# Patient Record
Sex: MALE | Race: WHITE | NOT HISPANIC OR LATINO
[De-identification: names, ages, dates, MRNs, and addresses within clinical notes are randomized per-mention and may not be internally consistent; named-entity substitution may affect disease eponyms.]

---

## 2014-11-28 RX ORDER — DOCUSATE SODIUM 100 MG
1 CAPSULE ORAL
Qty: 0 | Refills: 0 | DISCHARGE
Start: 2014-11-28

## 2016-12-01 RX ORDER — ACETAMINOPHEN 500 MG
2 TABLET ORAL
Qty: 0 | Refills: 0 | DISCHARGE
Start: 2016-12-01

## 2016-12-01 RX ORDER — ASPIRIN/CALCIUM CARB/MAGNESIUM 324 MG
1 TABLET ORAL
Qty: 0 | Refills: 0 | DISCHARGE
Start: 2016-12-01

## 2017-01-03 ENCOUNTER — APPOINTMENT (OUTPATIENT)
Dept: ORTHOPEDIC SURGERY | Facility: CLINIC | Age: 72
End: 2017-01-03

## 2017-01-03 RX ORDER — DULOXETINE HYDROCHLORIDE 60 MG/1
60 CAPSULE, DELAYED RELEASE PELLETS ORAL
Qty: 90 | Refills: 0 | Status: ACTIVE | COMMUNITY
Start: 2016-08-26

## 2017-01-03 RX ORDER — NEPAFENAC 3 MG/ML
0.3 SUSPENSION/ DROPS OPHTHALMIC
Qty: 2 | Refills: 0 | Status: ACTIVE | COMMUNITY
Start: 2016-08-05

## 2017-01-11 ENCOUNTER — APPOINTMENT (OUTPATIENT)
Dept: ORTHOPEDIC SURGERY | Facility: CLINIC | Age: 72
End: 2017-01-11

## 2017-01-25 ENCOUNTER — APPOINTMENT (OUTPATIENT)
Dept: ORTHOPEDIC SURGERY | Facility: CLINIC | Age: 72
End: 2017-01-25

## 2017-02-08 ENCOUNTER — APPOINTMENT (OUTPATIENT)
Dept: ORTHOPEDIC SURGERY | Facility: CLINIC | Age: 72
End: 2017-02-08

## 2017-04-18 ENCOUNTER — MEDICATION RENEWAL (OUTPATIENT)
Age: 72
End: 2017-04-18

## 2017-05-03 ENCOUNTER — APPOINTMENT (OUTPATIENT)
Dept: ORTHOPEDIC SURGERY | Facility: CLINIC | Age: 72
End: 2017-05-03

## 2017-05-03 VITALS — WEIGHT: 188 LBS | BODY MASS INDEX: 28.49 KG/M2 | HEIGHT: 68 IN

## 2017-05-03 DIAGNOSIS — M25.559 PAIN IN UNSPECIFIED HIP: ICD-10-CM

## 2017-06-13 ENCOUNTER — APPOINTMENT (OUTPATIENT)
Dept: PULMONOLOGY | Facility: CLINIC | Age: 72
End: 2017-06-13

## 2017-06-13 VITALS
RESPIRATION RATE: 17 BRPM | WEIGHT: 188 LBS | BODY MASS INDEX: 28.49 KG/M2 | HEART RATE: 85 BPM | DIASTOLIC BLOOD PRESSURE: 70 MMHG | SYSTOLIC BLOOD PRESSURE: 130 MMHG | OXYGEN SATURATION: 100 % | HEIGHT: 68 IN

## 2017-06-13 DIAGNOSIS — Z78.9 OTHER SPECIFIED HEALTH STATUS: ICD-10-CM

## 2017-06-13 DIAGNOSIS — Z56.0 UNEMPLOYMENT, UNSPECIFIED: ICD-10-CM

## 2017-06-13 DIAGNOSIS — Z82.69 FAMILY HISTORY OF OTHER DISEASES OF THE MUSCULOSKELETAL SYSTEM AND CONNECTIVE TISSUE: ICD-10-CM

## 2017-06-13 DIAGNOSIS — F19.90 OTHER PSYCHOACTIVE SUBSTANCE USE, UNSPECIFIED, UNCOMPLICATED: ICD-10-CM

## 2017-06-13 DIAGNOSIS — Z77.22 CONTACT WITH AND (SUSPECTED) EXPOSURE TO ENVIRONMENTAL TOBACCO SMOKE (ACUTE) (CHRONIC): ICD-10-CM

## 2017-06-13 DIAGNOSIS — Z60.2 PROBLEMS RELATED TO LIVING ALONE: ICD-10-CM

## 2017-06-13 DIAGNOSIS — Z80.9 FAMILY HISTORY OF MALIGNANT NEOPLASM, UNSPECIFIED: ICD-10-CM

## 2017-06-13 RX ORDER — PREDNISONE 5 MG/1
5 TABLET ORAL
Qty: 50 | Refills: 0 | Status: DISCONTINUED | COMMUNITY
Start: 2016-09-19 | End: 2017-06-13

## 2017-06-13 RX ORDER — MONTELUKAST SODIUM 10 MG/1
10 TABLET, FILM COATED ORAL
Qty: 1 | Refills: 1 | Status: ACTIVE | COMMUNITY
Start: 2017-06-13 | End: 1900-01-01

## 2017-06-13 RX ORDER — BESIFLOXACIN 6 MG/ML
0.6 SUSPENSION OPHTHALMIC
Qty: 5 | Refills: 0 | Status: DISCONTINUED | COMMUNITY
Start: 2016-07-01 | End: 2017-06-13

## 2017-06-13 RX ORDER — DULOXETINE HYDROCHLORIDE 60 MG/1
60 CAPSULE, DELAYED RELEASE PELLETS ORAL
Qty: 90 | Refills: 0 | Status: DISCONTINUED | COMMUNITY
Start: 2016-08-26 | End: 2017-06-13

## 2017-06-13 RX ORDER — CLINDAMYCIN HYDROCHLORIDE 300 MG/1
300 CAPSULE ORAL 3 TIMES DAILY
Qty: 30 | Refills: 0 | Status: DISCONTINUED | COMMUNITY
Start: 2017-01-03 | End: 2017-06-13

## 2017-06-13 SDOH — ECONOMIC STABILITY - INCOME SECURITY: UNEMPLOYMENT, UNSPECIFIED: Z56.0

## 2017-06-13 SDOH — SOCIAL STABILITY - SOCIAL INSECURITY: PROBLEMS RELATED TO LIVING ALONE: Z60.2

## 2017-07-11 ENCOUNTER — MEDICATION RENEWAL (OUTPATIENT)
Age: 72
End: 2017-07-11

## 2017-07-27 ENCOUNTER — RX RENEWAL (OUTPATIENT)
Age: 72
End: 2017-07-27

## 2017-07-31 ENCOUNTER — APPOINTMENT (OUTPATIENT)
Dept: PULMONOLOGY | Facility: CLINIC | Age: 72
End: 2017-07-31
Payer: MEDICARE

## 2017-07-31 VITALS
OXYGEN SATURATION: 100 % | HEART RATE: 68 BPM | BODY MASS INDEX: 21.22 KG/M2 | WEIGHT: 140 LBS | HEIGHT: 68 IN | RESPIRATION RATE: 16 BRPM

## 2017-07-31 VITALS — WEIGHT: 188 LBS | SYSTOLIC BLOOD PRESSURE: 140 MMHG | DIASTOLIC BLOOD PRESSURE: 80 MMHG | BODY MASS INDEX: 28.59 KG/M2

## 2017-07-31 PROCEDURE — 94010 BREATHING CAPACITY TEST: CPT

## 2017-07-31 PROCEDURE — 99214 OFFICE O/P EST MOD 30 MIN: CPT | Mod: 25

## 2017-08-02 ENCOUNTER — RESULT REVIEW (OUTPATIENT)
Age: 72
End: 2017-08-02

## 2017-08-10 ENCOUNTER — MEDICATION RENEWAL (OUTPATIENT)
Age: 72
End: 2017-08-10

## 2017-08-10 RX ORDER — TIOTROPIUM BROMIDE 18 UG/1
18 CAPSULE ORAL; RESPIRATORY (INHALATION)
Qty: 3 | Refills: 1 | Status: DISCONTINUED | COMMUNITY
End: 2017-08-10

## 2017-08-15 RX ORDER — FLUTICASONE FUROATE 200 UG/1
200 POWDER RESPIRATORY (INHALATION)
Qty: 3 | Refills: 1 | Status: ACTIVE | COMMUNITY
Start: 2017-08-15 | End: 1900-01-01

## 2017-08-15 RX ORDER — BECLOMETHASONE DIPROPIONATE 80 UG/1
80 AEROSOL, METERED RESPIRATORY (INHALATION) TWICE DAILY
Qty: 3 | Refills: 1 | Status: DISCONTINUED | COMMUNITY
Start: 2017-08-10 | End: 2017-08-15

## 2017-09-05 ENCOUNTER — EMERGENCY (EMERGENCY)
Facility: HOSPITAL | Age: 72
LOS: 1 days | Discharge: HOME | End: 2017-09-05
Attending: EMERGENCY MEDICINE | Admitting: EMERGENCY MEDICINE
Payer: MEDICARE

## 2017-09-05 VITALS — TEMPERATURE: 99 F | OXYGEN SATURATION: 96 % | RESPIRATION RATE: 18 BRPM | HEART RATE: 65 BPM

## 2017-09-05 DIAGNOSIS — M25.561 PAIN IN RIGHT KNEE: ICD-10-CM

## 2017-09-05 DIAGNOSIS — Z96.60 PRESENCE OF UNSPECIFIED ORTHOPEDIC JOINT IMPLANT: Chronic | ICD-10-CM

## 2017-09-05 DIAGNOSIS — G56.01 CARPAL TUNNEL SYNDROME, RIGHT UPPER LIMB: Chronic | ICD-10-CM

## 2017-09-05 DIAGNOSIS — Z98.89 OTHER SPECIFIED POSTPROCEDURAL STATES: Chronic | ICD-10-CM

## 2017-09-05 PROCEDURE — 99284 EMERGENCY DEPT VISIT MOD MDM: CPT | Mod: 25

## 2017-09-05 PROCEDURE — 99284 EMERGENCY DEPT VISIT MOD MDM: CPT

## 2017-09-05 PROCEDURE — 73564 X-RAY EXAM KNEE 4 OR MORE: CPT

## 2017-09-05 PROCEDURE — 73130 X-RAY EXAM OF HAND: CPT | Mod: 26,RT

## 2017-09-05 PROCEDURE — 73110 X-RAY EXAM OF WRIST: CPT

## 2017-09-05 PROCEDURE — 73130 X-RAY EXAM OF HAND: CPT

## 2017-09-05 PROCEDURE — 73110 X-RAY EXAM OF WRIST: CPT | Mod: 26,RT

## 2017-09-05 PROCEDURE — 73564 X-RAY EXAM KNEE 4 OR MORE: CPT | Mod: 26,RT
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
## 2017-09-05 NOTE — ED PROVIDER NOTE - OBJECTIVE STATEMENT
71 [facility] male [facility] who presents to the ED following fall last night in driveway. patient reports was trying to move a baby gate that got caught in a tree right sided fall onto the knee and right hand. no headstrike or LOC. ambulatory all day but has persistent hand and wrist pain as well as mild knee pain. ambulatory with pain. no numbness/tingling. tdap  UTD

## 2017-09-05 NOTE — ED PROVIDER NOTE - CARE PLAN
Principal Discharge DX:	Knee pain Principal Discharge DX:	Knee pain  Instructions for follow-up, activity and diet:	Follow up with your Primary Care Physician within the next 2-3 days  Bring a copy of your test results with you to your appointment  Continue your current medication regimen  Return to the Emergency Room if you experience new or worsening symptoms   Take Motrin 400-600mg every 6 hrs as needed for pain. Take with food   Take Tylenol 650mg every 6 hrs as needed for pain.  Rest. Apply cold pack for 20 minutes multiple times daily. Elevate.

## 2017-09-05 NOTE — ED ADULT NURSE NOTE - PSH
Carpal tunnel syndrome on both sides  2014  History of lithotripsy  2012  Previous back surgery  L1-L5   June 2014 first surgery got infected; + MRSA  antibiotic given  July 2014 incision and drainage of infected wound  S/P left knee arthroscopy  2012  S/P right knee arthroscopy  1997  S/P tonsillectomy and adenoidectomy  childhood  Status post right hip replacement  1995

## 2017-09-05 NOTE — ED PROVIDER NOTE - PLAN OF CARE
Follow up with your Primary Care Physician within the next 2-3 days  Bring a copy of your test results with you to your appointment  Continue your current medication regimen  Return to the Emergency Room if you experience new or worsening symptoms   Take Motrin 400-600mg every 6 hrs as needed for pain. Take with food   Take Tylenol 650mg every 6 hrs as needed for pain.  Rest. Apply cold pack for 20 minutes multiple times daily. Elevate.

## 2017-09-05 NOTE — ED PROVIDER NOTE - ATTENDING CONTRIBUTION TO CARE
Attending MD Gomez:   I personally have seen and examined this patient.  Physician assistant note reviewed and agree on plan of care and except where noted.  See below for details.    72M with extensive PMH including COPD, GERD presents to the ED s/p fall yesterday.  Reports that he was trying to get a babygate but it was caught on a bush so when he went to move it, he was thrown off balance and fell onto his R side.  Reports landed on his R wrist/hand/knee. Denies preceding dizziness, weakness, sensory changes, change in vision.  Denies LOC. Denies hitting head.  Reports decided to come in because of persistent pain.  Reports ambulated immediately after incident.  Denies numbness/weakness.  Reports Tetanus UTD.  On exam, NAD, head NCAT, PERRL, Cn2-12 grossly intact, FROM at neck, no tenderness to palpation or stepoffs along length of spine, lungs CTAB with good inspiratory effort, +S1S2, no m/r/g, abdomen soft with +BS, NT, ND, no CVAT, moving all extremities with 5/5 strength bilateral upper and lower extremities, good and equal  strength bilaterally, +2 radials, +tenderness to palpation along R dorsal hand at proximal 3rd metacarpal, scattered ecchymosis over hand and forearm,  well healed surgical scars on bilateral knees, reports pain with ROM of wrist (in any direction) but no tenderness to palpation, no anatomic snuffbox tenderness, no tenderness with axial loading of R thumb, abrasion to R knee and R wrist, no surrounding erythema, no purulence, no active bleeding; A/P: 72M s/p fall now with MSK pain, will obtain XRs of R hand and wrist to r/o fracture and R knee

## 2017-09-05 NOTE — ED ADULT NURSE NOTE - OBJECTIVE STATEMENT
Trip and fall in driveway and fell in his right wrist and hand pain.  Pt also has right knee pain that radiates up to the thigh [place]

## 2017-09-11 ENCOUNTER — APPOINTMENT (OUTPATIENT)
Dept: UROLOGY | Facility: CLINIC | Age: 72
End: 2017-09-11
Payer: MEDICARE

## 2017-09-11 ENCOUNTER — FORM ENCOUNTER (OUTPATIENT)
Age: 72
End: 2017-09-11

## 2017-09-11 VITALS
HEART RATE: 70 BPM | DIASTOLIC BLOOD PRESSURE: 79 MMHG | TEMPERATURE: 98.9 F | SYSTOLIC BLOOD PRESSURE: 136 MMHG | RESPIRATION RATE: 16 BRPM

## 2017-09-11 PROCEDURE — 99213 OFFICE O/P EST LOW 20 MIN: CPT

## 2017-09-12 ENCOUNTER — OUTPATIENT (OUTPATIENT)
Dept: OUTPATIENT SERVICES | Facility: HOSPITAL | Age: 72
LOS: 1 days | End: 2017-09-12
Payer: MEDICARE

## 2017-09-12 ENCOUNTER — APPOINTMENT (OUTPATIENT)
Dept: CT IMAGING | Facility: CLINIC | Age: 72
End: 2017-09-12
Payer: MEDICARE

## 2017-09-12 DIAGNOSIS — Z98.89 OTHER SPECIFIED POSTPROCEDURAL STATES: Chronic | ICD-10-CM

## 2017-09-12 DIAGNOSIS — Z00.8 ENCOUNTER FOR OTHER GENERAL EXAMINATION: ICD-10-CM

## 2017-09-12 DIAGNOSIS — G56.01 CARPAL TUNNEL SYNDROME, RIGHT UPPER LIMB: Chronic | ICD-10-CM

## 2017-09-12 DIAGNOSIS — Z96.60 PRESENCE OF UNSPECIFIED ORTHOPEDIC JOINT IMPLANT: Chronic | ICD-10-CM

## 2017-09-12 PROCEDURE — 71250 CT THORAX DX C-: CPT | Mod: 26

## 2017-09-12 PROCEDURE — 71250 CT THORAX DX C-: CPT

## 2017-09-14 ENCOUNTER — MEDICATION RENEWAL (OUTPATIENT)
Age: 72
End: 2017-09-14

## 2017-09-14 ENCOUNTER — RX RENEWAL (OUTPATIENT)
Age: 72
End: 2017-09-14

## 2017-09-25 ENCOUNTER — RX RENEWAL (OUTPATIENT)
Age: 72
End: 2017-09-25

## 2017-09-27 ENCOUNTER — NON-APPOINTMENT (OUTPATIENT)
Age: 72
End: 2017-09-27

## 2017-09-27 ENCOUNTER — APPOINTMENT (OUTPATIENT)
Dept: CARDIOLOGY | Facility: CLINIC | Age: 72
End: 2017-09-27
Payer: MEDICARE

## 2017-09-27 VITALS — SYSTOLIC BLOOD PRESSURE: 124 MMHG | DIASTOLIC BLOOD PRESSURE: 80 MMHG

## 2017-09-27 VITALS
WEIGHT: 193.8 LBS | HEIGHT: 68 IN | HEART RATE: 76 BPM | OXYGEN SATURATION: 99 % | DIASTOLIC BLOOD PRESSURE: 87 MMHG | SYSTOLIC BLOOD PRESSURE: 158 MMHG | BODY MASS INDEX: 29.37 KG/M2

## 2017-09-27 PROCEDURE — 93000 ELECTROCARDIOGRAM COMPLETE: CPT

## 2017-09-27 PROCEDURE — 99215 OFFICE O/P EST HI 40 MIN: CPT

## 2017-09-29 ENCOUNTER — MEDICATION RENEWAL (OUTPATIENT)
Age: 72
End: 2017-09-29

## 2017-10-04 ENCOUNTER — APPOINTMENT (OUTPATIENT)
Dept: ORTHOPEDIC SURGERY | Facility: CLINIC | Age: 72
End: 2017-10-04
Payer: MEDICARE

## 2017-10-04 PROCEDURE — 99213 OFFICE O/P EST LOW 20 MIN: CPT

## 2017-10-04 PROCEDURE — 72170 X-RAY EXAM OF PELVIS: CPT

## 2017-10-13 ENCOUNTER — APPOINTMENT (OUTPATIENT)
Dept: THORACIC SURGERY | Facility: CLINIC | Age: 72
End: 2017-10-13
Payer: MEDICARE

## 2017-10-13 ENCOUNTER — OUTPATIENT (OUTPATIENT)
Dept: OUTPATIENT SERVICES | Facility: HOSPITAL | Age: 72
LOS: 1 days | End: 2017-10-13
Payer: MEDICARE

## 2017-10-13 VITALS
SYSTOLIC BLOOD PRESSURE: 148 MMHG | HEIGHT: 68 IN | HEART RATE: 86 BPM | BODY MASS INDEX: 29.55 KG/M2 | WEIGHT: 195 LBS | RESPIRATION RATE: 18 BRPM | TEMPERATURE: 97.3 F | OXYGEN SATURATION: 96 % | DIASTOLIC BLOOD PRESSURE: 68 MMHG

## 2017-10-13 DIAGNOSIS — Z98.89 OTHER SPECIFIED POSTPROCEDURAL STATES: Chronic | ICD-10-CM

## 2017-10-13 DIAGNOSIS — Z96.60 PRESENCE OF UNSPECIFIED ORTHOPEDIC JOINT IMPLANT: Chronic | ICD-10-CM

## 2017-10-13 DIAGNOSIS — G56.01 CARPAL TUNNEL SYNDROME, RIGHT UPPER LIMB: Chronic | ICD-10-CM

## 2017-10-13 DIAGNOSIS — Z01.818 ENCOUNTER FOR OTHER PREPROCEDURAL EXAMINATION: ICD-10-CM

## 2017-10-13 LAB
ALBUMIN SERPL ELPH-MCNC: 4.4 G/DL — SIGNIFICANT CHANGE UP (ref 3.3–5)
ALP SERPL-CCNC: 107 U/L — SIGNIFICANT CHANGE UP (ref 40–120)
ALT FLD-CCNC: 12 U/L — SIGNIFICANT CHANGE UP (ref 10–45)
ANION GAP SERPL CALC-SCNC: 12 MMOL/L — SIGNIFICANT CHANGE UP (ref 5–17)
APPEARANCE UR: CLEAR — SIGNIFICANT CHANGE UP
APTT BLD: 32.4 SEC — SIGNIFICANT CHANGE UP (ref 27.5–37.4)
AST SERPL-CCNC: 13 U/L — SIGNIFICANT CHANGE UP (ref 10–40)
BASOPHILS NFR BLD AUTO: 0.6 % — SIGNIFICANT CHANGE UP (ref 0–2)
BILIRUB SERPL-MCNC: 0.3 MG/DL — SIGNIFICANT CHANGE UP (ref 0.2–1.2)
BILIRUB UR-MCNC: NEGATIVE — SIGNIFICANT CHANGE UP
BLD GP AB SCN SERPL QL: NEGATIVE — SIGNIFICANT CHANGE UP
BUN SERPL-MCNC: 16 MG/DL — SIGNIFICANT CHANGE UP (ref 7–23)
CALCIUM SERPL-MCNC: 9.2 MG/DL — SIGNIFICANT CHANGE UP (ref 8.4–10.5)
CHLORIDE SERPL-SCNC: 100 MMOL/L — SIGNIFICANT CHANGE UP (ref 96–108)
CHOLEST SERPL-MCNC: 229 MG/DL — HIGH (ref 10–199)
CO2 SERPL-SCNC: 27 MMOL/L — SIGNIFICANT CHANGE UP (ref 22–31)
COLOR SPEC: YELLOW — SIGNIFICANT CHANGE UP
CREAT SERPL-MCNC: 0.87 MG/DL — SIGNIFICANT CHANGE UP (ref 0.5–1.3)
DIFF PNL FLD: NEGATIVE — SIGNIFICANT CHANGE UP
EOSINOPHIL NFR BLD AUTO: 2.5 % — SIGNIFICANT CHANGE UP (ref 0–6)
GLUCOSE SERPL-MCNC: 88 MG/DL — SIGNIFICANT CHANGE UP (ref 70–99)
GLUCOSE UR QL: NEGATIVE — SIGNIFICANT CHANGE UP
HBA1C BLD-MCNC: 5.1 % — SIGNIFICANT CHANGE UP (ref 4–5.6)
HBV SURFACE AG SER-ACNC: SIGNIFICANT CHANGE UP
HCT VFR BLD CALC: 39.7 % — SIGNIFICANT CHANGE UP (ref 39–50)
HDLC SERPL-MCNC: 74 MG/DL — SIGNIFICANT CHANGE UP (ref 40–125)
HGB BLD-MCNC: 13.1 G/DL — SIGNIFICANT CHANGE UP (ref 13–17)
INR BLD: 0.92 — SIGNIFICANT CHANGE UP (ref 0.88–1.16)
KETONES UR-MCNC: NEGATIVE — SIGNIFICANT CHANGE UP
LEUKOCYTE ESTERASE UR-ACNC: NEGATIVE — SIGNIFICANT CHANGE UP
LIPID PNL WITH DIRECT LDL SERPL: 117 MG/DL — SIGNIFICANT CHANGE UP
LYMPHOCYTES # BLD AUTO: 23.7 % — SIGNIFICANT CHANGE UP (ref 13–44)
MCHC RBC-ENTMCNC: 30.8 PG — SIGNIFICANT CHANGE UP (ref 27–34)
MCHC RBC-ENTMCNC: 33 G/DL — SIGNIFICANT CHANGE UP (ref 32–36)
MCV RBC AUTO: 93.2 FL — SIGNIFICANT CHANGE UP (ref 80–100)
MONOCYTES NFR BLD AUTO: 7.4 % — SIGNIFICANT CHANGE UP (ref 2–14)
NEUTROPHILS NFR BLD AUTO: 65.8 % — SIGNIFICANT CHANGE UP (ref 43–77)
NITRITE UR-MCNC: NEGATIVE — SIGNIFICANT CHANGE UP
PH UR: 5.5 — SIGNIFICANT CHANGE UP (ref 5–8)
PLAT MORPH BLD: NORMAL — SIGNIFICANT CHANGE UP
PLATELET # BLD AUTO: 282 K/UL — SIGNIFICANT CHANGE UP (ref 150–400)
POTASSIUM SERPL-MCNC: 4 MMOL/L — SIGNIFICANT CHANGE UP (ref 3.5–5.3)
POTASSIUM SERPL-SCNC: 4 MMOL/L — SIGNIFICANT CHANGE UP (ref 3.5–5.3)
PROT SERPL-MCNC: 7.5 G/DL — SIGNIFICANT CHANGE UP (ref 6–8.3)
PROT UR-MCNC: NEGATIVE MG/DL — SIGNIFICANT CHANGE UP
PROTHROM AB SERPL-ACNC: 10.2 SEC — SIGNIFICANT CHANGE UP (ref 9.8–12.7)
RBC # BLD: 4.26 M/UL — SIGNIFICANT CHANGE UP (ref 4.2–5.8)
RBC # FLD: 13.1 % — SIGNIFICANT CHANGE UP (ref 10.3–16.9)
RBC BLD AUTO: NORMAL — SIGNIFICANT CHANGE UP
RH IG SCN BLD-IMP: POSITIVE — SIGNIFICANT CHANGE UP
SODIUM SERPL-SCNC: 139 MMOL/L — SIGNIFICANT CHANGE UP (ref 135–145)
SP GR SPEC: 1.01 — SIGNIFICANT CHANGE UP (ref 1–1.03)
TOTAL CHOLESTEROL/HDL RATIO MEASUREMENT: 3.1 RATIO — LOW (ref 3.4–9.6)
TRIGL SERPL-MCNC: 190 MG/DL — HIGH (ref 10–149)
TSH SERPL-MCNC: 1.53 UIU/ML — SIGNIFICANT CHANGE UP (ref 0.35–4.94)
UROBILINOGEN FLD QL: 0.2 E.U./DL — SIGNIFICANT CHANGE UP
WBC # BLD: 6.7 K/UL — SIGNIFICANT CHANGE UP (ref 3.8–10.5)
WBC # FLD AUTO: 6.7 K/UL — SIGNIFICANT CHANGE UP (ref 3.8–10.5)

## 2017-10-13 PROCEDURE — 93010 ELECTROCARDIOGRAM REPORT: CPT

## 2017-10-13 PROCEDURE — 87340 HEPATITIS B SURFACE AG IA: CPT

## 2017-10-13 PROCEDURE — 85730 THROMBOPLASTIN TIME PARTIAL: CPT

## 2017-10-13 PROCEDURE — 93005 ELECTROCARDIOGRAM TRACING: CPT

## 2017-10-13 PROCEDURE — 81003 URINALYSIS AUTO W/O SCOPE: CPT

## 2017-10-13 PROCEDURE — 85610 PROTHROMBIN TIME: CPT

## 2017-10-13 PROCEDURE — 86901 BLOOD TYPING SEROLOGIC RH(D): CPT

## 2017-10-13 PROCEDURE — 99205 OFFICE O/P NEW HI 60 MIN: CPT

## 2017-10-13 PROCEDURE — 80053 COMPREHEN METABOLIC PANEL: CPT

## 2017-10-13 PROCEDURE — 80061 LIPID PANEL: CPT

## 2017-10-13 PROCEDURE — 86850 RBC ANTIBODY SCREEN: CPT

## 2017-10-13 PROCEDURE — 86900 BLOOD TYPING SEROLOGIC ABO: CPT

## 2017-10-13 PROCEDURE — 84443 ASSAY THYROID STIM HORMONE: CPT

## 2017-10-13 PROCEDURE — 83036 HEMOGLOBIN GLYCOSYLATED A1C: CPT

## 2017-10-13 PROCEDURE — 85025 COMPLETE CBC W/AUTO DIFF WBC: CPT

## 2017-10-15 ENCOUNTER — RX RENEWAL (OUTPATIENT)
Age: 72
End: 2017-10-15

## 2017-10-15 ENCOUNTER — CLINICAL ADVICE (OUTPATIENT)
Age: 72
End: 2017-10-15

## 2017-10-19 ENCOUNTER — MEDICATION RENEWAL (OUTPATIENT)
Age: 72
End: 2017-10-19

## 2017-10-23 ENCOUNTER — OUTPATIENT (OUTPATIENT)
Dept: OUTPATIENT SERVICES | Facility: HOSPITAL | Age: 72
LOS: 1 days | Discharge: HOME | End: 2017-10-23
Payer: MEDICARE

## 2017-10-23 ENCOUNTER — APPOINTMENT (OUTPATIENT)
Dept: THORACIC SURGERY | Facility: HOSPITAL | Age: 72
End: 2017-10-23

## 2017-10-23 DIAGNOSIS — Z98.89 OTHER SPECIFIED POSTPROCEDURAL STATES: Chronic | ICD-10-CM

## 2017-10-23 DIAGNOSIS — G56.01 CARPAL TUNNEL SYNDROME, RIGHT UPPER LIMB: Chronic | ICD-10-CM

## 2017-10-23 DIAGNOSIS — Z96.60 PRESENCE OF UNSPECIFIED ORTHOPEDIC JOINT IMPLANT: Chronic | ICD-10-CM

## 2017-10-23 PROCEDURE — 31645 BRNCHSC W/THER ASPIR 1ST: CPT

## 2017-10-23 PROCEDURE — 31622 DX BRONCHOSCOPE/WASH: CPT

## 2017-10-26 DIAGNOSIS — Z88.1 ALLERGY STATUS TO OTHER ANTIBIOTIC AGENTS STATUS: ICD-10-CM

## 2017-10-26 DIAGNOSIS — J98.09 OTHER DISEASES OF BRONCHUS, NOT ELSEWHERE CLASSIFIED: ICD-10-CM

## 2017-10-26 DIAGNOSIS — J44.9 CHRONIC OBSTRUCTIVE PULMONARY DISEASE, UNSPECIFIED: ICD-10-CM

## 2017-10-26 DIAGNOSIS — Z96.652 PRESENCE OF LEFT ARTIFICIAL KNEE JOINT: ICD-10-CM

## 2017-10-26 DIAGNOSIS — M10.9 GOUT, UNSPECIFIED: ICD-10-CM

## 2017-10-26 DIAGNOSIS — Z96.643 PRESENCE OF ARTIFICIAL HIP JOINT, BILATERAL: ICD-10-CM

## 2017-10-26 DIAGNOSIS — N40.0 BENIGN PROSTATIC HYPERPLASIA WITHOUT LOWER URINARY TRACT SYMPTOMS: ICD-10-CM

## 2017-10-26 DIAGNOSIS — K21.9 GASTRO-ESOPHAGEAL REFLUX DISEASE WITHOUT ESOPHAGITIS: ICD-10-CM

## 2017-10-26 DIAGNOSIS — Z88.0 ALLERGY STATUS TO PENICILLIN: ICD-10-CM

## 2017-10-26 DIAGNOSIS — J45.909 UNSPECIFIED ASTHMA, UNCOMPLICATED: ICD-10-CM

## 2017-10-26 DIAGNOSIS — J39.8 OTHER SPECIFIED DISEASES OF UPPER RESPIRATORY TRACT: ICD-10-CM

## 2017-10-26 DIAGNOSIS — Z87.891 PERSONAL HISTORY OF NICOTINE DEPENDENCE: ICD-10-CM

## 2017-10-27 ENCOUNTER — RX RENEWAL (OUTPATIENT)
Age: 72
End: 2017-10-27

## 2017-10-27 RX ORDER — MOMETASONE 50 UG/1
50 SPRAY, METERED NASAL
Qty: 51 | Refills: 3 | Status: ACTIVE | COMMUNITY
Start: 2016-08-19 | End: 1900-01-01

## 2017-10-31 ENCOUNTER — APPOINTMENT (OUTPATIENT)
Dept: PULMONOLOGY | Facility: CLINIC | Age: 72
End: 2017-10-31
Payer: MEDICARE

## 2017-10-31 PROCEDURE — 94010 BREATHING CAPACITY TEST: CPT

## 2017-10-31 PROCEDURE — 99214 OFFICE O/P EST MOD 30 MIN: CPT | Mod: 25

## 2017-10-31 RX ORDER — TIZANIDINE 4 MG/1
4 TABLET ORAL
Qty: 90 | Refills: 0 | Status: ACTIVE | COMMUNITY
Start: 2017-04-25

## 2017-11-02 ENCOUNTER — MESSAGE (OUTPATIENT)
Age: 72
End: 2017-11-02

## 2017-11-03 ENCOUNTER — APPOINTMENT (OUTPATIENT)
Dept: THORACIC SURGERY | Facility: CLINIC | Age: 72
End: 2017-11-03
Payer: MEDICARE

## 2017-11-03 ENCOUNTER — RX RENEWAL (OUTPATIENT)
Age: 72
End: 2017-11-03

## 2017-11-03 VITALS
RESPIRATION RATE: 18 BRPM | BODY MASS INDEX: 30.71 KG/M2 | SYSTOLIC BLOOD PRESSURE: 154 MMHG | WEIGHT: 202 LBS | HEART RATE: 75 BPM | TEMPERATURE: 97.7 F | DIASTOLIC BLOOD PRESSURE: 72 MMHG | OXYGEN SATURATION: 97 %

## 2017-11-03 PROCEDURE — 99214 OFFICE O/P EST MOD 30 MIN: CPT

## 2017-11-06 ENCOUNTER — RX RENEWAL (OUTPATIENT)
Age: 72
End: 2017-11-06

## 2017-11-15 ENCOUNTER — APPOINTMENT (OUTPATIENT)
Dept: SPINE | Facility: CLINIC | Age: 72
End: 2017-11-15
Payer: MEDICARE

## 2017-11-15 VITALS
SYSTOLIC BLOOD PRESSURE: 144 MMHG | BODY MASS INDEX: 30.62 KG/M2 | DIASTOLIC BLOOD PRESSURE: 80 MMHG | WEIGHT: 202 LBS | HEIGHT: 68 IN

## 2017-11-15 PROCEDURE — 99214 OFFICE O/P EST MOD 30 MIN: CPT

## 2017-12-07 ENCOUNTER — RX RENEWAL (OUTPATIENT)
Age: 72
End: 2017-12-07

## 2017-12-12 ENCOUNTER — APPOINTMENT (OUTPATIENT)
Dept: ORTHOPEDIC SURGERY | Facility: CLINIC | Age: 72
End: 2017-12-12
Payer: MEDICARE

## 2017-12-12 VITALS
BODY MASS INDEX: 29.86 KG/M2 | DIASTOLIC BLOOD PRESSURE: 81 MMHG | HEIGHT: 68 IN | WEIGHT: 197 LBS | HEART RATE: 78 BPM | SYSTOLIC BLOOD PRESSURE: 157 MMHG

## 2017-12-12 DIAGNOSIS — M25.561 PAIN IN RIGHT KNEE: ICD-10-CM

## 2017-12-12 DIAGNOSIS — G89.29 PAIN IN RIGHT KNEE: ICD-10-CM

## 2017-12-12 PROCEDURE — 99214 OFFICE O/P EST MOD 30 MIN: CPT

## 2017-12-12 PROCEDURE — 73564 X-RAY EXAM KNEE 4 OR MORE: CPT | Mod: RT

## 2017-12-18 ENCOUNTER — MEDICATION RENEWAL (OUTPATIENT)
Age: 72
End: 2017-12-18

## 2017-12-19 ENCOUNTER — MEDICATION RENEWAL (OUTPATIENT)
Age: 72
End: 2017-12-19

## 2017-12-19 RX ORDER — ALBUTEROL SULFATE 4 MG/1
4 TABLET ORAL
Qty: 120 | Refills: 1 | Status: DISCONTINUED | COMMUNITY
Start: 2017-12-18 | End: 2017-12-19

## 2017-12-20 ENCOUNTER — APPOINTMENT (OUTPATIENT)
Dept: ORTHOPEDIC SURGERY | Facility: CLINIC | Age: 72
End: 2017-12-20
Payer: MEDICARE

## 2017-12-20 ENCOUNTER — APPOINTMENT (OUTPATIENT)
Dept: CARDIOLOGY | Facility: CLINIC | Age: 72
End: 2017-12-20
Payer: MEDICARE

## 2017-12-20 ENCOUNTER — NON-APPOINTMENT (OUTPATIENT)
Age: 72
End: 2017-12-20

## 2017-12-20 VITALS
BODY MASS INDEX: 29.7 KG/M2 | HEIGHT: 68 IN | WEIGHT: 196 LBS | HEART RATE: 75 BPM | DIASTOLIC BLOOD PRESSURE: 74 MMHG | SYSTOLIC BLOOD PRESSURE: 127 MMHG

## 2017-12-20 VITALS
BODY MASS INDEX: 29.7 KG/M2 | HEART RATE: 82 BPM | HEIGHT: 68 IN | SYSTOLIC BLOOD PRESSURE: 158 MMHG | DIASTOLIC BLOOD PRESSURE: 78 MMHG | WEIGHT: 196 LBS

## 2017-12-20 VITALS — DIASTOLIC BLOOD PRESSURE: 74 MMHG | SYSTOLIC BLOOD PRESSURE: 136 MMHG

## 2017-12-20 VITALS — DIASTOLIC BLOOD PRESSURE: 74 MMHG | SYSTOLIC BLOOD PRESSURE: 132 MMHG

## 2017-12-20 PROCEDURE — 99214 OFFICE O/P EST MOD 30 MIN: CPT | Mod: 25

## 2017-12-20 PROCEDURE — 20610 DRAIN/INJ JOINT/BURSA W/O US: CPT | Mod: RT

## 2017-12-20 PROCEDURE — 99214 OFFICE O/P EST MOD 30 MIN: CPT

## 2017-12-20 PROCEDURE — 73502 X-RAY EXAM HIP UNI 2-3 VIEWS: CPT | Mod: LT

## 2017-12-20 PROCEDURE — 93000 ELECTROCARDIOGRAM COMPLETE: CPT

## 2017-12-29 ENCOUNTER — LABORATORY RESULT (OUTPATIENT)
Age: 72
End: 2017-12-29

## 2017-12-29 ENCOUNTER — APPOINTMENT (OUTPATIENT)
Dept: PULMONOLOGY | Facility: CLINIC | Age: 72
End: 2017-12-29
Payer: MEDICARE

## 2017-12-29 VITALS
WEIGHT: 195 LBS | SYSTOLIC BLOOD PRESSURE: 124 MMHG | OXYGEN SATURATION: 98 % | HEART RATE: 79 BPM | HEIGHT: 68 IN | DIASTOLIC BLOOD PRESSURE: 82 MMHG | BODY MASS INDEX: 29.55 KG/M2

## 2017-12-29 PROCEDURE — 95012 NITRIC OXIDE EXP GAS DETER: CPT

## 2017-12-29 PROCEDURE — 99214 OFFICE O/P EST MOD 30 MIN: CPT | Mod: 25

## 2017-12-29 PROCEDURE — 94010 BREATHING CAPACITY TEST: CPT

## 2017-12-29 PROCEDURE — 94729 DIFFUSING CAPACITY: CPT

## 2017-12-30 ENCOUNTER — RX RENEWAL (OUTPATIENT)
Age: 72
End: 2017-12-30

## 2017-12-30 LAB
24R-OH-CALCIDIOL SERPL-MCNC: 56.5 PG/ML
25(OH)D3 SERPL-MCNC: 47.1 NG/ML
BASOPHILS # BLD AUTO: 0.02 K/UL
BASOPHILS NFR BLD AUTO: 0.2 %
EOSINOPHIL # BLD AUTO: 0.15 K/UL
EOSINOPHIL NFR BLD AUTO: 1.8 %
HCT VFR BLD CALC: 39.4 %
HGB BLD-MCNC: 12.7 G/DL
IGE SER-MCNC: 127 IU/ML
IMM GRANULOCYTES NFR BLD AUTO: 0.9 %
LYMPHOCYTES # BLD AUTO: 1.47 K/UL
LYMPHOCYTES NFR BLD AUTO: 17.3 %
MAN DIFF?: NORMAL
MCHC RBC-ENTMCNC: 30.8 PG
MCHC RBC-ENTMCNC: 32.2 GM/DL
MCV RBC AUTO: 95.6 FL
MONOCYTES # BLD AUTO: 0.53 K/UL
MONOCYTES NFR BLD AUTO: 6.2 %
NEUTROPHILS # BLD AUTO: 6.27 K/UL
NEUTROPHILS NFR BLD AUTO: 73.6 %
PLATELET # BLD AUTO: 302 K/UL
RBC # BLD: 4.12 M/UL
RBC # FLD: 13.6 %
WBC # FLD AUTO: 8.52 K/UL

## 2018-01-05 LAB
A ALTERNATA IGE QN: <0.1 KUA/L
A FUMIGATUS IGE QN: <0.1 KUA/L
C ALBICANS IGE QN: <0.1 KUA/L
C HERBARUM IGE QN: <0.1 KUA/L
CAT DANDER IGE QN: <0.1 KUA/L
CLAM IGE QN: <0.1 KUA/L
CODFISH IGE QN: <0.1 KUA/L
COMMON RAGWEED IGE QN: <0.1 KUA/L
CORN IGE QN: <0.1 KUA/L
COW MILK IGE QN: <0.1 KUA/L
D FARINAE IGE QN: <0.1 KUA/L
D PTERONYSS IGE QN: <0.1 KUA/L
DEPRECATED A ALTERNATA IGE RAST QL: 0
DEPRECATED A FUMIGATUS IGE RAST QL: 0
DEPRECATED C ALBICANS IGE RAST QL: 0
DEPRECATED C HERBARUM IGE RAST QL: 0
DEPRECATED CAT DANDER IGE RAST QL: 0
DEPRECATED CLAM IGE RAST QL: 0
DEPRECATED CODFISH IGE RAST QL: 0
DEPRECATED COMMON RAGWEED IGE RAST QL: 0
DEPRECATED CORN IGE RAST QL: 0
DEPRECATED COW MILK IGE RAST QL: 0
DEPRECATED D FARINAE IGE RAST QL: 0
DEPRECATED D PTERONYSS IGE RAST QL: 0
DEPRECATED DOG DANDER IGE RAST QL: 0
DEPRECATED EGG WHITE IGE RAST QL: 0
DEPRECATED M RACEMOSUS IGE RAST QL: 0
DEPRECATED PEANUT IGE RAST QL: 0
DEPRECATED ROACH IGE RAST QL: 0
DEPRECATED SCALLOP IGE RAST QL: <0.1 KUA/L
DEPRECATED SESAME SEED IGE RAST QL: 0
DEPRECATED SHRIMP IGE RAST QL: 0
DEPRECATED SOYBEAN IGE RAST QL: 0
DEPRECATED TIMOTHY IGE RAST QL: 0
DEPRECATED WALNUT IGE RAST QL: 0
DEPRECATED WHEAT IGE RAST QL: 0
DEPRECATED WHITE OAK IGE RAST QL: 0
DOG DANDER IGE QN: <0.1 KUA/L
EGG WHITE IGE QN: <0.1 KUA/L
M RACEMOSUS IGE QN: <0.1 KUA/L
PEANUT IGE QN: <0.1 KUA/L
ROACH IGE QN: <0.1 KUA/L
SCALLOP IGE QN: 0
SCALLOP IGE QN: <0.1 KUA/L
SESAME SEED IGE QN: <0.1 KUA/L
SOYBEAN IGE QN: <0.1 KUA/L
TIMOTHY IGE QN: <0.1 KUA/L
WALNUT IGE QN: <0.1 KUA/L
WHEAT IGE QN: <0.1 KUA/L
WHITE OAK IGE QN: <0.1 KUA/L

## 2018-01-15 ENCOUNTER — RX RENEWAL (OUTPATIENT)
Age: 73
End: 2018-01-15

## 2018-01-17 ENCOUNTER — MEDICATION RENEWAL (OUTPATIENT)
Age: 73
End: 2018-01-17

## 2018-02-16 ENCOUNTER — MEDICATION RENEWAL (OUTPATIENT)
Age: 73
End: 2018-02-16

## 2018-02-21 ENCOUNTER — APPOINTMENT (OUTPATIENT)
Dept: SPINE | Facility: CLINIC | Age: 73
End: 2018-02-21
Payer: MEDICARE

## 2018-02-21 VITALS
DIASTOLIC BLOOD PRESSURE: 70 MMHG | BODY MASS INDEX: 29.7 KG/M2 | SYSTOLIC BLOOD PRESSURE: 130 MMHG | HEIGHT: 68 IN | WEIGHT: 196 LBS

## 2018-02-21 PROCEDURE — 99213 OFFICE O/P EST LOW 20 MIN: CPT

## 2018-03-12 ENCOUNTER — MEDICATION RENEWAL (OUTPATIENT)
Age: 73
End: 2018-03-12

## 2018-03-12 ENCOUNTER — RX RENEWAL (OUTPATIENT)
Age: 73
End: 2018-03-12

## 2018-03-13 ENCOUNTER — MEDICATION RENEWAL (OUTPATIENT)
Age: 73
End: 2018-03-13

## 2018-03-19 ENCOUNTER — MEDICATION RENEWAL (OUTPATIENT)
Age: 73
End: 2018-03-19

## 2018-03-30 ENCOUNTER — MEDICATION RENEWAL (OUTPATIENT)
Age: 73
End: 2018-03-30

## 2018-03-30 RX ORDER — BECLOMETHASONE DIPROPIONATE 80 UG/1
80 AEROSOL, METERED RESPIRATORY (INHALATION) TWICE DAILY
Qty: 26.1 | Refills: 0 | Status: DISCONTINUED | COMMUNITY
Start: 2017-09-25 | End: 2018-03-30

## 2018-03-31 ENCOUNTER — MOBILE ON CALL (OUTPATIENT)
Age: 73
End: 2018-03-31

## 2018-04-01 ENCOUNTER — INPATIENT (INPATIENT)
Facility: HOSPITAL | Age: 73
LOS: 1 days | Discharge: HOME | DRG: 192 | End: 2018-04-03
Attending: INTERNAL MEDICINE | Admitting: INTERNAL MEDICINE
Payer: MEDICARE

## 2018-04-01 VITALS
TEMPERATURE: 100 F | WEIGHT: 199.08 LBS | OXYGEN SATURATION: 96 % | SYSTOLIC BLOOD PRESSURE: 137 MMHG | HEIGHT: 68 IN | HEART RATE: 83 BPM | DIASTOLIC BLOOD PRESSURE: 79 MMHG | RESPIRATION RATE: 22 BRPM

## 2018-04-01 DIAGNOSIS — G56.01 CARPAL TUNNEL SYNDROME, RIGHT UPPER LIMB: Chronic | ICD-10-CM

## 2018-04-01 DIAGNOSIS — J44.1 CHRONIC OBSTRUCTIVE PULMONARY DISEASE WITH (ACUTE) EXACERBATION: ICD-10-CM

## 2018-04-01 DIAGNOSIS — Z98.89 OTHER SPECIFIED POSTPROCEDURAL STATES: Chronic | ICD-10-CM

## 2018-04-01 DIAGNOSIS — Z96.60 PRESENCE OF UNSPECIFIED ORTHOPEDIC JOINT IMPLANT: Chronic | ICD-10-CM

## 2018-04-01 DIAGNOSIS — M25.561 PAIN IN RIGHT KNEE: ICD-10-CM

## 2018-04-01 LAB
ALBUMIN SERPL ELPH-MCNC: 4.3 G/DL — SIGNIFICANT CHANGE UP (ref 3.3–5)
ALP SERPL-CCNC: 105 U/L — SIGNIFICANT CHANGE UP (ref 40–120)
ALT FLD-CCNC: 21 U/L RC — SIGNIFICANT CHANGE UP (ref 10–45)
ANION GAP SERPL CALC-SCNC: 14 MMOL/L — SIGNIFICANT CHANGE UP (ref 5–17)
APTT BLD: 33.2 SEC — SIGNIFICANT CHANGE UP (ref 27.5–37.4)
AST SERPL-CCNC: 28 U/L — SIGNIFICANT CHANGE UP (ref 10–40)
BASE EXCESS BLDV CALC-SCNC: 3.3 MMOL/L — HIGH (ref -2–2)
BASOPHILS # BLD AUTO: 0.1 K/UL — SIGNIFICANT CHANGE UP (ref 0–0.2)
BASOPHILS NFR BLD AUTO: 2 % — SIGNIFICANT CHANGE UP (ref 0–2)
BILIRUB SERPL-MCNC: 0.4 MG/DL — SIGNIFICANT CHANGE UP (ref 0.2–1.2)
BUN SERPL-MCNC: 14 MG/DL — SIGNIFICANT CHANGE UP (ref 7–23)
CA-I SERPL-SCNC: 1.09 MMOL/L — LOW (ref 1.12–1.3)
CALCIUM SERPL-MCNC: 8.7 MG/DL — SIGNIFICANT CHANGE UP (ref 8.4–10.5)
CHLORIDE BLDV-SCNC: 105 MMOL/L — SIGNIFICANT CHANGE UP (ref 96–108)
CHLORIDE SERPL-SCNC: 102 MMOL/L — SIGNIFICANT CHANGE UP (ref 96–108)
CO2 BLDV-SCNC: 30 MMOL/L — SIGNIFICANT CHANGE UP (ref 22–30)
CO2 SERPL-SCNC: 24 MMOL/L — SIGNIFICANT CHANGE UP (ref 22–31)
CREAT SERPL-MCNC: 0.93 MG/DL — SIGNIFICANT CHANGE UP (ref 0.5–1.3)
EOSINOPHIL # BLD AUTO: 0 K/UL — SIGNIFICANT CHANGE UP (ref 0–0.5)
EOSINOPHIL NFR BLD AUTO: 0.5 % — SIGNIFICANT CHANGE UP (ref 0–6)
FLUBV RNA SPEC QL NAA+PROBE: DETECTED
GAS PNL BLDV: 136 MMOL/L — SIGNIFICANT CHANGE UP (ref 136–145)
GAS PNL BLDV: SIGNIFICANT CHANGE UP
GLUCOSE BLDV-MCNC: 116 MG/DL — HIGH (ref 70–99)
GLUCOSE SERPL-MCNC: 115 MG/DL — HIGH (ref 70–99)
HCO3 BLDV-SCNC: 29 MMOL/L — SIGNIFICANT CHANGE UP (ref 21–29)
HCT VFR BLD CALC: 37 % — LOW (ref 39–50)
HCT VFR BLDA CALC: 43 % — SIGNIFICANT CHANGE UP (ref 39–50)
HGB BLD CALC-MCNC: 14 G/DL — SIGNIFICANT CHANGE UP (ref 13–17)
HGB BLD-MCNC: 12.7 G/DL — LOW (ref 13–17)
INR BLD: 1.05 RATIO — SIGNIFICANT CHANGE UP (ref 0.88–1.16)
LACTATE BLDV-MCNC: 1.3 MMOL/L — SIGNIFICANT CHANGE UP (ref 0.7–2)
LYMPHOCYTES # BLD AUTO: 0.2 K/UL — LOW (ref 1–3.3)
LYMPHOCYTES # BLD AUTO: 3.9 % — LOW (ref 13–44)
MAGNESIUM SERPL-MCNC: 1.9 MG/DL — SIGNIFICANT CHANGE UP (ref 1.6–2.6)
MCHC RBC-ENTMCNC: 33.1 PG — SIGNIFICANT CHANGE UP (ref 27–34)
MCHC RBC-ENTMCNC: 34.3 GM/DL — SIGNIFICANT CHANGE UP (ref 32–36)
MCV RBC AUTO: 96.5 FL — SIGNIFICANT CHANGE UP (ref 80–100)
MONOCYTES # BLD AUTO: 0.3 K/UL — SIGNIFICANT CHANGE UP (ref 0–0.9)
MONOCYTES NFR BLD AUTO: 5.9 % — SIGNIFICANT CHANGE UP (ref 2–14)
NEUTROPHILS # BLD AUTO: 4 K/UL — SIGNIFICANT CHANGE UP (ref 1.8–7.4)
NEUTROPHILS NFR BLD AUTO: 87.7 % — HIGH (ref 43–77)
OTHER CELLS CSF MANUAL: 4 ML/DL — LOW (ref 18–22)
PCO2 BLDV: 50 MMHG — SIGNIFICANT CHANGE UP (ref 35–50)
PH BLDV: 7.38 — SIGNIFICANT CHANGE UP (ref 7.35–7.45)
PHOSPHATE SERPL-MCNC: 1.9 MG/DL — LOW (ref 2.5–4.5)
PLATELET # BLD AUTO: 155 K/UL — SIGNIFICANT CHANGE UP (ref 150–400)
PO2 BLDV: 18 MMHG — LOW (ref 25–45)
POTASSIUM BLDV-SCNC: 3.5 MMOL/L — SIGNIFICANT CHANGE UP (ref 3.5–5)
POTASSIUM SERPL-MCNC: 3.9 MMOL/L — SIGNIFICANT CHANGE UP (ref 3.5–5.3)
POTASSIUM SERPL-SCNC: 3.9 MMOL/L — SIGNIFICANT CHANGE UP (ref 3.5–5.3)
PROT SERPL-MCNC: 7.7 G/DL — SIGNIFICANT CHANGE UP (ref 6–8.3)
PROTHROM AB SERPL-ACNC: 11.3 SEC — SIGNIFICANT CHANGE UP (ref 9.8–12.7)
RAPID RVP RESULT: DETECTED
RBC # BLD: 3.84 M/UL — LOW (ref 4.2–5.8)
RBC # FLD: 12.2 % — SIGNIFICANT CHANGE UP (ref 10.3–14.5)
SAO2 % BLDV: 21 % — LOW (ref 67–88)
SODIUM SERPL-SCNC: 140 MMOL/L — SIGNIFICANT CHANGE UP (ref 135–145)
TROPONIN T SERPL-MCNC: <0.01 NG/ML — SIGNIFICANT CHANGE UP (ref 0–0.06)
WBC # BLD: 4.6 K/UL — SIGNIFICANT CHANGE UP (ref 3.8–10.5)
WBC # FLD AUTO: 4.6 K/UL — SIGNIFICANT CHANGE UP (ref 3.8–10.5)

## 2018-04-01 PROCEDURE — 99285 EMERGENCY DEPT VISIT HI MDM: CPT | Mod: 25,GC

## 2018-04-01 PROCEDURE — 93010 ELECTROCARDIOGRAM REPORT: CPT

## 2018-04-01 RX ORDER — MONTELUKAST 4 MG/1
10 TABLET, CHEWABLE ORAL DAILY
Qty: 0 | Refills: 0 | Status: DISCONTINUED | OUTPATIENT
Start: 2018-04-01 | End: 2018-04-03

## 2018-04-01 RX ORDER — ATORVASTATIN CALCIUM 80 MG/1
10 TABLET, FILM COATED ORAL AT BEDTIME
Qty: 0 | Refills: 0 | Status: DISCONTINUED | OUTPATIENT
Start: 2018-04-01 | End: 2018-04-03

## 2018-04-01 RX ORDER — AZITHROMYCIN 500 MG/1
500 TABLET, FILM COATED ORAL ONCE
Qty: 0 | Refills: 0 | Status: DISCONTINUED | OUTPATIENT
Start: 2018-04-01 | End: 2018-04-01

## 2018-04-01 RX ORDER — IPRATROPIUM/ALBUTEROL SULFATE 18-103MCG
3 AEROSOL WITH ADAPTER (GRAM) INHALATION EVERY 6 HOURS
Qty: 0 | Refills: 0 | Status: DISCONTINUED | OUTPATIENT
Start: 2018-04-01 | End: 2018-04-03

## 2018-04-01 RX ORDER — INFLUENZA VIRUS VACCINE 15; 15; 15; 15 UG/.5ML; UG/.5ML; UG/.5ML; UG/.5ML
0.5 SUSPENSION INTRAMUSCULAR ONCE
Qty: 0 | Refills: 0 | Status: DISCONTINUED | OUTPATIENT
Start: 2018-04-01 | End: 2018-04-03

## 2018-04-01 RX ORDER — FAMOTIDINE 10 MG/ML
20 INJECTION INTRAVENOUS DAILY
Qty: 0 | Refills: 0 | Status: DISCONTINUED | OUTPATIENT
Start: 2018-04-01 | End: 2018-04-03

## 2018-04-01 RX ORDER — TAMSULOSIN HYDROCHLORIDE 0.4 MG/1
0.8 CAPSULE ORAL AT BEDTIME
Qty: 0 | Refills: 0 | Status: DISCONTINUED | OUTPATIENT
Start: 2018-04-01 | End: 2018-04-03

## 2018-04-01 RX ORDER — ACETAMINOPHEN 500 MG
650 TABLET ORAL EVERY 6 HOURS
Qty: 0 | Refills: 0 | Status: DISCONTINUED | OUTPATIENT
Start: 2018-04-01 | End: 2018-04-03

## 2018-04-01 RX ORDER — SODIUM,POTASSIUM PHOSPHATES 278-250MG
1 POWDER IN PACKET (EA) ORAL
Qty: 0 | Refills: 0 | Status: DISCONTINUED | OUTPATIENT
Start: 2018-04-01 | End: 2018-04-01

## 2018-04-01 RX ORDER — FLUTICASONE PROPIONATE 50 MCG
2 SPRAY, SUSPENSION NASAL DAILY
Qty: 0 | Refills: 0 | Status: DISCONTINUED | OUTPATIENT
Start: 2018-04-01 | End: 2018-04-03

## 2018-04-01 RX ORDER — IPRATROPIUM/ALBUTEROL SULFATE 18-103MCG
3 AEROSOL WITH ADAPTER (GRAM) INHALATION ONCE
Qty: 0 | Refills: 0 | Status: COMPLETED | OUTPATIENT
Start: 2018-04-01 | End: 2018-04-01

## 2018-04-01 RX ORDER — ASPIRIN/CALCIUM CARB/MAGNESIUM 324 MG
81 TABLET ORAL DAILY
Qty: 0 | Refills: 0 | Status: DISCONTINUED | OUTPATIENT
Start: 2018-04-01 | End: 2018-04-03

## 2018-04-01 RX ORDER — LORATADINE 10 MG/1
10 TABLET ORAL DAILY
Qty: 0 | Refills: 0 | Status: DISCONTINUED | OUTPATIENT
Start: 2018-04-01 | End: 2018-04-03

## 2018-04-01 RX ORDER — TIOTROPIUM BROMIDE 18 UG/1
18 CAPSULE ORAL; RESPIRATORY (INHALATION) DAILY
Qty: 0 | Refills: 0 | Status: DISCONTINUED | OUTPATIENT
Start: 2018-04-01 | End: 2018-04-03

## 2018-04-01 RX ORDER — METHOCARBAMOL 500 MG/1
0 TABLET, FILM COATED ORAL
Qty: 0 | Refills: 0 | COMMUNITY

## 2018-04-01 RX ORDER — HEPARIN SODIUM 5000 [USP'U]/ML
5000 INJECTION INTRAVENOUS; SUBCUTANEOUS EVERY 12 HOURS
Qty: 0 | Refills: 0 | Status: DISCONTINUED | OUTPATIENT
Start: 2018-04-01 | End: 2018-04-03

## 2018-04-01 RX ORDER — DULOXETINE HYDROCHLORIDE 30 MG/1
60 CAPSULE, DELAYED RELEASE ORAL DAILY
Qty: 0 | Refills: 0 | Status: DISCONTINUED | OUTPATIENT
Start: 2018-04-01 | End: 2018-04-03

## 2018-04-01 RX ORDER — AZITHROMYCIN 500 MG/1
0 TABLET, FILM COATED ORAL
Qty: 0 | Refills: 0 | COMMUNITY
Start: 2018-04-01

## 2018-04-01 RX ORDER — ALLOPURINOL 300 MG
150 TABLET ORAL DAILY
Qty: 0 | Refills: 0 | Status: DISCONTINUED | OUTPATIENT
Start: 2018-04-01 | End: 2018-04-03

## 2018-04-01 RX ADMIN — Medication 3 MILLILITER(S): at 17:20

## 2018-04-01 RX ADMIN — Medication 3 MILLILITER(S): at 23:54

## 2018-04-01 RX ADMIN — Medication 20 MILLIGRAM(S): at 14:32

## 2018-04-01 RX ADMIN — Medication 1 TABLET(S): at 18:01

## 2018-04-01 RX ADMIN — Medication 3 MILLILITER(S): at 14:31

## 2018-04-01 RX ADMIN — Medication 3 MILLILITER(S): at 14:30

## 2018-04-01 RX ADMIN — ATORVASTATIN CALCIUM 10 MILLIGRAM(S): 80 TABLET, FILM COATED ORAL at 22:36

## 2018-04-01 RX ADMIN — Medication 40 MILLIGRAM(S): at 22:36

## 2018-04-01 RX ADMIN — TAMSULOSIN HYDROCHLORIDE 0.8 MILLIGRAM(S): 0.4 CAPSULE ORAL at 22:35

## 2018-04-01 NOTE — ED ADULT NURSE REASSESSMENT NOTE - NS ED NURSE REASSESS COMMENT FT1
Received report this PM from Cole Tran RN. PT observed resting comfortably in bed. Pt denies any needs at this time. Awaiting bed assignment . Plan of care reviewed with pt. Pt educated on call bell system and provided call bell. Safety and comfort maintained.

## 2018-04-01 NOTE — ED PROVIDER NOTE - MEDICAL DECISION MAKING DETAILS
Shravan Albright MD (resident): cough, sob, F/C. wheezing on exam. concerning for COPD exacerbation, likely triggered by infectious etiology w/ bacterial PNA or viral bronchitis. will get EKG, CXR and trop. Will treat w/ nebs, and prednisone to finish course of 40 mg daily.

## 2018-04-01 NOTE — H&P ADULT - HISTORY OF PRESENT ILLNESS
The patient is a 72y Male complaining of difficulty breathing.	  72 M w/ Hx of COPD not on home O2, HTN, DVT, who p/w SOB, cough, and fever. Pt started to have cough and SOB 4 days ago, but has been worsening, and now has been needing to use inhaler every 2-3 hours. Pt w/ nonproductive cough, chest congestion, and fever up to 101.2'F yesterday, took tylenol 1.5 hrs ago. Reports chest pressure midsternally, not worse w/ deep breathing or exertion. Pt has been taking azithromycin for 1 day, and prednisone (took one 20 mg dose of 40 mg daily Rx).

## 2018-04-01 NOTE — H&P ADULT - ASSESSMENT
72 m with  COPD exacerbation- steroids, nebs, Pulmonary evaluation called Dr. Lance  Influenza B- unclear start of symptoms. Will give Tamiflu.  Tracheal stenosis- CT chest  HTN control  Gout- continue Rx  CAD- stable  BPH- continue Rx  Depression stable  Further action as per clinical course   Dagoberto Nelson MD pager [phone]

## 2018-04-01 NOTE — ED PROVIDER NOTE - NS ED ROS FT
+ fever, no chills, no change in vision, no change in hearing, + chest pain, + cough, + shortness of breath, no abdominal pain, no vomiting, no dysuria, no muscle pain, no rashes, no loss of consciousness. ~ Shravan Albright MD

## 2018-04-01 NOTE — ED PROVIDER NOTE - ATTENDING CONTRIBUTION TO CARE
72 yom pmhx copd no home 02, prior dvt, htn, presents with few days of cough, sob and fever at home. states has been using his home inhalers without relief. states cough nonproductive, no cp. states sob worse w exertion. fever improves w tylenol. no sick contacts or recent travel. no leg swelling o calf pain. discussed his sxs with his pulm dr roche and was started on azithro and pred 40 qd this AM. pt's wife is immunocompromised s/p transplant.     ros as above     Physical Exam:   constitutional - well appearing, awake and alert, oriented x3  head - no external evidence of trauma  cvs - rrr, no murmurs, no peripheral edema  resp - bilat wheezing, good air entry, satting 94-5 on ra, no significanly increased wob  gi - abdomen soft and nontender, no rigidity, guarding or rebound, bowel sounds present  msk - moving all extremities spontaneously  neuro - alert and oriented x3, no focal deficits, CNs 2-12 grossly intact  skin- no jaundice, warm and dry  psych - mood and affect wnl, no apparent risk to self or others     pt w likely copd exac in setting of likely viral syndrome, however will do xray to eval for poss pna.   xray clear. endorsed to Dr Jenkins pending reassessment w plan for likely dc w continuation of his zpak and steroids. JAI Vanessa MD

## 2018-04-01 NOTE — ED PROVIDER NOTE - PROGRESS NOTE DETAILS
Shravan Albright MD (resident): discussion with patient and significant other regarding results. pt reassessed and still w/ wheezing on exam despite nebs, ambulated w/ pulse ox and desat to 92%, with dyspnea, pt w/ tachypnea so will admit to hosp. endorsed to Dr. Nelson who admits for Dr. Green

## 2018-04-01 NOTE — H&P ADULT - NSHPSOCIALHISTORY_GEN_ALL_CORE
Social History:    Marital Status:  (   )    (   ) Single    (   )    ( x )   Occupation:   Lives with: (  ) alone  (  ) children   (  ) spouse   (  ) parents  ( x ) other    Substance Use (street drugs): ( x ) never used  (  ) other:  Tobacco Usage:  (  x ) never smoked   (   ) former smoker   (   ) current smoker  (     ) pack years  (        ) last cigarette date  Alcohol Usage: denies    (     ) Advanced Directives: (     ) None    (      ) DNR    (     ) DNI    (     ) Health Care Proxy:

## 2018-04-01 NOTE — H&P ADULT - NSHPPHYSICALEXAM_GEN_ALL_CORE
PHYSICAL EXAMINATION:  Vital Signs Last 24 Hrs  T(C): 37.3 (01 Apr 2018 19:19), Max: 38 (01 Apr 2018 13:20)  T(F): 99.2 (01 Apr 2018 19:19), Max: 100.4 (01 Apr 2018 13:20)  HR: 81 (01 Apr 2018 19:19) (77 - 84)  BP: 133/69 (01 Apr 2018 19:19) (132/75 - 144/73)  BP(mean): --  RR: 20 (01 Apr 2018 19:19) (18 - 24)  SpO2: 100% (01 Apr 2018 19:19) (95% - 100%)  CAPILLARY BLOOD GLUCOSE          GENERAL: NAD, well-groomed, well-developed  HEAD:  atraumatic, normocephalic  EYES: sclera anicteric  ENMT: mucous membranes moist  NECK: supple, No JVD  CHEST/LUNG: few rhonchi and wheezes to auscultation bilaterally  HEART: normal S1, S2  ABDOMEN: BS+, soft, ND, NT   EXTREMITIES:  pulses palpable; no clubbing, cyanosis, 1+ edema b/l LEs  NEURO: awake, alert, interactive; moves all extremities  SKIN: no rashes or lesions

## 2018-04-01 NOTE — ED PROVIDER NOTE - PHYSICAL EXAMINATION
Physical Exam: elderly M w/ NAD, AAOx3, NCAT, MMM, neck is supple, no stridor, no drooling or difficulty handling secretions, PERRL, + wheezing, + prolonged expiratory phase, normal rate and regular rhythm, abdomen is soft and NTND, No edema, No deformity of extremities, No rashes, CN grossly intact, No focal motor or sensory deficits. ~ Shravan Albright MD

## 2018-04-01 NOTE — ED ADULT NURSE NOTE - OBJECTIVE STATEMENT
1340 72 yr old WM brought to ER by wife for further eval and tx of Difficulty breathing, SOB and fever x a few days . Was started on Zithromax and prednisone this morning. Wheezing. Tachypneic. Has inhaler and using frequently at home today. color pink. skin W&D. +cough. A&OX3. denies chest pain, palp,or dizziness. Did not get a flu shot this year. PMH COPD and lung nodules per wife

## 2018-04-01 NOTE — H&P ADULT - NSHPLABSRESULTS_GEN_ALL_CORE
12.7   4.6   )-----------( 155      ( 01 Apr 2018 14:48 )             37.0       04-01    140  |  102  |  14  ----------------------------<  115<H>  3.9   |  24  |  0.93    Ca    8.7      01 Apr 2018 14:48  Phos  1.9     04-01  Mg     1.9     04-01    TPro  7.7  /  Alb  4.3  /  TBili  0.4  /  DBili  x   /  AST  28  /  ALT  21  /  AlkPhos  105  04-01                  PT/INR - ( 01 Apr 2018 14:48 )   PT: 11.3 sec;   INR: 1.05 ratio         PTT - ( 01 Apr 2018 14:48 )  PTT:33.2 sec    Lactate Trend      CARDIAC MARKERS ( 01 Apr 2018 14:48 )  x     / <0.01 ng/mL / x     / x     / x            CAPILLARY BLOOD GLUCOSE    < from: Xray Chest 1 View- PORTABLE-Urgent (04.01.18 @ 14:38) >    IMPRESSION:   Clear lungs.  Stable tracheal narrowing as above.          < end of copied text >    EKG SR NSST/T

## 2018-04-01 NOTE — H&P ADULT - NSHPREVIEWOFSYSTEMS_GEN_ALL_CORE
REVIEW OF SYSTEMS:    CONSTITUTIONAL: + weakness,+ fevers + chills  EYES/ENT: No visual changes;  No vertigo or throat pain   NECK: No pain or stiffness  RESPIRATORY: + cough,+ wheezing, no hemoptysis; + shortness of breath  CARDIOVASCULAR: No chest pain or palpitations  GASTROINTESTINAL: No abdominal or epigastric pain. No nausea, vomiting, or hematemesis; No diarrhea or constipation. No melena or hematochezia.  GENITOURINARY: No dysuria, frequency or hematuria  NEUROLOGICAL: No numbness or weakness  SKIN: No itching, burning, rashes, or lesions   All other review of systems is negative unless indicated above.

## 2018-04-01 NOTE — ED ADULT NURSE REASSESSMENT NOTE - NS ED NURSE REASSESS COMMENT FT1
Patient resting comfortably. Droplet precaution maintained (patient is FluB+). Patient alert and oriented x3, ambulatory. RTM timer clicked and patient awaiting bed assignment.

## 2018-04-02 DIAGNOSIS — Z29.9 ENCOUNTER FOR PROPHYLACTIC MEASURES, UNSPECIFIED: ICD-10-CM

## 2018-04-02 DIAGNOSIS — J44.1 CHRONIC OBSTRUCTIVE PULMONARY DISEASE WITH (ACUTE) EXACERBATION: ICD-10-CM

## 2018-04-02 DIAGNOSIS — J10.1 INFLUENZA DUE TO OTHER IDENTIFIED INFLUENZA VIRUS WITH OTHER RESPIRATORY MANIFESTATIONS: ICD-10-CM

## 2018-04-02 DIAGNOSIS — E66.9 OBESITY, UNSPECIFIED: ICD-10-CM

## 2018-04-02 DIAGNOSIS — J39.8 OTHER SPECIFIED DISEASES OF UPPER RESPIRATORY TRACT: ICD-10-CM

## 2018-04-02 DIAGNOSIS — K21.9 GASTRO-ESOPHAGEAL REFLUX DISEASE WITHOUT ESOPHAGITIS: ICD-10-CM

## 2018-04-02 DIAGNOSIS — R06.02 SHORTNESS OF BREATH: ICD-10-CM

## 2018-04-02 LAB
ANION GAP SERPL CALC-SCNC: 14 MMOL/L — SIGNIFICANT CHANGE UP (ref 5–17)
BUN SERPL-MCNC: 17 MG/DL — SIGNIFICANT CHANGE UP (ref 7–23)
CALCIUM SERPL-MCNC: 8.9 MG/DL — SIGNIFICANT CHANGE UP (ref 8.4–10.5)
CHLORIDE SERPL-SCNC: 103 MMOL/L — SIGNIFICANT CHANGE UP (ref 96–108)
CO2 SERPL-SCNC: 22 MMOL/L — SIGNIFICANT CHANGE UP (ref 22–31)
CREAT SERPL-MCNC: 0.68 MG/DL — SIGNIFICANT CHANGE UP (ref 0.5–1.3)
GLUCOSE SERPL-MCNC: 151 MG/DL — HIGH (ref 70–99)
HCT VFR BLD CALC: 37.1 % — LOW (ref 39–50)
HGB BLD-MCNC: 12.2 G/DL — LOW (ref 13–17)
MCHC RBC-ENTMCNC: 31.6 PG — SIGNIFICANT CHANGE UP (ref 27–34)
MCHC RBC-ENTMCNC: 32.9 GM/DL — SIGNIFICANT CHANGE UP (ref 32–36)
MCV RBC AUTO: 96.1 FL — SIGNIFICANT CHANGE UP (ref 80–100)
NRBC # BLD: 0 /100 WBCS — SIGNIFICANT CHANGE UP (ref 0–0)
PLATELET # BLD AUTO: 168 K/UL — SIGNIFICANT CHANGE UP (ref 150–400)
POTASSIUM SERPL-MCNC: 3.7 MMOL/L — SIGNIFICANT CHANGE UP (ref 3.5–5.3)
POTASSIUM SERPL-SCNC: 3.7 MMOL/L — SIGNIFICANT CHANGE UP (ref 3.5–5.3)
RBC # BLD: 3.86 M/UL — LOW (ref 4.2–5.8)
RBC # FLD: 13.3 % — SIGNIFICANT CHANGE UP (ref 10.3–14.5)
SODIUM SERPL-SCNC: 139 MMOL/L — SIGNIFICANT CHANGE UP (ref 135–145)
WBC # BLD: 4.27 K/UL — SIGNIFICANT CHANGE UP (ref 3.8–10.5)
WBC # FLD AUTO: 4.27 K/UL — SIGNIFICANT CHANGE UP (ref 3.8–10.5)

## 2018-04-02 PROCEDURE — 99221 1ST HOSP IP/OBS SF/LOW 40: CPT

## 2018-04-02 RX ORDER — LANOLIN ALCOHOL/MO/W.PET/CERES
3 CREAM (GRAM) TOPICAL ONCE
Qty: 0 | Refills: 0 | Status: COMPLETED | OUTPATIENT
Start: 2018-04-02 | End: 2018-04-03

## 2018-04-02 RX ORDER — BUDESONIDE AND FORMOTEROL FUMARATE DIHYDRATE 160; 4.5 UG/1; UG/1
2 AEROSOL RESPIRATORY (INHALATION)
Qty: 0 | Refills: 0 | Status: DISCONTINUED | OUTPATIENT
Start: 2018-04-02 | End: 2018-04-03

## 2018-04-02 RX ADMIN — TAMSULOSIN HYDROCHLORIDE 0.8 MILLIGRAM(S): 0.4 CAPSULE ORAL at 21:05

## 2018-04-02 RX ADMIN — Medication 75 MILLIGRAM(S): at 06:18

## 2018-04-02 RX ADMIN — Medication 150 MILLIGRAM(S): at 12:27

## 2018-04-02 RX ADMIN — MONTELUKAST 10 MILLIGRAM(S): 4 TABLET, CHEWABLE ORAL at 12:31

## 2018-04-02 RX ADMIN — ATORVASTATIN CALCIUM 10 MILLIGRAM(S): 80 TABLET, FILM COATED ORAL at 21:05

## 2018-04-02 RX ADMIN — Medication 81 MILLIGRAM(S): at 12:29

## 2018-04-02 RX ADMIN — Medication 3 MILLILITER(S): at 12:26

## 2018-04-02 RX ADMIN — TIOTROPIUM BROMIDE 18 MICROGRAM(S): 18 CAPSULE ORAL; RESPIRATORY (INHALATION) at 12:32

## 2018-04-02 RX ADMIN — HEPARIN SODIUM 5000 UNIT(S): 5000 INJECTION INTRAVENOUS; SUBCUTANEOUS at 06:18

## 2018-04-02 RX ADMIN — Medication 40 MILLIGRAM(S): at 06:18

## 2018-04-02 RX ADMIN — Medication 20 MILLIGRAM(S): at 17:59

## 2018-04-02 RX ADMIN — HEPARIN SODIUM 5000 UNIT(S): 5000 INJECTION INTRAVENOUS; SUBCUTANEOUS at 17:53

## 2018-04-02 RX ADMIN — LORATADINE 10 MILLIGRAM(S): 10 TABLET ORAL at 12:31

## 2018-04-02 RX ADMIN — FAMOTIDINE 20 MILLIGRAM(S): 10 INJECTION INTRAVENOUS at 12:30

## 2018-04-02 RX ADMIN — DULOXETINE HYDROCHLORIDE 60 MILLIGRAM(S): 30 CAPSULE, DELAYED RELEASE ORAL at 12:30

## 2018-04-02 RX ADMIN — BUDESONIDE AND FORMOTEROL FUMARATE DIHYDRATE 2 PUFF(S): 160; 4.5 AEROSOL RESPIRATORY (INHALATION) at 21:05

## 2018-04-02 RX ADMIN — Medication 3 MILLILITER(S): at 06:19

## 2018-04-02 RX ADMIN — Medication 2 SPRAY(S): at 12:34

## 2018-04-02 RX ADMIN — Medication 3 MILLILITER(S): at 17:52

## 2018-04-02 RX ADMIN — Medication 3 MILLILITER(S): at 23:10

## 2018-04-02 RX ADMIN — Medication 75 MILLIGRAM(S): at 17:53

## 2018-04-02 NOTE — CONSULT NOTE ADULT - ASSESSMENT
71 yo male c/o decreased hearing in his left ear. No signs of infection. Left ear impacted with dense cerumen against the TM. Cerumen removal unsuccessful due to location of wax.
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
## 2018-04-02 NOTE — CONSULT NOTE ADULT - ASSESSMENT
72 yo mild COPD/asthma, allergies, OA, GERD, CAD-and likely Tracheomalacia-admit w/influenza induced bronchospasm.

## 2018-04-02 NOTE — PROGRESS NOTE ADULT - SUBJECTIVE AND OBJECTIVE BOX
Patient is a 72y old  Male who presents with a chief complaint of sob (01 Apr 2018 20:30)      SUBJECTIVE / OVERNIGHT EVENTS: feels better.  Review of Systems  chest pain no  palpitations no  sob improving   nausea no  headache no    MEDICATIONS  (STANDING):  ALBUTerol/ipratropium for Nebulization 3 milliLiter(s) Nebulizer every 6 hours  allopurinol 150 milliGRAM(s) Oral daily  aspirin enteric coated 81 milliGRAM(s) Oral daily  atorvastatin 10 milliGRAM(s) Oral at bedtime  buDESOnide 160 MICROgram(s)/formoterol 4.5 MICROgram(s) Inhaler 2 Puff(s) Inhalation two times a day  DULoxetine 60 milliGRAM(s) Oral daily  famotidine    Tablet 20 milliGRAM(s) Oral daily  fluticasone propionate 50 MICROgram(s)/spray Nasal Spray 2 Spray(s) Both Nostrils daily  heparin  Injectable 5000 Unit(s) SubCutaneous every 12 hours  influenza   Vaccine 0.5 milliLiter(s) IntraMuscular once  loratadine 10 milliGRAM(s) Oral daily  montelukast 10 milliGRAM(s) Oral daily  oseltamivir 75 milliGRAM(s) Oral two times a day  predniSONE   Tablet 20 milliGRAM(s) Oral two times a day  tamsulosin 0.8 milliGRAM(s) Oral at bedtime  tiotropium 18 MICROgram(s) Capsule 18 MICROGram(s) Inhalation daily    MEDICATIONS  (PRN):  acetaminophen   Tablet 650 milliGRAM(s) Oral every 6 hours PRN For Temp greater than 38.5 C (101.3 F)  acetaminophen   Tablet. 650 milliGRAM(s) Oral every 6 hours PRN Mild Pain (1 - 3)          PHYSICAL EXAM:  GENERAL: NAD, well-developed  HEAD:  Atraumatic, Normocephalic  EYES: EOMI, PERRLA, conjunctiva and sclera clear  NECK: Supple, No JVD  CHEST/LUNG: few wheezes to auscultation bilaterally  HEART: Regular rate and rhythm; No murmurs, rubs, or gallops  ABDOMEN: Soft, Nontender, Nondistended; Bowel sounds present  EXTREMITIES:  2+ Peripheral Pulses, No clubbing, cyanosis, or edema  PSYCH: AAOx3  NEUROLOGY: non-focal  SKIN: No rashes or lesions    LABS:                        12.2   4.27  )-----------( 168      ( 02 Apr 2018 09:27 )             37.1     04-02    139  |  103  |  17  ----------------------------<  151<H>  3.7   |  22  |  0.68    Ca    8.9      02 Apr 2018 07:10  Phos  1.9     04-01  Mg     1.9     04-01    TPro  7.7  /  Alb  4.3  /  TBili  0.4  /  DBili  x   /  AST  28  /  ALT  21  /  AlkPhos  105  04-01    PT/INR - ( 01 Apr 2018 14:48 )   PT: 11.3 sec;   INR: 1.05 ratio         PTT - ( 01 Apr 2018 14:48 )  PTT:33.2 sec  CARDIAC MARKERS ( 01 Apr 2018 14:48 )  x     / <0.01 ng/mL / x     / x     / x              RADIOLOGY & ADDITIONAL TESTS:    Imaging Personally Reviewed:    Consultant(s) Notes Reviewed:      Care Discussed with Consultants/Other Providers: Patient is a 72y old  Male who presents with a chief complaint of sob (01 Apr 2018 20:30)      SUBJECTIVE / OVERNIGHT EVENTS: feels better. c/o L ear decreased hearing.   Review of Systems  chest pain no  palpitations no  sob improving   nausea no  headache no    MEDICATIONS  (STANDING):  ALBUTerol/ipratropium for Nebulization 3 milliLiter(s) Nebulizer every 6 hours  allopurinol 150 milliGRAM(s) Oral daily  aspirin enteric coated 81 milliGRAM(s) Oral daily  atorvastatin 10 milliGRAM(s) Oral at bedtime  buDESOnide 160 MICROgram(s)/formoterol 4.5 MICROgram(s) Inhaler 2 Puff(s) Inhalation two times a day  DULoxetine 60 milliGRAM(s) Oral daily  famotidine    Tablet 20 milliGRAM(s) Oral daily  fluticasone propionate 50 MICROgram(s)/spray Nasal Spray 2 Spray(s) Both Nostrils daily  heparin  Injectable 5000 Unit(s) SubCutaneous every 12 hours  influenza   Vaccine 0.5 milliLiter(s) IntraMuscular once  loratadine 10 milliGRAM(s) Oral daily  montelukast 10 milliGRAM(s) Oral daily  oseltamivir 75 milliGRAM(s) Oral two times a day  predniSONE   Tablet 20 milliGRAM(s) Oral two times a day  tamsulosin 0.8 milliGRAM(s) Oral at bedtime  tiotropium 18 MICROgram(s) Capsule 18 MICROGram(s) Inhalation daily    MEDICATIONS  (PRN):  acetaminophen   Tablet 650 milliGRAM(s) Oral every 6 hours PRN For Temp greater than 38.5 C (101.3 F)  acetaminophen   Tablet. 650 milliGRAM(s) Oral every 6 hours PRN Mild Pain (1 - 3)          PHYSICAL EXAM:  GENERAL: NAD, well-developed  HEAD:  Atraumatic, Normocephalic  EYES: EOMI, PERRLA, conjunctiva and sclera clear  NECK: Supple, No JVD  CHEST/LUNG: few wheezes to auscultation bilaterally  HEART: Regular rate and rhythm; No murmurs, rubs, or gallops  ABDOMEN: Soft, Nontender, Nondistended; Bowel sounds present  EXTREMITIES:  2+ Peripheral Pulses, No clubbing, cyanosis, or edema  PSYCH: AAOx3  NEUROLOGY: non-focal  SKIN: No rashes or lesions    LABS:                        12.2   4.27  )-----------( 168      ( 02 Apr 2018 09:27 )             37.1     04-02    139  |  103  |  17  ----------------------------<  151<H>  3.7   |  22  |  0.68    Ca    8.9      02 Apr 2018 07:10  Phos  1.9     04-01  Mg     1.9     04-01    TPro  7.7  /  Alb  4.3  /  TBili  0.4  /  DBili  x   /  AST  28  /  ALT  21  /  AlkPhos  105  04-01    PT/INR - ( 01 Apr 2018 14:48 )   PT: 11.3 sec;   INR: 1.05 ratio         PTT - ( 01 Apr 2018 14:48 )  PTT:33.2 sec  CARDIAC MARKERS ( 01 Apr 2018 14:48 )  x     / <0.01 ng/mL / x     / x     / x              RADIOLOGY & ADDITIONAL TESTS:    Imaging Personally Reviewed:    Consultant(s) Notes Reviewed:      Care Discussed with Consultants/Other Providers:

## 2018-04-02 NOTE — CONSULT NOTE ADULT - ATTENDING COMMENTS
as above  Chronic resp dz-asthma/mild copd and likely Tracheomalacia (awaiting out pt dynamic CT)- influenza induced additional bspasm  DC solumedrol and initiate prednisone 40mg per day for 5 days then taper by 10mg every 3 days  symbicort 160 2 bid, spiriva 1 q day, nebulized rx, acapella, and incentive spirometry  tamiflu to complete 5 days  DC hopeful tomorrrow
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
## 2018-04-02 NOTE — CONSULT NOTE ADULT - PROBLEM SELECTOR RECOMMENDATION 3
change solumedrol to PO prednisone to 40mg per day (pt always wheezes due to likely TBM)  albuterol via HHN q 6h  spiriva 1 puff q day  symbicort 160 2 bid  pulmonary toilet-incentive spirometry, Chest Pt-acapella/chest vest-up to 5 times per day.    maintain oxygen levels above 90%-supplemental oxygen via nasal canula-humidified    All nebulized therapy is to be administered via hand held nebulizer-(patient must gargle and spit with water after use)

## 2018-04-02 NOTE — PROGRESS NOTE ADULT - ASSESSMENT
72 m with  COPD exacerbation- steroids, nebs, Pulmonary evaluation noted  Influenza B- unclear start of symptoms. Will give Tamiflu.  Tracheomalacia- CT chest as OTP  HTN control  Gout- continue Rx  CAD- stable  BPH- continue Rx  Depression stable  DCP home.   Dagoberto Nelson MD pager [phone] 72 m with  COPD exacerbation- steroids, nebs, Pulmonary evaluation noted  Influenza B- unclear start of symptoms. Will give Tamiflu.  Tracheomalacia- CT chest as OTP  HTN control  Gout- continue Rx  CAD- stable  BPH- continue Rx  Depression stable  ENT evaluation re L ear decreased hearing.  DCP home.   Dagoberto Nelson MD pager [phone]

## 2018-04-03 ENCOUNTER — TRANSCRIPTION ENCOUNTER (OUTPATIENT)
Age: 73
End: 2018-04-03

## 2018-04-03 VITALS
SYSTOLIC BLOOD PRESSURE: 133 MMHG | OXYGEN SATURATION: 97 % | RESPIRATION RATE: 18 BRPM | TEMPERATURE: 98 F | HEART RATE: 83 BPM | DIASTOLIC BLOOD PRESSURE: 72 MMHG

## 2018-04-03 DIAGNOSIS — H61.22 IMPACTED CERUMEN, LEFT EAR: ICD-10-CM

## 2018-04-03 PROCEDURE — 84484 ASSAY OF TROPONIN QUANT: CPT

## 2018-04-03 PROCEDURE — 85610 PROTHROMBIN TIME: CPT

## 2018-04-03 PROCEDURE — 87581 M.PNEUMON DNA AMP PROBE: CPT

## 2018-04-03 PROCEDURE — 84295 ASSAY OF SERUM SODIUM: CPT

## 2018-04-03 PROCEDURE — 87633 RESP VIRUS 12-25 TARGETS: CPT

## 2018-04-03 PROCEDURE — 99285 EMERGENCY DEPT VISIT HI MDM: CPT | Mod: 25

## 2018-04-03 PROCEDURE — 85027 COMPLETE CBC AUTOMATED: CPT

## 2018-04-03 PROCEDURE — 80053 COMPREHEN METABOLIC PANEL: CPT

## 2018-04-03 PROCEDURE — 82947 ASSAY GLUCOSE BLOOD QUANT: CPT

## 2018-04-03 PROCEDURE — 84100 ASSAY OF PHOSPHORUS: CPT

## 2018-04-03 PROCEDURE — 87798 DETECT AGENT NOS DNA AMP: CPT

## 2018-04-03 PROCEDURE — 82330 ASSAY OF CALCIUM: CPT

## 2018-04-03 PROCEDURE — 80048 BASIC METABOLIC PNL TOTAL CA: CPT

## 2018-04-03 PROCEDURE — 85014 HEMATOCRIT: CPT

## 2018-04-03 PROCEDURE — 82435 ASSAY OF BLOOD CHLORIDE: CPT

## 2018-04-03 PROCEDURE — 82803 BLOOD GASES ANY COMBINATION: CPT

## 2018-04-03 PROCEDURE — 83605 ASSAY OF LACTIC ACID: CPT

## 2018-04-03 PROCEDURE — 87486 CHLMYD PNEUM DNA AMP PROBE: CPT

## 2018-04-03 PROCEDURE — 87040 BLOOD CULTURE FOR BACTERIA: CPT

## 2018-04-03 PROCEDURE — 84132 ASSAY OF SERUM POTASSIUM: CPT

## 2018-04-03 PROCEDURE — 85730 THROMBOPLASTIN TIME PARTIAL: CPT

## 2018-04-03 PROCEDURE — 93005 ELECTROCARDIOGRAM TRACING: CPT

## 2018-04-03 PROCEDURE — 71045 X-RAY EXAM CHEST 1 VIEW: CPT

## 2018-04-03 PROCEDURE — 83735 ASSAY OF MAGNESIUM: CPT

## 2018-04-03 PROCEDURE — 99232 SBSQ HOSP IP/OBS MODERATE 35: CPT

## 2018-04-03 PROCEDURE — 94640 AIRWAY INHALATION TREATMENT: CPT

## 2018-04-03 RX ORDER — CARBAMIDE PEROXIDE 81.86 MG/ML
7 SOLUTION/ DROPS AURICULAR (OTIC)
Qty: 0 | Refills: 0 | Status: DISCONTINUED | OUTPATIENT
Start: 2018-04-03 | End: 2018-04-03

## 2018-04-03 RX ORDER — CARBAMIDE PEROXIDE 81.86 MG/ML
7 SOLUTION/ DROPS AURICULAR (OTIC)
Qty: 0 | Refills: 0 | DISCHARGE
Start: 2018-04-03

## 2018-04-03 RX ORDER — CHOLECALCIFEROL (VITAMIN D3) 125 MCG
0 CAPSULE ORAL
Qty: 0 | Refills: 0 | COMMUNITY

## 2018-04-03 RX ORDER — BECLOMETHASONE DIPROPIONATE 40 UG/1
2 AEROSOL, METERED RESPIRATORY (INHALATION)
Qty: 0 | Refills: 0 | COMMUNITY

## 2018-04-03 RX ADMIN — Medication 3 MILLILITER(S): at 11:22

## 2018-04-03 RX ADMIN — DULOXETINE HYDROCHLORIDE 60 MILLIGRAM(S): 30 CAPSULE, DELAYED RELEASE ORAL at 11:22

## 2018-04-03 RX ADMIN — Medication 2 SPRAY(S): at 11:23

## 2018-04-03 RX ADMIN — CARBAMIDE PEROXIDE 7 DROP(S): 81.86 SOLUTION/ DROPS AURICULAR (OTIC) at 06:22

## 2018-04-03 RX ADMIN — Medication 150 MILLIGRAM(S): at 11:21

## 2018-04-03 RX ADMIN — LORATADINE 10 MILLIGRAM(S): 10 TABLET ORAL at 11:22

## 2018-04-03 RX ADMIN — Medication 81 MILLIGRAM(S): at 11:22

## 2018-04-03 RX ADMIN — Medication 20 MILLIGRAM(S): at 08:58

## 2018-04-03 RX ADMIN — BUDESONIDE AND FORMOTEROL FUMARATE DIHYDRATE 2 PUFF(S): 160; 4.5 AEROSOL RESPIRATORY (INHALATION) at 06:21

## 2018-04-03 RX ADMIN — FAMOTIDINE 20 MILLIGRAM(S): 10 INJECTION INTRAVENOUS at 11:22

## 2018-04-03 RX ADMIN — HEPARIN SODIUM 5000 UNIT(S): 5000 INJECTION INTRAVENOUS; SUBCUTANEOUS at 06:21

## 2018-04-03 RX ADMIN — Medication 3 MILLILITER(S): at 06:21

## 2018-04-03 RX ADMIN — MONTELUKAST 10 MILLIGRAM(S): 4 TABLET, CHEWABLE ORAL at 11:21

## 2018-04-03 RX ADMIN — Medication 75 MILLIGRAM(S): at 06:21

## 2018-04-03 RX ADMIN — Medication 3 MILLIGRAM(S): at 00:25

## 2018-04-03 RX ADMIN — TIOTROPIUM BROMIDE 18 MICROGRAM(S): 18 CAPSULE ORAL; RESPIRATORY (INHALATION) at 11:23

## 2018-04-03 NOTE — DISCHARGE NOTE ADULT - HOSPITAL COURSE
72 yo mild COPD/asthma, allergies, OA, GERD, CAD-and likely Tracheomalacia-admit w/influenza induced bronchospasm.    Improved over all-near baseline;  (awaiting out pt dynamic CT)-   on steroid tapering per pulmonary; complete course of tamiflu for influenza;   follow up with your pulmonary doctor in 1 week;

## 2018-04-03 NOTE — DISCHARGE NOTE ADULT - CARE PROVIDERS DIRECT ADDRESSES
[email or web address],DirectAddress_Unknown

## 2018-04-03 NOTE — PROGRESS NOTE ADULT - SUBJECTIVE AND OBJECTIVE BOX
Patient is a 72y old  Male who presents with a chief complaint of sob (03 Apr 2018 10:15)      SUBJECTIVE / OVERNIGHT EVENTS: Comfortable without new complaints.   Review of Systems  chest pain no  palpitations no  sob no  nausea no  headache no    MEDICATIONS  (STANDING):  ALBUTerol/ipratropium for Nebulization 3 milliLiter(s) Nebulizer every 6 hours  allopurinol 150 milliGRAM(s) Oral daily  aspirin enteric coated 81 milliGRAM(s) Oral daily  atorvastatin 10 milliGRAM(s) Oral at bedtime  buDESOnide 160 MICROgram(s)/formoterol 4.5 MICROgram(s) Inhaler 2 Puff(s) Inhalation two times a day  carbamide peroxide Otic Solution 7 Drop(s) Left Ear two times a day  DULoxetine 60 milliGRAM(s) Oral daily  famotidine    Tablet 20 milliGRAM(s) Oral daily  fluticasone propionate 50 MICROgram(s)/spray Nasal Spray 2 Spray(s) Both Nostrils daily  heparin  Injectable 5000 Unit(s) SubCutaneous every 12 hours  influenza   Vaccine 0.5 milliLiter(s) IntraMuscular once  loratadine 10 milliGRAM(s) Oral daily  montelukast 10 milliGRAM(s) Oral daily  oseltamivir 75 milliGRAM(s) Oral two times a day  predniSONE   Tablet 20 milliGRAM(s) Oral two times a day  tamsulosin 0.8 milliGRAM(s) Oral at bedtime  tiotropium 18 MICROgram(s) Capsule 18 MICROGram(s) Inhalation daily    MEDICATIONS  (PRN):  acetaminophen   Tablet 650 milliGRAM(s) Oral every 6 hours PRN For Temp greater than 38.5 C (101.3 F)  acetaminophen   Tablet. 650 milliGRAM(s) Oral every 6 hours PRN Mild Pain (1 - 3)          PHYSICAL EXAM:  GENERAL: NAD, well-developed  HEAD:  Atraumatic, Normocephalic  EYES: EOMI, PERRLA, conjunctiva and sclera clear  NECK: Supple, No JVD  CHEST/LUNG: Clear to auscultation bilaterally; No wheeze  HEART: Regular rate and rhythm; No murmurs, rubs, or gallops  ABDOMEN: Soft, Nontender, Nondistended; Bowel sounds present  EXTREMITIES:  2+ Peripheral Pulses, No clubbing, cyanosis, or edema  PSYCH: AAOx3  NEUROLOGY: non-focal  SKIN: No rashes or lesions    LABS:                        12.2   4.27  )-----------( 168      ( 02 Apr 2018 09:27 )             37.1     04-02    139  |  103  |  17  ----------------------------<  151<H>  3.7   |  22  |  0.68    Ca    8.9      02 Apr 2018 07:10  Phos  1.9     04-01  Mg     1.9     04-01    TPro  7.7  /  Alb  4.3  /  TBili  0.4  /  DBili  x   /  AST  28  /  ALT  21  /  AlkPhos  105  04-01    PT/INR - ( 01 Apr 2018 14:48 )   PT: 11.3 sec;   INR: 1.05 ratio         PTT - ( 01 Apr 2018 14:48 )  PTT:33.2 sec  CARDIAC MARKERS ( 01 Apr 2018 14:48 )  x     / <0.01 ng/mL / x     / x     / x              RADIOLOGY & ADDITIONAL TESTS:    Imaging Personally Reviewed:    Consultant(s) Notes Reviewed:      Care Discussed with Consultants/Other Providers:

## 2018-04-03 NOTE — PROGRESS NOTE ADULT - ASSESSMENT
72 m with  COPD exacerbation- steroids taper, nebs, Pulmonary evaluation noted  Influenza B- unclear start of symptoms. Will continue Tamiflu for 5 days.  Tracheomalacia- CT chest as OTP  HTN control  Gout- continue Rx  CAD- stable  BPH- continue Rx  Depression stable  ENT evaluation re L ear decreased hearing noted. Cerumen impaction.  DC home.   Dagoberto Nelson MD pager [phone]

## 2018-04-03 NOTE — PROGRESS NOTE ADULT - ASSESSMENT
72 yo mild COPD/asthma, allergies, OA, GERD, CAD-and likely Tracheomalacia-admit w/influenza induced bronchospasm. 72 yo mild COPD/asthma, allergies, OA, GERD, CAD-and likely Tracheomalacia-admit w/influenza induced bronchospasm.    Improved over all-near baseline

## 2018-04-03 NOTE — DISCHARGE NOTE ADULT - PATIENT PORTAL LINK FT
You can access the [facility] [facility] Patient Portal, offered by [facility], by registering with the following website: [email or web address]

## 2018-04-03 NOTE — DISCHARGE NOTE ADULT - CARE PLAN
Principal Discharge DX:	COPD exacerbation  Goal:	improve symptoms  Assessment and plan of treatment:	complete steroid as prescribed  continue your medication  follow up with your lung doctor in 1 week  Secondary Diagnosis:	Influenza B  Assessment and plan of treatment:	complete Tamiflu as prescribed  Secondary Diagnosis:	Tracheomalacia, acquired  Assessment and plan of treatment:	follow  up-dynamic ct pending 4/11.   follow up with pulmonary doctor  Secondary Diagnosis:	Impacted cerumen of left ear  Assessment and plan of treatment:	complete ear drops as prescribed  follow up with ENT doctor as needed

## 2018-04-03 NOTE — PROGRESS NOTE ADULT - SUBJECTIVE AND OBJECTIVE BOX
CHIEF COMPLAINT:    Interval Events:    REVIEW OF SYSTEMS:  Constitutional: No fevers or chills. No weight loss. No fatigue or generalized malaise.  Eyes: No itching or discharge from the eyes  ENT: No ear pain. No ear discharge. No nasal congestion. No post nasal drip. No epistaxis. No throat pain. No sore throat. No difficulty swallowing.   CV: No chest pain. No palpitations. No lightheadedness or dizziness.   Resp: No dyspnea at rest. No dyspnea on exertion. No orthopnea. No wheezing. No cough. No stridor. No sputum production. No chest pain with respiration.  GI: No nausea. No vomiting. No diarrhea.  MSK: No joint pain or pain in any extremities  Integumentary: No skin lesions. No pedal edema.  Neurological: No gross motor weakness. No sensory changes.  [ ] All other systems negative  [ ] Unable to assess ROS because ________    OBJECTIVE:  ICU Vital Signs Last 24 Hrs  T(C): 36.6 (03 Apr 2018 00:43), Max: 37.5 (02 Apr 2018 17:24)  T(F): 97.9 (03 Apr 2018 00:43), Max: 99.5 (02 Apr 2018 17:24)  HR: 88 (03 Apr 2018 00:43) (65 - 88)  BP: 152/91 (03 Apr 2018 00:43) (143/70 - 163/88)  BP(mean): --  ABP: --  ABP(mean): --  RR: 18 (03 Apr 2018 00:43) (18 - 18)  SpO2: 98% (03 Apr 2018 00:43) (96% - 98%)        04-01 @ 07:01 - 04-02 @ 07:00  --------------------------------------------------------  IN: 480 mL / OUT: 0 mL / NET: 480 mL    04-02 @ 07:01  -  04-03 @ 05:18  --------------------------------------------------------  IN: 240 mL / OUT: 0 mL / NET: 240 mL      CAPILLARY BLOOD GLUCOSE          PHYSICAL EXAM:  General: Awake, alert, oriented X 3.   HEENT: Atraumatic, normocephalic.                 Mallampatti Grade                 No nasal congestion.                No tonsillar or pharyngeal exudates.  Lymph Nodes: No palpable lymphadenopathy  Neck: No JVD. No carotid bruit.   Respiratory: Normal chest expansion                         Normal percussion                         Normal and equal air entry                         No wheeze, rhonchi or rales.  Cardiovascular: S1 S2 normal. No murmurs, rubs or gallops.   Abdomen: Soft, non-tender, non-distended. No organomegaly.  Extremities: Warm to touch. Peripheral pulse palpable. No pedal edema.   Skin: No rashes or skin lesions  Neurological: Motor and sensory examination equal and normal in all four extremities.  Psychiatry: Appropriate mood and affect.    HOSPITAL MEDICATIONS:  MEDICATIONS  (STANDING):  ALBUTerol/ipratropium for Nebulization 3 milliLiter(s) Nebulizer every 6 hours  allopurinol 150 milliGRAM(s) Oral daily  aspirin enteric coated 81 milliGRAM(s) Oral daily  atorvastatin 10 milliGRAM(s) Oral at bedtime  buDESOnide 160 MICROgram(s)/formoterol 4.5 MICROgram(s) Inhaler 2 Puff(s) Inhalation two times a day  carbamide peroxide Otic Solution 7 Drop(s) Left Ear two times a day  DULoxetine 60 milliGRAM(s) Oral daily  famotidine    Tablet 20 milliGRAM(s) Oral daily  fluticasone propionate 50 MICROgram(s)/spray Nasal Spray 2 Spray(s) Both Nostrils daily  heparin  Injectable 5000 Unit(s) SubCutaneous every 12 hours  influenza   Vaccine 0.5 milliLiter(s) IntraMuscular once  loratadine 10 milliGRAM(s) Oral daily  montelukast 10 milliGRAM(s) Oral daily  oseltamivir 75 milliGRAM(s) Oral two times a day  predniSONE   Tablet 20 milliGRAM(s) Oral two times a day  tamsulosin 0.8 milliGRAM(s) Oral at bedtime  tiotropium 18 MICROgram(s) Capsule 18 MICROGram(s) Inhalation daily    MEDICATIONS  (PRN):  acetaminophen   Tablet 650 milliGRAM(s) Oral every 6 hours PRN For Temp greater than 38.5 C (101.3 F)  acetaminophen   Tablet. 650 milliGRAM(s) Oral every 6 hours PRN Mild Pain (1 - 3)      LABS:                        12.2   4.27  )-----------( 168      ( 02 Apr 2018 09:27 )             37.1     04-02    139  |  103  |  17  ----------------------------<  151<H>  3.7   |  22  |  0.68    Ca    8.9      02 Apr 2018 07:10  Phos  1.9     04-01  Mg     1.9     04-01    TPro  7.7  /  Alb  4.3  /  TBili  0.4  /  DBili  x   /  AST  28  /  ALT  21  /  AlkPhos  105  04-01    PT/INR - ( 01 Apr 2018 14:48 )   PT: 11.3 sec;   INR: 1.05 ratio         PTT - ( 01 Apr 2018 14:48 )  PTT:33.2 sec      Venous Blood Gas:  04-01 @ 14:48  7.38/50/18/29/21  VBG Lactate: 1.3      MICROBIOLOGY:     RADIOLOGY:  [ ] Reviewed and interpreted by me    Point of Care Ultrasound Findings:    PFT:    EKG: CHIEF COMPLAINT: better over all-walking/showered; less cough and intermittent    Interval Events: ambulated    REVIEW OF SYSTEMS:  Constitutional: No fevers or chills. No weight loss. No fatigue or generalized malaise.  Eyes: No itching or discharge from the eyes  ENT: No ear pain. No ear discharge. No nasal congestion. No post nasal drip. No epistaxis. No throat pain. No sore throat. No difficulty swallowing.   CV: No chest pain. No palpitations. No lightheadedness or dizziness.   Resp: No dyspnea at rest. mild dyspnea on exertion. No orthopnea. + wheezing. + cough. No stridor. No sputum production. No chest pain with respiration.  GI: No nausea. No vomiting. No diarrhea.  MSK: No joint pain or pain in any extremities  Integumentary: No skin lesions. No pedal edema.  Neurological: No gross motor weakness. No sensory changes.  [+ ] All other systems negative  [ ] Unable to assess ROS because ________    OBJECTIVE:  ICU Vital Signs Last 24 Hrs  T(C): 36.6 (03 Apr 2018 00:43), Max: 37.5 (02 Apr 2018 17:24)  T(F): 97.9 (03 Apr 2018 00:43), Max: 99.5 (02 Apr 2018 17:24)  HR: 88 (03 Apr 2018 00:43) (65 - 88)  BP: 152/91 (03 Apr 2018 00:43) (143/70 - 163/88)  BP(mean): --  ABP: --  ABP(mean): --  RR: 18 (03 Apr 2018 00:43) (18 - 18)  SpO2: 98% (03 Apr 2018 00:43) (96% - 98%)        04-01 @ 07:01 - 04-02 @ 07:00  --------------------------------------------------------  IN: 480 mL / OUT: 0 mL / NET: 480 mL    04-02 @ 07:01  -  04-03 @ 05:18  --------------------------------------------------------  IN: 240 mL / OUT: 0 mL / NET: 240 mL      CAPILLARY BLOOD GLUCOSE          PHYSICAL EXAM: NAD in bed  General: Awake, alert, oriented X 3.   HEENT: Atraumatic, normocephalic.                 Mallampatti Grade 3                No nasal congestion.                No tonsillar or pharyngeal exudates.  Lymph Nodes: No palpable lymphadenopathy  Neck: No JVD. No carotid bruit.   Respiratory: Normal chest expansion                         Normal percussion                         Normal and equal air entry                         + exp wheeze, no rhonchi or rales.  Cardiovascular: S1 S2 normal. No murmurs, rubs or gallops.   Abdomen: Soft, non-tender, non-distended. No organomegaly.  Extremities: Warm to touch. Peripheral pulse palpable. No pedal edema.   Skin: No rashes or skin lesions  Neurological: Motor and sensory examination equal and normal in all four extremities.  Psychiatry: Appropriate mood and affect.    HOSPITAL MEDICATIONS:  MEDICATIONS  (STANDING):  ALBUTerol/ipratropium for Nebulization 3 milliLiter(s) Nebulizer every 6 hours  allopurinol 150 milliGRAM(s) Oral daily  aspirin enteric coated 81 milliGRAM(s) Oral daily  atorvastatin 10 milliGRAM(s) Oral at bedtime  buDESOnide 160 MICROgram(s)/formoterol 4.5 MICROgram(s) Inhaler 2 Puff(s) Inhalation two times a day  carbamide peroxide Otic Solution 7 Drop(s) Left Ear two times a day  DULoxetine 60 milliGRAM(s) Oral daily  famotidine    Tablet 20 milliGRAM(s) Oral daily  fluticasone propionate 50 MICROgram(s)/spray Nasal Spray 2 Spray(s) Both Nostrils daily  heparin  Injectable 5000 Unit(s) SubCutaneous every 12 hours  influenza   Vaccine 0.5 milliLiter(s) IntraMuscular once  loratadine 10 milliGRAM(s) Oral daily  montelukast 10 milliGRAM(s) Oral daily  oseltamivir 75 milliGRAM(s) Oral two times a day  predniSONE   Tablet 20 milliGRAM(s) Oral two times a day  tamsulosin 0.8 milliGRAM(s) Oral at bedtime  tiotropium 18 MICROgram(s) Capsule 18 MICROGram(s) Inhalation daily    MEDICATIONS  (PRN):  acetaminophen   Tablet 650 milliGRAM(s) Oral every 6 hours PRN For Temp greater than 38.5 C (101.3 F)  acetaminophen   Tablet. 650 milliGRAM(s) Oral every 6 hours PRN Mild Pain (1 - 3)      LABS:                        12.2   4.27  )-----------( 168      ( 02 Apr 2018 09:27 )             37.1     04-02    139  |  103  |  17  ----------------------------<  151<H>  3.7   |  22  |  0.68    Ca    8.9      02 Apr 2018 07:10  Phos  1.9     04-01  Mg     1.9     04-01    TPro  7.7  /  Alb  4.3  /  TBili  0.4  /  DBili  x   /  AST  28  /  ALT  21  /  AlkPhos  105  04-01    PT/INR - ( 01 Apr 2018 14:48 )   PT: 11.3 sec;   INR: 1.05 ratio         PTT - ( 01 Apr 2018 14:48 )  PTT:33.2 sec      Venous Blood Gas:  04-01 @ 14:48  7.38/50/18/29/21  VBG Lactate: 1.3      MICROBIOLOGY:     RADIOLOGY:  [ ] Reviewed and interpreted by me    Point of Care Ultrasound Findings:    PFT:    EKG:

## 2018-04-03 NOTE — DISCHARGE NOTE ADULT - CARE PROVIDER_API CALL
Clay Lance), Internal Medicine; Pulmonary Disease  [address] [facility] [address]  [address]  [place] [address]  Phone: [phone]  Fax: [phone]    Leroy Lozano), Otolaryngology  [address] [address]  Phone: [phone]  Fax: [phone]    Dagoberto Nelson), Internal Medicine  [address] [place] [address]  Phone: [phone]  Fax: [phone]

## 2018-04-03 NOTE — DISCHARGE NOTE ADULT - MEDICATION SUMMARY - MEDICATIONS TO CHANGE
I will SWITCH the dose or number of times a day I take the medications listed below when I get home from the hospital:    predniSONE 20 mg oral tablet  -- 2 tab(s) by mouth once a day x 5 days

## 2018-04-03 NOTE — DISCHARGE NOTE ADULT - MEDICATION SUMMARY - MEDICATIONS TO TAKE
I will START or STAY ON the medications listed below when I get home from the hospital:    predniSONE 10 mg oral tablet  -- 4 tab(s) by mouth once a day for 1 day, 3 tabs daily for 3 days, 2 tabs dailt for 3 days and 1 tab daily for 3 days  -- It is very important that you take or use this exactly as directed.  Do not skip doses or discontinue unless directed by your doctor.  Obtain medical advice before taking any non-prescription drugs as some may affect the action of this medication.  Take with food or milk.    -- Indication: For Steroid tapering    acetaminophen 500 mg oral tablet  -- 2 tab(s) by mouth once a day in the morning   -- Indication: For Pain    aspirin 81 mg oral tablet  -- 1 tab(s) by mouth once a day  -- Indication: For antiplatelet    CeleBREX 200 mg oral capsule  -- 1 cap(s) by mouth once a day  -- Indication: For Pain    alfuzosin 10 mg oral tablet, extended release  -- 1 tab(s) by mouth once a day  -- Indication: For Prostate    duloxetine 60 mg oral delayed release capsule  -- 1 cap(s) by mouth once a day  -- Indication: For neuropathic pain    amitriptyline 10 mg oral tablet  -- 1 tab(s) by mouth once a day (at bedtime), or 2 times daily, As Needed  -- Indication: For neuropathic pain    allopurinol 300 mg oral tablet  -- 0.5 tab(s) by mouth once a day  -- Indication: For antigout    Xyzal 5 mg oral tablet  -- 1 tab(s) by mouth once a day (in the evening)  -- Indication: For antihistamine    Lipitor 10 mg oral tablet  -- 1 tab(s) by mouth once a day  -- Indication: For lipids    oseltamivir 75 mg oral capsule  -- 1 cap(s) by mouth 2 times a day  -- Indication: For Influenza B    ProAir HFA 90 mcg/inh inhalation aerosol  -- 2 puff(s) inhaled 4 times a day, As Needed  -- Indication: For COPD    Spiriva Respimat 2.5 mcg/inh inhalation aerosol  -- 2 puff(s) inhaled once a day  -- Indication: For COPD     Breo Ellipta 200 mcg-25 mcg/inh inhalation powder  -- 1 puff(s) inhaled once a day  -- Indication: For COPD     albuterol-ipratropium 2.5 mg-0.5 mg/3 mL inhalation solution  -- 3 milliliter(s) inhaled every 4-6 hours, As Needed  -- Indication: For Chronic obstructive pulmonary disease with acute exacerbation    raNITIdine 150 mg oral tablet  -- 1 tab(s) by mouth once a day  -- Indication: For antacid    docusate sodium 100 mg oral capsule  -- 1-2 cap(s) by mouth once a day, As Needed  -- Indication: For Stool softner    montelukast 10 mg oral tablet  -- 1 tab(s) by mouth once a day  -- Indication: For respiratory/    tiZANidine 4 mg oral tablet  -- 1 tab(s) by mouth once a day, As Needed  -- Indication: For Spasm    olopatadine 665 mcg/inh nasal spray  -- 2 spray(s) into nose 2 times a day  -- Indication: For nasal spray    Nasonex 50 mcg/inh nasal spray  -- 2 spray(s) into nose 2 times a day  -- Indication: For nasal spray    ipratropium 42 mcg/inh (0.06%) nasal spray  -- 2 spray(s) into nose 2-3 times a day, As Needed  -- Indication: For nasal spray    carbamide peroxide 6.5% otic solution  -- 7 drop(s) to each affected ear 2 times a day for 5 more days  -- Indication: For left ear wax    NexIUM 40 mg oral delayed release capsule  -- 1 cap(s) by mouth once a day  -- Indication: For Stomach acid reflux    Vitamin D3  -- 3000 international unit(s) by mouth once a day  -- Indication: For vitamin D

## 2018-04-03 NOTE — DISCHARGE NOTE ADULT - PLAN OF CARE
improve symptoms complete steroid as prescribed  continue your medication  follow up with your lung doctor in 1 week complete Tamiflu as prescribed follow  up-dynamic ct pending 4/11.   follow up with pulmonary doctor complete ear drops as prescribed  follow up with ENT doctor as needed

## 2018-04-03 NOTE — DISCHARGE NOTE ADULT - MEDICATION SUMMARY - MEDICATIONS TO STOP TAKING
I will STOP taking the medications listed below when I get home from the hospital:    Azithromycin 5 Day Dose Pack 250 mg oral tablet    Qvar 80 mcg/inh inhalation aerosol  -- 2 puff(s) inhaled 2 times a day

## 2018-04-03 NOTE — PROGRESS NOTE ADULT - ATTENDING COMMENTS
as above  Chronic resp dz-asthma/mild copd and likely Tracheomalacia (awaiting out pt dynamic CT)- influenza induced additional bspasm  DC solumedrol and initiate prednisone 40mg per day for 5 days then taper by 10mg every 3 days  symbicort 160 2 bid, spiriva 1 q day, nebulized rx, acapella, and incentive spirometry  tamiflu to complete 5 days  DC hopeful tomorrrow as above  Chronic resp dz-asthma/mild copd and likely Tracheomalacia (awaiting out pt dynamic CT)- influenza induced additional bspasm  prednisone 40mg per day for 3 days then taper by 10mg every 3 days  symbicort 160 2 bid, spiriva 1 q day, nebulized rx, acapella, and incentive spirometry  tamiflu to complete 5 days  DC hopeful today    Clay Lance MD-Pulmonary   [phone]

## 2018-04-03 NOTE — PROGRESS NOTE ADULT - PROBLEM SELECTOR PLAN 3
prednisone taper-40 for 3 then 30 for 3 then 20 for 3 then 10 then 3 days  acapella  symbicort 160 2 bid  spiriva 1 per day  pulmonary toilet-incentive spirometry, Chest Pt-acapella/chest vest-up to 5 times per day.    maintain oxygen levels above 90%-supplemental oxygen via nasal canula-humidified    All nebulized therapy is to be administered via hand held nebulizer-(patient must gargle and spit with water after use)

## 2018-04-06 LAB
CULTURE RESULTS: SIGNIFICANT CHANGE UP
CULTURE RESULTS: SIGNIFICANT CHANGE UP
SPECIMEN SOURCE: SIGNIFICANT CHANGE UP
SPECIMEN SOURCE: SIGNIFICANT CHANGE UP

## 2018-04-09 ENCOUNTER — MEDICATION RENEWAL (OUTPATIENT)
Age: 73
End: 2018-04-09

## 2018-04-10 ENCOUNTER — FORM ENCOUNTER (OUTPATIENT)
Age: 73
End: 2018-04-10

## 2018-04-11 ENCOUNTER — OUTPATIENT (OUTPATIENT)
Dept: OUTPATIENT SERVICES | Facility: HOSPITAL | Age: 73
LOS: 1 days | End: 2018-04-11
Payer: MEDICARE

## 2018-04-11 ENCOUNTER — APPOINTMENT (OUTPATIENT)
Dept: CT IMAGING | Facility: CLINIC | Age: 73
End: 2018-04-11
Payer: MEDICARE

## 2018-04-11 DIAGNOSIS — Z98.89 OTHER SPECIFIED POSTPROCEDURAL STATES: Chronic | ICD-10-CM

## 2018-04-11 DIAGNOSIS — R93.8 ABNORMAL FINDINGS ON DIAGNOSTIC IMAGING OF OTHER SPECIFIED BODY STRUCTURES: ICD-10-CM

## 2018-04-11 DIAGNOSIS — Z96.60 PRESENCE OF UNSPECIFIED ORTHOPEDIC JOINT IMPLANT: Chronic | ICD-10-CM

## 2018-04-11 DIAGNOSIS — G56.01 CARPAL TUNNEL SYNDROME, RIGHT UPPER LIMB: Chronic | ICD-10-CM

## 2018-04-11 PROCEDURE — 71250 CT THORAX DX C-: CPT

## 2018-04-11 PROCEDURE — 71250 CT THORAX DX C-: CPT | Mod: 26

## 2018-04-13 ENCOUNTER — APPOINTMENT (OUTPATIENT)
Dept: PULMONOLOGY | Facility: CLINIC | Age: 73
End: 2018-04-13
Payer: MEDICARE

## 2018-04-13 VITALS
WEIGHT: 196 LBS | DIASTOLIC BLOOD PRESSURE: 90 MMHG | SYSTOLIC BLOOD PRESSURE: 140 MMHG | HEART RATE: 93 BPM | RESPIRATION RATE: 17 BRPM | BODY MASS INDEX: 29.7 KG/M2 | OXYGEN SATURATION: 98 % | HEIGHT: 68 IN

## 2018-04-13 PROCEDURE — 99214 OFFICE O/P EST MOD 30 MIN: CPT | Mod: 25

## 2018-04-13 PROCEDURE — 94010 BREATHING CAPACITY TEST: CPT

## 2018-04-13 RX ORDER — PREDNISONE 10 MG/1
10 TABLET ORAL
Qty: 22 | Refills: 0 | Status: DISCONTINUED | COMMUNITY
Start: 2018-04-03

## 2018-04-13 RX ORDER — OSELTAMIVIR PHOSPHATE 75 MG/1
75 CAPSULE ORAL
Qty: 6 | Refills: 0 | Status: DISCONTINUED | COMMUNITY
Start: 2018-04-03

## 2018-04-16 ENCOUNTER — APPOINTMENT (OUTPATIENT)
Dept: UROLOGY | Facility: CLINIC | Age: 73
End: 2018-04-16
Payer: MEDICARE

## 2018-04-16 PROCEDURE — 99213 OFFICE O/P EST LOW 20 MIN: CPT

## 2018-04-19 LAB
PSA FREE FLD-MCNC: 14.4
PSA FREE SERPL-MCNC: 0.31 NG/ML
PSA SERPL-MCNC: 2.15 NG/ML

## 2018-04-24 PROBLEM — Z96.642 HISTORY OF LEFT HIP REPLACEMENT: Status: RESOLVED | Noted: 2017-10-04 | Resolved: 2018-04-24

## 2018-04-24 PROBLEM — Z92.29 HISTORY OF LONG-TERM TREATMENT WITH HIGH-RISK MEDICATION: Status: RESOLVED | Noted: 2017-12-20 | Resolved: 2018-04-24

## 2018-04-24 PROBLEM — Z87.39 HISTORY OF LOW BACK PAIN: Status: RESOLVED | Noted: 2017-05-03 | Resolved: 2018-04-24

## 2018-04-24 PROBLEM — Z87.898 HISTORY OF SNORING: Status: RESOLVED | Noted: 2017-06-13 | Resolved: 2018-04-24

## 2018-04-24 PROBLEM — J31.0 NON-ALLERGIC RHINITIS: Status: RESOLVED | Noted: 2017-06-13 | Resolved: 2018-04-24

## 2018-04-24 PROBLEM — S70.02XA HEMATOMA OF LEFT HIP, INITIAL ENCOUNTER: Status: RESOLVED | Noted: 2017-01-03 | Resolved: 2018-04-24

## 2018-04-24 PROBLEM — T84.84XA PAIN DUE TO TOTAL HIP REPLACEMENT, INITIAL ENCOUNTER: Status: RESOLVED | Noted: 2017-12-20 | Resolved: 2018-04-24

## 2018-04-24 PROBLEM — Z86.2 HISTORY OF IMMUNODEFICIENCY: Status: RESOLVED | Noted: 2018-04-13 | Resolved: 2018-04-24

## 2018-04-26 ENCOUNTER — APPOINTMENT (OUTPATIENT)
Dept: THORACIC SURGERY | Facility: CLINIC | Age: 73
End: 2018-04-26

## 2018-04-26 ENCOUNTER — APPOINTMENT (OUTPATIENT)
Dept: THORACIC SURGERY | Facility: CLINIC | Age: 73
End: 2018-04-26
Payer: MEDICARE

## 2018-04-26 ENCOUNTER — OUTPATIENT (OUTPATIENT)
Dept: OUTPATIENT SERVICES | Facility: HOSPITAL | Age: 73
LOS: 1 days | End: 2018-04-26
Payer: MEDICARE

## 2018-04-26 VITALS
DIASTOLIC BLOOD PRESSURE: 86 MMHG | HEART RATE: 72 BPM | SYSTOLIC BLOOD PRESSURE: 187 MMHG | TEMPERATURE: 97.3 F | RESPIRATION RATE: 18 BRPM | WEIGHT: 203 LBS | BODY MASS INDEX: 30.87 KG/M2 | OXYGEN SATURATION: 97 %

## 2018-04-26 DIAGNOSIS — Z96.641 PRESENCE OF RIGHT ARTIFICIAL HIP JOINT: ICD-10-CM

## 2018-04-26 DIAGNOSIS — Z96.60 PRESENCE OF UNSPECIFIED ORTHOPEDIC JOINT IMPLANT: Chronic | ICD-10-CM

## 2018-04-26 DIAGNOSIS — J39.8 OTHER SPECIFIED DISEASES OF UPPER RESPIRATORY TRACT: ICD-10-CM

## 2018-04-26 DIAGNOSIS — Z96.649 PAIN DUE TO INTERNAL ORTHOPEDIC PROSTHETIC DEVICES, IMPLANTS AND GRAFTS, INITIAL ENCOUNTER: ICD-10-CM

## 2018-04-26 DIAGNOSIS — Z87.898 PERSONAL HISTORY OF OTHER SPECIFIED CONDITIONS: ICD-10-CM

## 2018-04-26 DIAGNOSIS — R42 DIZZINESS AND GIDDINESS: ICD-10-CM

## 2018-04-26 DIAGNOSIS — Z87.39 PERSONAL HISTORY OF OTHER DISEASES OF THE MUSCULOSKELETAL SYSTEM AND CONNECTIVE TISSUE: ICD-10-CM

## 2018-04-26 DIAGNOSIS — Z96.642 PRESENCE OF LEFT ARTIFICIAL HIP JOINT: ICD-10-CM

## 2018-04-26 DIAGNOSIS — Z98.89 OTHER SPECIFIED POSTPROCEDURAL STATES: Chronic | ICD-10-CM

## 2018-04-26 DIAGNOSIS — T14.8XXA OTHER INJURY OF UNSPECIFIED BODY REGION, INITIAL ENCOUNTER: ICD-10-CM

## 2018-04-26 DIAGNOSIS — Z96.659 PRESENCE OF UNSPECIFIED ARTIFICIAL KNEE JOINT: ICD-10-CM

## 2018-04-26 DIAGNOSIS — Z96.652 PRESENCE OF LEFT ARTIFICIAL KNEE JOINT: ICD-10-CM

## 2018-04-26 DIAGNOSIS — Z92.29 PERSONAL HISTORY OF OTHER DRUG THERAPY: ICD-10-CM

## 2018-04-26 DIAGNOSIS — R00.1 BRADYCARDIA, UNSPECIFIED: ICD-10-CM

## 2018-04-26 DIAGNOSIS — M25.562 PAIN IN LEFT KNEE: ICD-10-CM

## 2018-04-26 DIAGNOSIS — N40.1 BENIGN PROSTATIC HYPERPLASIA WITH LOWER URINARY TRACT SYMPMS: ICD-10-CM

## 2018-04-26 DIAGNOSIS — Z86.2 PERSONAL HISTORY OF DISEASES OF THE BLOOD AND BLOOD-FORMING ORGANS AND CERTAIN DISORDERS INVOLVING THE IMMUNE MECHANISM: ICD-10-CM

## 2018-04-26 DIAGNOSIS — Z86.14 PERSONAL HISTORY OF METHICILLIN RESISTANT STAPHYLOCOCCUS AUREUS INFECTION: ICD-10-CM

## 2018-04-26 DIAGNOSIS — I80.00 PHLEBITIS AND THROMBOPHLEBITIS OF SUPERFICIAL VESSELS OF UNSPECIFIED LOWER EXTREMITY: ICD-10-CM

## 2018-04-26 DIAGNOSIS — Z12.11 ENCOUNTER FOR SCREENING FOR MALIGNANT NEOPLASM OF COLON: ICD-10-CM

## 2018-04-26 DIAGNOSIS — J31.0 CHRONIC RHINITIS: ICD-10-CM

## 2018-04-26 DIAGNOSIS — N13.8 BENIGN PROSTATIC HYPERPLASIA WITH LOWER URINARY TRACT SYMPMS: ICD-10-CM

## 2018-04-26 DIAGNOSIS — T84.84XA PAIN DUE TO INTERNAL ORTHOPEDIC PROSTHETIC DEVICES, IMPLANTS AND GRAFTS, INITIAL ENCOUNTER: ICD-10-CM

## 2018-04-26 DIAGNOSIS — S70.02XA CONTUSION OF LEFT HIP, INITIAL ENCOUNTER: ICD-10-CM

## 2018-04-26 DIAGNOSIS — G56.01 CARPAL TUNNEL SYNDROME, RIGHT UPPER LIMB: Chronic | ICD-10-CM

## 2018-04-26 DIAGNOSIS — Z87.19 PERSONAL HISTORY OF OTHER DISEASES OF THE DIGESTIVE SYSTEM: ICD-10-CM

## 2018-04-26 LAB
ALBUMIN SERPL ELPH-MCNC: 4.5 G/DL — SIGNIFICANT CHANGE UP (ref 3.3–5)
ALP SERPL-CCNC: 80 U/L — SIGNIFICANT CHANGE UP (ref 40–120)
ALT FLD-CCNC: 11 U/L — SIGNIFICANT CHANGE UP (ref 10–45)
ANION GAP SERPL CALC-SCNC: 10 MMOL/L — SIGNIFICANT CHANGE UP (ref 5–17)
APPEARANCE UR: CLEAR — SIGNIFICANT CHANGE UP
APTT BLD: 35.4 SEC — SIGNIFICANT CHANGE UP (ref 27.5–37.4)
AST SERPL-CCNC: 13 U/L — SIGNIFICANT CHANGE UP (ref 10–40)
BASOPHILS NFR BLD AUTO: 0.5 % — SIGNIFICANT CHANGE UP (ref 0–2)
BILIRUB SERPL-MCNC: 0.5 MG/DL — SIGNIFICANT CHANGE UP (ref 0.2–1.2)
BILIRUB UR-MCNC: NEGATIVE — SIGNIFICANT CHANGE UP
BLD GP AB SCN SERPL QL: NEGATIVE — SIGNIFICANT CHANGE UP
BUN SERPL-MCNC: 12 MG/DL — SIGNIFICANT CHANGE UP (ref 7–23)
CALCIUM SERPL-MCNC: 9.6 MG/DL — SIGNIFICANT CHANGE UP (ref 8.4–10.5)
CHLORIDE SERPL-SCNC: 102 MMOL/L — SIGNIFICANT CHANGE UP (ref 96–108)
CHOLEST SERPL-MCNC: 202 MG/DL — HIGH (ref 10–199)
CO2 SERPL-SCNC: 29 MMOL/L — SIGNIFICANT CHANGE UP (ref 22–31)
COLOR SPEC: YELLOW — SIGNIFICANT CHANGE UP
CREAT SERPL-MCNC: 0.75 MG/DL — SIGNIFICANT CHANGE UP (ref 0.5–1.3)
DIFF PNL FLD: NEGATIVE — SIGNIFICANT CHANGE UP
EOSINOPHIL NFR BLD AUTO: 3.8 % — SIGNIFICANT CHANGE UP (ref 0–6)
GLUCOSE SERPL-MCNC: 100 MG/DL — HIGH (ref 70–99)
GLUCOSE UR QL: NEGATIVE — SIGNIFICANT CHANGE UP
HBA1C BLD-MCNC: 5.2 % — SIGNIFICANT CHANGE UP (ref 4–5.6)
HBV SURFACE AG SER-ACNC: SIGNIFICANT CHANGE UP
HCT VFR BLD CALC: 37.9 % — LOW (ref 39–50)
HDLC SERPL-MCNC: 77 MG/DL — SIGNIFICANT CHANGE UP (ref 40–125)
HGB BLD-MCNC: 12.1 G/DL — LOW (ref 13–17)
INR BLD: 0.95 — SIGNIFICANT CHANGE UP (ref 0.88–1.16)
KETONES UR-MCNC: NEGATIVE — SIGNIFICANT CHANGE UP
LEUKOCYTE ESTERASE UR-ACNC: NEGATIVE — SIGNIFICANT CHANGE UP
LIPID PNL WITH DIRECT LDL SERPL: 93 MG/DL — SIGNIFICANT CHANGE UP
LYMPHOCYTES # BLD AUTO: 32.9 % — SIGNIFICANT CHANGE UP (ref 13–44)
MCHC RBC-ENTMCNC: 30.2 PG — SIGNIFICANT CHANGE UP (ref 27–34)
MCHC RBC-ENTMCNC: 31.9 G/DL — LOW (ref 32–36)
MCV RBC AUTO: 94.5 FL — SIGNIFICANT CHANGE UP (ref 80–100)
MONOCYTES NFR BLD AUTO: 8.1 % — SIGNIFICANT CHANGE UP (ref 2–14)
NEUTROPHILS NFR BLD AUTO: 54.7 % — SIGNIFICANT CHANGE UP (ref 43–77)
NITRITE UR-MCNC: NEGATIVE — SIGNIFICANT CHANGE UP
PH UR: 6 — SIGNIFICANT CHANGE UP (ref 5–8)
PLATELET # BLD AUTO: 286 K/UL — SIGNIFICANT CHANGE UP (ref 150–400)
POTASSIUM SERPL-MCNC: 4.9 MMOL/L — SIGNIFICANT CHANGE UP (ref 3.5–5.3)
POTASSIUM SERPL-SCNC: 4.9 MMOL/L — SIGNIFICANT CHANGE UP (ref 3.5–5.3)
PROT SERPL-MCNC: 7.7 G/DL — SIGNIFICANT CHANGE UP (ref 6–8.3)
PROT UR-MCNC: NEGATIVE MG/DL — SIGNIFICANT CHANGE UP
PROTHROM AB SERPL-ACNC: 10.5 SEC — SIGNIFICANT CHANGE UP (ref 9.8–12.7)
RBC # BLD: 4.01 M/UL — LOW (ref 4.2–5.8)
RBC # FLD: 13.4 % — SIGNIFICANT CHANGE UP (ref 10.3–16.9)
RH IG SCN BLD-IMP: POSITIVE — SIGNIFICANT CHANGE UP
SODIUM SERPL-SCNC: 141 MMOL/L — SIGNIFICANT CHANGE UP (ref 135–145)
SP GR SPEC: 1.01 — SIGNIFICANT CHANGE UP (ref 1–1.03)
TOTAL CHOLESTEROL/HDL RATIO MEASUREMENT: 2.6 RATIO — LOW (ref 3.4–9.6)
TRIGL SERPL-MCNC: 159 MG/DL — HIGH (ref 10–149)
TSH SERPL-MCNC: 1.76 UIU/ML — SIGNIFICANT CHANGE UP (ref 0.35–4.94)
UROBILINOGEN FLD QL: 0.2 E.U./DL — SIGNIFICANT CHANGE UP
WBC # BLD: 5.5 K/UL — SIGNIFICANT CHANGE UP (ref 3.8–10.5)
WBC # FLD AUTO: 5.5 K/UL — SIGNIFICANT CHANGE UP (ref 3.8–10.5)

## 2018-04-26 PROCEDURE — 81003 URINALYSIS AUTO W/O SCOPE: CPT

## 2018-04-26 PROCEDURE — 86900 BLOOD TYPING SEROLOGIC ABO: CPT

## 2018-04-26 PROCEDURE — 86850 RBC ANTIBODY SCREEN: CPT

## 2018-04-26 PROCEDURE — 86901 BLOOD TYPING SEROLOGIC RH(D): CPT

## 2018-04-26 PROCEDURE — 85610 PROTHROMBIN TIME: CPT

## 2018-04-26 PROCEDURE — 85025 COMPLETE CBC W/AUTO DIFF WBC: CPT

## 2018-04-26 PROCEDURE — 87340 HEPATITIS B SURFACE AG IA: CPT

## 2018-04-26 PROCEDURE — 84443 ASSAY THYROID STIM HORMONE: CPT

## 2018-04-26 PROCEDURE — 83036 HEMOGLOBIN GLYCOSYLATED A1C: CPT

## 2018-04-26 PROCEDURE — 80053 COMPREHEN METABOLIC PANEL: CPT

## 2018-04-26 PROCEDURE — 99214 OFFICE O/P EST MOD 30 MIN: CPT

## 2018-04-26 PROCEDURE — 85730 THROMBOPLASTIN TIME PARTIAL: CPT

## 2018-04-26 PROCEDURE — 80061 LIPID PANEL: CPT

## 2018-04-27 ENCOUNTER — MEDICATION RENEWAL (OUTPATIENT)
Age: 73
End: 2018-04-27

## 2018-04-27 RX ORDER — CLARITHROMYCIN 500 MG/1
500 TABLET, FILM COATED ORAL
Qty: 20 | Refills: 0 | Status: COMPLETED | COMMUNITY
Start: 2018-04-13 | End: 2018-04-27

## 2018-04-27 RX ORDER — OXYCODONE 5 MG/1
5 TABLET ORAL
Qty: 30 | Refills: 0 | Status: COMPLETED | COMMUNITY
Start: 2016-12-12 | End: 2018-04-27

## 2018-04-27 RX ORDER — LIDOCAINE 5% 700 MG/1
5 PATCH TOPICAL
Qty: 10 | Refills: 0 | Status: COMPLETED | COMMUNITY
Start: 2017-12-20 | End: 2018-04-27

## 2018-04-27 RX ORDER — AMITRIPTYLINE HCL 10 MG
10 TABLET ORAL
Refills: 0 | Status: COMPLETED | COMMUNITY
End: 2018-04-27

## 2018-04-27 RX ORDER — AZITHROMYCIN DIHYDRATE 250 MG/1
250 TABLET, FILM COATED ORAL
Qty: 1 | Refills: 0 | Status: COMPLETED | COMMUNITY
Start: 2018-03-31 | End: 2018-04-27

## 2018-04-27 RX ORDER — NYSTATIN 100000 [USP'U]/ML
100000 SUSPENSION ORAL
Qty: 1 | Refills: 2 | Status: COMPLETED | COMMUNITY
Start: 2017-12-29 | End: 2018-04-27

## 2018-05-15 ENCOUNTER — APPOINTMENT (OUTPATIENT)
Dept: PULMONOLOGY | Facility: CLINIC | Age: 73
End: 2018-05-15
Payer: MEDICARE

## 2018-05-15 PROCEDURE — 99203 OFFICE O/P NEW LOW 30 MIN: CPT | Mod: 25

## 2018-05-17 ENCOUNTER — APPOINTMENT (OUTPATIENT)
Dept: CARDIOLOGY | Facility: CLINIC | Age: 73
End: 2018-05-17

## 2018-05-21 VITALS
TEMPERATURE: 96 F | RESPIRATION RATE: 16 BRPM | DIASTOLIC BLOOD PRESSURE: 61 MMHG | SYSTOLIC BLOOD PRESSURE: 122 MMHG | HEART RATE: 69 BPM | HEIGHT: 68 IN | OXYGEN SATURATION: 95 % | WEIGHT: 205.25 LBS

## 2018-05-22 ENCOUNTER — OUTPATIENT (OUTPATIENT)
Dept: OUTPATIENT SERVICES | Facility: HOSPITAL | Age: 73
LOS: 1 days | Discharge: HOME | End: 2018-05-22
Payer: MEDICARE

## 2018-05-22 ENCOUNTER — APPOINTMENT (OUTPATIENT)
Dept: THORACIC SURGERY | Facility: HOSPITAL | Age: 73
End: 2018-05-22

## 2018-05-22 VITALS
SYSTOLIC BLOOD PRESSURE: 153 MMHG | RESPIRATION RATE: 17 BRPM | HEART RATE: 64 BPM | DIASTOLIC BLOOD PRESSURE: 66 MMHG | OXYGEN SATURATION: 96 %

## 2018-05-22 DIAGNOSIS — Z98.89 OTHER SPECIFIED POSTPROCEDURAL STATES: Chronic | ICD-10-CM

## 2018-05-22 DIAGNOSIS — G56.01 CARPAL TUNNEL SYNDROME, RIGHT UPPER LIMB: Chronic | ICD-10-CM

## 2018-05-22 DIAGNOSIS — Z96.60 PRESENCE OF UNSPECIFIED ORTHOPEDIC JOINT IMPLANT: Chronic | ICD-10-CM

## 2018-05-22 PROCEDURE — 31645 BRNCHSC W/THER ASPIR 1ST: CPT

## 2018-05-22 PROCEDURE — 31622 DX BRONCHOSCOPE/WASH: CPT

## 2018-05-22 RX ORDER — METOCLOPRAMIDE HCL 10 MG
10 TABLET ORAL ONCE
Qty: 0 | Refills: 0 | Status: DISCONTINUED | OUTPATIENT
Start: 2018-05-22 | End: 2018-05-22

## 2018-05-22 RX ORDER — ONDANSETRON 8 MG/1
4 TABLET, FILM COATED ORAL ONCE
Qty: 0 | Refills: 0 | Status: COMPLETED | OUTPATIENT
Start: 2018-05-22 | End: 2018-05-22

## 2018-05-22 RX ADMIN — ONDANSETRON 4 MILLIGRAM(S): 8 TABLET, FILM COATED ORAL at 13:50

## 2018-05-22 NOTE — PACU DISCHARGE NOTE - COMMENTS
pt aao x3.  VSS.  ambulating with steady gait.  tolerating PO.  reviewed discharge instructions with pt; pt verbalizes understanding of instructions; copy given to pt.  IV discontinued.  discharged to home ambulatory with escort

## 2018-05-23 ENCOUNTER — APPOINTMENT (OUTPATIENT)
Dept: CARDIOLOGY | Facility: CLINIC | Age: 73
End: 2018-05-23
Payer: MEDICARE

## 2018-05-23 PROCEDURE — 93306 TTE W/DOPPLER COMPLETE: CPT

## 2018-05-25 ENCOUNTER — MEDICATION RENEWAL (OUTPATIENT)
Age: 73
End: 2018-05-25

## 2018-06-06 ENCOUNTER — APPOINTMENT (OUTPATIENT)
Dept: CARDIOLOGY | Facility: CLINIC | Age: 73
End: 2018-06-06
Payer: MEDICARE

## 2018-06-06 ENCOUNTER — NON-APPOINTMENT (OUTPATIENT)
Age: 73
End: 2018-06-06

## 2018-06-06 VITALS
HEIGHT: 68 IN | HEART RATE: 78 BPM | WEIGHT: 210 LBS | BODY MASS INDEX: 31.83 KG/M2 | DIASTOLIC BLOOD PRESSURE: 82 MMHG | SYSTOLIC BLOOD PRESSURE: 158 MMHG

## 2018-06-06 VITALS — DIASTOLIC BLOOD PRESSURE: 82 MMHG | SYSTOLIC BLOOD PRESSURE: 128 MMHG

## 2018-06-06 VITALS — SYSTOLIC BLOOD PRESSURE: 144 MMHG | DIASTOLIC BLOOD PRESSURE: 84 MMHG

## 2018-06-06 PROCEDURE — 99215 OFFICE O/P EST HI 40 MIN: CPT

## 2018-06-06 PROCEDURE — 93000 ELECTROCARDIOGRAM COMPLETE: CPT

## 2018-06-12 ENCOUNTER — MEDICATION RENEWAL (OUTPATIENT)
Age: 73
End: 2018-06-12

## 2018-06-15 ENCOUNTER — APPOINTMENT (OUTPATIENT)
Dept: PULMONOLOGY | Facility: CLINIC | Age: 73
End: 2018-06-15
Payer: MEDICARE

## 2018-06-15 VITALS
HEART RATE: 69 BPM | DIASTOLIC BLOOD PRESSURE: 80 MMHG | OXYGEN SATURATION: 98 % | SYSTOLIC BLOOD PRESSURE: 126 MMHG | RESPIRATION RATE: 17 BRPM | HEIGHT: 68 IN | BODY MASS INDEX: 31.52 KG/M2 | WEIGHT: 208 LBS

## 2018-06-15 DIAGNOSIS — Z87.01 PERSONAL HISTORY OF PNEUMONIA (RECURRENT): ICD-10-CM

## 2018-06-15 PROCEDURE — 94010 BREATHING CAPACITY TEST: CPT

## 2018-06-15 PROCEDURE — 94727 GAS DIL/WSHOT DETER LNG VOL: CPT

## 2018-06-15 PROCEDURE — 94729 DIFFUSING CAPACITY: CPT

## 2018-06-15 PROCEDURE — 99214 OFFICE O/P EST MOD 30 MIN: CPT | Mod: 25

## 2018-06-30 ENCOUNTER — RX RENEWAL (OUTPATIENT)
Age: 73
End: 2018-06-30

## 2018-07-05 ENCOUNTER — MEDICATION RENEWAL (OUTPATIENT)
Age: 73
End: 2018-07-05

## 2018-07-05 DIAGNOSIS — Z29.9 ENCOUNTER FOR PROPHYLACTIC MEASURES, UNSPECIFIED: ICD-10-CM

## 2018-07-11 ENCOUNTER — MEDICATION RENEWAL (OUTPATIENT)
Age: 73
End: 2018-07-11

## 2018-07-18 ENCOUNTER — APPOINTMENT (OUTPATIENT)
Dept: PULMONOLOGY | Facility: CLINIC | Age: 73
End: 2018-07-18
Payer: MEDICARE

## 2018-07-18 VITALS
SYSTOLIC BLOOD PRESSURE: 122 MMHG | HEIGHT: 68 IN | DIASTOLIC BLOOD PRESSURE: 80 MMHG | WEIGHT: 208 LBS | HEART RATE: 67 BPM | OXYGEN SATURATION: 97 % | BODY MASS INDEX: 31.52 KG/M2 | RESPIRATION RATE: 17 BRPM

## 2018-07-18 VITALS
WEIGHT: 208 LBS | SYSTOLIC BLOOD PRESSURE: 127 MMHG | DIASTOLIC BLOOD PRESSURE: 82 MMHG | OXYGEN SATURATION: 98 % | RESPIRATION RATE: 17 BRPM | HEART RATE: 67 BPM | HEIGHT: 68 IN | BODY MASS INDEX: 31.52 KG/M2

## 2018-07-18 VITALS
HEIGHT: 68 IN | OXYGEN SATURATION: 98 % | WEIGHT: 208 LBS | BODY MASS INDEX: 31.52 KG/M2 | SYSTOLIC BLOOD PRESSURE: 130 MMHG | DIASTOLIC BLOOD PRESSURE: 76 MMHG | HEART RATE: 71 BPM | RESPIRATION RATE: 17 BRPM

## 2018-07-18 PROCEDURE — 96372 THER/PROPH/DIAG INJ SC/IM: CPT

## 2018-07-18 RX ORDER — OMALIZUMAB 202.5 MG/1.4ML
150 INJECTION, SOLUTION SUBCUTANEOUS
Qty: 45 | Refills: 0 | Status: COMPLETED | OUTPATIENT
Start: 2018-07-18

## 2018-07-18 RX ADMIN — OMALIZUMAB 0 MG: 202.5 INJECTION, SOLUTION SUBCUTANEOUS at 00:00

## 2018-07-31 ENCOUNTER — APPOINTMENT (OUTPATIENT)
Dept: PULMONOLOGY | Facility: CLINIC | Age: 73
End: 2018-07-31
Payer: MEDICARE

## 2018-07-31 VITALS
DIASTOLIC BLOOD PRESSURE: 80 MMHG | OXYGEN SATURATION: 100 % | WEIGHT: 214 LBS | HEART RATE: 73 BPM | SYSTOLIC BLOOD PRESSURE: 140 MMHG | HEIGHT: 68 IN | BODY MASS INDEX: 32.43 KG/M2

## 2018-07-31 DIAGNOSIS — R05 COUGH: ICD-10-CM

## 2018-07-31 PROCEDURE — 99214 OFFICE O/P EST MOD 30 MIN: CPT | Mod: 25

## 2018-07-31 PROCEDURE — 96372 THER/PROPH/DIAG INJ SC/IM: CPT

## 2018-07-31 RX ORDER — OMALIZUMAB 202.5 MG/1.4ML
150 INJECTION, SOLUTION SUBCUTANEOUS
Qty: 30 | Refills: 0 | Status: COMPLETED | OUTPATIENT
Start: 2018-07-31

## 2018-07-31 RX ADMIN — OMALIZUMAB 150 MG: 202.5 INJECTION, SOLUTION SUBCUTANEOUS at 00:00

## 2018-08-06 ENCOUNTER — RX RENEWAL (OUTPATIENT)
Age: 73
End: 2018-08-06

## 2018-08-07 ENCOUNTER — NON-APPOINTMENT (OUTPATIENT)
Age: 73
End: 2018-08-07

## 2018-08-07 ENCOUNTER — APPOINTMENT (OUTPATIENT)
Dept: CARDIOLOGY | Facility: CLINIC | Age: 73
End: 2018-08-07
Payer: MEDICARE

## 2018-08-07 VITALS — DIASTOLIC BLOOD PRESSURE: 78 MMHG | SYSTOLIC BLOOD PRESSURE: 140 MMHG

## 2018-08-07 VITALS
HEIGHT: 66 IN | BODY MASS INDEX: 33.11 KG/M2 | OXYGEN SATURATION: 97 % | HEART RATE: 73 BPM | SYSTOLIC BLOOD PRESSURE: 138 MMHG | DIASTOLIC BLOOD PRESSURE: 82 MMHG | WEIGHT: 206 LBS

## 2018-08-07 VITALS — SYSTOLIC BLOOD PRESSURE: 138 MMHG | DIASTOLIC BLOOD PRESSURE: 88 MMHG

## 2018-08-07 VITALS — SYSTOLIC BLOOD PRESSURE: 132 MMHG | DIASTOLIC BLOOD PRESSURE: 80 MMHG

## 2018-08-07 PROCEDURE — 93000 ELECTROCARDIOGRAM COMPLETE: CPT

## 2018-08-07 PROCEDURE — 99215 OFFICE O/P EST HI 40 MIN: CPT

## 2018-08-13 ENCOUNTER — MEDICATION RENEWAL (OUTPATIENT)
Age: 73
End: 2018-08-13

## 2018-08-14 ENCOUNTER — APPOINTMENT (OUTPATIENT)
Dept: PULMONOLOGY | Facility: CLINIC | Age: 73
End: 2018-08-14
Payer: MEDICARE

## 2018-08-14 VITALS
OXYGEN SATURATION: 98 % | HEART RATE: 78 BPM | SYSTOLIC BLOOD PRESSURE: 132 MMHG | WEIGHT: 211 LBS | HEIGHT: 68 IN | DIASTOLIC BLOOD PRESSURE: 80 MMHG | BODY MASS INDEX: 31.98 KG/M2

## 2018-08-14 PROCEDURE — 96372 THER/PROPH/DIAG INJ SC/IM: CPT

## 2018-08-14 RX ORDER — OMALIZUMAB 202.5 MG/1.4ML
150 INJECTION, SOLUTION SUBCUTANEOUS
Qty: 45 | Refills: 0 | Status: COMPLETED | OUTPATIENT
Start: 2018-08-14

## 2018-08-14 RX ADMIN — OMALIZUMAB 0 MG: 202.5 INJECTION, SOLUTION SUBCUTANEOUS at 00:00

## 2018-08-23 ENCOUNTER — APPOINTMENT (OUTPATIENT)
Dept: PULMONOLOGY | Facility: CLINIC | Age: 73
End: 2018-08-23
Payer: MEDICARE

## 2018-08-23 VITALS
HEIGHT: 68 IN | HEART RATE: 96 BPM | DIASTOLIC BLOOD PRESSURE: 80 MMHG | WEIGHT: 203 LBS | OXYGEN SATURATION: 98 % | BODY MASS INDEX: 30.77 KG/M2 | SYSTOLIC BLOOD PRESSURE: 118 MMHG

## 2018-08-23 PROCEDURE — 99214 OFFICE O/P EST MOD 30 MIN: CPT | Mod: 25

## 2018-08-23 PROCEDURE — 71046 X-RAY EXAM CHEST 2 VIEWS: CPT

## 2018-08-24 ENCOUNTER — MEDICATION RENEWAL (OUTPATIENT)
Age: 73
End: 2018-08-24

## 2018-08-25 ENCOUNTER — RX RENEWAL (OUTPATIENT)
Age: 73
End: 2018-08-25

## 2018-08-27 ENCOUNTER — MEDICATION RENEWAL (OUTPATIENT)
Age: 73
End: 2018-08-27

## 2018-08-27 RX ORDER — ALBUTEROL SULFATE 90 UG/1
108 (90 BASE) AEROSOL, METERED RESPIRATORY (INHALATION) EVERY 6 HOURS
Qty: 3 | Refills: 1 | Status: ACTIVE | COMMUNITY
Start: 2017-11-03 | End: 1900-01-01

## 2018-08-28 ENCOUNTER — APPOINTMENT (OUTPATIENT)
Dept: PULMONOLOGY | Facility: CLINIC | Age: 73
End: 2018-08-28
Payer: MEDICARE

## 2018-08-28 VITALS
SYSTOLIC BLOOD PRESSURE: 132 MMHG | BODY MASS INDEX: 31.52 KG/M2 | HEART RATE: 68 BPM | WEIGHT: 208 LBS | DIASTOLIC BLOOD PRESSURE: 79 MMHG | OXYGEN SATURATION: 96 % | HEIGHT: 68 IN

## 2018-08-28 PROCEDURE — 99214 OFFICE O/P EST MOD 30 MIN: CPT | Mod: 25

## 2018-08-28 PROCEDURE — 96372 THER/PROPH/DIAG INJ SC/IM: CPT

## 2018-08-28 RX ORDER — OMALIZUMAB 202.5 MG/1.4ML
150 INJECTION, SOLUTION SUBCUTANEOUS
Qty: 60 | Refills: 0 | Status: COMPLETED | OUTPATIENT
Start: 2018-08-28

## 2018-08-28 RX ADMIN — OMALIZUMAB 0 MG: 202.5 INJECTION, SOLUTION SUBCUTANEOUS at 00:00

## 2018-09-17 ENCOUNTER — APPOINTMENT (OUTPATIENT)
Dept: PULMONOLOGY | Facility: CLINIC | Age: 73
End: 2018-09-17
Payer: MEDICARE

## 2018-09-17 ENCOUNTER — NON-APPOINTMENT (OUTPATIENT)
Age: 73
End: 2018-09-17

## 2018-09-17 VITALS
RESPIRATION RATE: 17 BRPM | BODY MASS INDEX: 31.07 KG/M2 | HEART RATE: 98 BPM | HEIGHT: 68 IN | SYSTOLIC BLOOD PRESSURE: 132 MMHG | DIASTOLIC BLOOD PRESSURE: 84 MMHG | WEIGHT: 205 LBS | OXYGEN SATURATION: 78 %

## 2018-09-17 PROCEDURE — 96372 THER/PROPH/DIAG INJ SC/IM: CPT

## 2018-09-17 PROCEDURE — 94010 BREATHING CAPACITY TEST: CPT

## 2018-09-17 PROCEDURE — 99214 OFFICE O/P EST MOD 30 MIN: CPT | Mod: 25

## 2018-09-17 RX ORDER — OMALIZUMAB 202.5 MG/1.4ML
150 INJECTION, SOLUTION SUBCUTANEOUS
Qty: 60 | Refills: 0 | Status: COMPLETED | OUTPATIENT
Start: 2018-09-17

## 2018-09-17 RX ADMIN — OMALIZUMAB 0 MG: 202.5 INJECTION, SOLUTION SUBCUTANEOUS at 00:00

## 2018-09-17 NOTE — ASSESSMENT
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 He is now here for a xolair injection and is stable from a pulmonary perspective. \par \par His shortness of breath is felt to be multifactorial due to:\par -asthma\par -poor breathing mechanics \par -cardiac disease\par -anemia\par -out of shape\par -overweight\par -TBM\par \par \par problem 1: tracheomalacia (s/p PNA)\par -CTSX f/u with Dr. Bustos and Dr. Verde, f/u in 5 months\par -continue amitriptyline 10mg Q8H PRN\par Tracheomalacia is usually acquired in adults and common causes include damage by tracheostomy or endotracheal intubation damaging the tracheal cartilage with increase risk with multiple intubations, prolonged intubation, and concurrent high dose steroid therapy; external chest wall trauma and surgery; chronic compression of the trachea by benign etiologies (eg, benign mediastinal goiter) or malignancy; relapsing polychondritis; or recurrent infection. Tracheomalacia can be asymptomatic, however signs or symptoms can develop as the severity of the airway narrowing progresses with major symptoms include dyspnea, cough, and sputum retention. Other symptoms include severe paroxysms of coughing, wheezing or stridor, barking cough and may be exacerbated by forced expiration, cough, and valsalva maneuver. Tracheomalacia is diagnosed by a bronchoscopic visualization of dynamic airway collapse on dynamic chest CT. Therapy is warranted in symptomatic patients with severe tracheomalacia and includes surgical repair as tracheobronchoplasty. The patient was referred to Dr. Everardo Jones or Dr. Johan Verde, at [facility] for a surgical consult. \par \par problem 2: asthma \par -can continue to use albuterol by the nebulizer up to QID though should use it first thing in the morning and PRN \par -continue to use Breo Ellipta 200 at 1 inhalation QD\par -continue to use Spiriva Handihaler 1 inhalation QD \par -continue to use QVar 80 2 puffs BID\par -continue Singulair 10 mg QHS \par -continue to use rescue inhaler, Ventolin, PRN and before exercise\par \par -Asthma is  believed to be caused by inherited (genetic) and environmental factor, but its exact cause is unknown. Asthma may be triggered by allergens, lung infections, or irritants in the air. Asthma triggers are different for each person\par -Inhaler technique reviewed as well as oral hygiene techniques reviewed with patient. Avoidance of cold air, extremes of temperature, rescue inhaler should be used before exercise. Order of medication reviewed with patient. Recommended use of a cool mist humidifier in the bedroom. \par \par problem 3: allergic rhinitis\par -Olopatadine .6% 1 sniff/nostril BID\par -Clarinex 5 mg QD at breakfast \par -To r/o an allergic profile: blood work to include IgE level(+), eosinophil level(-), vitamin D level(-), asthma profile(-), and food IgE level (-)\par \par -Environmental measures for allergies were encouraged including mattress and pillow cover, air purifier, and environmental controls. \par \par problem 4: GERD\par -continue to use Nexium 40 mg before breakfast\par -continue to use Zantac 300 mg QHS \par \par -Rule of 2s: avoid eating too much, eating too late, eating too spicy, eating two hours before bed\par -Things to avoid including overeating, spicy foods, tight clothing, eating within three hours of bed, this list is not all inclusive. \par -For treatment of reflux, possible options discussed including diet control, H2 blockers, PPIs, as well as coating motility agents discussed as treatment options. Timing of meals and proximity of last meal to sleep were discussed. If symptoms persist, a formal gastrointestinal evaluation is needed. \par \par problem 5: Elevated IgE level (127) (severe allergic asthma)\par - he is receiving his 5th shot of xolair today in the [facility]\par -now injections to Q 3 weeks\par -Xolair is a recombinant DNA- derived humanized IgG1K monoclonal antibody that selectively binds ot human immunoglobulin E (IgE). Xolair is produced by a Chinese hamster ovary cell suspension culture in nutrient medium containing the antibiotic gentamicin. Gentamicin is not detectable in the final product. Xolair is a sterile, white, preservative free, lyophilized powder contained in a single use vial that is reconstituted with sterile water for suspension. Side effects include: wheezing, tightness of the chest, trouble breathing, hives, skin rash, feeling anxious or light-headed, fainting, warmth or tingling under skin, or swelling of face, lips, or tongue \par \par  \par problem 6: obesity\par -Weight loss, exercise, and diet control were discussed and are highly encouraged. Treatment options were given such as, aqua therapy, and contacting a nutritionist. Recommended to use the elliptical, stationary bike, less use of treadmill.  Obesity is associated with worsening asthma, shortness of breath, and potential for cardiac disease, diabetes, and other underlying medical conditions. \par \par problem 7: cardiac component\par -recommended to continue to f/u with cardiologist Dr. Jed Wilks\par \par problem 8: poor breathing mechanics\par -Proper breathing techniques were reviewed with an emphasis of exhalation. Patient instructed to breath in for 1 second and out for four seconds. Patient was encouraged to not talk while walking. \par \par problem 9: no OSAS\par -based on his fatigue, EDS, snoring, elevated mallampati class, increased leg size, and changes in memory\par -he is s/p home sleep study\par -recommended Provent in the meantime \par -Sleep apnea is associated with adverse clinical consequences which an affect most organ systems.  Cardiovascular disease risk includes arrhythmias, atrial fibrillation, hypertension, coronary artery disease, and stroke. Metabolic disorders include diabetes type 2, non-alcoholic fatty liver disease. Mood disorder especially depression; and cognitive decline especially in the elderly. Associations with  chronic reflux/Rodríguez’s esophagus some but not all inclusive. \par -Reasons to assess this include arousal consistent with hypopnea; respiratory events most prominent in REM sleep or supine position; therefore sleep staging and body position are important for accurate diagnosis and estimation of AHI.\par \par problem 10: abnormal chest CT\par -c/w TBM and PNA\par -repeat chest CT in 10/5/18\par \par problem 11: r/o immunodeficiency\par -s/p blood work: quantitative immunoglobulins, IgG subsets, strep pneumonia titers (all normal)\par \par -Due to the fact that this pt has had more infections than would be expected and immunological blood work is indicated this would include: IgG subclasses, quantitative immunoglobulins, Strep pneumoniae titers as well as Vitamin D levels. Based on this blood work we will be able to decide where the pt needs additional pneumococcal vaccine either Prevnar 13 or pneumovax. Immunology evaluation will also be potentially indicated.\par  \par problem 12: health maintenance\par - Recommended him to have a neurological evaluation with Dr. Jerrell Moore regarding his frequent falling. \par -recommended a yearly flu shot after October 15\par -recommended strep pneumonia vaccines: Prevnar-13 vaccine, followed by Pneumo vaccine 23 on year following\par -recommended early intervention for URIs\par -recommended osteoporosis evaluations\par -recommended early dermatological evaluations\par -recommended after the age of 50 to the age of 70, colonoscopy every 5 years\par \par F/U in 4 months\par He is encouraged to call with any changes, concerns, or questions.

## 2018-09-17 NOTE — HISTORY OF PRESENT ILLNESS
[FreeTextEntry1] : Mr. Rhoades is a 73 year old male with a history of abnormal chest CT, acute URI, severe persistent asthma, chronic fatigue, elevated IgE, GERD, overweight, tracheal stenosis, tracheomalacia and SOB presenting to the office today for a follow up visit. His chief complaint is sweats \par - he is leaving for [place] on the 6th. \par - He states that he feels generally well.\par - The last couple of weeks he has been having sweats. \par - He has itchy ears attributable to his hearing aids\par - he is sleeping well. \par - His wife reports that he has been wheezing slightly. \par - his wife states that he had a slight cough a few days ago. \par - His exercise consists of PT. \par - He denies any headaches, nausea, vomiting, fever, chills, chest pain, chest pressure, palpitations, SOB, fatigue, diarrhea, constipation, dysphagia, myalgias, dizziness, leg swelling, leg pain, itchy eyes, itchy ears, heartburn, reflux, or sour taste in the mouth.

## 2018-09-17 NOTE — PROCEDURE
[FreeTextEntry1] : PFT- spi reveals normal flows; FEV1 was 2.67 L which is 92% of predicted; flattened inspiratory limb

## 2018-09-17 NOTE — PHYSICAL EXAM

## 2018-09-17 NOTE — ADDENDUM
[FreeTextEntry1] : Documented by Manny Gupta acting as a scribe for Dr. Clay Lance on 9/17/18\par \par All medical record entries made by the Scribe were at my, Dr. Clay Lance's, direction and personally dictated by me on 9/17/18. I have reviewed the chart and agree that the record accurately reflects my personal performance of the history, physical exam, assessment and plan. I have also personally directed, reviewed, and agree with the discharge instructions. \par \par \par \par \par

## 2018-09-17 NOTE — REASON FOR VISIT
[Follow-Up] : a follow-up visit [FreeTextEntry1] : abnormal chest CT, acute URI, severe persistent asthma, chronic fatigue, elevated IgE, GERD, overweight, tracheal stenosis, tracheomalacia and SOB

## 2018-09-21 ENCOUNTER — RX RENEWAL (OUTPATIENT)
Age: 73
End: 2018-09-21

## 2018-10-02 ENCOUNTER — TRANSCRIPTION ENCOUNTER (OUTPATIENT)
Age: 73
End: 2018-10-02

## 2018-10-02 ENCOUNTER — FORM ENCOUNTER (OUTPATIENT)
Age: 73
End: 2018-10-02

## 2018-10-03 ENCOUNTER — APPOINTMENT (OUTPATIENT)
Dept: CT IMAGING | Facility: CLINIC | Age: 73
End: 2018-10-03
Payer: MEDICARE

## 2018-10-03 ENCOUNTER — OUTPATIENT (OUTPATIENT)
Dept: OUTPATIENT SERVICES | Facility: HOSPITAL | Age: 73
LOS: 1 days | End: 2018-10-03
Payer: MEDICARE

## 2018-10-03 DIAGNOSIS — Z98.89 OTHER SPECIFIED POSTPROCEDURAL STATES: Chronic | ICD-10-CM

## 2018-10-03 DIAGNOSIS — G56.01 CARPAL TUNNEL SYNDROME, RIGHT UPPER LIMB: Chronic | ICD-10-CM

## 2018-10-03 DIAGNOSIS — R93.8 ABNORMAL FINDINGS ON DIAGNOSTIC IMAGING OF OTHER SPECIFIED BODY STRUCTURES: ICD-10-CM

## 2018-10-03 DIAGNOSIS — Z96.60 PRESENCE OF UNSPECIFIED ORTHOPEDIC JOINT IMPLANT: Chronic | ICD-10-CM

## 2018-10-03 DIAGNOSIS — Z00.8 ENCOUNTER FOR OTHER GENERAL EXAMINATION: ICD-10-CM

## 2018-10-03 PROCEDURE — 71250 CT THORAX DX C-: CPT | Mod: 26

## 2018-10-03 PROCEDURE — 71250 CT THORAX DX C-: CPT

## 2018-10-05 ENCOUNTER — MEDICATION RENEWAL (OUTPATIENT)
Age: 73
End: 2018-10-05

## 2018-10-18 ENCOUNTER — APPOINTMENT (OUTPATIENT)
Dept: PULMONOLOGY | Facility: CLINIC | Age: 73
End: 2018-10-18

## 2018-10-18 ENCOUNTER — APPOINTMENT (OUTPATIENT)
Dept: PULMONOLOGY | Facility: CLINIC | Age: 73
End: 2018-10-18
Payer: MEDICARE

## 2018-10-18 VITALS
SYSTOLIC BLOOD PRESSURE: 126 MMHG | DIASTOLIC BLOOD PRESSURE: 80 MMHG | HEIGHT: 68 IN | OXYGEN SATURATION: 96 % | WEIGHT: 210 LBS | RESPIRATION RATE: 17 BRPM | BODY MASS INDEX: 31.83 KG/M2 | HEART RATE: 83 BPM

## 2018-10-18 PROCEDURE — 99214 OFFICE O/P EST MOD 30 MIN: CPT | Mod: 25

## 2018-10-18 PROCEDURE — 96372 THER/PROPH/DIAG INJ SC/IM: CPT

## 2018-10-18 RX ORDER — OMALIZUMAB 202.5 MG/1.4ML
150 INJECTION, SOLUTION SUBCUTANEOUS
Qty: 1 | Refills: 0 | Status: COMPLETED | OUTPATIENT
Start: 2018-10-18

## 2018-10-18 RX ADMIN — OMALIZUMAB 1.5 MG: 202.5 INJECTION, SOLUTION SUBCUTANEOUS at 00:00

## 2018-10-18 NOTE — PHYSICAL EXAM

## 2018-10-18 NOTE — REASON FOR VISIT
[Follow-Up] : a follow-up visit [FreeTextEntry1] : abnormal chest CT, allergic rhinitis, severe persistent asthma, chronic fatigue, cough, dyspnea, elevated IgE, GERD, overweight, prophylactic measure, tracheal stenosis and tracheomalacia Asthma  hospitalized in the past, never intubated. Last inhaler use 3weeks ago (had a cold)

## 2018-10-18 NOTE — ASSESSMENT
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 He is now here for a xolair injection and is in the midst of URI / Sinusitis. \par \par His shortness of breath is felt to be multifactorial due to:\par -in midst of URI / sinusitis\par -asthma\par -poor breathing mechanics \par -cardiac disease\par -anemia\par -out of shape\par -overweight\par -TBM\par \par problem 1: asthmatic bronchitis \par -add Prednisone 20 mg x 7 days, 10 mg x 7 days\par Information sheet given to the patient to be reviewed, this medication is never to be used without consulting the prescribing physician. Proper dietary restraint is necessary specifically salt containing foods, if any reaction may occur should be reported. \par \par -can continue to use albuterol by the nebulizer up to QID though should use it first thing in the morning and PRN \par -continue to use Breo Ellipta 200 at 1 inhalation QD\par -continue to use Spiriva Handihaler 1 inhalation QD \par -continue to use QVar 80 2 puffs BID\par -continue Singulair 10 mg QHS \par -continue to use rescue inhaler, Ventolin, PRN and before exercise\par \par Asthma is believed to be caused by inherited (genetic) and environmental factor, but its exact cause is unknown. Asthma may be triggered by allergens, lung infections, or irritants in the air. Asthma triggers are different for each person \par -Inhaler technique reviewed as well as oral hygiene techniques reviewed with patient. Avoidance of cold air, extremes of temperature, rescue inhaler should be used before exercise. Order of medication reviewed with patient. Recommended use of a cool mist humidifier in the bedroom \par You have a clinical scenario most c/w acute bronchitis the etiology of which is unknown but empiric antibiotics are indicated. Hydration, mucolytics including Mucinex, Robitussin and the like are indicated. Cough controlling agents will be needed. \par \par problem 2: Sinusitis\par -add Bactrim 5 mg BID \par \par problem 3: tracheomalacia (s/p PNA)\par -CTSX f/u with Dr. Bustos and Dr. Verde, f/u in 5 months\par -continue amitriptyline 10mg Q8H PRN\par Tracheomalacia is usually acquired in adults and common causes include damage by tracheostomy or endotracheal intubation damaging the tracheal cartilage with increase risk with multiple intubations, prolonged intubation, and concurrent high dose steroid therapy; external chest wall trauma and surgery; chronic compression of the trachea by benign etiologies (eg, benign mediastinal goiter) or malignancy; relapsing polychondritis; or recurrent infection. Tracheomalacia can be asymptomatic, however signs or symptoms can develop as the severity of the airway narrowing progresses with major symptoms include dyspnea, cough, and sputum retention. Other symptoms include severe paroxysms of coughing, wheezing or stridor, barking cough and may be exacerbated by forced expiration, cough, and Valsalva maneuver. Tracheomalacia is diagnosed by a bronchoscopic visualization of dynamic airway collapse on dynamic chest CT. Therapy is warranted in symptomatic patients with severe tracheomalacia and includes surgical repair as tracheobronchoplasty. The patient was referred to Dr. Everardo Jones or Dr. Johan Verde, at [facility] for a surgical consult.\par \par problem 4: allergic rhinitis\par -Olopatadine .6% 1 sniff/nostril BID\par -Clarinex 5 mg QD at breakfast \par -s/p allergic profile: blood work to include IgE level(+), eosinophil level(-), vitamin D level(-), asthma profile(-), and food IgE level (-)\par \par -Environmental measures for allergies were encouraged including mattress and pillow cover, air purifier, and environmental controls. \par \par problem 5: GERD\par -continue to use Nexium 40 mg before breakfast\par -continue to use Zantac 300 mg QHS \par \par -Rule of 2s: avoid eating too much, eating too late, eating too spicy, eating two hours before bed\par -Things to avoid including overeating, spicy foods, tight clothing, eating within three hours of bed, this list is not all inclusive. \par -For treatment of reflux, possible options discussed including diet control, H2 blockers, PPIs, as well as coating motility agents discussed as treatment options. Timing of meals and proximity of last meal to sleep were discussed. If symptoms persist, a formal gastrointestinal evaluation is needed. \par \par problem 6: Elevated IgE level (127) (severe allergic asthma)\par - he is receiving his 5th shot of xolair today \par -now injections to Q 3 weeks\par -Xolair is a recombinant DNA- derived humanized IgG1K monoclonal antibody that selectively binds ot human immunoglobulin E (IgE). Xolair is produced by a Chinese hamster ovary cell suspension culture in nutrient medium containing the antibiotic gentamicin. Gentamicin is not detectable in the final product. Xolair is a sterile, white, preservative free, lyophilized powder contained in a single use vial that is reconstituted with sterile water for suspension. Side effects include: wheezing, tightness of the chest, trouble breathing, hives, skin rash, feeling anxious or light-headed, fainting, warmth or tingling under skin, or swelling of face, lips, or tongue \par \par problem 7: obesity\par -Weight loss, exercise, and diet control were discussed and are highly encouraged. Treatment options were given such as, aqua therapy, and contacting a nutritionist. Recommended to use the elliptical, stationary bike, less use of treadmill.  Obesity is associated with worsening asthma, shortness of breath, and potential for cardiac disease, diabetes, and other underlying medical conditions. \par \par problem 8: cardiac component\par -recommended to continue to f/u with cardiologist Dr. Jed Wilks\par \par problem 9: poor breathing mechanics\par -Proper breathing techniques were reviewed with an emphasis of exhalation. Patient instructed to breath in for 1 second and out for four seconds. Patient was encouraged to not talk while walking. \par \par problem 10: no OSAS\par -based on his fatigue, EDS, snoring, elevated mallampati class, increased leg size, and changes in memory\par -he is s/p home sleep study\par -recommended Provent in the meantime \par -Sleep apnea is associated with adverse clinical consequences which an affect most organ systems.  Cardiovascular disease risk includes arrhythmias, atrial fibrillation, hypertension, coronary artery disease, and stroke. Metabolic disorders include diabetes type 2, non-alcoholic fatty liver disease. Mood disorder especially depression; and cognitive decline especially in the elderly. Associations with  chronic reflux/Rodríguez’s esophagus some but not all inclusive. \par -Reasons to assess this include arousal consistent with hypopnea; respiratory events most prominent in REM sleep or supine position; therefore sleep staging and body position are important for accurate diagnosis and estimation of AHI.\par \par problem 11: abnormal chest CT\par -c/w TBM and PNA\par -repeat chest CT in 10/5/18\par \par problem 12: r/o immunodeficiency\par -s/p blood work: quantitative immunoglobulins, IgG subsets, strep pneumonia titers (all normal)\par \par -Due to the fact that this pt has had more infections than would be expected and immunological blood work is indicated this would include: IgG subclasses, quantitative immunoglobulins, Strep pneumoniae titers as well as Vitamin D levels. Based on this blood work we will be able to decide where the pt needs additional pneumococcal vaccine either Prevnar 13 or pneumovax. Immunology evaluation will also be potentially indicated.\par  \par problem 13: health maintenance\par - Recommended him to have a neurological evaluation with Dr. Jerrell Moore regarding his frequent falling. \par -recommended a yearly flu shot after October 15\par -recommended strep pneumonia vaccines: Prevnar-13 vaccine, followed by Pneumo vaccine 23 on year following\par -recommended early intervention for URIs\par -recommended osteoporosis evaluations\par -recommended early dermatological evaluations\par -recommended after the age of 50 to the age of 70, colonoscopy every 5 years\par \par F/U in 4 months\par He is encouraged to call with any changes, concerns, or questions.

## 2018-10-18 NOTE — REVIEW OF SYSTEMS
[Negative] : Sleep Disorder [Fatigue] : fatigue [Chest Tightness] : chest tightness [As Noted in HPI] : as noted in HPI [Myalgias] : myalgias [Arthralgias] : arthralgias

## 2018-10-18 NOTE — ADDENDUM
[FreeTextEntry1] : All medical record entries made by alfonzo Escobar were at Dr. Clay Lance's, direction and personally dictated by me on (10/18/2018). I have reviewed the chart and agree that the record accurately reflects my personal performance of the history, physical exam, assessment and plan. I have also personally directed, reviewed, and agree with the discharge instructions.

## 2018-10-18 NOTE — HISTORY OF PRESENT ILLNESS
[FreeTextEntry1] : Mr. Rhoades is a 73 year old male with a history of abnormal chest CT, allergic rhinitis, severe persistent asthma, chronic fatigue, cough, dyspnea, elevated IgE, GERD, overweight, prophylactic measure, tracheal stenosis and tracheomalacia presenting to the office for a follow up visit. His chief complaint is \par -he states that he has been feeling sick since last night\par -he notes that he has not been bringing up any mucous, but he has been feeling chest pressure, fatigue, body aches, dry mouth and joint aches and pains. \par -he reports that he has constant sinus issues, with a constant runny nose and post nasal drip\par -he denies any headaches, nausea, vomiting, fever, chills, sweats, chest pain, diarrhea, constipation, dysphagia, dizziness, leg swelling, leg pain, itchy eyes, itchy ears, heartburn, reflux, or sour taste in the mouth.

## 2018-10-19 ENCOUNTER — MEDICATION RENEWAL (OUTPATIENT)
Age: 73
End: 2018-10-19

## 2018-11-08 ENCOUNTER — MEDICATION RENEWAL (OUTPATIENT)
Age: 73
End: 2018-11-08

## 2018-11-08 ENCOUNTER — NON-APPOINTMENT (OUTPATIENT)
Age: 73
End: 2018-11-08

## 2018-11-08 ENCOUNTER — APPOINTMENT (OUTPATIENT)
Dept: PULMONOLOGY | Facility: CLINIC | Age: 73
End: 2018-11-08
Payer: MEDICARE

## 2018-11-08 VITALS
RESPIRATION RATE: 17 BRPM | OXYGEN SATURATION: 98 % | DIASTOLIC BLOOD PRESSURE: 70 MMHG | WEIGHT: 209 LBS | HEART RATE: 83 BPM | SYSTOLIC BLOOD PRESSURE: 144 MMHG | HEIGHT: 67 IN | BODY MASS INDEX: 32.8 KG/M2

## 2018-11-08 DIAGNOSIS — Z87.09 PERSONAL HISTORY OF OTHER DISEASES OF THE RESPIRATORY SYSTEM: ICD-10-CM

## 2018-11-08 PROCEDURE — 99214 OFFICE O/P EST MOD 30 MIN: CPT | Mod: 25

## 2018-11-08 PROCEDURE — 94010 BREATHING CAPACITY TEST: CPT

## 2018-11-08 PROCEDURE — 96372 THER/PROPH/DIAG INJ SC/IM: CPT

## 2018-11-08 PROCEDURE — 95012 NITRIC OXIDE EXP GAS DETER: CPT

## 2018-11-08 RX ORDER — OMALIZUMAB 202.5 MG/1.4ML
150 INJECTION, SOLUTION SUBCUTANEOUS
Qty: 1 | Refills: 0 | Status: COMPLETED | OUTPATIENT
Start: 2018-11-08

## 2018-11-08 RX ADMIN — OMALIZUMAB 0.9 MG: 202.5 INJECTION, SOLUTION SUBCUTANEOUS at 00:00

## 2018-11-08 NOTE — REASON FOR VISIT
[Follow-Up] : a follow-up visit [FreeTextEntry1] : acute sinusitis, allergic rhinitis, chronic fatigue, cough, dyspnea, GERD, overweight, SOB, prophylactic measure, tracheal stenosis and tracheomalacia

## 2018-11-08 NOTE — PROCEDURE
[FreeTextEntry1] : PFT - spi reveals normal flows; FEV1 is 3.36 which is 121% of predicted, normal flow volume loop \par \par Chest CT (October 3, 2018) reveals the b/l tree in bud nodules and opacities have nearly completely resolved. the remaining residual nodules and opacities are better shown within the b/l lower lobes. the largest remaining noncalcified nodule measures 5 mm in the left lower lobe. \par \par FENO was 11; a normal value being less than 25\par \par Fractional exhaled nitric oxide (FENO) is regarded as a simple, noninvasive method for assessing eosinophilic airway inflammation. Produced by a variety of cells within the lung, nitric oxide (NO) concentrations are generally low in healthy individuals. However, high concentrations of NO appear to be involved in nonspecific host defense mechanisms and chronic inflammatory diseases such as asthma. The American Thor[employer] Society (ATS) therefore has recommended using FENO to aid in the diagnosis and monitoring of eosinophilic airway inflammation and asthma, and for identifying steroid responsive individuals whose chronic respiratory symptoms may be caused by airway inflammation.

## 2018-11-08 NOTE — PHYSICAL EXAM

## 2018-11-08 NOTE — ASSESSMENT
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 He is now here for a xolair injection and is s/p URI / Sinusitis. - improved. \par \par His shortness of breath is felt to be multifactorial due to:\par -in midst of URI / sinusitis\par -asthma\par -poor breathing mechanics \par -cardiac disease\par -anemia\par -out of shape\par -overweight\par -TBM\par \par problem 1: asthma\par -can continue to use albuterol by the nebulizer up to QID though should use it first thing in the morning and PRN \par -continue to use Breo Ellipta 200 at 1 inhalation QD\par -continue to use Spiriva Handihaler 1 inhalation QD \par -continue to use QVar 80 2 puffs BID\par -continue Singulair 10 mg QHS \par -continue to use rescue inhaler, Ventolin, PRN and before exercise\par \par Asthma is believed to be caused by inherited (genetic) and environmental factor, but its exact cause is unknown. Asthma may be triggered by allergens, lung infections, or irritants in the air. Asthma triggers are different for each person \par -Inhaler technique reviewed as well as oral hygiene techniques reviewed with patient. Avoidance of cold air, extremes of temperature, rescue inhaler should be used before exercise. Order of medication reviewed with patient. Recommended use of a cool mist humidifier in the bedroom \par You have a clinical scenario most c/w acute bronchitis the etiology of which is unknown but empiric antibiotics are indicated. Hydration, mucolytics including Mucinex, Robitussin and the like are indicated. Cough controlling agents will be needed. \par \par problem 2: tracheomalacia (s/p PNA)\par -CTSX f/u with Dr. Bustos and Dr. Verde, f/u in 5 months\par -continue amitriptyline 10mg Q8H PRN\par Tracheomalacia is usually acquired in adults and common causes include damage by tracheostomy or endotracheal intubation damaging the tracheal cartilage with increase risk with multiple intubations, prolonged intubation, and concurrent high dose steroid therapy; external chest wall trauma and surgery; chronic compression of the trachea by benign etiologies (eg, benign mediastinal goiter) or malignancy; relapsing polychondritis; or recurrent infection. Tracheomalacia can be asymptomatic, however signs or symptoms can develop as the severity of the airway narrowing progresses with major symptoms include dyspnea, cough, and sputum retention. Other symptoms include severe paroxysms of coughing, wheezing or stridor, barking cough and may be exacerbated by forced expiration, cough, and Valsalva maneuver. Tracheomalacia is diagnosed by a bronchoscopic visualization of dynamic airway collapse on dynamic chest CT. Therapy is warranted in symptomatic patients with severe tracheomalacia and includes surgical repair as tracheobronchoplasty. The patient was referred to Dr. Everardo Jones or Dr. Johan Verde, at [facility] for a surgical consult.\par \par problem 3: allergic rhinitis\par -recommended to use "Navage" nasal rinse device \par -Olopatadine .6% 1 sniff/nostril BID\par -Clarinex 5 mg QD at breakfast \par -s/p allergic profile: blood work to include IgE level(+), eosinophil level(-), vitamin D level(-), asthma profile(-), and food IgE level (-)\par \par -Environmental measures for allergies were encouraged including mattress and pillow cover, air purifier, and environmental controls. \par \par problem 4:  GERD\par -continue to use Nexium 40 mg before breakfast\par -continue to use Zantac 300 mg QHS \par \par -Rule of 2s: avoid eating too much, eating too late, eating too spicy, eating two hours before bed\par -Things to avoid including overeating, spicy foods, tight clothing, eating within three hours of bed, this list is not all inclusive. \par -For treatment of reflux, possible options discussed including diet control, H2 blockers, PPIs, as well as coating motility agents discussed as treatment options. Timing of meals and proximity of last meal to sleep were discussed. If symptoms persist, a formal gastrointestinal evaluation is needed. \par \par problem 5: Elevated IgE level (127) (severe allergic asthma)\par - s/p 6th Xolair shot\par -now injections to Q 3 weeks\par -Xolair is a recombinant DNA- derived humanized IgG1K monoclonal antibody that selectively binds ot human immunoglobulin E (IgE). Xolair is produced by a Chinese hamster ovary cell suspension culture in nutrient medium containing the antibiotic gentamicin. Gentamicin is not detectable in the final product. Xolair is a sterile, white, preservative free, lyophilized powder contained in a single use vial that is reconstituted with sterile water for suspension. Side effects include: wheezing, tightness of the chest, trouble breathing, hives, skin rash, feeling anxious or light-headed, fainting, warmth or tingling under skin, or swelling of face, lips, or tongue \par \par problem 6: obesity\par -Weight loss, exercise, and diet control were discussed and are highly encouraged. Treatment options were given such as, aqua therapy, and contacting a nutritionist. Recommended to use the elliptical, stationary bike, less use of treadmill.  Obesity is associated with worsening asthma, shortness of breath, and potential for cardiac disease, diabetes, and other underlying medical conditions. \par \par problem 7: cardiac component\par -recommended to continue to f/u with cardiologist Dr. Jed Wilks\par \par problem 8: poor breathing mechanics\par -Proper breathing techniques were reviewed with an emphasis of exhalation. Patient instructed to breath in for 1 second and out for four seconds. Patient was encouraged to not talk while walking. \par \par problem 9: no OSAS\par -based on his fatigue, EDS, snoring, elevated mallampati class, increased leg size, and changes in memory\par -he is s/p home sleep study\par -recommended Provent in the meantime \par -Sleep apnea is associated with adverse clinical consequences which an affect most organ systems.  Cardiovascular disease risk includes arrhythmias, atrial fibrillation, hypertension, coronary artery disease, and stroke. Metabolic disorders include diabetes type 2, non-alcoholic fatty liver disease. Mood disorder especially depression; and cognitive decline especially in the elderly. Associations with  chronic reflux/Rodríguez’s esophagus some but not all inclusive. \par -Reasons to assess this include arousal consistent with hypopnea; respiratory events most prominent in REM sleep or supine position; therefore sleep staging and body position are important for accurate diagnosis and estimation of AHI.\par \par problem 10: abnormal chest CT\par -c/w TBM and PNA\par -repeat chest CT in 10/19\par \par problem 11: r/o immunodeficiency\par -s/p blood work: quantitative immunoglobulins, IgG subsets, strep pneumonia titers (all normal)\par \par -Due to the fact that this pt has had more infections than would be expected and immunological blood work is indicated this would include: IgG subclasses, quantitative immunoglobulins, Strep pneumoniae titers as well as Vitamin D levels. Based on this blood work we will be able to decide where the pt needs additional pneumococcal vaccine either Prevnar 13 or pneumovax. Immunology evaluation will also be potentially indicated.\par  \par problem 12: health maintenance\par - Recommended him to have a neurological evaluation with Dr. Jerrell Moore regarding his frequent falling. \par -recommended a yearly flu shot after October 15 -refused\par -recommended strep pneumonia vaccines: Prevnar-13 vaccine, followed by Pneumo vaccine 23 on year following\par -recommended early intervention for URIs\par -recommended osteoporosis evaluations\par -recommended early dermatological evaluations\par -recommended after the age of 50 to the age of 70, colonoscopy every 5 years\par \par F/U in 4 months\par He is encouraged to call with any changes, concerns, or questions.

## 2018-11-08 NOTE — ADDENDUM
[FreeTextEntry1] : All medical record entries made by alfonzo Escobar were at Dr. Clay Lance's, direction and personally dictated by me on (11/08/2018). I have reviewed the chart and agree that the record accurately reflects my personal performance of the history, physical exam, assessment and plan. I have also personally directed, reviewed, and agree with the discharge instructions.

## 2018-11-08 NOTE — HISTORY OF PRESENT ILLNESS
[FreeTextEntry1] : Mr. Rhoades is a 73 year old male with a history of acute sinusitis, allergic rhinitis, chronic fatigue, cough, dyspnea, GERD, overweight, SOB, prophylactic measure, tracheal stenosis and tracheomalacia presenting to the office today for a follow up visit. His chief complaint is \par -he states that he has officially stopped quitting\par -he reports that he has some dry mouth, which causes him to have trouble swallowing\par -he states that his bowels have been regular\par -he reports that his sense of taste and smell have been good \par -he notes that his weight has been high, but stable\par -he reports that he has been sleeping well, and his wife notes that he has not been snoring too much\par -he reports that he has been having a frequent runny nose\par -he reports that he had used the navage a few times and thought it cleared out his sinuses very well\par -he denies any headaches, nausea, vomiting, fever, chills, sweats, chest pain, chest pressure, diarrhea, constipation, dysphagia, dizziness, leg swelling, leg pain, itchy eyes, itchy ears, heartburn, reflux, or sour taste in the mouth.

## 2018-11-29 ENCOUNTER — MED ADMIN CHARGE (OUTPATIENT)
Age: 73
End: 2018-11-29

## 2018-11-29 ENCOUNTER — APPOINTMENT (OUTPATIENT)
Dept: PULMONOLOGY | Facility: CLINIC | Age: 73
End: 2018-11-29
Payer: MEDICARE

## 2018-11-29 PROCEDURE — 96372 THER/PROPH/DIAG INJ SC/IM: CPT

## 2018-11-29 RX ORDER — OMALIZUMAB 202.5 MG/1.4ML
150 INJECTION, SOLUTION SUBCUTANEOUS
Qty: 1 | Refills: 0 | Status: COMPLETED | OUTPATIENT
Start: 2018-11-29

## 2018-11-29 RX ADMIN — OMALIZUMAB 1 MG: 202.5 INJECTION, SOLUTION SUBCUTANEOUS at 00:00

## 2018-12-03 ENCOUNTER — RX RENEWAL (OUTPATIENT)
Age: 73
End: 2018-12-03

## 2018-12-04 ENCOUNTER — RX RENEWAL (OUTPATIENT)
Age: 73
End: 2018-12-04

## 2018-12-16 ENCOUNTER — RX RENEWAL (OUTPATIENT)
Age: 73
End: 2018-12-16

## 2018-12-20 ENCOUNTER — APPOINTMENT (OUTPATIENT)
Dept: PULMONOLOGY | Facility: CLINIC | Age: 73
End: 2018-12-20
Payer: MEDICARE

## 2018-12-20 VITALS
BODY MASS INDEX: 31.39 KG/M2 | WEIGHT: 200 LBS | HEIGHT: 67 IN | HEART RATE: 79 BPM | OXYGEN SATURATION: 98 % | SYSTOLIC BLOOD PRESSURE: 138 MMHG | DIASTOLIC BLOOD PRESSURE: 76 MMHG | RESPIRATION RATE: 17 BRPM

## 2018-12-20 PROCEDURE — 96372 THER/PROPH/DIAG INJ SC/IM: CPT

## 2018-12-20 RX ORDER — OMALIZUMAB 202.5 MG/1.4ML
150 INJECTION, SOLUTION SUBCUTANEOUS
Qty: 1 | Refills: 0 | Status: COMPLETED | OUTPATIENT
Start: 2018-12-20

## 2018-12-20 RX ADMIN — OMALIZUMAB 1.5 MG: 202.5 INJECTION, SOLUTION SUBCUTANEOUS at 00:00

## 2018-12-24 ENCOUNTER — APPOINTMENT (OUTPATIENT)
Dept: CARDIOLOGY | Facility: CLINIC | Age: 73
End: 2018-12-24
Payer: MEDICARE

## 2018-12-24 ENCOUNTER — NON-APPOINTMENT (OUTPATIENT)
Age: 73
End: 2018-12-24

## 2018-12-24 VITALS
HEIGHT: 67 IN | OXYGEN SATURATION: 98 % | SYSTOLIC BLOOD PRESSURE: 126 MMHG | HEART RATE: 77 BPM | WEIGHT: 207 LBS | BODY MASS INDEX: 32.49 KG/M2 | DIASTOLIC BLOOD PRESSURE: 75 MMHG

## 2018-12-24 DIAGNOSIS — R55 SYNCOPE AND COLLAPSE: ICD-10-CM

## 2018-12-24 PROCEDURE — 99214 OFFICE O/P EST MOD 30 MIN: CPT

## 2018-12-24 PROCEDURE — 93000 ELECTROCARDIOGRAM COMPLETE: CPT

## 2018-12-24 NOTE — HISTORY OF PRESENT ILLNESS
[FreeTextEntry1] : He presents in scheduled followup, accompanied by his girlfriend. The past 4 months. He has suffered 4 or 5 full. On June 18 episode occurred as he exited a train onto a platform. He has no recollection of falling and next recalls awakening in the hospital. He suffered an occipital laceration. Apparently no abnormalities noted on his vital signs were EKG and he was discharged home. Thereafter, he fell in the men's room of the doctor's office. Believes he may twisted his ankle and stumbled. He suffered a back abrasion. The other episodes occurred without any particular setting prodrome. He fell in the bathroom at home without having strained. He suffered a scalp laceration. He was evaluated in the emergency room and again treated and released.\par \par He saw Dr. Jerrell Moore in consultation. MRI and EEG are scheduled for later this week.\par \par Unchanged dyspnea with moderate effort w/o associated symptoms\par Mild dependent edema for a few years w/o change.   No orthopnea. Nocturia x1-2. No palpitations.  Rare postural lightheadedness.  No syncope. No claudication.

## 2018-12-24 NOTE — REASON FOR VISIT
[Initial Evaluation] : an initial evaluation of [Chest Pain] : chest pain [Dyspnea] : dyspnea [FreeTextEntry1] : Abnormal CAT scan

## 2019-01-04 ENCOUNTER — RX RENEWAL (OUTPATIENT)
Age: 74
End: 2019-01-04

## 2019-01-07 ENCOUNTER — RX RENEWAL (OUTPATIENT)
Age: 74
End: 2019-01-07

## 2019-01-10 ENCOUNTER — APPOINTMENT (OUTPATIENT)
Dept: PULMONOLOGY | Facility: CLINIC | Age: 74
End: 2019-01-10
Payer: MEDICARE

## 2019-01-10 VITALS
SYSTOLIC BLOOD PRESSURE: 120 MMHG | OXYGEN SATURATION: 98 % | HEART RATE: 76 BPM | DIASTOLIC BLOOD PRESSURE: 70 MMHG | HEIGHT: 67 IN | RESPIRATION RATE: 17 BRPM | BODY MASS INDEX: 32.33 KG/M2 | WEIGHT: 206 LBS

## 2019-01-10 PROCEDURE — 96372 THER/PROPH/DIAG INJ SC/IM: CPT

## 2019-01-10 RX ORDER — OMALIZUMAB 202.5 MG/1.4ML
150 INJECTION, SOLUTION SUBCUTANEOUS
Qty: 45 | Refills: 0 | Status: COMPLETED | OUTPATIENT
Start: 2019-01-10

## 2019-01-10 RX ADMIN — OMALIZUMAB 0 MG: 202.5 INJECTION, SOLUTION SUBCUTANEOUS at 00:00

## 2019-01-11 ENCOUNTER — RX CHANGE (OUTPATIENT)
Age: 74
End: 2019-01-11

## 2019-01-11 RX ORDER — DESLORATADINE 5 MG/1
5 TABLET ORAL DAILY
Qty: 90 | Refills: 1 | Status: DISCONTINUED | COMMUNITY
Start: 2017-07-31 | End: 2019-01-11

## 2019-01-14 ENCOUNTER — RX RENEWAL (OUTPATIENT)
Age: 74
End: 2019-01-14

## 2019-01-30 ENCOUNTER — APPOINTMENT (OUTPATIENT)
Dept: PULMONOLOGY | Facility: CLINIC | Age: 74
End: 2019-01-30
Payer: MEDICARE

## 2019-01-30 ENCOUNTER — NON-APPOINTMENT (OUTPATIENT)
Age: 74
End: 2019-01-30

## 2019-01-30 VITALS
DIASTOLIC BLOOD PRESSURE: 78 MMHG | HEART RATE: 71 BPM | WEIGHT: 208 LBS | SYSTOLIC BLOOD PRESSURE: 145 MMHG | BODY MASS INDEX: 31.52 KG/M2 | RESPIRATION RATE: 17 BRPM | HEIGHT: 68 IN | OXYGEN SATURATION: 98 %

## 2019-01-30 PROCEDURE — 99214 OFFICE O/P EST MOD 30 MIN: CPT | Mod: 25

## 2019-01-30 PROCEDURE — 94010 BREATHING CAPACITY TEST: CPT

## 2019-01-30 PROCEDURE — 95012 NITRIC OXIDE EXP GAS DETER: CPT

## 2019-01-30 PROCEDURE — 96372 THER/PROPH/DIAG INJ SC/IM: CPT

## 2019-01-30 RX ORDER — PREDNISONE 10 MG/1
10 TABLET ORAL
Qty: 50 | Refills: 0 | Status: DISCONTINUED | COMMUNITY
Start: 2018-12-27 | End: 2019-01-30

## 2019-01-30 RX ORDER — SULFAMETHOXAZOLE AND TRIMETHOPRIM 800; 160 MG/1; MG/1
800-160 TABLET ORAL TWICE DAILY
Qty: 20 | Refills: 0 | Status: DISCONTINUED | COMMUNITY
Start: 2019-01-10 | End: 2019-01-30

## 2019-01-30 RX ORDER — CLARITHROMYCIN 500 MG/1
500 TABLET, FILM COATED ORAL
Qty: 20 | Refills: 0 | Status: DISCONTINUED | COMMUNITY
Start: 2017-09-25 | End: 2019-01-30

## 2019-01-30 RX ORDER — OMALIZUMAB 202.5 MG/1.4ML
150 INJECTION, SOLUTION SUBCUTANEOUS
Qty: 60 | Refills: 0 | Status: COMPLETED | OUTPATIENT
Start: 2019-01-30

## 2019-01-30 RX ORDER — SULFAMETHOXAZOLE AND TRIMETHOPRIM 800; 160 MG/1; MG/1
800-160 TABLET ORAL TWICE DAILY
Qty: 20 | Refills: 0 | Status: DISCONTINUED | COMMUNITY
Start: 2018-10-18 | End: 2019-01-30

## 2019-01-30 RX ORDER — PREDNISONE 10 MG/1
10 TABLET ORAL
Qty: 100 | Refills: 0 | Status: DISCONTINUED | COMMUNITY
Start: 2018-10-18 | End: 2019-01-30

## 2019-01-30 RX ORDER — PREDNISONE 20 MG/1
20 TABLET ORAL DAILY
Qty: 10 | Refills: 0 | Status: DISCONTINUED | COMMUNITY
Start: 2018-03-31 | End: 2019-01-30

## 2019-01-30 RX ORDER — AZITHROMYCIN 500 MG/1
500 TABLET, FILM COATED ORAL DAILY
Qty: 1 | Refills: 0 | Status: DISCONTINUED | COMMUNITY
Start: 2018-08-23 | End: 2019-01-30

## 2019-01-30 RX ORDER — AZITHROMYCIN 500 MG/1
500 TABLET, FILM COATED ORAL
Qty: 5 | Refills: 0 | Status: DISCONTINUED | COMMUNITY
Start: 2018-12-12 | End: 2019-01-30

## 2019-01-30 RX ADMIN — OMALIZUMAB 1.5 MG: 202.5 INJECTION, SOLUTION SUBCUTANEOUS at 00:00

## 2019-01-30 RX ADMIN — OMALIZUMAB 0 MG: 202.5 INJECTION, SOLUTION SUBCUTANEOUS at 00:00

## 2019-01-30 NOTE — REASON FOR VISIT
[Follow-Up] : a follow-up visit [FreeTextEntry1] : chronic sinusitis, allergic rhinitis, chronic fatigue, cough, GERD, overweight, SOB, tracheal stenosis and tracheomalacia

## 2019-01-30 NOTE — HISTORY OF PRESENT ILLNESS
[FreeTextEntry1] : Mr. Rhoades is a 73 year old male with a history of chronic sinusitis, allergic rhinitis, chronic fatigue, cough, GERD, overweight, SOB, tracheal stenosis and tracheomalacia presenting to the office today for a follow up visit. His chief complaint is sinus issues. \par - He comes in stating that he feels remarkably improved. \par - He has recently begun taking hemp oil, which he feels has improved his symptoms\par - He notes a chest discomfort when he swallows\par - He has itchy ears associated with his hearing aids\par - He is sleeping well\par - He gained around 10-12 pounds. \par - He reports SOB while climbing stairs, but his breathing is otherwise fine\par - He has chronic PND\par - He describes his sinuses as horrible\par - He does not cough unless he wants.

## 2019-01-30 NOTE — PHYSICAL EXAM

## 2019-01-30 NOTE — ADDENDUM
[FreeTextEntry1] : Documented by Manny Gupta acting as a scribe for Dr. Clay Lance on 1/30/2019\par \par All medical record entries made by the Scribe were at my, Dr. Clay Lance's, direction and personally dictated by me on 1/30/2019. I have reviewed the chart and agree that the record accurately reflects my personal performance of the history, physical exam, assessment and plan. I have also personally directed, reviewed, and agree with the discharge instructions. \par \par \par \par \par

## 2019-01-30 NOTE — PROCEDURE
[FreeTextEntry1] : PFT- spi reveals normal flows; FEV1 was 2.53L which is 87% of predicted; normal flow volume loop \par \par FENO was 32; a normal value being less than 25\par Fractional exhaled nitric oxide (FENO) is regarded as a simple, noninvasive method for assessing eosinophilic airway inflammation. Produced by a variety of cells within the lung, nitric oxide (NO) concentrations are generally low in healthy individuals. However, high concentrations of NO appear to be involved in nonspecific host defense mechanisms and chronic inflammatory diseases such as asthma. The American Thoracic Society (ATS) therefore has recommended using FENO to aid in the diagnosis and monitoring of eosinophilic airway inflammation and asthma, and for identifying steroid responsive individuals whose chronic respiratory symptoms may be caused by airway inflammation. \par

## 2019-01-31 ENCOUNTER — APPOINTMENT (OUTPATIENT)
Dept: PULMONOLOGY | Facility: CLINIC | Age: 74
End: 2019-01-31

## 2019-01-31 RX ORDER — OMALIZUMAB 202.5 MG/1.4ML
150 INJECTION, SOLUTION SUBCUTANEOUS
Qty: 60 | Refills: 0 | Status: COMPLETED | OUTPATIENT
Start: 2019-01-30

## 2019-02-02 ENCOUNTER — RX RENEWAL (OUTPATIENT)
Age: 74
End: 2019-02-02

## 2019-02-07 ENCOUNTER — APPOINTMENT (OUTPATIENT)
Dept: THORACIC SURGERY | Facility: CLINIC | Age: 74
End: 2019-02-07
Payer: MEDICARE

## 2019-02-07 VITALS
RESPIRATION RATE: 19 BRPM | DIASTOLIC BLOOD PRESSURE: 69 MMHG | OXYGEN SATURATION: 98 % | SYSTOLIC BLOOD PRESSURE: 148 MMHG | BODY MASS INDEX: 31.98 KG/M2 | HEIGHT: 68 IN | WEIGHT: 211 LBS | HEART RATE: 84 BPM | TEMPERATURE: 98.4 F

## 2019-02-07 PROCEDURE — 99213 OFFICE O/P EST LOW 20 MIN: CPT

## 2019-02-07 NOTE — PHYSICAL EXAM
[Neck Appearance] : the appearance of the neck was normal [] : no respiratory distress [Respiration, Rhythm And Depth] : normal respiratory rhythm and effort [Exaggerated Use Of Accessory Muscles For Inspiration] : no accessory muscle use [Apical Impulse] : the apical impulse was normal [Heart Rate And Rhythm] : heart rate was normal and rhythm regular [Heart Sounds] : normal S1 and S2 [2+] : left 2+ [Skin Color & Pigmentation] : normal skin color and pigmentation [Oriented To Time, Place, And Person] : oriented to person, place, and time

## 2019-02-10 ENCOUNTER — RX RENEWAL (OUTPATIENT)
Age: 74
End: 2019-02-10

## 2019-02-13 NOTE — HISTORY OF PRESENT ILLNESS
[FreeTextEntry1] : 72 year old Male, Former smoker (1/2 PPD x 2 years) with PMHx of COPD/asthma, GERD, tracheobronchomalacia, chronic cough, and SOB/FRIED. He was originally referred to us by Dr. Clay Lance. He underwent an away dynamic bronchoscopy in May 2018 which revealed moderate tracheal stenosis.  He presents today for a follow up visit and for symptom reassessment. \par \par  Chest CT on 9/12/17 revealing:\par -Severe collapse of the trachea is noted on the dynamic expiratory images.\par -Few less than 0.3 cm solid nodules in both lungs unchanged from previous exam. \par \par \par At that time we decided to do an awake bronchoscopy to further evaluate degree of collapse. Awake Bronchoscopy on 10/23/17 revealed: \par -Short segment of approximately 1 cm of narrowing of mid trachea, with a saber sheath structure. \par -Approximately 50% collapse of the posterior membrane at the adrianne. \par \par At that time, it was decided that we continue to monitor the patient due to a combination of degree of tracheal collapse, focal stenosis, and patient apprehension towards surgery. \par \par CT chest on 4/11/18:\par - marked collapse of the trachea, bronchus intermedius and the left mainstem bronchi, consistent with tracheobronchomalacia. Several ill-defined opacities throughout both lungs, primary consideration is multifocal pneumonia. Patient was also recently hospitalized with the flu and is currently finishing an antibiotic regimen. \par \par Away Dynamic Bronchoscopy completed on 5/22/18:\par - at the level of the thoracic inlet, there was a mod- severe stenosis with a length of  approximately 2.5 cm or six tracheal rings. The stenosis was no circumferential but rather lateral with severe saber sheath appearance. More distally the stenosis appeared less significant with a moderate amount of tracheomalacia of approximately 75% collapse. The left and right mainstem bronchi also collapsed about 75%.\par

## 2019-02-13 NOTE — ASSESSMENT
[FreeTextEntry1] : 72 y/o M with mild tracheobronchomalacia. Patient was evaluated with awake dynamic bronchoscopy and found to have 50% collapse in 10/2017 and 75% collapse in 5/2018) Today, patient is doing well. he does report that he recently was seen by Dr. Clay Lance for an episode of bronchitis, started on prednisone and abx, with resolution of his symptoms. Overall, patient reports that his TBM symptoms have improved with Elavil and he is feeling better. Recommend we repeat the awake dynamic bronchoscopy when patient returns from his vacation in March.\par \par I have reviewed the patient's medical records and diagnostic images at the time of this office visit and have made the following recommendations\par Plan:\par 1. Repeat awake dynamic bronchoscopy when patient return from vacation in March\par 2. Follow-up here after the above to discuss results and plan of care

## 2019-02-14 ENCOUNTER — RX RENEWAL (OUTPATIENT)
Age: 74
End: 2019-02-14

## 2019-02-20 ENCOUNTER — APPOINTMENT (OUTPATIENT)
Dept: PULMONOLOGY | Facility: CLINIC | Age: 74
End: 2019-02-20
Payer: MEDICARE

## 2019-02-20 VITALS
WEIGHT: 206 LBS | BODY MASS INDEX: 31.22 KG/M2 | SYSTOLIC BLOOD PRESSURE: 140 MMHG | RESPIRATION RATE: 16 BRPM | DIASTOLIC BLOOD PRESSURE: 72 MMHG | OXYGEN SATURATION: 98 % | HEART RATE: 74 BPM | HEIGHT: 68 IN

## 2019-02-20 PROCEDURE — 96372 THER/PROPH/DIAG INJ SC/IM: CPT

## 2019-02-20 RX ORDER — OMALIZUMAB 202.5 MG/1.4ML
150 INJECTION, SOLUTION SUBCUTANEOUS
Qty: 45 | Refills: 0 | Status: COMPLETED | OUTPATIENT
Start: 2019-02-20

## 2019-02-20 RX ADMIN — OMALIZUMAB 0 MG: 202.5 INJECTION, SOLUTION SUBCUTANEOUS at 00:00

## 2019-02-26 ENCOUNTER — RX RENEWAL (OUTPATIENT)
Age: 74
End: 2019-02-26

## 2019-02-26 RX ORDER — ALBUTEROL SULFATE 90 UG/1
108 (90 BASE) AEROSOL, METERED RESPIRATORY (INHALATION)
Qty: 3 | Refills: 1 | Status: ACTIVE | COMMUNITY
Start: 2016-02-22 | End: 1900-01-01

## 2019-03-12 ENCOUNTER — RX RENEWAL (OUTPATIENT)
Age: 74
End: 2019-03-12

## 2019-03-15 ENCOUNTER — RX RENEWAL (OUTPATIENT)
Age: 74
End: 2019-03-15

## 2019-03-22 ENCOUNTER — NON-APPOINTMENT (OUTPATIENT)
Age: 74
End: 2019-03-22

## 2019-03-22 ENCOUNTER — APPOINTMENT (OUTPATIENT)
Dept: PULMONOLOGY | Facility: CLINIC | Age: 74
End: 2019-03-22
Payer: MEDICARE

## 2019-03-22 VITALS
BODY MASS INDEX: 32.13 KG/M2 | WEIGHT: 212 LBS | HEIGHT: 68 IN | OXYGEN SATURATION: 98 % | SYSTOLIC BLOOD PRESSURE: 139 MMHG | RESPIRATION RATE: 17 BRPM | HEART RATE: 80 BPM | DIASTOLIC BLOOD PRESSURE: 79 MMHG

## 2019-03-22 PROCEDURE — 96372 THER/PROPH/DIAG INJ SC/IM: CPT

## 2019-03-22 PROCEDURE — 99214 OFFICE O/P EST MOD 30 MIN: CPT | Mod: 25

## 2019-03-22 PROCEDURE — 94010 BREATHING CAPACITY TEST: CPT

## 2019-03-22 PROCEDURE — 95012 NITRIC OXIDE EXP GAS DETER: CPT

## 2019-03-22 RX ORDER — AMLODIPINE BESYLATE 5 MG/1
5 TABLET ORAL
Qty: 30 | Refills: 3 | Status: DISCONTINUED | COMMUNITY
Start: 2019-01-04 | End: 2019-03-22

## 2019-03-22 RX ORDER — DIAZEPAM 5 MG/1
5 TABLET ORAL
Qty: 3 | Refills: 0 | Status: ACTIVE | COMMUNITY
Start: 2018-12-07

## 2019-03-22 RX ORDER — OMALIZUMAB 202.5 MG/1.4ML
150 INJECTION, SOLUTION SUBCUTANEOUS
Qty: 1 | Refills: 0 | Status: COMPLETED | OUTPATIENT
Start: 2019-03-22

## 2019-03-22 RX ORDER — METHYLPREDNISOLONE 4 MG/1
4 TABLET ORAL
Qty: 21 | Refills: 0 | Status: DISCONTINUED | COMMUNITY
Start: 2018-12-18

## 2019-03-22 NOTE — REASON FOR VISIT
[Follow-Up] : a follow-up visit [Spouse] : spouse [FreeTextEntry1] : allergic rhinitis, severe persistent asthma, dyspnea, elevated IgE, GERD, neck pain, OW, SOB, tracheal stenosis, and Tracheomalacia

## 2019-03-22 NOTE — ADDENDUM
[FreeTextEntry1] : All medical record entries made by alfonzo Escobar were at Dr. Clay Lance's, direction and personally dictated by me on 03/22/2019. I have reviewed the chart and agree that the record accurately reflects my personal performance of the history, physical exam, assessment and plan. I have also personally directed, reviewed, and agree with the discharge instructions.

## 2019-03-22 NOTE — PROCEDURE
[FreeTextEntry1] : PFT - spi reveals normal flows; FEV1 is 2.42 which is 84% of predicted, normal flow volume loop \par \par FENO was 7; a normal value being less than 25\par \par Fractional exhaled nitric oxide (FENO) is regarded as a simple, noninvasive method for assessing eosinophilic airway inflammation. Produced by a variety of cells within the lung, nitric oxide (NO) concentrations are generally low in healthy individuals. However, high concentrations of NO appear to be involved in nonspecific host defense mechanisms and chronic inflammatory diseases such as asthma. The American Thoracic Society (ATS) therefore has recommended using FENO to aid in the diagnosis and monitoring of eosinophilic airway inflammation and asthma, and for identifying steroid responsive individuals whose chronic respiratory symptoms may be caused by airway inflammation.

## 2019-03-22 NOTE — ASSESSMENT
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 He is now here for a Xolair injection; He is currently stable from a pulmonary perspective. \par \par His shortness of breath is felt to be multifactorial due to:\par - Chronic sinusitis \par -asthma\par -poor breathing mechanics \par -cardiac disease\par -anemia\par -out of shape\par -overweight\par -TBM\par \par problem 1: asthma (severe)\par -can continue to use albuterol by the nebulizer up to QID though should use it first thing in the morning and PRN \par -continue to use Breo Ellipta 200 at 1 inhalation QD\par -continue to use Spiriva Handihaler 1 inhalation QD \par -continue to use QVar 80 2 puffs BID\par -continue Singulair 10 mg QHS \par -continue to use rescue inhaler, Ventolin, PRN and before exercise\par \par Asthma is believed to be caused by inherited (genetic) and environmental factor, but its exact cause is unknown. Asthma may be triggered by allergens, lung infections, or irritants in the air. Asthma triggers are different for each person \par -Inhaler technique reviewed as well as oral hygiene techniques reviewed with patient. Avoidance of cold air, extremes of temperature, rescue inhaler should be used before exercise. Order of medication reviewed with patient. Recommended use of a cool mist humidifier in the bedroom \par You have a clinical scenario most c/w acute bronchitis the etiology of which is unknown but empiric antibiotics are indicated. Hydration, mucolytics including Mucinex, Robitussin and the like are indicated. Cough controlling agents will be needed. \par \par problem 2: tracheomalacia (s/p PNA)\par -CTSX f/u with Dr. Bustos and Dr. Verde, f/u in 5 months\par -continue amitriptyline 10mg Q8H PRN\par Tracheomalacia is usually acquired in adults and common causes include damage by tracheostomy or endotracheal intubation damaging the tracheal cartilage with increase risk with multiple intubations, prolonged intubation, and concurrent high dose steroid therapy; external chest wall trauma and surgery; chronic compression of the trachea by benign etiologies (eg, benign mediastinal goiter) or malignancy; relapsing polychondritis; or recurrent infection. Tracheomalacia can be asymptomatic, however signs or symptoms can develop as the severity of the airway narrowing progresses with major symptoms include dyspnea, cough, and sputum retention. Other symptoms include severe paroxysms of coughing, wheezing or stridor, barking cough and may be exacerbated by forced expiration, cough, and Valsalva maneuver. Tracheomalacia is diagnosed by a bronchoscopic visualization of dynamic airway collapse on dynamic chest CT. Therapy is warranted in symptomatic patients with severe tracheomalacia and includes surgical repair as tracheobronchoplasty. The patient was referred to Dr. Everardo Jones or Dr. Johan Verde, at [facility] for a surgical consult.\par \par problem 3: allergic rhinitis\par - continue Pulmicort 0.5 mg BID via navage\par -recommended to use "Navage" nasal rinse device \par -Olopatadine .6% 1 sniff/nostril BID\par -Clarinex 5 mg QD at breakfast \par -s/p allergic profile: blood work to include IgE level(+), eosinophil level(-), vitamin D level(-), asthma profile(-), and food IgE level (-)\par \par -Environmental measures for allergies were encouraged including mattress and pillow cover, air purifier, and environmental controls. \par \par problem 4:  GERD\par -continue to use Nexium 40 mg before breakfast\par -continue to use Zantac 300 mg QHS \par \par -Rule of 2s: avoid eating too much, eating too late, eating too spicy, eating two hours before bed\par -Things to avoid including overeating, spicy foods, tight clothing, eating within three hours of bed, this list is not all inclusive. \par -For treatment of reflux, possible options discussed including diet control, H2 blockers, PPIs, as well as coating motility agents discussed as treatment options. Timing of meals and proximity of last meal to sleep were discussed. If symptoms persist, a formal gastrointestinal evaluation is needed. \par \par problem 5: Elevated IgE level (127) (severe allergic asthma)\par - Xolair shot - R/L arms \par -now injections to Q 3 weeks\par -Xolair is a recombinant DNA- derived humanized IgG1K monoclonal antibody that selectively binds ot human immunoglobulin E (IgE). Xolair is produced by a Chinese hamster ovary cell suspension culture in nutrient medium containing the antibiotic gentamicin. Gentamicin is not detectable in the final product. Xolair is a sterile, white, preservative free, lyophilized powder contained in a single use vial that is reconstituted with sterile water for suspension. Side effects include: wheezing, tightness of the chest, trouble breathing, hives, skin rash, feeling anxious or light-headed, fainting, warmth or tingling under skin, or swelling of face, lips, or tongue \par \par problem 6: obesity\par -Weight loss, exercise, and diet control were discussed and are highly encouraged. Treatment options were given such as, aqua therapy, and contacting a nutritionist. Recommended to use the elliptical, stationary bike, less use of treadmill.  Obesity is associated with worsening asthma, shortness of breath, and potential for cardiac disease, diabetes, and other underlying medical conditions. \par \par problem 7: cardiac component\par -recommended to continue to f/u with cardiologist Dr. Jed Wilks\par \par problem 8: poor breathing mechanics\par -Proper breathing techniques were reviewed with an emphasis of exhalation. Patient instructed to breath in for 1 second and out for four seconds. Patient was encouraged to not talk while walking. \par \par problem 9: no OSAS\par -based on his fatigue, EDS, snoring, elevated mallampati class, increased leg size, and changes in memory\par -he is s/p home sleep study\par -recommended Provent in the meantime \par -Sleep apnea is associated with adverse clinical consequences which an affect most organ systems.  Cardiovascular disease risk includes arrhythmias, atrial fibrillation, hypertension, coronary artery disease, and stroke. Metabolic disorders include diabetes type 2, non-alcoholic fatty liver disease. Mood disorder especially depression; and cognitive decline especially in the elderly. Associations with  chronic reflux/Rodríguez’s esophagus some but not all inclusive. \par -Reasons to assess this include arousal consistent with hypopnea; respiratory events most prominent in REM sleep or supine position; therefore sleep staging and body position are important for accurate diagnosis and estimation of AHI.\par \par problem 10: abnormal chest CT\par -c/w TBM and PNA\par -repeat chest CT in 10/19\par \par problem 11: r/o immunodeficiency\par -s/p blood work: quantitative immunoglobulins, IgG subsets, strep pneumonia titers (all normal)\par \par -Due to the fact that this pt has had more infections than would be expected and immunological blood work is indicated this would include: IgG subclasses, quantitative immunoglobulins, Strep pneumoniae titers as well as Vitamin D levels. Based on this blood work we will be able to decide where the pt needs additional pneumococcal vaccine either Prevnar 13 or pneumovax. Immunology evaluation will also be potentially indicated.\par  \par problem 12: health maintenance\par - Recommended him to have a neurological evaluation with Dr. Jerrell Moore regarding his frequent falling. \par -recommended a yearly flu shot after October 15 -refused\par -recommended strep pneumonia vaccines: Prevnar-13 vaccine, followed by Pneumo vaccine 23 on year following\par -recommended early intervention for URIs\par -recommended osteoporosis evaluations\par -recommended early dermatological evaluations\par -recommended after the age of 50 to the age of 70, colonoscopy every 5 years\par \par F/U in 4 months\par He is encouraged to call with any changes, concerns, or questions.

## 2019-03-22 NOTE — HISTORY OF PRESENT ILLNESS
[FreeTextEntry1] : Mr. Rhoades is a 73 year old male with a history of allergic rhinitis, severe persistent asthma, dyspnea, elevated IgE, GERD, neck pain, OW, SOB, tracheal stenosis, and Tracheomalacia presenting to the office today for a follow up visit. \par -he states that he has recently just returned from a tropical vacation, where he was generally feeling well. he notes that it was very hot during vacation, so he was feeling weak for some parts of the vacation\par -his wife reports that he does snore occasionally, and was snoring on the plane\par -he states that his sinuses have been under control, and he has had no PND\par -he reports that while they were following up with Dr. Jones, he felt that this breathing was very shallow, and Dr. Jones suggested booking an additional bronchoscopy\par -he notes that he has not been using his nebulizer, however he has been using his inhalers regularly\par -he has been using his CPAP machine regularly\par -he denies any headaches, nausea, vomiting, fever, chills, sweats, chest pain, chest pressure, diarrhea, constipation, dysphagia, dizziness, leg swelling, leg pain, itchy eyes, itchy ears, heartburn, reflux, or sour taste in the mouth.

## 2019-03-28 ENCOUNTER — MEDICATION RENEWAL (OUTPATIENT)
Age: 74
End: 2019-03-28

## 2019-04-10 ENCOUNTER — LABORATORY RESULT (OUTPATIENT)
Age: 74
End: 2019-04-10

## 2019-04-12 ENCOUNTER — RX RENEWAL (OUTPATIENT)
Age: 74
End: 2019-04-12

## 2019-04-16 ENCOUNTER — RX RENEWAL (OUTPATIENT)
Age: 74
End: 2019-04-16

## 2019-04-17 ENCOUNTER — NON-APPOINTMENT (OUTPATIENT)
Age: 74
End: 2019-04-17

## 2019-04-17 ENCOUNTER — APPOINTMENT (OUTPATIENT)
Dept: CARDIOLOGY | Facility: CLINIC | Age: 74
End: 2019-04-17
Payer: MEDICARE

## 2019-04-17 ENCOUNTER — APPOINTMENT (OUTPATIENT)
Dept: PULMONOLOGY | Facility: CLINIC | Age: 74
End: 2019-04-17
Payer: MEDICARE

## 2019-04-17 VITALS
HEIGHT: 68 IN | RESPIRATION RATE: 17 BRPM | DIASTOLIC BLOOD PRESSURE: 88 MMHG | BODY MASS INDEX: 33.3 KG/M2 | SYSTOLIC BLOOD PRESSURE: 138 MMHG | OXYGEN SATURATION: 98 % | WEIGHT: 219.68 LBS | HEART RATE: 77 BPM

## 2019-04-17 VITALS
DIASTOLIC BLOOD PRESSURE: 80 MMHG | HEIGHT: 68 IN | WEIGHT: 213 LBS | BODY MASS INDEX: 32.28 KG/M2 | SYSTOLIC BLOOD PRESSURE: 138 MMHG | HEART RATE: 70 BPM

## 2019-04-17 DIAGNOSIS — I20.9 ANGINA PECTORIS, UNSPECIFIED: ICD-10-CM

## 2019-04-17 PROCEDURE — 96372 THER/PROPH/DIAG INJ SC/IM: CPT

## 2019-04-17 PROCEDURE — 99214 OFFICE O/P EST MOD 30 MIN: CPT

## 2019-04-17 PROCEDURE — 93000 ELECTROCARDIOGRAM COMPLETE: CPT

## 2019-04-17 RX ORDER — OMALIZUMAB 202.5 MG/1.4ML
150 INJECTION, SOLUTION SUBCUTANEOUS
Qty: 1 | Refills: 0 | Status: COMPLETED | OUTPATIENT
Start: 2019-04-17

## 2019-04-17 RX ORDER — FLUTICASONE FUROATE AND VILANTEROL TRIFENATATE 200; 25 UG/1; UG/1
200-25 POWDER RESPIRATORY (INHALATION)
Qty: 60 | Refills: 0 | Status: DISCONTINUED | COMMUNITY
Start: 2016-12-15 | End: 2019-04-17

## 2019-04-17 RX ADMIN — OMALIZUMAB 1.5 MG: 202.5 INJECTION, SOLUTION SUBCUTANEOUS at 00:00

## 2019-04-18 PROBLEM — I20.9 ANGINA, CLASS I: Status: ACTIVE | Noted: 2017-09-27

## 2019-04-18 NOTE — HISTORY OF PRESENT ILLNESS
[FreeTextEntry1] : He presents in scheduled followup, accompanied by his girlfriend. Scheduled to undergo "awake bronchoscopy" by Dina Jones and Ammon at [facility] in ~2 weeks.  He has been feeling generally well with unchanged exertional dyspnea after approximately 200 since relieved by his "puffer". Pulmonary rehabilitation sessions are well tolerated. Depression. There is typically around 130/70.\par \par He saw Dr. Jerrell Moore in consultation. MRI and EEG are scheduled for later this week.\par \par Mild dependent edema for a few years w/o change.   No orthopnea. Nocturia x1-2. No palpitations.  Rare postural lightheadedness.  No syncope. No claudication.

## 2019-04-26 ENCOUNTER — OUTPATIENT (OUTPATIENT)
Dept: OUTPATIENT SERVICES | Facility: HOSPITAL | Age: 74
LOS: 1 days | Discharge: HOME | End: 2019-04-26
Payer: MEDICARE

## 2019-04-26 ENCOUNTER — APPOINTMENT (OUTPATIENT)
Dept: THORACIC SURGERY | Facility: HOSPITAL | Age: 74
End: 2019-04-26

## 2019-04-26 DIAGNOSIS — G56.01 CARPAL TUNNEL SYNDROME, RIGHT UPPER LIMB: Chronic | ICD-10-CM

## 2019-04-26 DIAGNOSIS — Z98.89 OTHER SPECIFIED POSTPROCEDURAL STATES: Chronic | ICD-10-CM

## 2019-04-26 DIAGNOSIS — Z96.60 PRESENCE OF UNSPECIFIED ORTHOPEDIC JOINT IMPLANT: Chronic | ICD-10-CM

## 2019-04-26 PROCEDURE — 31622 DX BRONCHOSCOPE/WASH: CPT

## 2019-04-26 PROCEDURE — 31645 BRNCHSC W/THER ASPIR 1ST: CPT

## 2019-05-15 ENCOUNTER — APPOINTMENT (OUTPATIENT)
Dept: PULMONOLOGY | Facility: CLINIC | Age: 74
End: 2019-05-15
Payer: MEDICARE

## 2019-05-15 VITALS
OXYGEN SATURATION: 98 % | HEIGHT: 68 IN | RESPIRATION RATE: 16 BRPM | HEART RATE: 76 BPM | SYSTOLIC BLOOD PRESSURE: 130 MMHG | WEIGHT: 211 LBS | DIASTOLIC BLOOD PRESSURE: 80 MMHG | BODY MASS INDEX: 31.98 KG/M2

## 2019-05-15 PROCEDURE — 96372 THER/PROPH/DIAG INJ SC/IM: CPT

## 2019-05-15 RX ORDER — OMALIZUMAB 202.5 MG/1.4ML
150 INJECTION, SOLUTION SUBCUTANEOUS
Qty: 45 | Refills: 0 | Status: COMPLETED | OUTPATIENT
Start: 2019-05-15

## 2019-05-15 RX ADMIN — OMALIZUMAB 0 MG: 202.5 INJECTION, SOLUTION SUBCUTANEOUS at 00:00

## 2019-05-31 ENCOUNTER — RX RENEWAL (OUTPATIENT)
Age: 74
End: 2019-05-31

## 2019-06-13 ENCOUNTER — APPOINTMENT (OUTPATIENT)
Dept: PULMONOLOGY | Facility: CLINIC | Age: 74
End: 2019-06-13
Payer: MEDICARE

## 2019-06-13 ENCOUNTER — NON-APPOINTMENT (OUTPATIENT)
Age: 74
End: 2019-06-13

## 2019-06-13 VITALS
WEIGHT: 216 LBS | DIASTOLIC BLOOD PRESSURE: 70 MMHG | HEART RATE: 78 BPM | SYSTOLIC BLOOD PRESSURE: 110 MMHG | RESPIRATION RATE: 16 BRPM | HEIGHT: 68 IN | OXYGEN SATURATION: 96 % | BODY MASS INDEX: 32.74 KG/M2

## 2019-06-13 PROCEDURE — 96372 THER/PROPH/DIAG INJ SC/IM: CPT

## 2019-06-13 PROCEDURE — 94010 BREATHING CAPACITY TEST: CPT

## 2019-06-13 PROCEDURE — 99214 OFFICE O/P EST MOD 30 MIN: CPT | Mod: 25

## 2019-06-13 NOTE — HISTORY OF PRESENT ILLNESS
[FreeTextEntry1] : Mr. Rhoades is a 73 year old male with a history of allergic rhinitis, severe persistent asthma, dyspnea, elevated IgE, GERD, neck pain, OW, SOB, tracheal stenosis, and Tracheomalacia presenting to the office today for a follow up visit.  His chief complaint is SOB.\par -he is s/p recent bronchoscopy with Dr Jones that confirmed severe TBM\par -he reports feeling well\par -his wife notes he is unable to walk well due to SOB, easily becoming tired, and aching in all his joints\par -he notes some constipation\par -his wife notes he fell due to \par -he reports intermittent wheezing\par -he notes bringing up clear sputum, occasionally yellowish\par -he notes his sleep quality is good, with 3-4 hours per night.  His wife notes he watches TV late at night\par -he denies taking any new medications, vitamins, or supplements. \par -he denies any coughing, chest pain, chest pressure, diarrhea, dysphagia, dizziness, leg swelling, itchy eyes, itchy ears, heartburn, reflux, sour taste in the mouth.

## 2019-06-13 NOTE — PHYSICAL EXAM
[General Appearance - Well Developed] : well developed [Normal Appearance] : normal appearance [Well Groomed] : well groomed [General Appearance - Well Nourished] : well nourished [No Deformities] : no deformities [General Appearance - In No Acute Distress] : no acute distress [Normal Conjunctiva] : the conjunctiva exhibited no abnormalities [Eyelids - No Xanthelasma] : the eyelids demonstrated no xanthelasmas [Normal Oropharynx] : normal oropharynx [III] : III [Neck Appearance] : the appearance of the neck was normal [Neck Cervical Mass (___cm)] : no neck mass was observed [Jugular Venous Distention Increased] : there was no jugular-venous distention [Thyroid Diffuse Enlargement] : the thyroid was not enlarged [Thyroid Nodule] : there were no palpable thyroid nodules [Heart Sounds] : normal S1 and S2 [Murmurs] : no murmurs present [Heart Rate And Rhythm] : heart rate and rhythm were normal [Respiration, Rhythm And Depth] : normal respiratory rhythm and effort [Exaggerated Use Of Accessory Muscles For Inspiration] : no accessory muscle use [Abdomen Tenderness] : non-tender [Abdomen Soft] : soft [Abdomen Mass (___ Cm)] : no abdominal mass palpated [Gait - Sufficient For Exercise Testing] : the gait was sufficient for exercise testing [Abnormal Walk] : normal gait [Nail Clubbing] : no clubbing of the fingernails [Cyanosis, Localized] : no localized cyanosis [Petechial Hemorrhages (___cm)] : no petechial hemorrhages [Skin Color & Pigmentation] : normal skin color and pigmentation [] : no rash [No Venous Stasis] : no venous stasis [Skin Lesions] : no skin lesions [No Skin Ulcers] : no skin ulcer [No Xanthoma] : no  xanthoma was observed [Deep Tendon Reflexes (DTR)] : deep tendon reflexes were 2+ and symmetric [Sensation] : the sensory exam was normal to light touch and pinprick [Oriented To Time, Place, And Person] : oriented to person, place, and time [No Focal Deficits] : no focal deficits [Impaired Insight] : insight and judgment were intact [Affect] : the affect was normal [FreeTextEntry1] : I:E ratio 1:3; expiratory wheeze bilaterally

## 2019-06-13 NOTE — ADDENDUM
[FreeTextEntry1] : Documented by Navin Mata acting as a scribe for Dr. Clay Lance on 06/13/2019.\par \par All medical record entries made by the Scribe were at my, Dr. Clay Lance's, direction and personally dictated by me on 06/13/2019. I have reviewed the chart and agree that the record accurately reflects my personal performance of the history, physical exam, assessment and plan. I have also personally directed, reviewed, and agree with the discharge instructions.

## 2019-06-13 NOTE — PROCEDURE
[FreeTextEntry1] : PFT revealed mild restrictive dysfunction, with a FEV1 of 2.27L, which is 79% of predicted, with a normal flow volume loop

## 2019-06-13 NOTE — ASSESSMENT
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 He is now here for a Xolair injection; He is currently stable from a pulmonary perspective, and is s/p FOB c/w severe TBM and is contemplating surgery.\par \par His shortness of breath is felt to be multifactorial due to:\par - Chronic sinusitis \par -asthma\par -poor breathing mechanics \par -cardiac disease\par -anemia\par -out of shape\par -overweight\par -TBM\par \par problem 1: asthma (severe)\par -can continue to use albuterol by the nebulizer up to QID though should use it first thing in the morning and PRN \par -continue to use Breo Ellipta 200 at 1 inhalation QD\par -continue to use Spiriva Handihaler 1 inhalation QD \par -continue to use QVar 80 2 puffs BID\par -continue Singulair 10 mg QHS \par -continue to use rescue inhaler, Ventolin, PRN and before exercise\par \par Asthma is believed to be caused by inherited (genetic) and environmental factor, but its exact cause is unknown. Asthma may be triggered by allergens, lung infections, or irritants in the air. Asthma triggers are different for each person \par -Inhaler technique reviewed as well as oral hygiene techniques reviewed with patient. Avoidance of cold air, extremes of temperature, rescue inhaler should be used before exercise. Order of medication reviewed with patient. Recommended use of a cool mist humidifier in the bedroom \par You have a clinical scenario most c/w acute bronchitis the etiology of which is unknown but empiric antibiotics are indicated. Hydration, mucolytics including Mucinex, Robitussin and the like are indicated. Cough controlling agents will be needed. \par \par problem 2: tracheomalacia (s/p PNA)\par -CTSX f/u with Dr. Bustos and Dr. Verde, f/u in a few months \par -continue amitriptyline 10mg Q8H PRN\par Tracheomalacia is usually acquired in adults and common causes include damage by tracheostomy or endotracheal intubation damaging the tracheal cartilage with increase risk with multiple intubations, prolonged intubation, and concurrent high dose steroid therapy; external chest wall trauma and surgery; chronic compression of the trachea by benign etiologies (eg, benign mediastinal goiter) or malignancy; relapsing polychondritis; or recurrent infection. Tracheomalacia can be asymptomatic, however signs or symptoms can develop as the severity of the airway narrowing progresses with major symptoms include dyspnea, cough, and sputum retention. Other symptoms include severe paroxysms of coughing, wheezing or stridor, barking cough and may be exacerbated by forced expiration, cough, and Valsalva maneuver. Tracheomalacia is diagnosed by a bronchoscopic visualization of dynamic airway collapse on dynamic chest CT. Therapy is warranted in symptomatic patients with severe tracheomalacia and includes surgical repair as tracheobronchoplasty. The patient was referred to Dr. Everardo Jones or Dr. Johan Verde, at [facility] for a surgical consult.\par \par problem 3: allergic rhinitis\par - continue Pulmicort 0.5 mg BID via navage\par -recommended to use "Navage" nasal rinse device \par -Olopatadine .6% 1 sniff/nostril BID\par -Clarinex 5 mg QD at breakfast \par -s/p allergic profile: blood work to include IgE level(+), eosinophil level(-), vitamin D level(-), asthma profile(-), and food IgE level (-)\par \par -Environmental measures for allergies were encouraged including mattress and pillow cover, air purifier, and environmental controls. \par \par problem 4:  GERD\par -continue to use Nexium 40 mg before breakfast\par -continue to use Zantac 300 mg QHS \par \par -Rule of 2s: avoid eating too much, eating too late, eating too spicy, eating two hours before bed\par -Things to avoid including overeating, spicy foods, tight clothing, eating within three hours of bed, this list is not all inclusive. \par -For treatment of reflux, possible options discussed including diet control, H2 blockers, PPIs, as well as coating motility agents discussed as treatment options. Timing of meals and proximity of last meal to sleep were discussed. If symptoms persist, a formal gastrointestinal evaluation is needed. \par \par problem 5: Elevated IgE level (127) (severe allergic asthma)\par - Xolair shot - R/L arms \par -now injections to Q 4 weeks\par -Xolair is a recombinant DNA- derived humanized IgG1K monoclonal antibody that selectively binds ot human immunoglobulin E (IgE). Xolair is produced by a Chinese hamster ovary cell suspension culture in nutrient medium containing the antibiotic gentamicin. Gentamicin is not detectable in the final product. Xolair is a sterile, white, preservative free, lyophilized powder contained in a single use vial that is reconstituted with sterile water for suspension. Side effects include: wheezing, tightness of the chest, trouble breathing, hives, skin rash, feeling anxious or light-headed, fainting, warmth or tingling under skin, or swelling of face, lips, or tongue \par \par problem 6: obesity\par -Weight loss, exercise, and diet control were discussed and are highly encouraged. Treatment options were given such as, aqua therapy, and contacting a nutritionist. Recommended to use the elliptical, stationary bike, less use of treadmill.  Obesity is associated with worsening asthma, shortness of breath, and potential for cardiac disease, diabetes, and other underlying medical conditions. \par \par problem 7: cardiac component\par -recommended to continue to f/u with cardiologist Dr. Jed Wilks\par \par problem 8: poor breathing mechanics\par -Proper breathing techniques were reviewed with an emphasis of exhalation. Patient instructed to breath in for 1 second and out for four seconds. Patient was encouraged to not talk while walking. \par \par problem 9: no OSAS\par -based on his fatigue, EDS, snoring, elevated mallampati class, increased leg size, and changes in memory\par -he is s/p home sleep study\par -recommended Provent in the meantime \par -Sleep apnea is associated with adverse clinical consequences which an affect most organ systems.  Cardiovascular disease risk includes arrhythmias, atrial fibrillation, hypertension, coronary artery disease, and stroke. Metabolic disorders include diabetes type 2, non-alcoholic fatty liver disease. Mood disorder especially depression; and cognitive decline especially in the elderly. Associations with  chronic reflux/Rodríguez’s esophagus some but not all inclusive. \par -Reasons to assess this include arousal consistent with hypopnea; respiratory events most prominent in REM sleep or supine position; therefore sleep staging and body position are important for accurate diagnosis and estimation of AHI.\par \par problem 10: abnormal chest CT\par -c/w TBM and PNA\par -repeat chest CT in 10/19\par \par problem 11: r/o immunodeficiency\par -s/p blood work: quantitative immunoglobulins, IgG subsets, strep pneumonia titers (all normal)\par \par -Due to the fact that this pt has had more infections than would be expected and immunological blood work is indicated this would include: IgG subclasses, quantitative immunoglobulins, Strep pneumoniae titers as well as Vitamin D levels. Based on this blood work we will be able to decide where the pt needs additional pneumococcal vaccine either Prevnar 13 or pneumovax. Immunology evaluation will also be potentially indicated.\par  \par problem 12: health maintenance\par - Recommended him to have a neurological evaluation with Dr. Jerrell Moore regarding his frequent falling. \par -recommended a yearly flu shot after October 15 -refused\par -recommended strep pneumonia vaccines: Prevnar-13 vaccine, followed by Pneumo vaccine 23 on year following\par -recommended early intervention for URIs\par -recommended osteoporosis evaluations\par -recommended early dermatological evaluations\par -recommended after the age of 50 to the age of 70, colonoscopy every 5 years\par \par F/U in 3 months\par He is encouraged to call with any changes, concerns, or questions.

## 2019-07-01 ENCOUNTER — RX RENEWAL (OUTPATIENT)
Age: 74
End: 2019-07-01

## 2019-07-02 ENCOUNTER — APPOINTMENT (OUTPATIENT)
Dept: ORTHOPEDIC SURGERY | Facility: CLINIC | Age: 74
End: 2019-07-02
Payer: MEDICARE

## 2019-07-02 VITALS
SYSTOLIC BLOOD PRESSURE: 144 MMHG | WEIGHT: 210 LBS | DIASTOLIC BLOOD PRESSURE: 81 MMHG | HEIGHT: 68 IN | BODY MASS INDEX: 31.83 KG/M2 | HEART RATE: 79 BPM

## 2019-07-02 PROCEDURE — 20610 DRAIN/INJ JOINT/BURSA W/O US: CPT | Mod: RT

## 2019-07-02 PROCEDURE — 73564 X-RAY EXAM KNEE 4 OR MORE: CPT | Mod: RT

## 2019-07-02 PROCEDURE — 99214 OFFICE O/P EST MOD 30 MIN: CPT | Mod: 25

## 2019-07-02 NOTE — PHYSICAL EXAM
[de-identified] : Patient is well nourished, well-developed, in no acute distress, with appropriate mood and affect. The patient is oriented to time, place, and person. Respirations are even and unlabored. Gait evaluation does reveal a limp. There is no inguinal adenopathy. Examination of the contralateral knee shows normal range of motion, strength, no tenderness, and intact skin with a superficial skin abrasion over the anterior aspect of the knee.  There is also a well-healed midline surgical scar from prior total knee arthroplasty.  There is no surrounding erythema and the abrasion is superficial. The affected limb is well-perfused, without skin lesions, shows a grossly normal motor and sensory examination. Knee motion is significantly reduced and does cause significant pain. The right knee moves from 0-125 degrees. The knee is stable within that range-of-motion to AP and ML stress. The alignment of the knee is 5 degrees varus. Muscle strength is normal. Pedal pulses are palpable. Hip examination was negative.\par  [de-identified] : Long standing knee, AP knee, lateral knee, and patellar views of the right knee were ordered and taken in the office and demonstrate severe degenerative joint disease of the knee with joint space narrowing, osteophyte formation, and subchondral sclerosis.

## 2019-07-02 NOTE — HISTORY OF PRESENT ILLNESS
[de-identified] : This is very nice 74-year-old gentleman experiencing acute on chronic right knee pain, which is severe in intensity.  The pain acutely flared after a fall 1 week ago while at the beach.  Since then he has had increasing pain in the knee.  He has been taking Tylenol which does not help.  He has been wrapping his knee in an Ace wrap which is helping.  He does physical therapy which is not helping.  He does intermittently use a cane which helps sometimes.  But the knee is not giving way.  He has been previously diagnosed with right knee osteoarthritis.  The patient denies any radiation of the pain to the feet and it is not associated with numbness, tingling, or weakness.  He did cut the anterior aspect of the left knee when he fell and this has been treated with Xeroform dressing by city [facility].  He did not hit his head when he fell.  The pain substantially limits activities of daily living. Walking tolerance is reduced.

## 2019-07-02 NOTE — REASON FOR VISIT
[Osteoarthritis, Knee] : osteoarthritis, knee [Follow-Up Visit] : a follow-up visit for [Spouse] : spouse

## 2019-07-02 NOTE — DISCUSSION/SUMMARY
[de-identified] : This patient has severe right knee degenerative joint disease.  The patient is not an appropriate candidate for total knee arthroplasty at this time. An extensive discussion was conducted on the natural history of the disease and the variety of surgical and non-surgical options available to the patient including, but not limited to non-steroidal anti-inflammatory medications, steroid injections, physical therapy, maintenance of ideal body weight, and reduction of activity.  Today we performed a right knee intra-articular cortisone injection.  He is encouraged to utilize a neoprene sleeve knee brace.  The patient will schedule an appointment as needed.  Additionally to address the test with the left knee with Xeroform and Telfa wrap.  He is instructed to start applying bacitracin and a Band-Aid daily until the wound is healed.\par \par Informed consent for the right knee injection was obtained. All questions were answered. A time out was performed. The right knee was prepped and draped in sterile fashion. Using sterile technique, the right knee was injection of 2cc of Kenalog, 4cc of 1% lidocaine, 2cc of 0.5% marcaine using a 21-gauge needle. A sterile dressing was applied. Post injection instructions were reviewed. The patient tolerated the procedure well.\par

## 2019-07-05 ENCOUNTER — RX RENEWAL (OUTPATIENT)
Age: 74
End: 2019-07-05

## 2019-07-05 RX ORDER — OMALIZUMAB 202.5 MG/1.4ML
150 INJECTION, SOLUTION SUBCUTANEOUS
Qty: 6 | Refills: 4 | Status: ACTIVE | COMMUNITY
Start: 2019-06-13 | End: 1900-01-01

## 2019-07-08 ENCOUNTER — RX RENEWAL (OUTPATIENT)
Age: 74
End: 2019-07-08

## 2019-07-11 ENCOUNTER — APPOINTMENT (OUTPATIENT)
Dept: PULMONOLOGY | Facility: CLINIC | Age: 74
End: 2019-07-11
Payer: MEDICARE

## 2019-07-11 VITALS
WEIGHT: 208 LBS | SYSTOLIC BLOOD PRESSURE: 130 MMHG | BODY MASS INDEX: 31.52 KG/M2 | DIASTOLIC BLOOD PRESSURE: 70 MMHG | HEART RATE: 77 BPM | RESPIRATION RATE: 16 BRPM | HEIGHT: 68 IN | OXYGEN SATURATION: 98 %

## 2019-07-11 PROCEDURE — 96372 THER/PROPH/DIAG INJ SC/IM: CPT

## 2019-07-11 RX ORDER — OMALIZUMAB 150 MG/ML
150 INJECTION, SOLUTION SUBCUTANEOUS
Qty: 0 | Refills: 0 | Status: COMPLETED | OUTPATIENT
Start: 2019-07-11

## 2019-07-11 RX ORDER — OMALIZUMAB 75 MG/.5ML
75 INJECTION, SOLUTION SUBCUTANEOUS
Qty: 0 | Refills: 0 | Status: COMPLETED | OUTPATIENT
Start: 2019-07-11

## 2019-07-11 RX ADMIN — OMALIZUMAB 225 MG/0.5ML: 75 INJECTION, SOLUTION SUBCUTANEOUS at 00:00

## 2019-07-11 RX ADMIN — OMALIZUMAB 225 MG/ML: 150 INJECTION, SOLUTION SUBCUTANEOUS at 00:00

## 2019-07-18 ENCOUNTER — RX RENEWAL (OUTPATIENT)
Age: 74
End: 2019-07-18

## 2019-07-26 ENCOUNTER — RX RENEWAL (OUTPATIENT)
Age: 74
End: 2019-07-26

## 2019-08-03 ENCOUNTER — EMERGENCY (EMERGENCY)
Facility: HOSPITAL | Age: 74
LOS: 1 days | Discharge: HOME | End: 2019-08-03
Attending: EMERGENCY MEDICINE
Payer: MEDICARE

## 2019-08-03 VITALS
RESPIRATION RATE: 18 BRPM | DIASTOLIC BLOOD PRESSURE: 71 MMHG | OXYGEN SATURATION: 96 % | HEART RATE: 78 BPM | TEMPERATURE: 100 F | SYSTOLIC BLOOD PRESSURE: 113 MMHG

## 2019-08-03 VITALS
TEMPERATURE: 100 F | HEIGHT: 68 IN | DIASTOLIC BLOOD PRESSURE: 73 MMHG | RESPIRATION RATE: 17 BRPM | HEART RATE: 100 BPM | SYSTOLIC BLOOD PRESSURE: 116 MMHG | OXYGEN SATURATION: 95 % | WEIGHT: 207.9 LBS

## 2019-08-03 DIAGNOSIS — G56.01 CARPAL TUNNEL SYNDROME, RIGHT UPPER LIMB: Chronic | ICD-10-CM

## 2019-08-03 DIAGNOSIS — Z98.89 OTHER SPECIFIED POSTPROCEDURAL STATES: Chronic | ICD-10-CM

## 2019-08-03 DIAGNOSIS — Z96.60 PRESENCE OF UNSPECIFIED ORTHOPEDIC JOINT IMPLANT: Chronic | ICD-10-CM

## 2019-08-03 LAB
ALBUMIN SERPL ELPH-MCNC: 3.9 G/DL — SIGNIFICANT CHANGE UP (ref 3.3–5)
ALP SERPL-CCNC: 83 U/L — SIGNIFICANT CHANGE UP (ref 40–120)
ALT FLD-CCNC: 12 U/L — SIGNIFICANT CHANGE UP (ref 10–45)
ANION GAP SERPL CALC-SCNC: 16 MMOL/L — SIGNIFICANT CHANGE UP (ref 5–17)
AST SERPL-CCNC: 18 U/L — SIGNIFICANT CHANGE UP (ref 10–40)
BILIRUB SERPL-MCNC: 0.8 MG/DL — SIGNIFICANT CHANGE UP (ref 0.2–1.2)
BUN SERPL-MCNC: 12 MG/DL — SIGNIFICANT CHANGE UP (ref 7–23)
CALCIUM SERPL-MCNC: 9 MG/DL — SIGNIFICANT CHANGE UP (ref 8.4–10.5)
CHLORIDE SERPL-SCNC: 97 MMOL/L — SIGNIFICANT CHANGE UP (ref 96–108)
CO2 SERPL-SCNC: 22 MMOL/L — SIGNIFICANT CHANGE UP (ref 22–31)
CREAT SERPL-MCNC: 0.88 MG/DL — SIGNIFICANT CHANGE UP (ref 0.5–1.3)
GAS PNL BLDV: SIGNIFICANT CHANGE UP
GLUCOSE SERPL-MCNC: 114 MG/DL — HIGH (ref 70–99)
HCT VFR BLD CALC: 37.8 % — LOW (ref 39–50)
HGB BLD-MCNC: 12.4 G/DL — LOW (ref 13–17)
LYMPHOCYTES # BLD AUTO: 0.5 K/UL — LOW (ref 1–3.3)
LYMPHOCYTES # BLD AUTO: 7 % — LOW (ref 13–44)
MCHC RBC-ENTMCNC: 29.9 PG — SIGNIFICANT CHANGE UP (ref 27–34)
MCHC RBC-ENTMCNC: 32.7 GM/DL — SIGNIFICANT CHANGE UP (ref 32–36)
MCV RBC AUTO: 91.4 FL — SIGNIFICANT CHANGE UP (ref 80–100)
MONOCYTES # BLD AUTO: 0.4 K/UL — SIGNIFICANT CHANGE UP (ref 0–0.9)
MONOCYTES NFR BLD AUTO: 5 % — SIGNIFICANT CHANGE UP (ref 2–14)
NEUTROPHILS # BLD AUTO: 5.7 K/UL — SIGNIFICANT CHANGE UP (ref 1.8–7.4)
NEUTROPHILS NFR BLD AUTO: 88 % — HIGH (ref 43–77)
PLATELET # BLD AUTO: 210 K/UL — SIGNIFICANT CHANGE UP (ref 150–400)
POTASSIUM SERPL-MCNC: 3.9 MMOL/L — SIGNIFICANT CHANGE UP (ref 3.5–5.3)
POTASSIUM SERPL-SCNC: 3.9 MMOL/L — SIGNIFICANT CHANGE UP (ref 3.5–5.3)
PROT SERPL-MCNC: 7.1 G/DL — SIGNIFICANT CHANGE UP (ref 6–8.3)
RAPID RVP RESULT: SIGNIFICANT CHANGE UP
RBC # BLD: 4.13 M/UL — LOW (ref 4.2–5.8)
RBC # FLD: 13.3 % — SIGNIFICANT CHANGE UP (ref 10.3–14.5)
SODIUM SERPL-SCNC: 135 MMOL/L — SIGNIFICANT CHANGE UP (ref 135–145)
WBC # BLD: 6.9 K/UL — SIGNIFICANT CHANGE UP (ref 3.8–10.5)
WBC # FLD AUTO: 6.9 K/UL — SIGNIFICANT CHANGE UP (ref 3.8–10.5)

## 2019-08-03 PROCEDURE — 96374 THER/PROPH/DIAG INJ IV PUSH: CPT

## 2019-08-03 PROCEDURE — 82330 ASSAY OF CALCIUM: CPT

## 2019-08-03 PROCEDURE — 87581 M.PNEUMON DNA AMP PROBE: CPT

## 2019-08-03 PROCEDURE — 84295 ASSAY OF SERUM SODIUM: CPT

## 2019-08-03 PROCEDURE — 94640 AIRWAY INHALATION TREATMENT: CPT

## 2019-08-03 PROCEDURE — 84132 ASSAY OF SERUM POTASSIUM: CPT

## 2019-08-03 PROCEDURE — 87633 RESP VIRUS 12-25 TARGETS: CPT

## 2019-08-03 PROCEDURE — 82803 BLOOD GASES ANY COMBINATION: CPT

## 2019-08-03 PROCEDURE — 85027 COMPLETE CBC AUTOMATED: CPT

## 2019-08-03 PROCEDURE — 87798 DETECT AGENT NOS DNA AMP: CPT

## 2019-08-03 PROCEDURE — 87486 CHLMYD PNEUM DNA AMP PROBE: CPT

## 2019-08-03 PROCEDURE — 99284 EMERGENCY DEPT VISIT MOD MDM: CPT | Mod: 25

## 2019-08-03 PROCEDURE — 82947 ASSAY GLUCOSE BLOOD QUANT: CPT

## 2019-08-03 PROCEDURE — 82435 ASSAY OF BLOOD CHLORIDE: CPT

## 2019-08-03 PROCEDURE — 83605 ASSAY OF LACTIC ACID: CPT

## 2019-08-03 PROCEDURE — 71045 X-RAY EXAM CHEST 1 VIEW: CPT | Mod: 26

## 2019-08-03 PROCEDURE — 80053 COMPREHEN METABOLIC PANEL: CPT

## 2019-08-03 PROCEDURE — 85014 HEMATOCRIT: CPT

## 2019-08-03 PROCEDURE — 99284 EMERGENCY DEPT VISIT MOD MDM: CPT | Mod: GC

## 2019-08-03 PROCEDURE — 71045 X-RAY EXAM CHEST 1 VIEW: CPT

## 2019-08-03 RX ORDER — AZITHROMYCIN 500 MG/1
500 TABLET, FILM COATED ORAL ONCE
Refills: 0 | Status: COMPLETED | OUTPATIENT
Start: 2019-08-03 | End: 2019-08-03

## 2019-08-03 RX ORDER — ONDANSETRON 8 MG/1
4 TABLET, FILM COATED ORAL ONCE
Refills: 0 | Status: COMPLETED | OUTPATIENT
Start: 2019-08-03 | End: 2019-08-03

## 2019-08-03 RX ORDER — AZITHROMYCIN 500 MG/1
1 TABLET, FILM COATED ORAL
Qty: 4 | Refills: 0
Start: 2019-08-03 | End: 2019-08-06

## 2019-08-03 RX ORDER — ONDANSETRON 8 MG/1
4 TABLET, FILM COATED ORAL ONCE
Refills: 0 | Status: DISCONTINUED | OUTPATIENT
Start: 2019-08-03 | End: 2019-08-03

## 2019-08-03 RX ORDER — IPRATROPIUM/ALBUTEROL SULFATE 18-103MCG
3 AEROSOL WITH ADAPTER (GRAM) INHALATION EVERY 6 HOURS
Refills: 0 | Status: DISCONTINUED | OUTPATIENT
Start: 2019-08-03 | End: 2019-08-09

## 2019-08-03 RX ADMIN — Medication 3 MILLILITER(S): at 15:46

## 2019-08-03 RX ADMIN — ONDANSETRON 4 MILLIGRAM(S): 8 TABLET, FILM COATED ORAL at 17:30

## 2019-08-03 RX ADMIN — Medication 60 MILLIGRAM(S): at 15:42

## 2019-08-03 RX ADMIN — AZITHROMYCIN 500 MILLIGRAM(S): 500 TABLET, FILM COATED ORAL at 15:42

## 2019-08-03 NOTE — ED ADULT NURSE NOTE - OBJECTIVE STATEMENT
73 yo presents to the ED from urgent care, A&Ox4, ambulatory with significant other at bedside. pt reports chills started last night. pt reports fever, highest temp 102.0 at urgent care, received 1gram of Tylenol at  1 hour PTA, oral temp 100.1 upon assessment. pt reports productive cough, clear to yellow sputum. reports SOB, 95% on RA. wheezes bilaterally. history of asthma and COPD, not on home O2. pt denies any CP. denies sick contact. denies rash or internation travel. 18G DAR gibbons MD at bedside. reports nausea, denies vomiting. reports body aches.

## 2019-08-03 NOTE — ED PROVIDER NOTE - OBJECTIVE STATEMENT
74M PMH COPD/asthma (not on home oxygen), OA, GERD, CAD and likely tracheomalacia presents with SOB, cough and fever. Symptom started last night with productive and fever to 102 associated with SOB and dizziness. He went to urgent care this morning and was given Tylenol and was told to report to the ED. Denies sick contacts, recent travels, No chest pain, nausea/vomiting/diarrhea. He's compliant with his COPD meds.

## 2019-08-03 NOTE — ED PROVIDER NOTE - NS ED ROS FT
CONSTITUTIONAL: +Fever  EYES/ENT: No visual changes;  No  throat pain   NECK: No pain   RESPIRATORY: +cough, +wheezing, +shortness of breath  CARDIOVASCULAR: No chest pain or palpitations  GASTROINTESTINAL: No abdominal or epigastric pain. No nausea, vomiting, or hematemesis; No diarrhea or constipation. No melena or hematochezia.  GENITOURINARY: No dysuria, frequency or hematuria  NEUROLOGICAL: No numbness or weakness  SKIN: No itching, rashes

## 2019-08-03 NOTE — ED PROVIDER NOTE - ATTENDING CONTRIBUTION TO CARE
74M presenting to the ED w/ shortness of breath, cough, and fever since yesterday. Patient states that yesterday he noted a fever 102F at home. Denies exacerbating/alleviating factors. States that went to urgent care this AM and was told to come to the ER for a CXR to r/o PNA. Patient states some sputum, productive of yellowish sputum. Has been using his nebulizer 3x daily. Denies prior intubations.    ROS:  GEN: (+) fevers/chills, (-) weight loss, (-) fatigue/malaise  HEENT: (-) visual change, (-) photophobia, (-) nasal congestion/rhinorrhea, (-) difficulty swallowing  NECK: (-) stiffness, (-) swelling  RESP: (+) shortness of breath, (+) cough, (+ sputum, (-) hemoptysis, (-) FRIED  CV: (-) chest pain, (-) palpitations  GI: (-) nausea, (-) vomiting, (-) pain, (-) constipation, (-) diarrhea  : (-) frequency/urgency, (-) hematuria, (-) dysuria  EXT: (-) edema  ENDO: (-) polyuria  NEURO: (-) paresthesias, (-) weakness, (-) headache, (-) dizziness, (-) syncope  MSK: no recent trauma    PMHx: COPD (not on home O2), OA, Gout, GERD, CAD  PSHx: B/L hip replacement, spinal surgery, CTS surgery  Allergies: See EMR  SocHx: former smoker    VITALS REVIEWED.  Afebrile, normal  GENERALIZED APPEARANCE:  Comfortable, no acute distress, ambulating without difficulty  SKIN:  Warm, dry, no cyanosis  HEAD:  No obvious scalp lesions  EYES:  Conjunctiva pink, no icterus  ENMT:  Mucus membranes moist, no stridor  NECK:  Supple, non-tender  CHEST AND RESPIRATORY: Expiratory wheezing B/L, no retractions, speaking full sentences  HEART AND CARDIOVASCULAR:  Regular rate, no obvious murmur  ABDOMEN AND GI:  Soft, non-tender, non-distended.  No rebound, no guarding  EXTREMITIES:  No deformity, edema, or calf tenderness  NEURO: AAOx3, gross motor and sensory intact    Impression:  COPD exacerbation, r/o PNA, likely viral illness, r/o electrolyte abnormality  Labs, imaging, Duonebs, Steroids, re-eval

## 2019-08-03 NOTE — ED PROVIDER NOTE - PHYSICAL EXAMINATION
GENERAL: NAD  HEAD:  Atraumatic, Normocephalic  EYES: EOMI, conjunctiva and sclera clear  NECK: Supple, No JVD  CHEST/LUNG: + expiratory wheezing  HEART: Regular rate and rhythm; No murmurs, rubs, or gallops  ABDOMEN: Soft, Nontender, Nondistended; Bowel sounds present  EXTREMITIES:  2+ Peripheral Pulses, No clubbing, cyanosis, or edema  PSYCH: AAOx3  NEUROLOGY: non-focal  SKIN: No rashes or lesions

## 2019-08-03 NOTE — ED ADULT NURSE NOTE - NSIMPLEMENTINTERV_GEN_ALL_ED
Implemented All Universal Safety Interventions:  [place] to call system. Call bell, personal items and telephone within reach. Instruct patient to call for assistance. Room bathroom lighting operational. Non-slip footwear when patient is off stretcher. Physically safe environment: no spills, clutter or unnecessary equipment. Stretcher in lowest position, wheels locked, appropriate side rails in place.

## 2019-08-03 NOTE — ED PROVIDER NOTE - PROGRESS NOTE DETAILS
Patient re-evaluated.  Labs and imaging reviewed. Likely viral illness, no acute CXR findings (reviewed by myself). No longer wheezing on exam. States that he feels better, wants to go home.  All labs and imaging results (if any), were reviewed with the patient. Patient understands to follow up with their regular doctor. Patient understands to return to the ED is symptoms worsen or progress. Discharge instructions were given to the patient and discussed with patient. Rx (if any) were electronically sent to patient's preferred pharmacy.

## 2019-08-08 ENCOUNTER — RX RENEWAL (OUTPATIENT)
Age: 74
End: 2019-08-08

## 2019-08-08 RX ORDER — TIOTROPIUM BROMIDE INHALATION SPRAY 3.12 UG/1
2.5 SPRAY, METERED RESPIRATORY (INHALATION)
Qty: 3 | Refills: 1 | Status: ACTIVE | COMMUNITY
Start: 2017-08-10 | End: 1900-01-01

## 2019-08-15 ENCOUNTER — APPOINTMENT (OUTPATIENT)
Dept: PULMONOLOGY | Facility: CLINIC | Age: 74
End: 2019-08-15
Payer: MEDICARE

## 2019-08-15 VITALS
HEART RATE: 76 BPM | OXYGEN SATURATION: 97 % | BODY MASS INDEX: 31.52 KG/M2 | DIASTOLIC BLOOD PRESSURE: 80 MMHG | WEIGHT: 208 LBS | SYSTOLIC BLOOD PRESSURE: 130 MMHG | RESPIRATION RATE: 16 BRPM | HEIGHT: 68 IN

## 2019-08-15 PROCEDURE — 96372 THER/PROPH/DIAG INJ SC/IM: CPT

## 2019-08-15 RX ORDER — OMALIZUMAB 150 MG/ML
150 INJECTION, SOLUTION SUBCUTANEOUS
Qty: 0 | Refills: 0 | Status: COMPLETED | OUTPATIENT
Start: 2019-08-15

## 2019-08-15 RX ORDER — OMALIZUMAB 75 MG/.5ML
75 INJECTION, SOLUTION SUBCUTANEOUS
Qty: 0 | Refills: 0 | Status: COMPLETED | OUTPATIENT
Start: 2019-08-15

## 2019-08-15 RX ADMIN — OMALIZUMAB 150 MG/ML: 150 INJECTION, SOLUTION SUBCUTANEOUS at 00:00

## 2019-08-15 RX ADMIN — OMALIZUMAB 75 MG/0.5ML: 75 INJECTION, SOLUTION SUBCUTANEOUS at 00:00

## 2019-08-18 ENCOUNTER — RX RENEWAL (OUTPATIENT)
Age: 74
End: 2019-08-18

## 2019-08-23 RX ORDER — NEBULIZER ACCESSORIES
KIT MISCELLANEOUS
Qty: 1 | Refills: 0 | Status: ACTIVE | COMMUNITY
Start: 2019-08-23 | End: 1900-01-01

## 2019-08-26 ENCOUNTER — RX RENEWAL (OUTPATIENT)
Age: 74
End: 2019-08-26

## 2019-08-26 RX ORDER — BECLOMETHASONE DIPROPIONATE HFA 80 UG/1
80 AEROSOL, METERED RESPIRATORY (INHALATION) TWICE DAILY
Qty: 31.8 | Refills: 1 | Status: ACTIVE | COMMUNITY
Start: 2018-03-30 | End: 1900-01-01

## 2019-08-30 ENCOUNTER — RX RENEWAL (OUTPATIENT)
Age: 74
End: 2019-08-30

## 2019-09-04 ENCOUNTER — RX RENEWAL (OUTPATIENT)
Age: 74
End: 2019-09-04

## 2019-09-04 RX ORDER — FLUTICASONE FUROATE AND VILANTEROL TRIFENATATE 200; 25 UG/1; UG/1
200-25 POWDER RESPIRATORY (INHALATION)
Qty: 3 | Refills: 5 | Status: ACTIVE | COMMUNITY
Start: 2016-12-15 | End: 1900-01-01

## 2019-09-12 ENCOUNTER — MEDICATION RENEWAL (OUTPATIENT)
Age: 74
End: 2019-09-12

## 2019-09-12 ENCOUNTER — APPOINTMENT (OUTPATIENT)
Dept: PULMONOLOGY | Facility: CLINIC | Age: 74
End: 2019-09-12
Payer: MEDICARE

## 2019-09-12 VITALS
RESPIRATION RATE: 16 BRPM | WEIGHT: 203 LBS | HEIGHT: 68 IN | DIASTOLIC BLOOD PRESSURE: 80 MMHG | HEART RATE: 84 BPM | OXYGEN SATURATION: 97 % | SYSTOLIC BLOOD PRESSURE: 128 MMHG | BODY MASS INDEX: 30.77 KG/M2

## 2019-09-12 PROCEDURE — 96372 THER/PROPH/DIAG INJ SC/IM: CPT

## 2019-09-12 RX ORDER — OMALIZUMAB 75 MG/.5ML
75 INJECTION, SOLUTION SUBCUTANEOUS
Qty: 0 | Refills: 0 | Status: COMPLETED | OUTPATIENT
Start: 2019-09-12

## 2019-09-12 RX ORDER — OMALIZUMAB 150 MG/ML
150 INJECTION, SOLUTION SUBCUTANEOUS
Qty: 0 | Refills: 0 | Status: COMPLETED | OUTPATIENT
Start: 2019-09-12

## 2019-09-12 RX ADMIN — OMALIZUMAB 225 MG/0.5ML: 75 INJECTION, SOLUTION SUBCUTANEOUS at 00:00

## 2019-09-12 RX ADMIN — OMALIZUMAB 225 MG/ML: 150 INJECTION, SOLUTION SUBCUTANEOUS at 00:00

## 2019-10-02 ENCOUNTER — APPOINTMENT (OUTPATIENT)
Dept: ORTHOPEDIC SURGERY | Facility: CLINIC | Age: 74
End: 2019-10-02

## 2019-10-02 PROBLEM — Z60.2 PERSON LIVING ALONE: Status: ACTIVE | Noted: 2017-05-03

## 2019-10-03 ENCOUNTER — APPOINTMENT (OUTPATIENT)
Dept: THORACIC SURGERY | Facility: CLINIC | Age: 74
End: 2019-10-03
Payer: MEDICARE

## 2019-10-03 PROCEDURE — 99213 OFFICE O/P EST LOW 20 MIN: CPT

## 2019-10-04 VITALS
BODY MASS INDEX: 31.17 KG/M2 | HEART RATE: 82 BPM | DIASTOLIC BLOOD PRESSURE: 73 MMHG | OXYGEN SATURATION: 98 % | SYSTOLIC BLOOD PRESSURE: 133 MMHG | TEMPERATURE: 98 F | WEIGHT: 205 LBS | RESPIRATION RATE: 16 BRPM

## 2019-10-04 NOTE — HISTORY OF PRESENT ILLNESS
[FreeTextEntry1] : 74 year old male, former smoker (1/2 PPD x 2 years) with PMHx of COPD/asthma, GERD, tracheobronchomalacia, chronic cough. Pt has been seen by Dr. Jones and Dr. Verde since 2017 at \par [facility] and had multiple awake Flex bronchoscopy done in the past.\par \par CT Chest on 10/3/18:\par - bilateral tree in bud nodules and opacities have complete resolved\par - 5 mm nodule LLL\par \par Awake Flex Bronchoscopy on 4/26/19 showed distal trachea showed up to 85% of collapse with Lt and Rt mainstem collapsing 90% and bronchus intermedius collapsing 90%. There was an area of narrowing measured 6 cm above the adrianne in a saber-sheath shape. This area of narrowing measured appx 2 cm in length. \par \par Pt presents today for second opinion. Pt admits worsening SOB over the months but is hesitated to get surgery. \par

## 2019-10-04 NOTE — DATA REVIEWED
[FreeTextEntry1] : CT Chest on 10/3/18:\par - bilateral tree in bud nodules and opacities have complete resolved\par - 5 mm nodule LLL\par \par Awake Flex Bronchoscopy on 4/26/19 showed distal trachea showed up to 85% of collapse with Lt and Rt mainstem collapsing 90% and bronchus intermedius collapsing 90%. There was an area of narrowing measured 6 cm above the adrianne in a saber-sheath shape. This area of narrowing measured appx 2 cm in length.

## 2019-10-04 NOTE — CONSULT LETTER
[Dear  ___] : Dear  [unfilled], [Please see my note below.] : Please see my note below. [Courtesy Letter:] : I had the pleasure of seeing your patient, [unfilled], in my office today. [Sincerely,] : Sincerely, [Consult Closing:] : Thank you very much for allowing me to participate in the care of this patient.  If you have any questions, please do not hesitate to contact me. [FreeTextEntry2] : Dr. Everardo Jones (CT sx) [FreeTextEntry3] : Memo Stuart MD, MPH \par System Director of Thoracic Surgery \par Director of Comprehensive Lung and Foregut [place] \par Professor Cardiovascular & Thoracic Surgery  \par [employer] at [facility]\par

## 2019-10-04 NOTE — REVIEW OF SYSTEMS
[As Noted in HPI] : as noted in HPI [Shortness Of Breath] : shortness of breath [Wheezing] : wheezing [SOB on Exertion] : shortness of breath during exertion [Negative] : Heme/Lymph

## 2019-10-04 NOTE — ASSESSMENT
[FreeTextEntry1] : 74 year old male, former smoker (1/2 PPD x 2 years) with PMHx of COPD/asthma, GERD, tracheobronchomalacia, chronic cough. Pt has been seen by Dr. Jones and Dr. Verde since 2017 at [facility] and had multiple awake Flex bronchoscopy done in the past.\par \par CT Chest on 10/3/18:\par - bilateral tree in bud nodules and opacities have complete resolved\par - 5 mm nodule LLL\par \par Awake Flex Bronchoscopy on 4/26/19 showed distal trachea showed up to 85% of collapse with Lt and Rt mainstem collapsing 90% and bronchus intermedius collapsing 90%. There was an area of narrowing measured 6 cm above the adrianne in a saber-sheath shape. This area of narrowing measured appx 2 cm in length. \par \par I have reviewed the patient's medical records and diagnostic images at time of this office consultation and have made the following recommendation:\par 1. CT and Bronch reviewed with pt and family. Pt has tracheobronchomalacia with worsening symptoms, pt should consider for surgery with Dr. Jones \par 2. Follow up with Dr. Jones\par \par \par Written by Brandee Gallardo NP, acting as a scribe for Dr. Memo Stuart. \par \par The documentation recorded by the scribe accurately reflects the service I personally performed and the decisions made by me. MEMO STUART MD\par

## 2019-10-04 NOTE — PHYSICAL EXAM
[Heart Rate And Rhythm] : heart rate was normal and rhythm regular [Heart Sounds] : normal S1 and S2 [Heart Sounds Gallop] : no gallops [Murmurs] : no murmurs [Heart Sounds Pericardial Friction Rub] : no pericardial rub [Examination Of The Chest] : the chest was normal in appearance [Chest Visual Inspection Thoracic Asymmetry] : no chest asymmetry [Diminished Respiratory Excursion] : normal chest expansion [Bowel Sounds] : normal bowel sounds [Abdomen Soft] : soft [Abdomen Tenderness] : non-tender [Abdomen Mass (___ Cm)] : no abdominal mass palpated [Nail Clubbing] : no clubbing  or cyanosis of the fingernails [Abnormal Walk] : normal gait [Musculoskeletal - Swelling] : no joint swelling seen [Motor Tone] : muscle strength and tone were normal [Skin Color & Pigmentation] : normal skin color and pigmentation [] : no rash [Skin Turgor] : normal skin turgor [Sensation] : the sensory exam was normal to light touch and pinprick [No Focal Deficits] : no focal deficits [Deep Tendon Reflexes (DTR)] : deep tendon reflexes were 2+ and symmetric [Oriented To Time, Place, And Person] : oriented to person, place, and time [Impaired Insight] : insight and judgment were intact [Affect] : the affect was normal [FreeTextEntry1] : wheezing

## 2019-10-10 ENCOUNTER — NON-APPOINTMENT (OUTPATIENT)
Age: 74
End: 2019-10-10

## 2019-10-10 ENCOUNTER — APPOINTMENT (OUTPATIENT)
Dept: PULMONOLOGY | Facility: CLINIC | Age: 74
End: 2019-10-10
Payer: MEDICARE

## 2019-10-10 VITALS
BODY MASS INDEX: 31.22 KG/M2 | HEIGHT: 68 IN | SYSTOLIC BLOOD PRESSURE: 124 MMHG | DIASTOLIC BLOOD PRESSURE: 80 MMHG | WEIGHT: 206 LBS | OXYGEN SATURATION: 98 % | RESPIRATION RATE: 16 BRPM | HEART RATE: 83 BPM

## 2019-10-10 PROCEDURE — 96372 THER/PROPH/DIAG INJ SC/IM: CPT

## 2019-10-10 PROCEDURE — 99214 OFFICE O/P EST MOD 30 MIN: CPT | Mod: 25

## 2019-10-10 PROCEDURE — 94010 BREATHING CAPACITY TEST: CPT

## 2019-10-10 PROCEDURE — 95012 NITRIC OXIDE EXP GAS DETER: CPT

## 2019-10-10 RX ORDER — OMALIZUMAB 150 MG/ML
150 INJECTION, SOLUTION SUBCUTANEOUS
Qty: 0 | Refills: 0 | Status: COMPLETED | OUTPATIENT
Start: 2019-10-10

## 2019-10-10 RX ADMIN — OMALIZUMAB 150 MG/ML: 150 INJECTION, SOLUTION SUBCUTANEOUS at 00:00

## 2019-10-10 NOTE — ASSESSMENT
[FreeTextEntry1] : Mr. Rhoades has HTN, arthritis, tracheomalacia, stenosis, asthma, allergy, GERD, and elevated IgE. He is now here for a Xolair injection; He is currently stable from a pulmonary perspective, and is s/p FOB c/w severe TBM and is contemplating surgery.\par \par His shortness of breath is felt to be multifactorial due to:\par - Chronic sinusitis \par -asthma\par -poor breathing mechanics \par -cardiac disease\par -anemia\par -out of shape\par -overweight\par -TBM\par \par problem 1: asthma (stable)\par -can continue to use albuterol by the nebulizer up to QID though should use it first thing in the morning and PRN \par -continue to use Breo Ellipta 200 at 1 inhalation QD\par -continue to use Spiriva Handihaler 1 inhalation QD \par -continue to use QVar 80 2 puffs BID\par -continue Singulair 10 mg QHS \par -continue to use rescue inhaler, Ventolin, PRN and before exercise\par \par Asthma is believed to be caused by inherited (genetic) and environmental factor, but its exact cause is unknown. Asthma may be triggered by allergens, lung infections, or irritants in the air. Asthma triggers are different for each person \par -Inhaler technique reviewed as well as oral hygiene techniques reviewed with patient. Avoidance of cold air, extremes of temperature, rescue inhaler should be used before exercise. Order of medication reviewed with patient. Recommended use of a cool mist humidifier in the bedroom \par You have a clinical scenario most c/w acute bronchitis the etiology of which is unknown but empiric antibiotics are indicated. Hydration, mucolytics including Mucinex, Robitussin and the like are indicated. Cough controlling agents will be needed. \par \par problem 2: tracheomalacia (s/p PNA)\par -CTSX f/u with Dr. Bustos and Dr. Verde, f/u in a few months \par -continue amitriptyline 10mg Q8H PRN\par Tracheomalacia is usually acquired in adults and common causes include damage by tracheostomy or endotracheal intubation damaging the tracheal cartilage with increase risk with multiple intubations, prolonged intubation, and concurrent high dose steroid therapy; external chest wall trauma and surgery; chronic compression of the trachea by benign etiologies (eg, benign mediastinal goiter) or malignancy; relapsing polychondritis; or recurrent infection. Tracheomalacia can be asymptomatic, however signs or symptoms can develop as the severity of the airway narrowing progresses with major symptoms include dyspnea, cough, and sputum retention. Other symptoms include severe paroxysms of coughing, wheezing or stridor, barking cough and may be exacerbated by forced expiration, cough, and Valsalva maneuver. Tracheomalacia is diagnosed by a bronchoscopic visualization of dynamic airway collapse on dynamic chest CT. Therapy is warranted in symptomatic patients with severe tracheomalacia and includes surgical repair as tracheobronchoplasty. The patient was referred to Dr. Everardo Jones or Dr. Johan Verde, at [facility] for a surgical consult.\par \par problem 3: allergic rhinitis\par - continue Pulmicort 0.5 mg BID via navage\par -recommended to use "Navage" nasal rinse device \par -Olopatadine .6% 1 sniff/nostril BID\par -Clarinex 5 mg QD at breakfast \par -s/p allergic profile: blood work to include IgE level(+), eosinophil level(-), vitamin D level(-), asthma profile(-), and food IgE level (-)\par \par -Environmental measures for allergies were encouraged including mattress and pillow cover, air purifier, and environmental controls. \par \par problem 4:  GERD\par -continue to use Nexium 40 mg before breakfast\par -add Pepcid Complete 40 mg QHS \par -Rule of 2s: avoid eating too much, eating too late, eating too spicy, eating two hours before bed\par -Things to avoid including overeating, spicy foods, tight clothing, eating within three hours of bed, this list is not all inclusive. \par -For treatment of reflux, possible options discussed including diet control, H2 blockers, PPIs, as well as coating motility agents discussed as treatment options. Timing of meals and proximity of last meal to sleep were discussed. If symptoms persist, a formal gastrointestinal evaluation is needed. \par \par problem 5: Elevated IgE level (127) (severe allergic asthma)\par - Xolair shot - R/L arms 225 (today)\par -now injections to Q 4 weeks\par -Xolair is a recombinant DNA- derived humanized IgG1K monoclonal antibody that selectively binds ot human immunoglobulin E (IgE). Xolair is produced by a Chinese hamster ovary cell suspension culture in nutrient medium containing the antibiotic gentamicin. Gentamicin is not detectable in the final product. Xolair is a sterile, white, preservative free, lyophilized powder contained in a single use vial that is reconstituted with sterile water for suspension. Side effects include: wheezing, tightness of the chest, trouble breathing, hives, skin rash, feeling anxious or light-headed, fainting, warmth or tingling under skin, or swelling of face, lips, or tongue \par \par problem 6: obesity\par -Weight loss, exercise, and diet control were discussed and are highly encouraged. Treatment options were given such as, aqua therapy, and contacting a nutritionist. Recommended to use the elliptical, stationary bike, less use of treadmill.  Obesity is associated with worsening asthma, shortness of breath, and potential for cardiac disease, diabetes, and other underlying medical conditions. \par \par problem 7: cardiac component\par -recommended to continue to f/u with cardiologist Dr. Jed Wilks\par \par problem 8: poor breathing mechanics\par -Proper breathing techniques were reviewed with an emphasis of exhalation. Patient instructed to breath in for 1 second and out for four seconds. Patient was encouraged to not talk while walking. \par \par problem 9: no OSAS\par -based on his fatigue, EDS, snoring, elevated mallampati class, increased leg size, and changes in memory\par -he is s/p home sleep study\par -recommended Provent in the meantime \par -Sleep apnea is associated with adverse clinical consequences which an affect most organ systems.  Cardiovascular disease risk includes arrhythmias, atrial fibrillation, hypertension, coronary artery disease, and stroke. Metabolic disorders include diabetes type 2, non-alcoholic fatty liver disease. Mood disorder especially depression; and cognitive decline especially in the elderly. Associations with  chronic reflux/Rodríguez’s esophagus some but not all inclusive. \par -Reasons to assess this include arousal consistent with hypopnea; respiratory events most prominent in REM sleep or supine position; therefore sleep staging and body position are important for accurate diagnosis and estimation of AHI.\par \par problem 10: abnormal chest CT\par -c/w TBM and PNA\par -repeat chest CT in 10/19\par \par problem 11: r/o immunodeficiency\par -s/p blood work: quantitative immunoglobulins, IgG subsets, strep pneumonia titers (all normal)\par \par -Due to the fact that this pt has had more infections than would be expected and immunological blood work is indicated this would include: IgG subclasses, quantitative immunoglobulins, Strep pneumoniae titers as well as Vitamin D levels. Based on this blood work we will be able to decide where the pt needs additional pneumococcal vaccine either Prevnar 13 or pneumovax. Immunology evaluation will also be potentially indicated.\par  \par problem 12: health maintenance\par - Recommended him to have a neurological evaluation with Dr. Jerrell Moore regarding his frequent falling. \par -recommended a yearly flu shot after October 15 -refused\par -recommended strep pneumonia vaccines: Prevnar-13 vaccine, followed by Pneumo vaccine 23 on year following\par -recommended early intervention for URIs\par -recommended osteoporosis evaluations\par -recommended early dermatological evaluations\par -recommended after the age of 50 to the age of 70, colonoscopy every 5 years\par \par F/U in 3 months SPI/NIOX\par He is encouraged to call with any changes, concerns, or questions.

## 2019-10-10 NOTE — ADDENDUM
[FreeTextEntry1] : All medical record entries made by alfonzo Chi were at Dr. Clay Lance's, direction and personally dictated by me on 10/10/2019. I have reviewed the chart and agree that the record accurately reflects my personal performance of the history, physical exam, assessment and plan. I have also personally directed, reviewed, and agree with the discharge instructions.

## 2019-10-10 NOTE — HISTORY OF PRESENT ILLNESS
[FreeTextEntry1] : Mr. Rhoades is a 74 year old male with a history of allergic rhinitis, severe persistent asthma, dyspnea, elevated IgE, GERD, neck pain, OW, SOB, tracheal stenosis, and Tracheomalacia presenting to the office today for a follow up visit.  His chief complaint is SOB and fatigue. \par \par -Pt feels fatigue and SOB.\par -Pt's weight has increased slightly recently due to attending a family feast. He is trying to lose weight. \par -Pt sleeps 4-5 hours nightly. He snores if on his back. \par -Ankle swelling at night for the last few days. Keeps his legs elevated and it goes down by morning\par -Occasional itchy eyes.\par -Regular bowels, but not as good as before.\par -No change in medications/supplements/vitamins.\par -Pt has stopped Zantac\par \par -he denies any headaches, nausea, vomiting, fever, chills, sweats, chest pain, chest pressure, diarrhea, constipation, dysphagia, dizziness, leg pain, itchy ears, heartburn, reflux, or sour taste in the mouth.

## 2019-10-10 NOTE — PROCEDURE
[FreeTextEntry1] : PFT revealed normal flows, with a FEV1 of 2.58L, which is 90% of predicted, with a normal flow volume loop \par \par FENO was 5; a normal value being less than 25\par Fractional exhaled nitric oxide (FENO) is regarded as a simple, noninvasive method for assessing eosinophilic airway inflammation. Produced by a variety of cells within the lung, nitric oxide (NO) concentrations are generally low in healthy individuals. However, high concentrations of NO appear to be involved in nonspecific host defense mechanisms and chronic inflammatory diseases such as asthma. The American Thoracic Society (ATS) therefore has recommended using FENO to aid in the diagnosis and monitoring of eosinophilic airway inflammation and asthma, and for identifying steroid responsive individuals whose chronic respiratory symptoms may be caused by airway inflammation. \par

## 2019-10-17 ENCOUNTER — APPOINTMENT (OUTPATIENT)
Dept: THORACIC SURGERY | Facility: CLINIC | Age: 74
End: 2019-10-17
Payer: MEDICARE

## 2019-10-17 VITALS
WEIGHT: 203 LBS | BODY MASS INDEX: 30.77 KG/M2 | DIASTOLIC BLOOD PRESSURE: 74 MMHG | HEART RATE: 75 BPM | TEMPERATURE: 97.2 F | OXYGEN SATURATION: 96 % | HEIGHT: 68 IN | SYSTOLIC BLOOD PRESSURE: 148 MMHG | RESPIRATION RATE: 18 BRPM

## 2019-10-17 PROCEDURE — 99214 OFFICE O/P EST MOD 30 MIN: CPT

## 2019-10-17 RX ORDER — MOMETASONE FUROATE MONOHYDRATE 50 UG/1
50 SPRAY, METERED NASAL
Refills: 0 | Status: COMPLETED | COMMUNITY
End: 2019-10-17

## 2019-10-17 RX ORDER — DIFLUPREDNATE 0.5 MG/ML
0.05 EMULSION OPHTHALMIC
Qty: 5 | Refills: 0 | Status: COMPLETED | COMMUNITY
Start: 2016-07-01 | End: 2019-10-17

## 2019-10-17 RX ORDER — BUDESONIDE 0.5 MG/2ML
0.5 INHALANT ORAL
Qty: 60 | Refills: 5 | Status: COMPLETED | COMMUNITY
Start: 2019-01-30 | End: 2019-10-17

## 2019-10-17 RX ORDER — EPINEPHRINE 0.3 MG/.3ML
0.3 INJECTION INTRAMUSCULAR
Qty: 1 | Refills: 0 | Status: COMPLETED | COMMUNITY
Start: 2018-07-05 | End: 2019-10-17

## 2019-10-17 RX ORDER — FAMOTIDINE 40 MG/1
40 TABLET, FILM COATED ORAL
Refills: 0 | Status: ACTIVE | COMMUNITY

## 2019-10-17 NOTE — PHYSICAL EXAM
[] : no respiratory distress [Respiration, Rhythm And Depth] : normal respiratory rhythm and effort [Exaggerated Use Of Accessory Muscles For Inspiration] : no accessory muscle use [Apical Impulse] : the apical impulse was normal [Auscultation Breath Sounds / Voice Sounds] : lungs were clear to auscultation bilaterally [Heart Rate And Rhythm] : heart rate was normal and rhythm regular [Heart Sounds] : normal S1 and S2 [Examination Of The Chest] : the chest was normal in appearance [2+] : left 2+ [Abnormal Walk] : normal gait [Skin Color & Pigmentation] : normal skin color and pigmentation [Oriented To Time, Place, And Person] : oriented to person, place, and time

## 2019-10-18 NOTE — END OF VISIT
[FreeTextEntry3] : All medical record entries made by the Scribe were at my, Dr. Verde's direction and personally dictated by me on 10/17/2019  I have reviewed the chart and agree that the record accurately reflects my personal performance of the history, physical exam, assessment and plan. I have also personally directed, reviewed, and agreed with the chart.\par

## 2019-10-18 NOTE — ASSESSMENT
[FreeTextEntry1] : 74 year old Male, Former smoker (1/2 PPD x 2 years) with tracheobronchomalacia, chronic cough, and SOB/FRIED. \par \par Patient is doing well today and presents here with his wife. Patient reports worsening symptoms of TBM, experiencing SOB and severe cough. He has decided to undergo surgical intervention at this time. \par \par I reviewed the patient's CT Chest images from 10/3/19 with the patient and his wife which shows tracheal stenosis. I informed the patient that this is not likely causing his symptoms of SOB and cough. I informed the patient that his symptom is caused by his severe tracheal collapse as seen from the previous Dynamic CT Chest from 4/11/2018 which shows >90% collapse of the trachea. \par \par I recommended patient to undergo a Bronchoscopy, R VATS, Robotic-assisted, bilateral bronchoplasty with prolene mesh. The surgical approach, and risks of surgery were discussed with the patient and wife. The risks include risk of bleeding, need for a blood transfusion, conversion to an open procedure, postoperative pain syndrome, wound infection, DVT, pulmonary embolus, dysrhythmia, myocardial infarction, as well as mortality. The patient understands the need for postoperative chest drainage as well. Pre-post-operative instructions were provided to the patient. All questions and concerns were answered in details to the patient's and wife's satisfaction. Patient understands and agrees to undergo the proposed procedure. \par \par Wife has plans to undergo surgery soon. Will schedule patient for surgery a week prior to Thanksgiving to allow wife to recover from her surgery. Patient will need cardiac and medical clearance prior to surgery. \par \par I have reviewed the patient's medical records and diagnostic images at the time of this office visit and have made the following recommendations\par Plan:\par 1. Obtain Medical and Cardiac clearance with recent ECHO\par 2. Schedule for Bronchoscopy, R VATS, Robotic-assisted, bilateral bronchoplasty with prolene mesh

## 2019-10-18 NOTE — ADDENDUM
[FreeTextEntry1] : Documented by Kristie La acting as a scribe for Dr. Johan Verde on 10/17/2019\par

## 2019-10-18 NOTE — HISTORY OF PRESENT ILLNESS
[FreeTextEntry1] : 74 year old Male, Former smoker (1/2 PPD x 2 years) with PMHx of COPD/asthma, GERD, tracheobronchomalacia, chronic cough, and SOB/FRIED. He was originally referred to us by Dr. Clay Lance. He underwent an away dynamic bronchoscopy in April 2019 which revealed moderate tracheal stenosis. He presents today for a follow up visit to discuss surgery.  \par \par  Chest CT on 9/12/17 revealing:\par -Severe collapse of the trachea is noted on the dynamic expiratory images.\par -Few less than 0.3 cm solid nodules in both lungs unchanged from previous exam. \par \par At that time we decided to do an awake bronchoscopy to further evaluate degree of collapse. Awake Bronchoscopy on 10/23/17 revealed: \par -Short segment of approximately 1 cm of narrowing of mid trachea, with a saber sheath structure. \par -Approximately 50% collapse of the posterior membrane at the adrianne. \par \par At that time, it was decided that we continue to monitor the patient due to a combination of degree of tracheal collapse, focal stenosis, and patient apprehension towards surgery. \par \par CT chest on 4/11/18:\par - marked collapse of the trachea, bronchus intermedius and the left mainstem bronchi, consistent with tracheobronchomalacia. Several ill-defined opacities throughout both lungs, primary consideration is multifocal pneumonia. Patient was also recently hospitalized with the flu and is currently finishing an antibiotic regimen. \par \par Away Dynamic Bronchoscopy completed on 5/22/18:\par - at the level of the thoracic inlet, there was a mod- severe stenosis with a length of approximately 2.5 cm or six tracheal rings. The stenosis was no circumferential but rather lateral with severe saber sheath appearance. More distally the stenosis appeared less significant with a moderate amount of tracheomalacia of approximately 75% collapse. The left and right mainstem bronchi also collapsed about 75%.\par \par Awake Bronchoscopy completed on 04/26/19:\par -the distal trachea showed up to 85% collapse with left and right mainstem collapsing 90% and bronchus intermedius collapsing 90%\par -distal trachea appeared widened posteriorly; however there was an area of narrowing approximately 6cm above the adrianne in a saber-sheath shape\par -area of narrowing measured approximately 2cm in length\par

## 2019-10-27 ENCOUNTER — RX RENEWAL (OUTPATIENT)
Age: 74
End: 2019-10-27

## 2019-10-30 ENCOUNTER — APPOINTMENT (OUTPATIENT)
Dept: CARDIOLOGY | Facility: CLINIC | Age: 74
End: 2019-10-30
Payer: MEDICARE

## 2019-10-30 ENCOUNTER — NON-APPOINTMENT (OUTPATIENT)
Age: 74
End: 2019-10-30

## 2019-10-30 VITALS
DIASTOLIC BLOOD PRESSURE: 71 MMHG | SYSTOLIC BLOOD PRESSURE: 114 MMHG | BODY MASS INDEX: 30.77 KG/M2 | TEMPERATURE: 98.1 F | HEART RATE: 67 BPM | OXYGEN SATURATION: 99 % | RESPIRATION RATE: 17 BRPM | WEIGHT: 203 LBS | HEIGHT: 68 IN

## 2019-10-30 DIAGNOSIS — Z01.810 ENCOUNTER FOR PREPROCEDURAL CARDIOVASCULAR EXAMINATION: ICD-10-CM

## 2019-10-30 PROCEDURE — 99215 OFFICE O/P EST HI 40 MIN: CPT

## 2019-10-30 PROCEDURE — 93000 ELECTROCARDIOGRAM COMPLETE: CPT

## 2019-10-30 NOTE — HISTORY OF PRESENT ILLNESS
[FreeTextEntry1] : He presents in scheduled followup, accompanied by his girlfriend. Scheduled to undergo bronchoscopy and right VATS robotic assisted bronchoplasty with Prolene mesh by Dina Jones at [facility] in on November 25, 2018.  H is exhausted and breathless with more than mild exertion.  He was able to navigate the trip to [facility] but subway stairs were very challenging.\par \par No recurrence of chest discomfort.  Mild dependent edema for a few years w/o change.   No orthopnea. Nocturia x1-2. No palpitations.  Rare postural lightheadedness.  Reports 2 falls without significant trauma unclear if he stumbled but was unsteady at these times.  No near syncope or syncope and no associated palpitations.  No syncope. No claudication.

## 2019-11-04 ENCOUNTER — APPOINTMENT (OUTPATIENT)
Dept: PULMONOLOGY | Facility: CLINIC | Age: 74
End: 2019-11-04
Payer: MEDICARE

## 2019-11-04 VITALS
OXYGEN SATURATION: 98 % | BODY MASS INDEX: 31.71 KG/M2 | RESPIRATION RATE: 15 BRPM | WEIGHT: 202 LBS | HEIGHT: 67 IN | SYSTOLIC BLOOD PRESSURE: 117 MMHG | HEART RATE: 68 BPM | DIASTOLIC BLOOD PRESSURE: 68 MMHG

## 2019-11-04 PROCEDURE — 94060 EVALUATION OF WHEEZING: CPT

## 2019-11-04 PROCEDURE — ZZZZZ: CPT

## 2019-11-04 PROCEDURE — 94729 DIFFUSING CAPACITY: CPT

## 2019-11-04 PROCEDURE — 94727 GAS DIL/WSHOT DETER LNG VOL: CPT

## 2019-11-04 PROCEDURE — 94618 PULMONARY STRESS TESTING: CPT

## 2019-11-04 PROCEDURE — 96372 THER/PROPH/DIAG INJ SC/IM: CPT | Mod: 59

## 2019-11-04 PROCEDURE — 99214 OFFICE O/P EST MOD 30 MIN: CPT | Mod: 25

## 2019-11-04 RX ORDER — OMALIZUMAB 75 MG/.5ML
75 INJECTION, SOLUTION SUBCUTANEOUS
Qty: 0 | Refills: 0 | Status: COMPLETED | OUTPATIENT
Start: 2019-11-04

## 2019-11-04 RX ORDER — OMALIZUMAB 150 MG/ML
150 INJECTION, SOLUTION SUBCUTANEOUS
Qty: 0 | Refills: 0 | Status: COMPLETED | OUTPATIENT
Start: 2019-11-04

## 2019-11-04 RX ADMIN — OMALIZUMAB 225 MG/ML: 150 INJECTION, SOLUTION SUBCUTANEOUS at 00:00

## 2019-11-04 RX ADMIN — OMALIZUMAB 225 MG/0.5ML: 75 INJECTION, SOLUTION SUBCUTANEOUS at 00:00

## 2019-11-04 NOTE — PROCEDURE
[FreeTextEntry1] : 6 minute walk test reveals a low saturation of 98% with moderate to severe; walked 423.8 meters. \par \par Full PFT revealed mild to moderate obstructive flows, with a FEV1 of 2.33L, which is 74% of predicted, low to normal lung volumes, and a diffusion of 19.1, which is 88% of predicted, with a normal flow volume loop\par

## 2019-11-04 NOTE — ASSESSMENT
[FreeTextEntry1] : Mr. Rhoades has HTN, arthritis, tracheomalacia, stenosis, asthma, allergy, GERD, and elevated IgE. He is now here for a Xolair injection; He is currently stable from a pulmonary perspective, and is s/p FOB c/w severe TBM and is having surgery. He is preop for tracheoplasty. \par \par His shortness of breath is felt to be multifactorial due to:\par - Chronic sinusitis \par -asthma\par -poor breathing mechanics \par -cardiac disease\par -anemia\par -out of shape\par -overweight\par -TBM\par \par problem 1: asthma (stable)\par -can continue to use albuterol by the nebulizer up to QID though should use it first thing in the morning and PRN \par -continue to use Breo Ellipta 200 at 1 inhalation QD\par -continue to use Spiriva Handihaler 1 inhalation QD \par -continue to use QVar 80 2 puffs BID\par -continue Singulair 10 mg QHS \par -continue to use rescue inhaler, Ventolin, PRN and before exercise\par \par Asthma is believed to be caused by inherited (genetic) and environmental factor, but its exact cause is unknown. Asthma may be triggered by allergens, lung infections, or irritants in the air. Asthma triggers are different for each person \par -Inhaler technique reviewed as well as oral hygiene techniques reviewed with patient. Avoidance of cold air, extremes of temperature, rescue inhaler should be used before exercise. Order of medication reviewed with patient. Recommended use of a cool mist humidifier in the bedroom \par You have a clinical scenario most c/w acute bronchitis the etiology of which is unknown but empiric antibiotics are indicated. Hydration, mucolytics including Mucinex, Robitussin and the like are indicated. Cough controlling agents will be needed. \par \par problem 2: tracheomalacia (s/p PNA)\par -CTSX f/u with Dr. Bustos and Dr. Verde, f/u in a few months \par -continue amitriptyline 10mg Q8H PRN\par Tracheomalacia is usually acquired in adults and common causes include damage by tracheostomy or endotracheal intubation damaging the tracheal cartilage with increase risk with multiple intubations, prolonged intubation, and concurrent high dose steroid therapy; external chest wall trauma and surgery; chronic compression of the trachea by benign etiologies (eg, benign mediastinal goiter) or malignancy; relapsing polychondritis; or recurrent infection. Tracheomalacia can be asymptomatic, however signs or symptoms can develop as the severity of the airway narrowing progresses with major symptoms include dyspnea, cough, and sputum retention. Other symptoms include severe paroxysms of coughing, wheezing or stridor, barking cough and may be exacerbated by forced expiration, cough, and Valsalva maneuver. Tracheomalacia is diagnosed by a bronchoscopic visualization of dynamic airway collapse on dynamic chest CT. Therapy is warranted in symptomatic patients with severe tracheomalacia and includes surgical repair as tracheobronchoplasty. The patient was referred to Dr. Everardo Jones or Dr. Johan Verde, at [facility] for a surgical consult.\par \par problem 2A: preop for tracheoplasty \par *******************************PRE-OP CLEARANCE****************************\par problem 3: allergic rhinitis\par - continue Pulmicort 0.5 mg BID via navage\par -recommended to use "Navage" nasal rinse device \par -Olopatadine .6% 1 sniff/nostril BID\par -Clarinex 5 mg QD at breakfast \par -s/p allergic profile: blood work to include IgE level(+), eosinophil level(-), vitamin D level(-), asthma profile(-), and food IgE level (-)\par \par -Environmental measures for allergies were encouraged including mattress and pillow cover, air purifier, and environmental controls. \par \par problem 4:  GERD\par -continue to use Nexium 40 mg before breakfast\par -continue Pepcid 40 mg QHS \par -Rule of 2s: avoid eating too much, eating too late, eating too spicy, eating two hours before bed\par -Things to avoid including overeating, spicy foods, tight clothing, eating within three hours of bed, this list is not all inclusive. \par -For treatment of reflux, possible options discussed including diet control, H2 blockers, PPIs, as well as coating motility agents discussed as treatment options. Timing of meals and proximity of last meal to sleep were discussed. If symptoms persist, a formal gastrointestinal evaluation is needed. \par \par problem 5: Elevated IgE level (127) (severe allergic asthma)\par - Xolair shot - R/L arms 225 (today)\par -now injections to Q 4 weeks\par -Xolair is a recombinant DNA- derived humanized IgG1K monoclonal antibody that selectively binds ot human immunoglobulin E (IgE). Xolair is produced by a Chinese hamster ovary cell suspension culture in nutrient medium containing the antibiotic gentamicin. Gentamicin is not detectable in the final product. Xolair is a sterile, white, preservative free, lyophilized powder contained in a single use vial that is reconstituted with sterile water for suspension. Side effects include: wheezing, tightness of the chest, trouble breathing, hives, skin rash, feeling anxious or light-headed, fainting, warmth or tingling under skin, or swelling of face, lips, or tongue \par \par problem 6: obesity\par -Weight loss, exercise, and diet control were discussed and are highly encouraged. Treatment options were given such as, aqua therapy, and contacting a nutritionist. Recommended to use the elliptical, stationary bike, less use of treadmill.  Obesity is associated with worsening asthma, shortness of breath, and potential for cardiac disease, diabetes, and other underlying medical conditions. \par \par problem 7: cardiac component\par -recommended to continue to f/u with cardiologist Dr. Jed Wilks\par \par problem 8: poor breathing mechanics\par -Proper breathing techniques were reviewed with an emphasis of exhalation. Patient instructed to breath in for 1 second and out for four seconds. Patient was encouraged to not talk while walking. \par \par problem 9: no OSAS\par -based on his fatigue, EDS, snoring, elevated mallampati class, increased leg size, and changes in memory\par -he is s/p home sleep study\par -recommended Provent in the meantime \par -Sleep apnea is associated with adverse clinical consequences which an affect most organ systems.  Cardiovascular disease risk includes arrhythmias, atrial fibrillation, hypertension, coronary artery disease, and stroke. Metabolic disorders include diabetes type 2, non-alcoholic fatty liver disease. Mood disorder especially depression; and cognitive decline especially in the elderly. Associations with  chronic reflux/Rodríguez’s esophagus some but not all inclusive. \par -Reasons to assess this include arousal consistent with hypopnea; respiratory events most prominent in REM sleep or supine position; therefore sleep staging and body position are important for accurate diagnosis and estimation of AHI.\par \par problem 10: abnormal chest CT\par -c/w TBM and PNA\par -repeat chest CT in 10/19 reconfirmation \par \par problem 11: r/o immunodeficiency\par -s/p blood work: quantitative immunoglobulins, IgG subsets, strep pneumonia titers (all normal)\par \par -Due to the fact that this pt has had more infections than would be expected and immunological blood work is indicated this would include: IgG subclasses, quantitative immunoglobulins, Strep pneumoniae titers as well as Vitamin D levels. Based on this blood work we will be able to decide where the pt needs additional pneumococcal vaccine either Prevnar 13 or pneumovax. Immunology evaluation will also be potentially indicated.\par  \par problem 12: health maintenance\par - Recommended him to have a neurological evaluation with Dr. Jerrell Moore regarding his frequent falling. \par -recommended a yearly flu shot after October 15 -refused\par -recommended strep pneumonia vaccines: Prevnar-13 vaccine, followed by Pneumo vaccine 23 on year following\par -recommended early intervention for URIs\par -recommended osteoporosis evaluations\par -recommended early dermatological evaluations\par -recommended after the age of 50 to the age of 70, colonoscopy every 5 years\par \par F/U in 3 months SPI/NIOX\par He is encouraged to call with any changes, concerns, or questions.

## 2019-11-04 NOTE — HISTORY OF PRESENT ILLNESS
[FreeTextEntry1] : Mr. Rhoades is a 74 year old male with a history of allergic rhinitis, severe persistent asthma, dyspnea, elevated IgE, GERD, neck pain, OW, SOB, tracheal stenosis, and Tracheomalacia presenting to the office today for a follow up visit.  His chief complaint is energy\par \par *******************************PRE-OP CLEARANCE****************************\par - preop tracheoplasty\par - denies palpations \par - states that he is sleeping well usually 4-5 hours a night\par - changes in diet with removed carbs and junk foods \par - he c/o of SOB \par - he states that his energy level is a 3/10 about 90% of time feeling weak and exhausted. \par - states that his SOB has been the same in the past few weeks \par - medications have not changed\par \par \par \par

## 2019-11-04 NOTE — PHYSICAL EXAM

## 2019-11-04 NOTE — ADDENDUM
[FreeTextEntry1] : Documented by Luz Tellez acting as a scribe for Dr. Clay Lance on 11/04/2019 \par \par All medical record entries made by the Scribe were at my, Dr. Clay Lance's, direction and personally dictated by me on 11/04/2019 . I have reviewed the chart and agree that the record accurately reflects my personal performance of the history, physical exam, assessment and plan. I have also personally directed, reviewed, and agree with the discharge instructions.\par

## 2019-11-05 ENCOUNTER — LABORATORY RESULT (OUTPATIENT)
Age: 74
End: 2019-11-05

## 2019-11-05 ENCOUNTER — APPOINTMENT (OUTPATIENT)
Dept: PULMONOLOGY | Facility: CLINIC | Age: 74
End: 2019-11-05

## 2019-11-07 ENCOUNTER — APPOINTMENT (OUTPATIENT)
Dept: PULMONOLOGY | Facility: CLINIC | Age: 74
End: 2019-11-07

## 2019-11-19 ENCOUNTER — EMERGENCY (EMERGENCY)
Facility: HOSPITAL | Age: 74
LOS: 1 days | Discharge: HOME | End: 2019-11-19
Attending: EMERGENCY MEDICINE
Payer: MEDICARE

## 2019-11-19 VITALS
OXYGEN SATURATION: 100 % | TEMPERATURE: 98 F | DIASTOLIC BLOOD PRESSURE: 70 MMHG | HEART RATE: 68 BPM | RESPIRATION RATE: 18 BRPM | SYSTOLIC BLOOD PRESSURE: 141 MMHG

## 2019-11-19 VITALS
HEIGHT: 68 IN | DIASTOLIC BLOOD PRESSURE: 75 MMHG | SYSTOLIC BLOOD PRESSURE: 121 MMHG | OXYGEN SATURATION: 100 % | RESPIRATION RATE: 16 BRPM | HEART RATE: 67 BPM | WEIGHT: 201.94 LBS | TEMPERATURE: 98 F

## 2019-11-19 DIAGNOSIS — Z98.89 OTHER SPECIFIED POSTPROCEDURAL STATES: Chronic | ICD-10-CM

## 2019-11-19 DIAGNOSIS — G56.01 CARPAL TUNNEL SYNDROME, RIGHT UPPER LIMB: Chronic | ICD-10-CM

## 2019-11-19 DIAGNOSIS — Z96.60 PRESENCE OF UNSPECIFIED ORTHOPEDIC JOINT IMPLANT: Chronic | ICD-10-CM

## 2019-11-19 PROCEDURE — 73590 X-RAY EXAM OF LOWER LEG: CPT

## 2019-11-19 PROCEDURE — 73630 X-RAY EXAM OF FOOT: CPT

## 2019-11-19 PROCEDURE — 99283 EMERGENCY DEPT VISIT LOW MDM: CPT | Mod: GC

## 2019-11-19 PROCEDURE — 73590 X-RAY EXAM OF LOWER LEG: CPT | Mod: 26,LT

## 2019-11-19 PROCEDURE — 73552 X-RAY EXAM OF FEMUR 2/>: CPT | Mod: 26,50

## 2019-11-19 PROCEDURE — 73562 X-RAY EXAM OF KNEE 3: CPT

## 2019-11-19 PROCEDURE — 73610 X-RAY EXAM OF ANKLE: CPT

## 2019-11-19 PROCEDURE — 73552 X-RAY EXAM OF FEMUR 2/>: CPT

## 2019-11-19 PROCEDURE — 73630 X-RAY EXAM OF FOOT: CPT | Mod: 26,RT

## 2019-11-19 PROCEDURE — 73562 X-RAY EXAM OF KNEE 3: CPT | Mod: 26,50

## 2019-11-19 PROCEDURE — 99284 EMERGENCY DEPT VISIT MOD MDM: CPT

## 2019-11-19 PROCEDURE — 73610 X-RAY EXAM OF ANKLE: CPT | Mod: 26,LT

## 2019-11-19 RX ORDER — IBUPROFEN 200 MG
600 TABLET ORAL ONCE
Refills: 0 | Status: COMPLETED | OUTPATIENT
Start: 2019-11-19 | End: 2019-11-19

## 2019-11-19 RX ADMIN — Medication 600 MILLIGRAM(S): at 17:06

## 2019-11-19 NOTE — ED ADULT NURSE REASSESSMENT NOTE - NS ED NURSE REASSESS COMMENT FT1
Receieved report from YANA Lemus. Patient is a/ox3, VSS, ambulatory, sensory/motor function intact and in NAD at this time. Patient rates pain a 3/10 on the pain scale s/p receiving 600mg of Motrin. Lung sounds are clear and equal b/l with no labored breathing noted. Abdomen is soft, nontender and nondistended. Patient denies CP/SOB, f/c, n/v/d, abdominal pain, numbness/tingling/weakness, dizziness/lightheadedness at this time. Patient pending Xray results. Plan of care discussed and initiated patient verbalizes understanding. Will continue to monitor and reassess.

## 2019-11-19 NOTE — ED ADULT TRIAGE NOTE - CHIEF COMPLAINT QUOTE
fell down 4-5 steps, c/o right foot and left ankle pain, denies loc, hitting his head, takes one low dose aspirin daily

## 2019-11-19 NOTE — ED PROCEDURE NOTE - ATTENDING CONTRIBUTION TO CARE
Attending MD Perla Timmons: Risks, benefit and alternatives of procedure explained to patient and patient demonstrated verbal understanding and consent.  I was present during the key portions of the procedure. Patient tolerated procedure well without complications

## 2019-11-19 NOTE — ED PROVIDER NOTE - PROGRESS NOTE DETAILS
+Left ankle sprain, otherwise no acute fracture. D/w Ortho, recommends ACE, RICE, f/u outpatient, WBAT. Pt remains well appearing. Will ACE wrap, ambulate for likely dc.

## 2019-11-19 NOTE — ED PROVIDER NOTE - ATTENDING CONTRIBUTION TO CARE
Perla Timmons MD - Attending Physician: I have personally seen and examined this patient with the resident/fellow.  I have fully participated in the care of this patient. I have reviewed all pertinent clinical information, including history, physical exam, plan and the Resident/Fellow’s note and agree except as noted. See MDM

## 2019-11-19 NOTE — ED PROVIDER NOTE - MUSCULOSKELETAL MINIMAL EXAM
no palpable tenderness or pain with log roll of bilateral lower extremities; normal sensation of legs and toes bilaterally. R knee minimally edematous compared to L. L ankle markedly swollen compared to R. Able to move bilateral toes.

## 2019-11-19 NOTE — ED PROVIDER NOTE - PATIENT PORTAL LINK FT
You can access the FollowMyHealth Patient Portal offered by [facility] by registering at the following website: [email or web address]. By joining [employer]s FollowMyHealth portal, you will also be able to view your health information using other applications (apps) compatible with our system.

## 2019-11-19 NOTE — ED PROVIDER NOTE - OBJECTIVE STATEMENT
Parth is a 74-year-old man with a complex PMH (including asthma, COPD, laryngomalacia, osteoarthrosis of knees) who presents after loosing his footing on the 5 steps leading up to his home this morning at 11:30am. He fell forward and landed on knees. Immediately after, he had bilateral knee, L ankle, and R great toe pain. He had difficulty standing, but eventually was able to gradually pull himself up after approximately 30 minutes. He states he remembers the entire incident, did not hit his head, and never lost consciousness.

## 2019-11-19 NOTE — ED ADULT NURSE NOTE - OBJECTIVE STATEMENT
74 y.o male c c.o mechanical fall down 4 brick steps. Pt fell onto knees. No abrasion noted to knee area/ hx of knee replacement. No LOC. On 81 mg ASA daily. No head injury. No CP. Pt scheduled for surgery this week unrelated to visit. Hx of back surgery. Pain noted to R great toe/ no abrasion/ numbness noted. Significant other at bedside. TDAP up to date as per pt.

## 2019-11-19 NOTE — ED PROVIDER NOTE - NSFOLLOWUPINSTRUCTIONS_ED_ALL_ED_FT
Thank you for visiting our Emergency Department, it has been a pleasure taking part in your healthcare.    Please follow up with your Primary Doctor in 2-3 days.      R.I.C.E. Treatment  WHAT YOU NEED TO KNOW:    R.I.C.E. treatment is a 4-step process used to decrease swelling and pain caused by an injury. R.I.C.E. stands for rest, ice, compression, and elevation. R.I.C.E. should be done within 24 to 48 hours after an injury.     Ice and Elevation         DISCHARGE INSTRUCTIONS:    Return to the emergency department if:     Your pain is severe.      You have severe swelling or deformity.      You have numbness in the injured area.    Contact your healthcare provider if:     Your pain and swelling does not go away after a few days.      You have questions or concerns about your condition or care.    How to use R.I.C.E. treatment:     Rest your injured area as directed. You may need to stop using, or keep weight off, the injury for 48 hours or longer. Your healthcare provider may recommend crutches or another device. Return to your usual activities as directed.       Apply ice on your injured area for 15 to 20 minutes every 4 hours or as directed. Use an ice pack, or put crushed ice in a plastic bag. Cover it with a towel. Ice helps prevent tissue damage and decreases swelling and pain.      Compress, or keep pressure on, the injured area. Compression will help decrease swelling and support the injured area. Use an elastic bandage, air stirrup, splint, or sling as directed. If you use an elastic bandage to wrap your injured area, make sure the bandage is not too tight.       Elevate the injured area above the level of your heart as often as you can. This will help decrease swelling and pain. Prop the injured area on pillows or blankets to keep it elevated comfortably.     Follow up with your healthcare provider as directed: Write down your questions so you remember to ask them during your visits.

## 2019-11-19 NOTE — ED PROVIDER NOTE - CARE PLAN
Principal Discharge DX:	Fall (on) (from) other stairs and steps, initial encounter Principal Discharge DX:	Sprain of left ankle, unspecified ligament, initial encounter  Secondary Diagnosis:	Fall on stairs, initial encounter

## 2019-11-19 NOTE — ED PROVIDER NOTE - CLINICAL SUMMARY MEDICAL DECISION MAKING FREE TEXT BOX
74-year-old with complex PMH s/p fall down steps onto bilateral knees. Exam notable for R knee and L ankle swelling but with neurovascularly intact and non-tender bilateral lower extremities. Plan for imaging and pain control with Motrin. 74-year-old with complex PMH s/p fall down steps onto bilateral knees. Exam notable for R knee and L ankle swelling but with neurovascularly intact and non-tender bilateral lower extremities. Plan for imaging and pain control with Motrin.    Perla Timmons MD - Attending Physician: Pt here with minor fall on stairs, landing on knees after twisting able. Neuro intact, pain throughout LE but noted swelling to L ankle. No head injury. No abd pain. Xrays, pain control

## 2019-11-19 NOTE — ED PROVIDER NOTE - PRINCIPAL DIAGNOSIS
Fall (on) (from) other stairs and steps, initial encounter Sprain of left ankle, unspecified ligament, initial encounter

## 2019-11-19 NOTE — ED PROVIDER NOTE - NSFOLLOWUPCLINICS_GEN_ALL_ED_FT
[facility] Orthopedic Surgery  Orthopedic Surgery  [address], [address] [place] [address]  Phone: [phone]  Fax:   Follow Up Time:

## 2019-11-20 ENCOUNTER — APPOINTMENT (OUTPATIENT)
Dept: ORTHOPEDIC SURGERY | Facility: CLINIC | Age: 74
End: 2019-11-20
Payer: MEDICARE

## 2019-11-20 DIAGNOSIS — Z96.642 PRESENCE OF LEFT ARTIFICIAL HIP JOINT: ICD-10-CM

## 2019-11-20 DIAGNOSIS — Z96.641 PRESENCE OF RIGHT ARTIFICIAL HIP JOINT: ICD-10-CM

## 2019-11-20 PROCEDURE — 99213 OFFICE O/P EST LOW 20 MIN: CPT

## 2019-11-20 NOTE — DISCUSSION/SUMMARY
[de-identified] : Main pain at this time is in his foot but mildly in here he does have arthritis in his toes but there is no obvious major fracture can be seen in the great toe.  His ankle mortise appears to be satisfactory.  His knee does have arthritis but because of the pain he will get an MRI to better evaluate his knee he is given a Judie brace set at 0 to 90 degrees.  He does have a surgical procedure on his trachea this weekend at [facility] and if there is no major finding at this should not inferior with that interfere with the procedure of that procedure is important which it is.  He returns he will be seen by our trauma Dr. Dr. Hammond if there is a fracture.

## 2019-11-20 NOTE — PHYSICAL EXAM
[de-identified] : X-rays done at the emergency room are reviewed and they have been reviewed by bone radiology there is no major fracture that can be seen he does have a significant arthritis in all compartments of his right knee but no obvious fracture present. [FreeTextEntry2] : This time the patient did take a fall though he has some discomfort about his hips he is hip motion is not changed significantly since he was here in 2017 he is left hip but does have flexion of 120 with abduction is 60 adduction 20 and full extension.  His right hip was done many years ago by Dr. Marie in this hip he flexes to 110 today he has approximately 35 of abduction and 10 of adduction and full extension and it is somewhat hard to examine because of the pain in his right knee.  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  \par \par His left total knee replacement is doing well with a full extension and flexion to 120 degrees good medial lateral and posterior stability his right knee is painful here he does have arthritis in all compartments.  He does not have pain directly the pressure on the medial or lateral joint line is more toward the popliteal area he does have an effusion in the knee and he does not have major pain with compression of the patella however when bending and straightening his knee he has pain and he is going only from approximately 20 degrees short of full extension to 95 degrees of flexion.  His right ankle at this time is moving well he has ecchymosis under his right large toe.

## 2019-11-20 NOTE — HISTORY OF PRESENT ILLNESS
[de-identified] : Mr. CHIDI RUVALCABA is a 74 year male who presents to office complaining of multiple body pains after a fall down his tears at home. In particular, his knees hurt from direct fall onto them and his ankles hurt from plantarflexing them. Patient went to [facility] ER where he had multiple x-rays performed.\par IMPRESSION: Bilateral femurs and knees: Status post left total hip arthroplasty with noncemented components. The hardware is intact. There is no acute hardware related complication. Status post right total hip arthroplasty. The hardware is intact. There is no abnormal periprosthetic lucency. 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 Moderate right knee osteoarthritis. Small right knee joint effusion. Status post left total knee arthroplasty without evidence of acute hardware related complication. Left tibia and fibula and left ankle: Slight nonspecific widening of the left tibiotalar joint is new from the prior x-ray with marked adjacent soft tissue swelling that suggests ligamentous injury. Subcentimeter nonspecific rounded lucency in the medullary space of the midshaft of the tibia on the left. No acute-appearing fractures. Small chronic appearing avulsion fractures at the inferior aspect of the medial malleolus. Right foot: No acute fractures or dislocations of the right foot. Multiple hammertoe deformities. Chronic fractures at the inferior aspects of the medial and lateral malleoli. Bilateral plantarcalcaneal enthesophytes.\par All review of systems, family history, social history, surgical history, past medical history, medications, and allergies not previously stated as positive are negative. They were reviewed by me today with the patient and documented accordingly.

## 2019-11-21 ENCOUNTER — FORM ENCOUNTER (OUTPATIENT)
Age: 74
End: 2019-11-21

## 2019-11-22 ENCOUNTER — APPOINTMENT (OUTPATIENT)
Dept: MRI IMAGING | Facility: CLINIC | Age: 74
End: 2019-11-22
Payer: MEDICARE

## 2019-11-22 ENCOUNTER — RX RENEWAL (OUTPATIENT)
Age: 74
End: 2019-11-22

## 2019-11-22 ENCOUNTER — OUTPATIENT (OUTPATIENT)
Dept: OUTPATIENT SERVICES | Facility: HOSPITAL | Age: 74
LOS: 1 days | End: 2019-11-22
Payer: MEDICARE

## 2019-11-22 VITALS
RESPIRATION RATE: 16 BRPM | HEIGHT: 68 IN | DIASTOLIC BLOOD PRESSURE: 75 MMHG | HEART RATE: 78 BPM | WEIGHT: 201.94 LBS | OXYGEN SATURATION: 96 % | TEMPERATURE: 98 F | SYSTOLIC BLOOD PRESSURE: 143 MMHG

## 2019-11-22 DIAGNOSIS — Z98.89 OTHER SPECIFIED POSTPROCEDURAL STATES: Chronic | ICD-10-CM

## 2019-11-22 DIAGNOSIS — Z96.60 PRESENCE OF UNSPECIFIED ORTHOPEDIC JOINT IMPLANT: Chronic | ICD-10-CM

## 2019-11-22 DIAGNOSIS — G56.01 CARPAL TUNNEL SYNDROME, RIGHT UPPER LIMB: Chronic | ICD-10-CM

## 2019-11-22 DIAGNOSIS — Z00.8 ENCOUNTER FOR OTHER GENERAL EXAMINATION: ICD-10-CM

## 2019-11-22 PROCEDURE — 73721 MRI JNT OF LWR EXTRE W/O DYE: CPT

## 2019-11-22 PROCEDURE — 73721 MRI JNT OF LWR EXTRE W/O DYE: CPT | Mod: 26,RT

## 2019-11-22 RX ORDER — RANITIDINE HYDROCHLORIDE 150 MG/1
1 TABLET, FILM COATED ORAL
Qty: 0 | Refills: 0 | DISCHARGE

## 2019-11-22 RX ORDER — INFLUENZA VIRUS VACCINE 15; 15; 15; 15 UG/.5ML; UG/.5ML; UG/.5ML; UG/.5ML
0.5 SUSPENSION INTRAMUSCULAR ONCE
Refills: 0 | Status: COMPLETED | OUTPATIENT
Start: 2019-11-25 | End: 2019-11-25

## 2019-11-22 NOTE — H&P ADULT - HISTORY OF PRESENT ILLNESS
74 year old Male, Former smoker, with PMHx of COPD/asthma, GERD, tracheobronchomalacia, chronic cough, and SOB/FRIED. Chest CT on 9/12/17 revealing:Severe collapse of the trachea is noted on the dynamic expiratory images. Few less than 0.3 cm solid nodules in both lungs unchanged from previous exam. Awake Bronchoscopy on 10/23/17 revealed: Short segment of approximately 1 cm of narrowing of mid trachea, with a saber sheath structure. Approximately 50% collapse of the posterior membrane at the adrianne. At that time, it was decided that we continue to monitor the patient due to a combination of degree of tracheal collapse, focal stenosis, and patient apprehension towards surgery. CT chest on 4/11/18:  - marked collapse of the trachea, bronchus intermedius and the left mainstem bronchi, consistent with tracheobronchomalacia. Several ill-defined opacities throughout both lungs, primary consideration is multifocal pneumonia. Patient was also recently hospitalized with the flu and is currently finishing an antibiotic regimen. Away Dynamic Bronchoscopy completed on 5/22/18: revealed at the level of the thoracic inlet, there was a mod- severe stenosis with a length of approximately 2.5 cm or six tracheal rings. The stenosis was no circumferential but rather lateral with severe saber sheath appearance. More distally the stenosis appeared less significant with a moderate amount of tracheomalacia of approximately 75% collapse. The left and right mainstem bronchi also collapsed about 75%. Awake Bronchoscopy completed on 04/26/19: the distal trachea showed up to 85% collapse with left and right mainstem collapsing 90% and bronchus intermedius collapsing 90%, distal trachea appeared widened posteriorly; however there was an area of narrowing approximately 6cm above the adrianne in a saber-sheath shape area of narrowing measured approximately 2cm in length. PFT done on 11/4/19 showed FEV1 =2.41 ,76 % of predicted value, FVC =3.28 , 81% of predicted value, PEF =6.21 ,76 % of predicted value and DLCO = 19.1 , 88% of predicted value. He is referred by Dr. Clay Lance.  He presents today for an elective Bronchoscopy, R VATS, Robotic-assisted, bilateral bronchoplasty with prolene mesh. 74 year old Male, Former smoker, with PMHx of COPD/asthma, GERD, tracheobronchomalacia, chronic cough, and SOB/FRIED. Chest CT on 9/12/17 revealing:Severe collapse of the trachea is noted on the dynamic expiratory images. Few less than 0.3 cm solid nodules in both lungs unchanged from previous exam. Awake Bronchoscopy on 10/23/17 revealed: Short segment of approximately 1 cm of narrowing of mid trachea, with a saber sheath structure. Approximately 50% collapse of the posterior membrane at the adrianne. At that time, it was decided that we continue to monitor the patient due to a combination of degree of tracheal collapse, focal stenosis, and patient apprehension towards surgery. CT chest on 4/11/18:  - marked collapse of the trachea, bronchus intermedius and the left mainstem bronchi, consistent with tracheobronchomalacia. Several ill-defined opacities throughout both lungs, primary consideration is multifocal pneumonia. Patient was also recently hospitalized with the flu and is currently finishing an antibiotic regimen. Away Dynamic Bronchoscopy completed on 5/22/18: revealed at the level of the thoracic inlet, there was a mod- severe stenosis with a length of approximately 2.5 cm or six tracheal rings. The stenosis was no circumferential but rather lateral with severe saber sheath appearance. More distally the stenosis appeared less significant with a moderate amount of tracheomalacia of approximately 75% collapse. The left and right mainstem bronchi also collapsed about 75%. Awake Bronchoscopy completed on 04/26/19: the distal trachea showed up to 85% collapse with left and right mainstem collapsing 90% and bronchus intermedius collapsing 90%, distal trachea appeared widened posteriorly; however there was an area of narrowing approximately 6cm above the adrianne in a saber-sheath shape area of narrowing measured approximately 2cm in length. PFT done on 11/4/19 showed FEV1 =2.41 ,76 % of predicted value, FVC =3.28 , 81% of predicted value, PEF =6.21 ,76 % of predicted value and DLCO = 19.1 , 88% of predicted value. He is referred by Dr. Clay Lance.  He presents today for an elective Bronchoscopy, R VATS, Robotic-assisted, bilateral bronchoplasty with prolene mesh.  Pt seen the morning of surgery, he states he has not had anything to eat or drink since last night. He states he has completed his course of azithromycin for a sinus infection. He denies fever, chills, n/v/d

## 2019-11-22 NOTE — H&P ADULT - NSHPLABSRESULTS_GEN_ALL_CORE
Chest CT on 9/12/17 revealing:  -Severe collapse of the trachea is noted on the dynamic expiratory images.  -Few less than 0.3 cm solid nodules in both lungs unchanged from previous exam.     Awake Bronchoscopy on 10/23/17 revealed:   -Short segment of approximately 1 cm of narrowing of mid trachea, with a saber sheath structure.   -Approximately 50% collapse of the posterior membrane at the adrianne.     CT chest on 4/11/18:  - marked collapse of the trachea, bronchus intermedius and the left mainstem bronchi, consistent with tracheobronchomalacia. Several ill-defined opacities throughout both lungs, primary consideration is multifocal pneumonia. Patient was also recently hospitalized with the flu and is currently finishing an antibiotic regimen.     Away Dynamic Bronchoscopy completed on 5/22/18:  - at the level of the thoracic inlet, there was a mod- severe stenosis with a length of approximately 2.5 cm or six tracheal rings. The stenosis was no circumferential but rather lateral with severe saber sheath appearance. More distally the stenosis appeared less significant with a moderate amount of tracheomalacia of approximately 75% collapse. The left and right mainstem bronchi also collapsed about 75%.    Awake Bronchoscopy completed on 04/26/19:  -the distal trachea showed up to 85% collapse with left and right mainstem collapsing 90% and bronchus intermedius collapsing 90%  -distal trachea appeared widened posteriorly; however there was an area of narrowing approximately 6cm above the adrianne in a saber-sheath shape  -area of narrowing measured approximately 2cm in length    PFT done on 11/4/19 showed FEV1 =2.41 ,76 % of predicted value, FVC =3.28 , 81% of predicted value, PEF =6.21 ,76 % of predicted value and DLCO = 19.1 , 88% of predicted value.

## 2019-11-22 NOTE — H&P ADULT - NSICDXPASTSURGICALHX_GEN_ALL_CORE_FT
PAST SURGICAL HISTORY:  Carpal tunnel syndrome on both sides 2014    History of lithotripsy 2012    Previous back surgery L1-L5   June 2014 first surgery got infected; + MRSA  antibiotic given  July 2014 incision and drainage of infected wound    S/P left knee arthroscopy 2012    S/P right knee arthroscopy 1997    S/P tonsillectomy and adenoidectomy childhood    Status post right hip replacement 1995
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
## 2019-11-25 ENCOUNTER — RESULT REVIEW (OUTPATIENT)
Age: 74
End: 2019-11-25

## 2019-11-25 ENCOUNTER — INPATIENT (INPATIENT)
Facility: HOSPITAL | Age: 74
LOS: 1 days | Discharge: HOME | DRG: 165 | End: 2019-11-27
Attending: SPECIALIST | Admitting: SPECIALIST
Payer: MEDICARE

## 2019-11-25 ENCOUNTER — APPOINTMENT (OUTPATIENT)
Dept: THORACIC SURGERY | Facility: HOSPITAL | Age: 74
End: 2019-11-25

## 2019-11-25 DIAGNOSIS — Z96.652 PRESENCE OF LEFT ARTIFICIAL KNEE JOINT: Chronic | ICD-10-CM

## 2019-11-25 DIAGNOSIS — Z98.89 OTHER SPECIFIED POSTPROCEDURAL STATES: Chronic | ICD-10-CM

## 2019-11-25 DIAGNOSIS — G56.01 CARPAL TUNNEL SYNDROME, RIGHT UPPER LIMB: Chronic | ICD-10-CM

## 2019-11-25 DIAGNOSIS — J39.8 OTHER SPECIFIED DISEASES OF UPPER RESPIRATORY TRACT: ICD-10-CM

## 2019-11-25 DIAGNOSIS — Z96.643 PRESENCE OF ARTIFICIAL HIP JOINT, BILATERAL: Chronic | ICD-10-CM

## 2019-11-25 LAB
ALBUMIN SERPL ELPH-MCNC: 3.9 G/DL — SIGNIFICANT CHANGE UP (ref 3.3–5)
ALP SERPL-CCNC: 78 U/L — SIGNIFICANT CHANGE UP (ref 40–120)
ALT FLD-CCNC: 13 U/L — SIGNIFICANT CHANGE UP (ref 10–45)
ANION GAP SERPL CALC-SCNC: 10 MMOL/L — SIGNIFICANT CHANGE UP (ref 5–17)
ANION GAP SERPL CALC-SCNC: 11 MMOL/L — SIGNIFICANT CHANGE UP (ref 5–17)
APTT BLD: 31.3 SEC — SIGNIFICANT CHANGE UP (ref 27.5–36.3)
APTT BLD: 36.5 SEC — HIGH (ref 27.5–36.3)
AST SERPL-CCNC: 19 U/L — SIGNIFICANT CHANGE UP (ref 10–40)
BASE EXCESS BLDA CALC-SCNC: -0.8 MMOL/L — SIGNIFICANT CHANGE UP (ref -2–3)
BASOPHILS # BLD AUTO: 0.02 K/UL — SIGNIFICANT CHANGE UP (ref 0–0.2)
BASOPHILS NFR BLD AUTO: 0.2 % — SIGNIFICANT CHANGE UP (ref 0–2)
BILIRUB SERPL-MCNC: 0.4 MG/DL — SIGNIFICANT CHANGE UP (ref 0.2–1.2)
BLD GP AB SCN SERPL QL: NEGATIVE — SIGNIFICANT CHANGE UP
BUN SERPL-MCNC: 16 MG/DL — SIGNIFICANT CHANGE UP (ref 7–23)
BUN SERPL-MCNC: 19 MG/DL — SIGNIFICANT CHANGE UP (ref 7–23)
CALCIUM SERPL-MCNC: 8.6 MG/DL — SIGNIFICANT CHANGE UP (ref 8.4–10.5)
CALCIUM SERPL-MCNC: 8.8 MG/DL — SIGNIFICANT CHANGE UP (ref 8.4–10.5)
CHLORIDE SERPL-SCNC: 104 MMOL/L — SIGNIFICANT CHANGE UP (ref 96–108)
CHLORIDE SERPL-SCNC: 107 MMOL/L — SIGNIFICANT CHANGE UP (ref 96–108)
CO2 SERPL-SCNC: 22 MMOL/L — SIGNIFICANT CHANGE UP (ref 22–31)
CO2 SERPL-SCNC: 24 MMOL/L — SIGNIFICANT CHANGE UP (ref 22–31)
CREAT SERPL-MCNC: 0.79 MG/DL — SIGNIFICANT CHANGE UP (ref 0.5–1.3)
CREAT SERPL-MCNC: 0.81 MG/DL — SIGNIFICANT CHANGE UP (ref 0.5–1.3)
EOSINOPHIL # BLD AUTO: 0.03 K/UL — SIGNIFICANT CHANGE UP (ref 0–0.5)
EOSINOPHIL NFR BLD AUTO: 0.3 % — SIGNIFICANT CHANGE UP (ref 0–6)
GAS PNL BLDA: SIGNIFICANT CHANGE UP
GLUCOSE SERPL-MCNC: 148 MG/DL — HIGH (ref 70–99)
GLUCOSE SERPL-MCNC: 180 MG/DL — HIGH (ref 70–99)
HCO3 BLDA-SCNC: 24 MMOL/L — SIGNIFICANT CHANGE UP (ref 21–28)
HCT VFR BLD CALC: 31 % — LOW (ref 39–50)
HCT VFR BLD CALC: 34.9 % — LOW (ref 39–50)
HGB BLD-MCNC: 10.2 G/DL — LOW (ref 13–17)
HGB BLD-MCNC: 10.9 G/DL — LOW (ref 13–17)
IMM GRANULOCYTES NFR BLD AUTO: 0.5 % — SIGNIFICANT CHANGE UP (ref 0–1.5)
INR BLD: 0.99 — SIGNIFICANT CHANGE UP (ref 0.88–1.16)
INR BLD: 1.06 — SIGNIFICANT CHANGE UP (ref 0.88–1.16)
LACTATE SERPL-SCNC: 1.5 MMOL/L — SIGNIFICANT CHANGE UP (ref 0.5–2)
LYMPHOCYTES # BLD AUTO: 0.53 K/UL — LOW (ref 1–3.3)
LYMPHOCYTES # BLD AUTO: 5.5 % — LOW (ref 13–44)
MAGNESIUM SERPL-MCNC: 1.7 MG/DL — SIGNIFICANT CHANGE UP (ref 1.6–2.6)
MAGNESIUM SERPL-MCNC: 1.9 MG/DL — SIGNIFICANT CHANGE UP (ref 1.6–2.6)
MCHC RBC-ENTMCNC: 29.3 PG — SIGNIFICANT CHANGE UP (ref 27–34)
MCHC RBC-ENTMCNC: 30.4 PG — SIGNIFICANT CHANGE UP (ref 27–34)
MCHC RBC-ENTMCNC: 31.2 GM/DL — LOW (ref 32–36)
MCHC RBC-ENTMCNC: 32.9 GM/DL — SIGNIFICANT CHANGE UP (ref 32–36)
MCV RBC AUTO: 92.3 FL — SIGNIFICANT CHANGE UP (ref 80–100)
MCV RBC AUTO: 93.8 FL — SIGNIFICANT CHANGE UP (ref 80–100)
MONOCYTES # BLD AUTO: 0.13 K/UL — SIGNIFICANT CHANGE UP (ref 0–0.9)
MONOCYTES NFR BLD AUTO: 1.4 % — LOW (ref 2–14)
NEUTROPHILS # BLD AUTO: 8.85 K/UL — HIGH (ref 1.8–7.4)
NEUTROPHILS NFR BLD AUTO: 92.1 % — HIGH (ref 43–77)
NRBC # BLD: 0 /100 WBCS — SIGNIFICANT CHANGE UP (ref 0–0)
NRBC # BLD: 0 /100 WBCS — SIGNIFICANT CHANGE UP (ref 0–0)
PCO2 BLDA: 40 MMHG — SIGNIFICANT CHANGE UP (ref 35–48)
PH BLDA: 7.39 — SIGNIFICANT CHANGE UP (ref 7.35–7.45)
PHOSPHATE SERPL-MCNC: 2.7 MG/DL — SIGNIFICANT CHANGE UP (ref 2.5–4.5)
PLATELET # BLD AUTO: 258 K/UL — SIGNIFICANT CHANGE UP (ref 150–400)
PLATELET # BLD AUTO: 263 K/UL — SIGNIFICANT CHANGE UP (ref 150–400)
PO2 BLDA: 73 MMHG — LOW (ref 83–108)
POTASSIUM SERPL-MCNC: 3.8 MMOL/L — SIGNIFICANT CHANGE UP (ref 3.5–5.3)
POTASSIUM SERPL-MCNC: 4.5 MMOL/L — SIGNIFICANT CHANGE UP (ref 3.5–5.3)
POTASSIUM SERPL-SCNC: 3.8 MMOL/L — SIGNIFICANT CHANGE UP (ref 3.5–5.3)
POTASSIUM SERPL-SCNC: 4.5 MMOL/L — SIGNIFICANT CHANGE UP (ref 3.5–5.3)
PROT SERPL-MCNC: 6.5 G/DL — SIGNIFICANT CHANGE UP (ref 6–8.3)
PROTHROM AB SERPL-ACNC: 11.2 SEC — SIGNIFICANT CHANGE UP (ref 10–12.9)
PROTHROM AB SERPL-ACNC: 12 SEC — SIGNIFICANT CHANGE UP (ref 10–12.9)
RBC # BLD: 3.36 M/UL — LOW (ref 4.2–5.8)
RBC # BLD: 3.72 M/UL — LOW (ref 4.2–5.8)
RBC # FLD: 12.9 % — SIGNIFICANT CHANGE UP (ref 10.3–14.5)
RBC # FLD: 12.9 % — SIGNIFICANT CHANGE UP (ref 10.3–14.5)
RH IG SCN BLD-IMP: POSITIVE — SIGNIFICANT CHANGE UP
SAO2 % BLDA: 94 % — LOW (ref 95–100)
SODIUM SERPL-SCNC: 138 MMOL/L — SIGNIFICANT CHANGE UP (ref 135–145)
SODIUM SERPL-SCNC: 140 MMOL/L — SIGNIFICANT CHANGE UP (ref 135–145)
WBC # BLD: 9.61 K/UL — SIGNIFICANT CHANGE UP (ref 3.8–10.5)
WBC # BLD: 9.87 K/UL — SIGNIFICANT CHANGE UP (ref 3.8–10.5)
WBC # FLD AUTO: 9.61 K/UL — SIGNIFICANT CHANGE UP (ref 3.8–10.5)
WBC # FLD AUTO: 9.87 K/UL — SIGNIFICANT CHANGE UP (ref 3.8–10.5)

## 2019-11-25 PROCEDURE — 31770 REPAIR/GRAFT OF BRONCHUS: CPT

## 2019-11-25 PROCEDURE — 31770 REPAIR/GRAFT OF BRONCHUS: CPT | Mod: AS

## 2019-11-25 PROCEDURE — 32674 THORACOSCOPY LYMPH NODE EXC: CPT

## 2019-11-25 PROCEDURE — 71250 CT THORAX DX C-: CPT | Mod: 26

## 2019-11-25 PROCEDURE — 99291 CRITICAL CARE FIRST HOUR: CPT

## 2019-11-25 PROCEDURE — 32674 THORACOSCOPY LYMPH NODE EXC: CPT | Mod: AS

## 2019-11-25 PROCEDURE — S2900 ROBOTIC SURGICAL SYSTEM: CPT | Mod: NC

## 2019-11-25 PROCEDURE — 31622 DX BRONCHOSCOPE/WASH: CPT

## 2019-11-25 PROCEDURE — 88305 TISSUE EXAM BY PATHOLOGIST: CPT | Mod: 26

## 2019-11-25 PROCEDURE — 31760 TRACHEOPLASTY INTRATHORACIC: CPT

## 2019-11-25 PROCEDURE — 31760 TRACHEOPLASTY INTRATHORACIC: CPT | Mod: AS

## 2019-11-25 PROCEDURE — 71045 X-RAY EXAM CHEST 1 VIEW: CPT | Mod: 26

## 2019-11-25 RX ORDER — ACETAMINOPHEN 500 MG
1000 TABLET ORAL ONCE
Refills: 0 | Status: DISCONTINUED | OUTPATIENT
Start: 2019-11-25 | End: 2019-11-25

## 2019-11-25 RX ORDER — ACETAMINOPHEN 500 MG
1000 TABLET ORAL ONCE
Refills: 0 | Status: COMPLETED | OUTPATIENT
Start: 2019-11-25 | End: 2019-11-25

## 2019-11-25 RX ORDER — KETOROLAC TROMETHAMINE 0.5 %
1 DROPS OPHTHALMIC (EYE) EVERY 6 HOURS
Refills: 0 | Status: COMPLETED | OUTPATIENT
Start: 2019-11-25 | End: 2019-11-27

## 2019-11-25 RX ORDER — HEPARIN SODIUM 5000 [USP'U]/ML
5000 INJECTION INTRAVENOUS; SUBCUTANEOUS EVERY 8 HOURS
Refills: 0 | Status: DISCONTINUED | OUTPATIENT
Start: 2019-11-25 | End: 2019-11-27

## 2019-11-25 RX ORDER — KETOROLAC TROMETHAMINE 0.5 %
1 DROPS OPHTHALMIC (EYE)
Refills: 0 | Status: DISCONTINUED | OUTPATIENT
Start: 2019-11-25 | End: 2019-11-25

## 2019-11-25 RX ORDER — TIOTROPIUM BROMIDE 18 UG/1
1 CAPSULE ORAL; RESPIRATORY (INHALATION) DAILY
Refills: 0 | Status: DISCONTINUED | OUTPATIENT
Start: 2019-11-25 | End: 2019-11-27

## 2019-11-25 RX ORDER — KETOROLAC TROMETHAMINE 30 MG/ML
15 SYRINGE (ML) INJECTION ONCE
Refills: 0 | Status: DISCONTINUED | OUTPATIENT
Start: 2019-11-25 | End: 2019-11-25

## 2019-11-25 RX ORDER — SODIUM CHLORIDE 9 MG/ML
1000 INJECTION, SOLUTION INTRAVENOUS
Refills: 0 | Status: DISCONTINUED | OUTPATIENT
Start: 2019-11-25 | End: 2019-11-26

## 2019-11-25 RX ORDER — IPRATROPIUM/ALBUTEROL SULFATE 18-103MCG
3 AEROSOL WITH ADAPTER (GRAM) INHALATION EVERY 4 HOURS
Refills: 0 | Status: DISCONTINUED | OUTPATIENT
Start: 2019-11-25 | End: 2019-11-27

## 2019-11-25 RX ORDER — CELECOXIB 200 MG/1
200 CAPSULE ORAL DAILY
Refills: 0 | Status: DISCONTINUED | OUTPATIENT
Start: 2019-11-25 | End: 2019-11-27

## 2019-11-25 RX ORDER — COLCHICINE 0.6 MG
1 TABLET ORAL
Qty: 0 | Refills: 0 | DISCHARGE

## 2019-11-25 RX ORDER — BUPIVACAINE 13.3 MG/ML
20 INJECTION, SUSPENSION, LIPOSOMAL INFILTRATION ONCE
Refills: 0 | Status: DISCONTINUED | OUTPATIENT
Start: 2019-11-25 | End: 2019-11-27

## 2019-11-25 RX ORDER — ALLOPURINOL 300 MG
150 TABLET ORAL DAILY
Refills: 0 | Status: DISCONTINUED | OUTPATIENT
Start: 2019-11-25 | End: 2019-11-27

## 2019-11-25 RX ORDER — FLUTICASONE PROPIONATE 50 MCG
2 SPRAY, SUSPENSION NASAL
Refills: 0 | Status: DISCONTINUED | OUTPATIENT
Start: 2019-11-25 | End: 2019-11-27

## 2019-11-25 RX ORDER — AMITRIPTYLINE HCL 25 MG
1 TABLET ORAL
Qty: 0 | Refills: 0 | DISCHARGE

## 2019-11-25 RX ORDER — OLOPATADINE HYDROCHLORIDE 665 UG/1
2 SPRAY, METERED NASAL
Qty: 0 | Refills: 0 | DISCHARGE

## 2019-11-25 RX ORDER — AMITRIPTYLINE HCL 25 MG
10 TABLET ORAL THREE TIMES A DAY
Refills: 0 | Status: DISCONTINUED | OUTPATIENT
Start: 2019-11-25 | End: 2019-11-27

## 2019-11-25 RX ORDER — CHOLECALCIFEROL (VITAMIN D3) 125 MCG
3000 CAPSULE ORAL
Qty: 0 | Refills: 0 | DISCHARGE

## 2019-11-25 RX ORDER — LIDOCAINE 4 G/100G
1 CREAM TOPICAL DAILY
Refills: 0 | Status: DISCONTINUED | OUTPATIENT
Start: 2019-11-25 | End: 2019-11-27

## 2019-11-25 RX ORDER — ASPIRIN/CALCIUM CARB/MAGNESIUM 324 MG
81 TABLET ORAL DAILY
Refills: 0 | Status: DISCONTINUED | OUTPATIENT
Start: 2019-11-25 | End: 2019-11-27

## 2019-11-25 RX ORDER — KETOROLAC TROMETHAMINE 30 MG/ML
15 SYRINGE (ML) INJECTION EVERY 8 HOURS
Refills: 0 | Status: DISCONTINUED | OUTPATIENT
Start: 2019-11-25 | End: 2019-11-26

## 2019-11-25 RX ORDER — ATORVASTATIN CALCIUM 80 MG/1
10 TABLET, FILM COATED ORAL AT BEDTIME
Refills: 0 | Status: DISCONTINUED | OUTPATIENT
Start: 2019-11-25 | End: 2019-11-27

## 2019-11-25 RX ORDER — CHOLECALCIFEROL (VITAMIN D3) 125 MCG
3000 CAPSULE ORAL DAILY
Refills: 0 | Status: DISCONTINUED | OUTPATIENT
Start: 2019-11-25 | End: 2019-11-27

## 2019-11-25 RX ORDER — MOMETASONE FUROATE 50 UG/1
2 SPRAY NASAL
Qty: 0 | Refills: 0 | DISCHARGE

## 2019-11-25 RX ORDER — LIDOCAINE 4 G/100G
1 CREAM TOPICAL DAILY
Refills: 0 | Status: DISCONTINUED | OUTPATIENT
Start: 2019-11-25 | End: 2019-11-25

## 2019-11-25 RX ORDER — TAMSULOSIN HYDROCHLORIDE 0.4 MG/1
0.8 CAPSULE ORAL AT BEDTIME
Refills: 0 | Status: DISCONTINUED | OUTPATIENT
Start: 2019-11-25 | End: 2019-11-26

## 2019-11-25 RX ORDER — DULOXETINE HYDROCHLORIDE 30 MG/1
60 CAPSULE, DELAYED RELEASE ORAL DAILY
Refills: 0 | Status: DISCONTINUED | OUTPATIENT
Start: 2019-11-25 | End: 2019-11-27

## 2019-11-25 RX ORDER — IPRATROPIUM BROMIDE 21 MCG
2 AEROSOL, SPRAY (ML) NASAL
Qty: 0 | Refills: 0 | DISCHARGE

## 2019-11-25 RX ORDER — FENTANYL CITRATE 50 UG/ML
25 INJECTION INTRAVENOUS ONCE
Refills: 0 | Status: DISCONTINUED | OUTPATIENT
Start: 2019-11-25 | End: 2019-11-25

## 2019-11-25 RX ORDER — LEVOCETIRIZINE DIHYDROCHLORIDE 0.5 MG/ML
1 SOLUTION ORAL
Qty: 0 | Refills: 0 | DISCHARGE

## 2019-11-25 RX ORDER — VANCOMYCIN HCL 1 G
1000 VIAL (EA) INTRAVENOUS EVERY 12 HOURS
Refills: 0 | Status: COMPLETED | OUTPATIENT
Start: 2019-11-25 | End: 2019-11-26

## 2019-11-25 RX ORDER — PANTOPRAZOLE SODIUM 20 MG/1
40 TABLET, DELAYED RELEASE ORAL
Refills: 0 | Status: DISCONTINUED | OUTPATIENT
Start: 2019-11-25 | End: 2019-11-27

## 2019-11-25 RX ORDER — TIZANIDINE 4 MG/1
1 TABLET ORAL
Qty: 0 | Refills: 0 | DISCHARGE

## 2019-11-25 RX ORDER — ALBUTEROL 90 UG/1
2 AEROSOL, METERED ORAL
Refills: 0 | Status: DISCONTINUED | OUTPATIENT
Start: 2019-11-25 | End: 2019-11-25

## 2019-11-25 RX ORDER — ALBUTEROL 90 UG/1
2 AEROSOL, METERED ORAL
Qty: 0 | Refills: 0 | DISCHARGE

## 2019-11-25 RX ADMIN — HEPARIN SODIUM 5000 UNIT(S): 5000 INJECTION INTRAVENOUS; SUBCUTANEOUS at 22:13

## 2019-11-25 RX ADMIN — Medication 400 MILLIGRAM(S): at 21:38

## 2019-11-25 RX ADMIN — LIDOCAINE 1 PATCH: 4 CREAM TOPICAL at 15:00

## 2019-11-25 RX ADMIN — Medication 1 DROP(S): at 22:14

## 2019-11-25 RX ADMIN — Medication 1 APPLICATION(S): at 17:33

## 2019-11-25 RX ADMIN — Medication 10 MILLIGRAM(S): at 22:13

## 2019-11-25 RX ADMIN — Medication 15 MILLIGRAM(S): at 15:30

## 2019-11-25 RX ADMIN — Medication 1000 MILLIGRAM(S): at 22:08

## 2019-11-25 RX ADMIN — LIDOCAINE 1 PATCH: 4 CREAM TOPICAL at 18:16

## 2019-11-25 RX ADMIN — Medication 250 MILLIGRAM(S): at 22:14

## 2019-11-25 RX ADMIN — Medication 1000 MILLIGRAM(S): at 15:15

## 2019-11-25 RX ADMIN — Medication 2 SPRAY(S): at 16:50

## 2019-11-25 RX ADMIN — ATORVASTATIN CALCIUM 10 MILLIGRAM(S): 80 TABLET, FILM COATED ORAL at 22:14

## 2019-11-25 RX ADMIN — Medication 400 MILLIGRAM(S): at 15:00

## 2019-11-25 RX ADMIN — Medication 15 MILLIGRAM(S): at 15:45

## 2019-11-25 NOTE — BRIEF OPERATIVE NOTE - COMMENTS
I was the first assistant for this case including but not limited to opening the pericardial window, draining pericardial fluid, repairing RA injury and  closing the incision. I was the first assistant for this case including but not limited to opening, port placement/docking, robotic instrumentation, chest tube placement, and closure.

## 2019-11-25 NOTE — PROGRESS NOTE ADULT - SUBJECTIVE AND OBJECTIVE BOX
75 yo M, Former smoker, with PMHx of COPD/asthma, GERD, tracheobronchomalacia, chronic cough, and SOB/FRIED. Chest CT on 9/12/17 revealing: Severe collapse of the trachea on the dynamic expiratory images. Few, < 0.3 cm solid nodules in both lungs unchanged from previous exam. Awake Bronchoscopy on 10/23/17 revealed: Short segment of approximately 1 cm of narrowing of mid trachea, with a saber sheath structure. Approximately 50% collapse of the posterior membrane at the adrianne. At that time, it was decided that we continue to monitor the patient due to a combination of degree of tracheal collapse, focal stenosis, and patient apprehension towards surgery.   CT chest on 4/11/18 - marked collapse of the trachea, bronchus intermedius and the left mainstem bronchi, consistent with tracheobronchomalacia. Several ill-defined opacities throughout both lungs, primary consideration is multifocal pneumonia. Patient was also recently hospitalized with the flu and is currently finishing an antibiotic regimen. Away Dynamic Bronchoscopy completed on 5/22/18: revealed at the level of the thoracic inlet, there was a mod- severe stenosis with a length of approximately 2.5 cm or six tracheal rings. The stenosis was no circumferential but rather lateral with severe saber sheath appearance. More distally the stenosis appeared less significant with a moderate amount of tracheomalacia of approximately 75% collapse. The left and right mainstem bronchi also collapsed about 75%. Awake Bronchoscopy completed on 04/26/19: the distal trachea showed up to 85% collapse with left and right mainstem collapsing 90% and bronchus intermedius collapsing 90%, distal trachea appeared widened posteriorly; however there was an area of narrowing approximately 6cm above the adrianne in a saber-sheath shape area of narrowing measured approximately 2cm in length. PFT done on 11/4/19 showed FEV1 =2.41 ,76 % of predicted value, FVC =3.28 , 81% of predicted value, PEF =6.21 ,76 % of predicted value and DLCO = 19.1 , 88% of predicted value.    Now s/p Elective Bronchoscopy, R VATS, Robotic-assisted, bilateral bronchoplasty with prolene mesh    Vital Signs Last 24 Hrs  T(C): 36.5 (25 Nov 2019 13:50), Max: 36.5 (25 Nov 2019 13:50)  T(F): 97.7 (25 Nov 2019 13:50), Max: 97.7 (25 Nov 2019 13:50)  HR: 64 (25 Nov 2019 15:30) (64 - 84)  BP: 126/68 (25 Nov 2019 15:30) (120/67 - 126/68)  BP(mean): 102 (25 Nov 2019 15:30) (94 - 102)  RR: 17 (25 Nov 2019 15:30) (14 - 26)  SpO2: 98% (25 Nov 2019 15:30) (96% - 100%)    Extubated, alert, awake, verbal  follows commands  injected right cornea  S1S2 reg rate  right pleural tube  soft abdomen, non distended  moves all 4 ext    WB 9.6, hg 10.9, plt 263  PTT 36.5  BUN 19, Cr 0.8  Lactate, Blood: 1.5 mmoL/L (11-25-19 @ 14:02)    CXR - right CT, no pnthx.     a/p:    IS routinely  Bronchodilators  Pain control PRN  glycemic control, DVT and GI proph  Trend chest tube output        40 minutes of critical care time spent providing medical care for patient's acute illness/conditions that impairs at least one vital organ system and/or poses a high risk of imminent or life threatening deterioration in the patient's condition. It includes time spent evaluating and treating the patient's acute illness as well as time spent reviewing labs, radiology, discussing goals of care with patient and/or patient's family, and discussing the case with a multidisciplinary team in an effort to prevent further life threatening deterioration or end organ damage. This time is independent of any procedures performed.

## 2019-11-25 NOTE — BRIEF OPERATIVE NOTE - NSICDXBRIEFPROCEDURE_GEN_ALL_CORE_FT
PROCEDURES:  Bronchoscopy 25-Nov-2019 08:02:21  Bonnie Michelle  Bronchoplasty with graft 25-Nov-2019 08:01:50 Bronchoscopy, R VATS, RA tracheobronchoplasty Bonnie Michelle

## 2019-11-25 NOTE — BRIEF OPERATIVE NOTE - NSICDXBRIEFPOSTOP_GEN_ALL_CORE_FT
POST-OP DIAGNOSIS:  Tracheobronchomalacia 25-Nov-2019 08:02:42 Tracheobronchomalacia Bonnie Michelle N

## 2019-11-25 NOTE — BRIEF OPERATIVE NOTE - NSICDXBRIEFPREOP_GEN_ALL_CORE_FT
PRE-OP DIAGNOSIS:  Tracheobronchomalacia 25-Nov-2019 08:02:49 Tracheobronchomalacia Bonnie Michelle N

## 2019-11-25 NOTE — CONSULT NOTE ADULT - SUBJECTIVE AND OBJECTIVE BOX
POD0 s/p Elective Bronchoscopy, R VATS, Robotic-assisted, bilateral bronchoplasty with prolene mesh, c/o FBS/ pain OD since procedure.    Exam:  VA: OD 20/60 (has copious ointment in eye) OS: 20/30  IOP: 18, 17mmHG  EOMI  PERRL    PLE:  Lids: WNL  K: OD- 3mm linear abrasion inferior to pupil OS: clear  Conj: w/q  Iris: intact OU  AC: formed  Lens: pseudophakic OU    A/P  K abrasion OD post-op  - lubricating ointment PRN discomfort    To f/u with own ophthalmologist or left card to f/u with me.    Марина Banuelos MD

## 2019-11-26 LAB
ALBUMIN SERPL ELPH-MCNC: 4 G/DL — SIGNIFICANT CHANGE UP (ref 3.3–5)
ALP SERPL-CCNC: 79 U/L — SIGNIFICANT CHANGE UP (ref 40–120)
ALT FLD-CCNC: 12 U/L — SIGNIFICANT CHANGE UP (ref 10–45)
ANION GAP SERPL CALC-SCNC: 10 MMOL/L — SIGNIFICANT CHANGE UP (ref 5–17)
APTT BLD: 31.1 SEC — SIGNIFICANT CHANGE UP (ref 27.5–36.3)
AST SERPL-CCNC: 18 U/L — SIGNIFICANT CHANGE UP (ref 10–40)
BILIRUB SERPL-MCNC: 0.4 MG/DL — SIGNIFICANT CHANGE UP (ref 0.2–1.2)
BUN SERPL-MCNC: 14 MG/DL — SIGNIFICANT CHANGE UP (ref 7–23)
CALCIUM SERPL-MCNC: 8.8 MG/DL — SIGNIFICANT CHANGE UP (ref 8.4–10.5)
CHLORIDE SERPL-SCNC: 105 MMOL/L — SIGNIFICANT CHANGE UP (ref 96–108)
CO2 SERPL-SCNC: 26 MMOL/L — SIGNIFICANT CHANGE UP (ref 22–31)
CREAT SERPL-MCNC: 0.77 MG/DL — SIGNIFICANT CHANGE UP (ref 0.5–1.3)
GAS PNL BLDA: SIGNIFICANT CHANGE UP
GLUCOSE SERPL-MCNC: 122 MG/DL — HIGH (ref 70–99)
HCT VFR BLD CALC: 31.5 % — LOW (ref 39–50)
HGB BLD-MCNC: 10.2 G/DL — LOW (ref 13–17)
INR BLD: 1.07 — SIGNIFICANT CHANGE UP (ref 0.88–1.16)
LACTATE SERPL-SCNC: 0.9 MMOL/L — SIGNIFICANT CHANGE UP (ref 0.5–2)
MAGNESIUM SERPL-MCNC: 1.8 MG/DL — SIGNIFICANT CHANGE UP (ref 1.6–2.6)
MCHC RBC-ENTMCNC: 29.8 PG — SIGNIFICANT CHANGE UP (ref 27–34)
MCHC RBC-ENTMCNC: 32.4 GM/DL — SIGNIFICANT CHANGE UP (ref 32–36)
MCV RBC AUTO: 92.1 FL — SIGNIFICANT CHANGE UP (ref 80–100)
NRBC # BLD: 0 /100 WBCS — SIGNIFICANT CHANGE UP (ref 0–0)
PHOSPHATE SERPL-MCNC: 2.9 MG/DL — SIGNIFICANT CHANGE UP (ref 2.5–4.5)
PLATELET # BLD AUTO: 276 K/UL — SIGNIFICANT CHANGE UP (ref 150–400)
POTASSIUM SERPL-MCNC: 3.9 MMOL/L — SIGNIFICANT CHANGE UP (ref 3.5–5.3)
POTASSIUM SERPL-SCNC: 3.9 MMOL/L — SIGNIFICANT CHANGE UP (ref 3.5–5.3)
PROT SERPL-MCNC: 6.4 G/DL — SIGNIFICANT CHANGE UP (ref 6–8.3)
PROTHROM AB SERPL-ACNC: 12.1 SEC — SIGNIFICANT CHANGE UP (ref 10–12.9)
RBC # BLD: 3.42 M/UL — LOW (ref 4.2–5.8)
RBC # FLD: 13 % — SIGNIFICANT CHANGE UP (ref 10.3–14.5)
SODIUM SERPL-SCNC: 141 MMOL/L — SIGNIFICANT CHANGE UP (ref 135–145)
WBC # BLD: 10.04 K/UL — SIGNIFICANT CHANGE UP (ref 3.8–10.5)
WBC # FLD AUTO: 10.04 K/UL — SIGNIFICANT CHANGE UP (ref 3.8–10.5)

## 2019-11-26 PROCEDURE — 71045 X-RAY EXAM CHEST 1 VIEW: CPT | Mod: 26

## 2019-11-26 RX ORDER — ACETAMINOPHEN 500 MG
1000 TABLET ORAL ONCE
Refills: 0 | Status: COMPLETED | OUTPATIENT
Start: 2019-11-26 | End: 2019-11-26

## 2019-11-26 RX ORDER — MAGNESIUM OXIDE 400 MG ORAL TABLET 241.3 MG
800 TABLET ORAL ONCE
Refills: 0 | Status: COMPLETED | OUTPATIENT
Start: 2019-11-26 | End: 2019-11-26

## 2019-11-26 RX ORDER — POTASSIUM CHLORIDE 20 MEQ
20 PACKET (EA) ORAL ONCE
Refills: 0 | Status: COMPLETED | OUTPATIENT
Start: 2019-11-26 | End: 2019-11-26

## 2019-11-26 RX ORDER — AMLODIPINE BESYLATE 2.5 MG/1
5 TABLET ORAL DAILY
Refills: 0 | Status: DISCONTINUED | OUTPATIENT
Start: 2019-11-26 | End: 2019-11-27

## 2019-11-26 RX ORDER — SODIUM CHLORIDE 9 MG/ML
3 INJECTION INTRAMUSCULAR; INTRAVENOUS; SUBCUTANEOUS EVERY 8 HOURS
Refills: 0 | Status: DISCONTINUED | OUTPATIENT
Start: 2019-11-26 | End: 2019-11-27

## 2019-11-26 RX ADMIN — TIOTROPIUM BROMIDE 1 CAPSULE(S): 18 CAPSULE ORAL; RESPIRATORY (INHALATION) at 12:29

## 2019-11-26 RX ADMIN — Medication 250 MILLIGRAM(S): at 22:58

## 2019-11-26 RX ADMIN — Medication 15 MILLIGRAM(S): at 07:03

## 2019-11-26 RX ADMIN — Medication 81 MILLIGRAM(S): at 11:38

## 2019-11-26 RX ADMIN — ATORVASTATIN CALCIUM 10 MILLIGRAM(S): 80 TABLET, FILM COATED ORAL at 22:47

## 2019-11-26 RX ADMIN — AMLODIPINE BESYLATE 5 MILLIGRAM(S): 2.5 TABLET ORAL at 07:04

## 2019-11-26 RX ADMIN — LIDOCAINE 1 PATCH: 4 CREAM TOPICAL at 17:39

## 2019-11-26 RX ADMIN — Medication 1000 MILLIGRAM(S): at 21:44

## 2019-11-26 RX ADMIN — LIDOCAINE 1 PATCH: 4 CREAM TOPICAL at 11:46

## 2019-11-26 RX ADMIN — Medication 15 MILLIGRAM(S): at 19:55

## 2019-11-26 RX ADMIN — Medication 150 MILLIGRAM(S): at 11:38

## 2019-11-26 RX ADMIN — Medication 10 MILLIGRAM(S): at 22:58

## 2019-11-26 RX ADMIN — CELECOXIB 200 MILLIGRAM(S): 200 CAPSULE ORAL at 19:55

## 2019-11-26 RX ADMIN — SODIUM CHLORIDE 3 MILLILITER(S): 9 INJECTION INTRAMUSCULAR; INTRAVENOUS; SUBCUTANEOUS at 21:44

## 2019-11-26 RX ADMIN — Medication 10 MILLIGRAM(S): at 07:04

## 2019-11-26 RX ADMIN — Medication 15 MILLIGRAM(S): at 07:33

## 2019-11-26 RX ADMIN — PANTOPRAZOLE SODIUM 40 MILLIGRAM(S): 20 TABLET, DELAYED RELEASE ORAL at 07:06

## 2019-11-26 RX ADMIN — Medication 250 MILLIGRAM(S): at 09:30

## 2019-11-26 RX ADMIN — Medication 400 MILLIGRAM(S): at 21:11

## 2019-11-26 RX ADMIN — MAGNESIUM OXIDE 400 MG ORAL TABLET 800 MILLIGRAM(S): 241.3 TABLET ORAL at 01:11

## 2019-11-26 RX ADMIN — Medication 3000 UNIT(S): at 11:39

## 2019-11-26 RX ADMIN — Medication 15 MILLIGRAM(S): at 14:43

## 2019-11-26 RX ADMIN — SODIUM CHLORIDE 3 MILLILITER(S): 9 INJECTION INTRAMUSCULAR; INTRAVENOUS; SUBCUTANEOUS at 14:44

## 2019-11-26 RX ADMIN — DULOXETINE HYDROCHLORIDE 60 MILLIGRAM(S): 30 CAPSULE, DELAYED RELEASE ORAL at 11:38

## 2019-11-26 RX ADMIN — Medication 2 SPRAY(S): at 07:04

## 2019-11-26 RX ADMIN — Medication 1 DROP(S): at 01:11

## 2019-11-26 RX ADMIN — Medication 10 MILLIGRAM(S): at 14:26

## 2019-11-26 RX ADMIN — Medication 20 MILLIEQUIVALENT(S): at 01:11

## 2019-11-26 RX ADMIN — Medication 1 DROP(S): at 17:39

## 2019-11-26 RX ADMIN — Medication 2 SPRAY(S): at 17:34

## 2019-11-26 RX ADMIN — Medication 1 APPLICATION(S): at 17:38

## 2019-11-26 RX ADMIN — LIDOCAINE 1 PATCH: 4 CREAM TOPICAL at 22:58

## 2019-11-26 RX ADMIN — Medication 1 APPLICATION(S): at 17:39

## 2019-11-26 RX ADMIN — CELECOXIB 200 MILLIGRAM(S): 200 CAPSULE ORAL at 11:38

## 2019-11-26 RX ADMIN — HEPARIN SODIUM 5000 UNIT(S): 5000 INJECTION INTRAVENOUS; SUBCUTANEOUS at 14:46

## 2019-11-26 NOTE — DIETITIAN INITIAL EVALUATION ADULT. - ADD RECOMMEND
1. Continue on DASH/TLC diet as tolerated 2. Diet reinforcement PRN. 3. Honor pts food preferences w/in dietary restrictions.

## 2019-11-26 NOTE — DIETITIAN INITIAL EVALUATION ADULT. - ENERGY NEEDS
Height: 5'8" Weight: 202lbs, IBW 154lbs+/-10%, %%, BMI 30.7  IBW used for calculations as pt >120% of IBW   Nutrient needs based on [facility] standards of care for maintenance in older adults.   Needs adjusted for age and post-op healing 130

## 2019-11-26 NOTE — PROGRESS NOTE ADULT - SUBJECTIVE AND OBJECTIVE BOX
Transferred from ICU to stepdown floor    Operation / Date: 11/25/19    SUBJECTIVE ASSESSMENT:  Feels well with no acute complaints.  Denies HA, dizziness, CP, SOB, palpitations, N/V/D/C, leg swelling or calf pain.      Vital Signs Last 24 Hrs  T(C): 36.3 (26 Nov 2019 05:01), Max: 37.1 (25 Nov 2019 21:13)  T(F): 97.4 (26 Nov 2019 05:01), Max: 98.8 (25 Nov 2019 21:13)  HR: 88 (26 Nov 2019 13:07) (62 - 88)  BP: 145/76 (26 Nov 2019 13:07) (120/67 - 145/76)  BP(mean): 90 (26 Nov 2019 13:07) (87 - 106)  RR: 129 (26 Nov 2019 13:07) (10 - 129)  SpO2: 98% (26 Nov 2019 10:13) (96% - 100%)  I&O's Detail    25 Nov 2019 07:01  -  26 Nov 2019 07:00  --------------------------------------------------------  IN:    lactated ringers.: 545 mL    Oral Fluid: 100 mL  Total IN: 645 mL    OUT:    Chest Tube: 120 mL    Indwelling Catheter - Urethral: 1105 mL    Voided: 475 mL  Total OUT: 1700 mL    Total NET: -1055 mL      CHEST TUBE:  No.   DANIEL DRAIN:  No.  EPICARDIAL WIRES: No.  TIE DOWNS: Yes  GLORIA: No.    PHYSICAL EXAM:    General: Lying in bed, comfortable, NAD    Neurological: A&O x 3 LI, no focal deficits    Cardiovascular: S1S2 RRR No M/G/R    Respiratory: CTA b/l No W/R/R    Gastrointestinal: soft, NT/ND    Extremities: No LE edema, no calf tenderness b/l    Vascular: warm and well perfused    Incision Sites: right VATs incisions healing well, no drainage, no erythema, dermabond intact.     LABS:                        10.2   10.04 )-----------( 276      ( 26 Nov 2019 02:57 )             31.5         PT/INR - ( 26 Nov 2019 02:57 )   PT: 12.1 sec;   INR: 1.07          PTT - ( 26 Nov 2019 02:57 )  PTT:31.1 sec    11-26    141  |  105  |  14  ----------------------------<  122<H>  3.9   |  26  |  0.77    Ca    8.8      26 Nov 2019 02:57  Phos  2.9     11-26  Mg     1.8     11-26    TPro  6.4  /  Alb  4.0  /  TBili  0.4  /  DBili  x   /  AST  18  /  ALT  12  /  AlkPhos  79  11-26      MEDICATIONS  (STANDING):  allopurinol 150 milliGRAM(s) Oral daily  amitriptyline 10 milliGRAM(s) Oral three times a day  amLODIPine   Tablet 5 milliGRAM(s) Oral daily  artificial  tears Solution 1 Drop(s) Both EYES four times a day  aspirin enteric coated 81 milliGRAM(s) Oral daily  atorvastatin 10 milliGRAM(s) Oral at bedtime  BUpivacaine liposome 1.3% Injectable (no eMAR) 20 milliLiter(s) Local Injection once  celecoxib 200 milliGRAM(s) Oral daily  cholecalciferol 3000 Unit(s) Oral daily  DULoxetine 60 milliGRAM(s) Oral daily  fluticasone propionate 50 MICROgram(s)/spray Nasal Spray 2 Spray(s) Both Nostrils two times a day  heparin  Injectable 5000 Unit(s) SubCutaneous every 8 hours  influenza   Vaccine 0.5 milliLiter(s) IntraMuscular once  ketorolac 0.5% Ophthalmic Solution 1 Drop(s) Right EYE every 6 hours  lidocaine   Patch 1 Patch Transdermal daily  pantoprazole    Tablet 40 milliGRAM(s) Oral before breakfast  sodium chloride 0.9% lock flush 3 milliLiter(s) IV Push every 8 hours  tiotropium 18 MICROgram(s) Capsule 1 Capsule(s) Inhalation daily  vancomycin  IVPB 1000 milliGRAM(s) IV Intermittent every 12 hours    MEDICATIONS  (PRN):  albuterol/ipratropium for Nebulization 3 milliLiter(s) Nebulizer every 4 hours PRN Shortness of Breath  artificial tears (preservative free) Ophthalmic Solution 1 Drop(s) Both EYES every 6 hours PRN eye discomfort  ketorolac   Injectable 15 milliGRAM(s) IV Push every 8 hours PRN Severe Pain (7 - 10)  petrolatum Ophthalmic Ointment 1 Application(s) Right EYE three times a day PRN Eye irritation    RADIOLOGY & ADDITIONAL TESTS:

## 2019-11-26 NOTE — DIETITIAN INITIAL EVALUATION ADULT. - OTHER INFO
73yo M Former smoker, w/ PMH of COPD/asthma, GERD, tracheobronchomalacia, chronic cough, and SOB/FRIED. Presents for elective surgical intervention. Now POD1 s/p bronchoscopy, R VATS, RA b/l bronchoplasty w/ prolene mesh. Pt seen in room, resting in chair, w/ wife in room. Currently on a DASH/TLC diet (adv. last night) and tolerating PO. Endorsing good appetite, consuming >75% of meals. Only complaint is that he is not getting what he is ordering- will communicate to  team. Pt is planning to have a tuna salad for lunch and the orange chicken for dinner. Denies N/V, has yet to have a post-op BM. Reports an intentional 18lb wt loss (8.18% wt change) x1.5 years. Attributes it to eating better and avoiding refined CHOs. Discussed purpose of current DASH/TLC diet order relative to heart health. NKFA or dietary restrictions. Skin: surgical incision; GI WDL per flowsheet. RD to follow for diet reinforcement.

## 2019-11-26 NOTE — PROGRESS NOTE ADULT - ASSESSMENT
74 year old Male, Former smoker, with PMHx of COPD/asthma, GERD, chronic cough, and tracheobronchomalacia POD 1 from a right VATs tracheobronchoplasty.  OR and ICU course uncomplicated, CT removed.        Neurovascular: No delirium. Pain well controlled with current regimen.  -con't toradol prn   -PO tylenol prn   -con't duloxetine, amitriptyline,     Cardiovascular: Hemodynamically stable. HR controlled.  -con't ASA, norvasc, and statin    Respiratory:   -If on oxygen wean to RA from for O2 Sat > 93%.  -Encourage IS and ambulation  -con't home nebulizers  -CXR in am     GI: Stable.  -PPX.  -PO Diet.  -bowel regimen    Renal / :  -Monitor renal function.  -Monitor I/O's.    Hematologic:  -HSQ for DVT ppx    ID:  -complete vanc for periop ppx    Disposition:  -home when medically ready, probably tomorrow

## 2019-11-27 ENCOUNTER — TRANSCRIPTION ENCOUNTER (OUTPATIENT)
Age: 74
End: 2019-11-27

## 2019-11-27 VITALS
DIASTOLIC BLOOD PRESSURE: 91 MMHG | RESPIRATION RATE: 12 BRPM | HEART RATE: 76 BPM | OXYGEN SATURATION: 98 % | SYSTOLIC BLOOD PRESSURE: 171 MMHG

## 2019-11-27 PROBLEM — J39.8 OTHER SPECIFIED DISEASES OF UPPER RESPIRATORY TRACT: Chronic | Status: ACTIVE | Noted: 2019-11-22

## 2019-11-27 PROBLEM — I10 ESSENTIAL (PRIMARY) HYPERTENSION: Chronic | Status: ACTIVE | Noted: 2019-11-25

## 2019-11-27 PROBLEM — E78.5 HYPERLIPIDEMIA, UNSPECIFIED: Chronic | Status: ACTIVE | Noted: 2019-11-25

## 2019-11-27 LAB
ANION GAP SERPL CALC-SCNC: 9 MMOL/L — SIGNIFICANT CHANGE UP (ref 5–17)
BUN SERPL-MCNC: 19 MG/DL — SIGNIFICANT CHANGE UP (ref 7–23)
CALCIUM SERPL-MCNC: 9 MG/DL — SIGNIFICANT CHANGE UP (ref 8.4–10.5)
CHLORIDE SERPL-SCNC: 105 MMOL/L — SIGNIFICANT CHANGE UP (ref 96–108)
CO2 SERPL-SCNC: 27 MMOL/L — SIGNIFICANT CHANGE UP (ref 22–31)
CREAT SERPL-MCNC: 0.84 MG/DL — SIGNIFICANT CHANGE UP (ref 0.5–1.3)
GLUCOSE SERPL-MCNC: 114 MG/DL — HIGH (ref 70–99)
HCT VFR BLD CALC: 31.3 % — LOW (ref 39–50)
HGB BLD-MCNC: 10.1 G/DL — LOW (ref 13–17)
MAGNESIUM SERPL-MCNC: 1.9 MG/DL — SIGNIFICANT CHANGE UP (ref 1.6–2.6)
MCHC RBC-ENTMCNC: 30.5 PG — SIGNIFICANT CHANGE UP (ref 27–34)
MCHC RBC-ENTMCNC: 32.3 GM/DL — SIGNIFICANT CHANGE UP (ref 32–36)
MCV RBC AUTO: 94.6 FL — SIGNIFICANT CHANGE UP (ref 80–100)
NRBC # BLD: 0 /100 WBCS — SIGNIFICANT CHANGE UP (ref 0–0)
PLATELET # BLD AUTO: 254 K/UL — SIGNIFICANT CHANGE UP (ref 150–400)
POTASSIUM SERPL-MCNC: 4.2 MMOL/L — SIGNIFICANT CHANGE UP (ref 3.5–5.3)
POTASSIUM SERPL-SCNC: 4.2 MMOL/L — SIGNIFICANT CHANGE UP (ref 3.5–5.3)
RBC # BLD: 3.31 M/UL — LOW (ref 4.2–5.8)
RBC # FLD: 13.1 % — SIGNIFICANT CHANGE UP (ref 10.3–14.5)
SODIUM SERPL-SCNC: 141 MMOL/L — SIGNIFICANT CHANGE UP (ref 135–145)
WBC # BLD: 6.48 K/UL — SIGNIFICANT CHANGE UP (ref 3.8–10.5)
WBC # FLD AUTO: 6.48 K/UL — SIGNIFICANT CHANGE UP (ref 3.8–10.5)

## 2019-11-27 PROCEDURE — 71045 X-RAY EXAM CHEST 1 VIEW: CPT | Mod: 26

## 2019-11-27 RX ORDER — FAMOTIDINE 10 MG/ML
1 INJECTION INTRAVENOUS
Qty: 30 | Refills: 0
Start: 2019-11-27 | End: 2019-12-26

## 2019-11-27 RX ORDER — AMITRIPTYLINE HCL 25 MG
1 TABLET ORAL
Qty: 0 | Refills: 0 | DISCHARGE

## 2019-11-27 RX ORDER — IPRATROPIUM/ALBUTEROL SULFATE 18-103MCG
3 AEROSOL WITH ADAPTER (GRAM) INHALATION
Qty: 0 | Refills: 0 | DISCHARGE

## 2019-11-27 RX ORDER — ALLOPURINOL 300 MG
0.5 TABLET ORAL
Qty: 15 | Refills: 0
Start: 2019-11-27 | End: 2019-12-26

## 2019-11-27 RX ORDER — ALLOPURINOL 300 MG
0.5 TABLET ORAL
Qty: 0 | Refills: 0 | DISCHARGE

## 2019-11-27 RX ORDER — IPRATROPIUM/ALBUTEROL SULFATE 18-103MCG
3 AEROSOL WITH ADAPTER (GRAM) INHALATION
Qty: 90 | Refills: 0
Start: 2019-11-27 | End: 2019-12-26

## 2019-11-27 RX ORDER — FLUTICASONE FUROATE AND VILANTEROL TRIFENATATE 100; 25 UG/1; UG/1
1 POWDER RESPIRATORY (INHALATION)
Qty: 1 | Refills: 0
Start: 2019-11-27 | End: 2019-12-26

## 2019-11-27 RX ORDER — BECLOMETHASONE DIPROPIONATE 40 UG/1
1 AEROSOL, METERED RESPIRATORY (INHALATION)
Qty: 1 | Refills: 0
Start: 2019-11-27 | End: 2019-12-26

## 2019-11-27 RX ORDER — CELECOXIB 200 MG/1
1 CAPSULE ORAL
Qty: 0 | Refills: 0 | DISCHARGE

## 2019-11-27 RX ORDER — ACETAMINOPHEN 500 MG
650 TABLET ORAL EVERY 6 HOURS
Refills: 0 | Status: DISCONTINUED | OUTPATIENT
Start: 2019-11-27 | End: 2019-11-27

## 2019-11-27 RX ORDER — AMITRIPTYLINE HCL 25 MG
1 TABLET ORAL
Qty: 90 | Refills: 0
Start: 2019-11-27 | End: 2019-12-26

## 2019-11-27 RX ORDER — TIOTROPIUM BROMIDE 18 UG/1
2 CAPSULE ORAL; RESPIRATORY (INHALATION)
Qty: 1 | Refills: 0
Start: 2019-11-27 | End: 2019-12-26

## 2019-11-27 RX ORDER — ACETAMINOPHEN 500 MG
2 TABLET ORAL
Qty: 240 | Refills: 0
Start: 2019-11-27 | End: 2019-12-26

## 2019-11-27 RX ORDER — ALFUZOSIN HYDROCHLORIDE 10 MG/1
1 TABLET, EXTENDED RELEASE ORAL
Qty: 0 | Refills: 0 | DISCHARGE

## 2019-11-27 RX ORDER — MONTELUKAST 4 MG/1
1 TABLET, CHEWABLE ORAL
Qty: 0 | Refills: 0 | DISCHARGE

## 2019-11-27 RX ORDER — FAMOTIDINE 10 MG/ML
1 INJECTION INTRAVENOUS
Qty: 0 | Refills: 0 | DISCHARGE

## 2019-11-27 RX ORDER — TIOTROPIUM BROMIDE 18 UG/1
2 CAPSULE ORAL; RESPIRATORY (INHALATION)
Qty: 0 | Refills: 0 | DISCHARGE

## 2019-11-27 RX ORDER — AMLODIPINE BESYLATE 2.5 MG/1
1 TABLET ORAL
Qty: 30 | Refills: 0
Start: 2019-11-27 | End: 2019-12-26

## 2019-11-27 RX ORDER — KETOROLAC TROMETHAMINE 30 MG/ML
1 SYRINGE (ML) INJECTION
Qty: 56 | Refills: 0
Start: 2019-11-27 | End: 2019-12-10

## 2019-11-27 RX ORDER — ALFUZOSIN HYDROCHLORIDE 10 MG/1
1 TABLET, EXTENDED RELEASE ORAL
Qty: 30 | Refills: 0
Start: 2019-11-27 | End: 2019-12-26

## 2019-11-27 RX ORDER — ESOMEPRAZOLE MAGNESIUM 40 MG/1
1 CAPSULE, DELAYED RELEASE ORAL
Qty: 0 | Refills: 0 | DISCHARGE

## 2019-11-27 RX ORDER — KETOROLAC TROMETHAMINE 30 MG/ML
10 SYRINGE (ML) INJECTION EVERY 6 HOURS
Refills: 0 | Status: DISCONTINUED | OUTPATIENT
Start: 2019-11-27 | End: 2019-11-27

## 2019-11-27 RX ORDER — ATORVASTATIN CALCIUM 80 MG/1
1 TABLET, FILM COATED ORAL
Qty: 30 | Refills: 0
Start: 2019-11-27 | End: 2019-12-26

## 2019-11-27 RX ORDER — BECLOMETHASONE DIPROPIONATE 40 UG/1
1 AEROSOL, METERED RESPIRATORY (INHALATION)
Qty: 0 | Refills: 0 | DISCHARGE

## 2019-11-27 RX ORDER — ESOMEPRAZOLE MAGNESIUM 40 MG/1
1 CAPSULE, DELAYED RELEASE ORAL
Qty: 30 | Refills: 0
Start: 2019-11-27 | End: 2019-12-26

## 2019-11-27 RX ORDER — IPRATROPIUM/ALBUTEROL SULFATE 18-103MCG
3 AEROSOL WITH ADAPTER (GRAM) INHALATION
Qty: 10 | Refills: 0
Start: 2019-11-27 | End: 2019-12-26

## 2019-11-27 RX ORDER — IPRATROPIUM/ALBUTEROL SULFATE 18-103MCG
3 AEROSOL WITH ADAPTER (GRAM) INHALATION
Qty: 1 | Refills: 0
Start: 2019-11-27 | End: 2019-12-26

## 2019-11-27 RX ORDER — MONTELUKAST 4 MG/1
1 TABLET, CHEWABLE ORAL
Qty: 30 | Refills: 0
Start: 2019-11-27 | End: 2019-12-26

## 2019-11-27 RX ORDER — AMLODIPINE BESYLATE 2.5 MG/1
1 TABLET ORAL
Qty: 0 | Refills: 0 | DISCHARGE

## 2019-11-27 RX ORDER — ATORVASTATIN CALCIUM 80 MG/1
1 TABLET, FILM COATED ORAL
Qty: 0 | Refills: 0 | DISCHARGE

## 2019-11-27 RX ORDER — ASPIRIN/CALCIUM CARB/MAGNESIUM 324 MG
1 TABLET ORAL
Qty: 30 | Refills: 0
Start: 2019-11-27 | End: 2019-12-26

## 2019-11-27 RX ORDER — MAGNESIUM OXIDE 400 MG ORAL TABLET 241.3 MG
800 TABLET ORAL ONCE
Refills: 0 | Status: COMPLETED | OUTPATIENT
Start: 2019-11-27 | End: 2019-11-27

## 2019-11-27 RX ORDER — DULOXETINE HYDROCHLORIDE 30 MG/1
1 CAPSULE, DELAYED RELEASE ORAL
Qty: 30 | Refills: 0
Start: 2019-11-27 | End: 2019-12-26

## 2019-11-27 RX ADMIN — Medication 1 APPLICATION(S): at 11:14

## 2019-11-27 RX ADMIN — LIDOCAINE 1 PATCH: 4 CREAM TOPICAL at 18:42

## 2019-11-27 RX ADMIN — TIOTROPIUM BROMIDE 1 CAPSULE(S): 18 CAPSULE ORAL; RESPIRATORY (INHALATION) at 11:10

## 2019-11-27 RX ADMIN — Medication 650 MILLIGRAM(S): at 07:58

## 2019-11-27 RX ADMIN — SODIUM CHLORIDE 3 MILLILITER(S): 9 INJECTION INTRAMUSCULAR; INTRAVENOUS; SUBCUTANEOUS at 05:29

## 2019-11-27 RX ADMIN — Medication 10 MILLIGRAM(S): at 06:42

## 2019-11-27 RX ADMIN — MAGNESIUM OXIDE 400 MG ORAL TABLET 800 MILLIGRAM(S): 241.3 TABLET ORAL at 11:56

## 2019-11-27 RX ADMIN — Medication 1 APPLICATION(S): at 11:10

## 2019-11-27 RX ADMIN — Medication 10 MILLIGRAM(S): at 11:00

## 2019-11-27 RX ADMIN — Medication 10 MILLIGRAM(S): at 10:26

## 2019-11-27 RX ADMIN — Medication 1 DROP(S): at 11:14

## 2019-11-27 RX ADMIN — Medication 10 MILLIGRAM(S): at 16:24

## 2019-11-27 RX ADMIN — Medication 1 APPLICATION(S): at 11:13

## 2019-11-27 RX ADMIN — Medication 10 MILLIGRAM(S): at 14:21

## 2019-11-27 RX ADMIN — DULOXETINE HYDROCHLORIDE 60 MILLIGRAM(S): 30 CAPSULE, DELAYED RELEASE ORAL at 11:11

## 2019-11-27 RX ADMIN — Medication 150 MILLIGRAM(S): at 11:11

## 2019-11-27 RX ADMIN — Medication 2 SPRAY(S): at 06:42

## 2019-11-27 RX ADMIN — Medication 10 MILLIGRAM(S): at 19:27

## 2019-11-27 RX ADMIN — Medication 3000 UNIT(S): at 11:11

## 2019-11-27 RX ADMIN — SODIUM CHLORIDE 3 MILLILITER(S): 9 INJECTION INTRAMUSCULAR; INTRAVENOUS; SUBCUTANEOUS at 13:50

## 2019-11-27 RX ADMIN — AMLODIPINE BESYLATE 5 MILLIGRAM(S): 2.5 TABLET ORAL at 06:42

## 2019-11-27 RX ADMIN — LIDOCAINE 1 PATCH: 4 CREAM TOPICAL at 11:11

## 2019-11-27 RX ADMIN — Medication 81 MILLIGRAM(S): at 11:11

## 2019-11-27 RX ADMIN — PANTOPRAZOLE SODIUM 40 MILLIGRAM(S): 20 TABLET, DELAYED RELEASE ORAL at 06:42

## 2019-11-27 NOTE — DISCHARGE NOTE PROVIDER - NSDCFUADDAPPT_GEN_ALL_CORE_FT
- Please follow up with Dr. Jones on 12/12/19 at 2:15pm. The office is located at [facility], [facility], 4th floor. Call us with any questions #[phone].    -Please follow up outpatient with an orthopedist if you are still have pain in your right knee/foot and left ankle. - Please follow up with Dr. Jones on 12/12/19 at 2:15pm. The office is located at [facility], [facility], 4th floor. Call us with any questions #[phone].    -Please follow up with your ophthlmologist Dr. Lauren within 1-2 weeks. The office is located at [address] [place] [address]. Tel: [phone]    - Please follow up with an orthopedics if you are still have pain in your right knee/foot and left ankle.

## 2019-11-27 NOTE — DISCHARGE NOTE PROVIDER - NSDCFUSCHEDAPPT_GEN_ALL_CORE_FT
CHIDI RUVALCABA ; 12/05/2019 ; [facility] [address] [facility]  CHIDI RUVALCABA ; 12/12/2019 ; [facility] [address] [place] [address]  CHIDI RUVALCABA ; 12/20/2019 ; [facility] CHIDI Valdes ; 01/02/2020 ; [facility] [address] [facility] CHIDI RUVALCABA ; 12/05/2019 ; [facility] [address] [facility]  CHIDI RUVALCABA ; 12/12/2019 ; [facility] [address] [place] [address]  CHIDI RUVALCABA ; 12/20/2019 ; [facility] CHIDI Valdes ; 01/02/2020 ; [facility] [address] [facility] CHIDI RUVALCABA ; 12/05/2019 ; [facility] [address] [facility]  CHIDI RUVALCABA ; 12/12/2019 ; [facility] [address] [place] [address]  CHIDI RUVALCABA ; 12/20/2019 ; [facility]  [facility] CHIDI Valdes ; 01/02/2020 ; [facility] [address] [facility]

## 2019-11-27 NOTE — DISCHARGE NOTE PROVIDER - CARE PROVIDER_API CALL
Everardo Jones (MD)  Surgery; Thoracic Surgery  [address], 4th Floor  [place], [place] [address]  Phone: [phone]  Fax: [phone]  Follow Up Time:

## 2019-11-27 NOTE — PHYSICAL THERAPY INITIAL EVALUATION ADULT - PERTINENT HX OF CURRENT PROBLEM, REHAB EVAL
74 year old male presented for an elective Bronchoscopy, R VATS, Robotic-assisted, bilateral bronchoplasty with prolene mesh.

## 2019-11-27 NOTE — PROGRESS NOTE ADULT - REASON FOR ADMISSION
Bronchoscopy, R VATS, Robotic-assisted, bilateral bronchoplasty with prolene mesh.

## 2019-11-27 NOTE — DISCHARGE NOTE PROVIDER - HOSPITAL COURSE
This is a 75 y/o M, former smoker, with a PMHx of COPD/asthma, GERD, tracheobronchomalacia, chronic cough, SOB/FRIED, and s/p mechanical fall (11/24/19 - xray films of left ankle, right knee and right foot all negative for fracture) . In the past he has had multiple bronchoscopies with the most recent one on 4/26/19 . He follows out patient with Dr. Clay Lance who referred him to Dr. Jones for surgical evaluation. Pt was deemed a good surgical candidate and on 11/26/19 pt underwent a bronchoscopy, R VATS, robotic-assisted, bilateral bronchoplasty with prolene mesh. OR course was uncomplicated. He was transferred to the CTICU extubated and had 1 chest tube in place. He complained of eye pain in the afternoon and optho consulted and he was started on artifical tears which resolved the issue. On POD#1 the CT was removed and he was transferred to . On POD#2 the patient didn't want to ambulate due to pain in his left ankle and right knee from a fall he sustained pre-operatively and orthopedics. Ortho recommend outpatient follow up and physical therapy consult. Pt complains of pain after Tylenol and he was started on PO Toradol (per Dr. Jones). Per Dr. Jones, the pt is medically ready to be discharged home.         Over 35 minutes was spent with the patient reviewing the discharge material including medications, follow up appointments, recovery, concerning symptoms, and how to contact their health care providers if they have questions This is a 73 y/o M, former smoker, with a PMHx of COPD/asthma, GERD, tracheobronchomalacia, chronic cough, SOB/FRIED, and s/p mechanical fall (11/24/19 - xray films of left ankle, right knee and right foot all negative for fracture) . In the past he has had multiple bronchoscopies with the most recent one on 4/26/19 . He follows out patient with Dr. Clay Lance who referred him to Dr. Jones for surgical evaluation. Pt was deemed a good surgical candidate and on 11/26/19 pt underwent a bronchoscopy, R VATS, robotic-assisted, bilateral bronchoplasty with prolene mesh. OR course was uncomplicated. He was transferred to the CTICU extubated and had 1 chest tube in place. He complained of eye pain in the afternoon and optho consulted and he was started on artifical tears which resolved the issue. On POD#1 the CT was removed and he was transferred to . On POD#2 pt complaining of knee and ankle pain, Ortho consulted recommending PT, PT consulted, patient able to ambulate without difficulty and was cleared for discharge home with no PT needs. Per Dr. Jones, the pt is medically ready to be discharged home on PO toradol and tylenol on POD#2         Over 35 minutes was spent with the patient reviewing the discharge material including medications, follow up appointments, recovery, concerning symptoms, and how to contact their health care providers if they have questions

## 2019-11-27 NOTE — DISCHARGE NOTE PROVIDER - NSDCMRMEDTOKEN_GEN_ALL_CORE_FT
albuterol-ipratropium 2.5 mg-0.5 mg/3 mL inhalation solution: 3 milliliter(s) inhaled every 4-6 hours, As Needed  alfuzosin 10 mg oral tablet, extended release: 1 tab(s) orally once a day  allopurinol 300 mg oral tablet: 0.5 tab(s) orally once a day  amitriptyline 10 mg oral tablet: 1 tab(s) orally 3 times a day, As Needed  amLODIPine 10 mg oral tablet: 1 tab(s) orally once a day  aspirin 81 mg oral tablet: 1 tab(s) orally once a day  Breo Ellipta 200 mcg-25 mcg/inh inhalation powder: 1 puff(s) inhaled once a day  CeleBREX 200 mg oral capsule: 1 cap(s) orally once a day  duloxetine 60 mg oral delayed release capsule: 1 cap(s) orally once a day  ipratropium 42 mcg/inh (0.06%) nasal spray: 2 spray(s) nasal 2-3 times a day, As Needed  Lipitor 10 mg oral tablet: 1 tab(s) orally once a day  montelukast 10 mg oral tablet: 1 tab(s) orally once a day  Nasonex 50 mcg/inh nasal spray: 2 spray(s) nasal 2 times a day  NexIUM 40 mg oral delayed release capsule: 1 cap(s) orally once a day  olopatadine 665 mcg/inh nasal spray: 2 spray(s) nasal 2 times a day  Pepcid 40 mg oral tablet: 1 tab(s) orally once a day (at bedtime)  Qvar Redihaler 80 mcg/inh inhalation aerosol: 1 puff(s) inhaled 2 times a day  Spiriva Respimat 2.5 mcg/inh inhalation aerosol: 2 puff(s) inhaled once a day  tiZANidine 4 mg oral tablet: 1 tab(s) orally once a day, As Needed  Vitamin D3: 3000 international unit(s) orally once a day acetaminophen 325 mg oral tablet: 2 tab(s) orally every 6 hours, As needed, Mild Pain (1 - 3)  alfuzosin 10 mg oral tablet, extended release: 1 tab(s) orally once a day  allopurinol 300 mg oral tablet: 0.5 tab(s) orally once a day  amitriptyline 10 mg oral tablet: 1 tab(s) orally 3 times a day, As Needed  amLODIPine 5 mg oral tablet: 1 tab(s) orally once a day  aspirin 81 mg oral tablet: 1 tab(s) orally once a day  Breo Ellipta 200 mcg-25 mcg/inh inhalation powder: 1 puff(s) inhaled once a day  duloxetine 60 mg oral delayed release capsule: 1 cap(s) orally once a day  ipratropium 42 mcg/inh (0.06%) nasal spray: 2 spray(s) nasal 2-3 times a day, As Needed  ipratropium-albuterol 0.5 mg-2.5 mg/3 mLinhalation solution: 3 milliliter(s) inhaled every 4 hours, As needed, Shortness of Breath  ketorolac 10 mg oral tablet: 1 tab(s) orally every 6 hours, As needed, Severe Pain (7 - 10) MDD:4  Lacri-Lube S.O.P. ophthalmic ointment: 1 application in each eye once a day   Lipitor 10 mg oral tablet: 1 tab(s) orally once a day  montelukast 10 mg oral tablet: 1 tab(s) orally once a day  Nasonex 50 mcg/inh nasal spray: 2 spray(s) nasal 2 times a day  NexIUM 40 mg oral delayed release capsule: 1 cap(s) orally once a day  ocular lubricant ophthalmic solution: 1 drop(s) to each affected eye every 6 hours, As needed, eye discomfort  olopatadine 665 mcg/inh nasal spray: 2 spray(s) nasal 2 times a day  Pepcid 40 mg oral tablet: 1 tab(s) orally once a day (at bedtime)  Qvar Redihaler 80 mcg/inh inhalation aerosol: 1 puff(s) inhaled 2 times a day  tiotropium 2.5 mcg/inh inhalation aerosol: 2 puff(s) inhaled once a day   tiZANidine 4 mg oral tablet: 1 tab(s) orally once a day, As Needed  Vitamin D3: 3000 international unit(s) orally once a day acetaminophen 325 mg oral tablet: 2 tab(s) orally every 6 hours, As needed, Mild Pain (1 - 3)  alfuzosin 10 mg oral tablet, extended release: 1 tab(s) orally once a day  allopurinol 300 mg oral tablet: 0.5 tab(s) orally once a day  amitriptyline 10 mg oral tablet: 1 tab(s) orally 3 times a day, As Needed  amLODIPine 5 mg oral tablet: 1 tab(s) orally once a day  aspirin 81 mg oral tablet: 1 tab(s) orally once a day  Breo Ellipta 200 mcg-25 mcg/inh inhalation powder: 1 puff(s) inhaled once a day  duloxetine 60 mg oral delayed release capsule: 1 cap(s) orally once a day  ipratropium 42 mcg/inh (0.06%) nasal spray: 2 spray(s) nasal 2-3 times a day, As Needed  ipratropium-albuterol 0.5 mg-2.5 mg/3 mLinhalation solution: 3 milliliter(s) inhaled every 4 hours, As Needed -Shortness of Breath   ketorolac 10 mg oral tablet: 1 tab(s) orally every 6 hours, As needed, Severe Pain (7 - 10) MDD:4  Lacri-Lube S.O.P. ophthalmic ointment: 1 application in each eye once a day   Lipitor 10 mg oral tablet: 1 tab(s) orally once a day  montelukast 10 mg oral tablet: 1 tab(s) orally once a day  Nasonex 50 mcg/inh nasal spray: 2 spray(s) nasal 2 times a day  NexIUM 40 mg oral delayed release capsule: 1 cap(s) orally once a day  ocular lubricant ophthalmic solution: 1 drop(s) to each affected eye every 6 hours, As needed, eye discomfort  olopatadine 665 mcg/inh nasal spray: 2 spray(s) nasal 2 times a day  Pepcid 40 mg oral tablet: 1 tab(s) orally once a day (at bedtime)  Qvar Redihaler 80 mcg/inh inhalation aerosol: 1 puff(s) inhaled 2 times a day  tiotropium 2.5 mcg/inh inhalation aerosol: 2 puff(s) inhaled once a day   tiZANidine 4 mg oral tablet: 1 tab(s) orally once a day, As Needed  Vitamin D3: 3000 international unit(s) orally once a day

## 2019-11-27 NOTE — DISCHARGE NOTE PROVIDER - NSDCFUADDINST_GEN_ALL_CORE_FT
-Walk daily as tolerated and use your incentive spirometer every hour.    -No driving or strenuous activity/exercise for 6 weeks, or until cleared by your surgeon.    -Gently clean your incisions with anti-bacterial soap and water, pat dry.  You may leave them open to air.    -Call your doctor if you have shortness of breath, chest pain not relieved by pain medication, dizziness, fever >101.5, or increased redness or drainage from incisions. -You have a stitch at your previous chest tube site on your right chest please keep that dressing clean dry and intact until you are seen in the office. You will have that removed when you return to Dr. Jones's office. You may shower with the dressing in place but please try to keep it clean and dry as best as possible.     -Walk daily as tolerated and use your incentive spirometer every hour.    -No driving or strenuous activity/exercise until cleared by your surgeon.    -Gently clean your incisions with anti-bacterial soap and water, pat dry.  You may leave them open to air.    -Call your doctor if you have shortness of breath, chest pain not relieved by pain medication, dizziness, fever >101.5, or increased redness or drainage from incisions.

## 2019-11-27 NOTE — DISCHARGE NOTE NURSING/CASE MANAGEMENT/SOCIAL WORK - PATIENT PORTAL LINK FT
You can access the FollowMyHealth Patient Portal offered by [facility] by registering at the following website: [email or web address]. By joining [facility] FollowMyHealth portal, you will also be able to view your health information using other applications (apps) compatible with our system.

## 2019-11-27 NOTE — DISCHARGE NOTE PROVIDER - NSDCACTIVITY_GEN_ALL_CORE
Walking - Outdoors allowed/Showering allowed/Stairs allowed/No heavy lifting/straining/Walking - Indoors allowed

## 2019-11-27 NOTE — PROGRESS NOTE ADULT - SUBJECTIVE AND OBJECTIVE BOX
Patient discussed on morning rounds with       Operation / Date:     Surgeon:    Referring Physician:    SUBJECTIVE ASSESSMENT AND HOSPITAL COURSE:  74y Male       Vital Signs Last 24 Hrs  T(C): 36.4 (27 Nov 2019 09:00), Max: 36.8 (26 Nov 2019 14:23)  T(F): 97.5 (27 Nov 2019 09:00), Max: 98.3 (27 Nov 2019 00:02)  HR: 70 (27 Nov 2019 09:00) (64 - 88)  BP: 150/71 (27 Nov 2019 09:00) (107/59 - 150/71)  BP(mean): 110 (27 Nov 2019 09:00) (75 - 110)  RR: 12 (27 Nov 2019 09:00) (12 - 129)  SpO2: 98% (27 Nov 2019 09:00) (95% - 98%)    EPICARDIAL WIRES REMOVED: Yes/No.  TIE DOWNS REMOVED: Yes/No.    PHYSICAL EXAM:    General:     Neurological:    Cardiovascular:    Respiratory:    Gastrointestinal:    Extremities:    Vascular:    Incision Sites:    LABS:                        10.1   6.48  )-----------( 254      ( 27 Nov 2019 07:31 )             31.3       COUMADIN:  Yes/No.        DOSE:                  INDICATION:                GOAL INR:    PT/INR - ( 26 Nov 2019 02:57 )   PT: 12.1 sec;   INR: 1.07          PTT - ( 26 Nov 2019 02:57 )  PTT:31.1 sec    11-27    141  |  105  |  19  ----------------------------<  114<H>  4.2   |  27  |  0.84    Ca    9.0      27 Nov 2019 07:31  Phos  2.9     11-26  Mg     1.9     11-27    TPro  6.4  /  Alb  4.0  /  TBili  0.4  /  DBili  x   /  AST  18  /  ALT  12  /  AlkPhos  79  11-26          Discharge CXR:    Discharge ECHO: Patient discussed on morning rounds with Dr. Jones      Operation / Date: 11/26/19    Surgeon: Dr. Jones     Referring Physician: Dr. Clay Lance     HOSPITAL COURSE:  This is a 73 y/o M, former smoker, with a PMHx of COPD/asthma, GERD, tracheobronchomalacia, chronic cough, SOB/FRIED, and s/p mechanical fall (11/24/19 - xray films of left ankle, right knee and right foot all negative for fracture) . In the past he has had multiple bronchoscopies with the most recent one on 4/26/19 . He follows out patient with Dr. Clay Lance who referred him to Dr. Jones for surgical evaluation. Pt was deemed a good surgical candidate and on 11/26/19 pt underwent a bronchoscopy, R VATS, robotic-assisted, bilateral bronchoplasty with prolene mesh. OR course was uncomplicated. He was transferred to the CTICU extubated and had 1 chest tube in place. He complained of eye pain in the afternoon and optho consulted and he was started on artifical tears which resolved the issue. On POD#1 the CT was removed and he was transferred to . On POD#2 the patient didn't want to ambulate due to pain in his left ankle and right knee from a fall he sustained pre-operatively and orthopedics. Ortho recommend outpatient follow up and physical therapy consult. Pt complains of pain after Tylenol and he was started on PO Toradol (per Dr. Jones). Per Dr. Jones, the pt is medically ready to be discharged home.    SUBJECTIVE ASSESSMENT:  Pt is feeling well this morning and only complains of pain in his left ankle and right still. Otherwise he is feeling well since the procedure and denies any CP, palpitations, SOB, abdominal pain, N/V/D/C, fevers or chills at this time.     Vital Signs Last 24 Hrs  T(C): 36.4 (27 Nov 2019 09:00), Max: 36.8 (26 Nov 2019 14:23)  T(F): 97.5 (27 Nov 2019 09:00), Max: 98.3 (27 Nov 2019 00:02)  HR: 70 (27 Nov 2019 09:00) (64 - 88)  BP: 150/71 (27 Nov 2019 09:00) (107/59 - 150/71)  BP(mean): 110 (27 Nov 2019 09:00) (75 - 110)  RR: 12 (27 Nov 2019 09:00) (12 - 129)  SpO2: 98% (27 Nov 2019 09:00) (95% - 98%)      PHYSICAL EXAM:  General: well appearing found resting in bed comfortably this AM in NAD   Neurological: AOx3. Motor skills grossly intact  Cardiovascular: Normal S1/S2. RRR. No M/R/G.  Respiratory: Lungs CTA b/l. No W/R/R.  Gastrointestinal: +BS 4 quadrants Soft. NT. ND.  Extremities: Strength 5/5 b/l upper/lower extremities. Sensation intact upper/lower extremities b/l. No edema. No calf tenderness.  Vascular: Radial 2+ b/l. DP 2+ b/l.   Incision Sites: N/a       LABS:                        10.1   6.48  )-----------( 254      ( 27 Nov 2019 07:31 )             31.3       COUMADIN:  no    PT/INR - ( 26 Nov 2019 02:57 )   PT: 12.1 sec;   INR: 1.07          PTT - ( 26 Nov 2019 02:57 )  PTT:31.1 sec    11-27    141  |  105  |  19  ----------------------------<  114<H>  4.2   |  27  |  0.84    Ca    9.0      27 Nov 2019 07:31  Phos  2.9     11-26  Mg     1.9     11-27    TPro  6.4  /  Alb  4.0  /  TBili  0.4  /  DBili  x   /  AST  18  /  ALT  12  /  AlkPhos  79  11-26          Discharge CXR:  < from: Xray Chest 1 View-PORTABLE IMMEDIATE (11.26.19 @ 06:46) >  FINDINGS: There is interval removal of right-sided chest tube. No   pneumothorax. Stable lung findings. Heart size within normal limits.   Redemonstrated right subcutaneous emphysema.    < end of copied text > Patient discussed on morning rounds with Dr. Jones      Operation / Date: 11/26/19    Surgeon: Dr. Jones     Referring Physician: Dr. Clay Lance     HOSPITAL COURSE:  This is a 73 y/o M, former smoker, with a PMHx of COPD/asthma, GERD, tracheobronchomalacia, chronic cough, SOB/FRIED, and s/p mechanical fall (11/24/19 - xray films of left ankle, right knee and right foot all negative for fracture) . In the past he has had multiple bronchoscopies with the most recent one on 4/26/19 . He follows out patient with Dr. Clay Lance who referred him to Dr. Jones for surgical evaluation. Pt was deemed a good surgical candidate and on 11/26/19 pt underwent a bronchoscopy, R VATS, robotic-assisted, bilateral bronchoplasty with prolene mesh. OR course was uncomplicated. He was transferred to the CTICU extubated and had 1 chest tube in place. He complained of eye pain in the afternoon and optho consulted and he was started on artifical tears which resolved the issue. On POD#1 the CT was removed and he was transferred to . On POD#2 pt complaining of knee and ankle pain, Ortho consulted recommending PT, PT consulted, patient able to ambulate without difficulty and was cleared for discharge home with no PT needs. Per Dr. Jones, the pt is medically ready to be discharged home on PO toradol and tylenol on POD#2     SUBJECTIVE ASSESSMENT:  Pt is feeling well this morning and only complains of pain in his left ankle and right still. Otherwise he is feeling well since the procedure and denies any CP, palpitations, SOB, abdominal pain, N/V/D/C, fevers or chills at this time.     Vital Signs Last 24 Hrs  T(C): 36.4 (27 Nov 2019 09:00), Max: 36.8 (26 Nov 2019 14:23)  T(F): 97.5 (27 Nov 2019 09:00), Max: 98.3 (27 Nov 2019 00:02)  HR: 70 (27 Nov 2019 09:00) (64 - 88)  BP: 150/71 (27 Nov 2019 09:00) (107/59 - 150/71)  BP(mean): 110 (27 Nov 2019 09:00) (75 - 110)  RR: 12 (27 Nov 2019 09:00) (12 - 129)  SpO2: 98% (27 Nov 2019 09:00) (95% - 98%)      PHYSICAL EXAM:  General: well appearing found resting in bed comfortably this AM in NAD   Neurological: AOx3. Motor skills grossly intact  Cardiovascular: Normal S1/S2. RRR. No M/R/G.  Respiratory: Right Chest tie down in place with dressing C/D/I. Lungs CTA b/l. No W/R/R.  Gastrointestinal: +BS 4 quadrants Soft. NT. ND.  Extremities: Strength 5/5 b/l upper/lower extremities. Sensation intact upper/lower extremities b/l. No edema. No calf tenderness.  Vascular: Radial 2+ b/l. DP 2+ b/l.   Incision Sites: N/a     TIE DOWNS REMOVED?: No (to be removed as outpatient)    LABS:                        10.1   6.48  )-----------( 254      ( 27 Nov 2019 07:31 )             31.3       COUMADIN:  no    PT/INR - ( 26 Nov 2019 02:57 )   PT: 12.1 sec;   INR: 1.07          PTT - ( 26 Nov 2019 02:57 )  PTT:31.1 sec    11-27    141  |  105  |  19  ----------------------------<  114<H>  4.2   |  27  |  0.84    Ca    9.0      27 Nov 2019 07:31  Phos  2.9     11-26  Mg     1.9     11-27    TPro  6.4  /  Alb  4.0  /  TBili  0.4  /  DBili  x   /  AST  18  /  ALT  12  /  AlkPhos  79  11-26          Discharge CXR:  < from: Xray Chest 1 View-PORTABLE IMMEDIATE (11.26.19 @ 06:46) >  FINDINGS: There is interval removal of right-sided chest tube. No   pneumothorax. Stable lung findings. Heart size within normal limits.   Redemonstrated right subcutaneous emphysema.    < end of copied text >

## 2019-11-27 NOTE — PHYSICAL THERAPY INITIAL EVALUATION ADULT - GAIT DEVIATIONS NOTED, PT EVAL
steady, no loss of balance, reciprocal gait, no grimacing or antalgic gait pattern despite complaint of pain
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
## 2019-12-02 ENCOUNTER — APPOINTMENT (OUTPATIENT)
Dept: PULMONOLOGY | Facility: CLINIC | Age: 74
End: 2019-12-02
Payer: MEDICARE

## 2019-12-02 ENCOUNTER — NON-APPOINTMENT (OUTPATIENT)
Age: 74
End: 2019-12-02

## 2019-12-02 VITALS
OXYGEN SATURATION: 98 % | RESPIRATION RATE: 17 BRPM | WEIGHT: 196 LBS | HEIGHT: 67 IN | HEART RATE: 75 BPM | BODY MASS INDEX: 30.76 KG/M2 | DIASTOLIC BLOOD PRESSURE: 80 MMHG | SYSTOLIC BLOOD PRESSURE: 110 MMHG

## 2019-12-02 PROCEDURE — 94010 BREATHING CAPACITY TEST: CPT

## 2019-12-02 PROCEDURE — 96372 THER/PROPH/DIAG INJ SC/IM: CPT

## 2019-12-02 PROCEDURE — 95012 NITRIC OXIDE EXP GAS DETER: CPT

## 2019-12-02 PROCEDURE — 99214 OFFICE O/P EST MOD 30 MIN: CPT | Mod: 25

## 2019-12-02 PROCEDURE — 71046 X-RAY EXAM CHEST 2 VIEWS: CPT

## 2019-12-02 RX ORDER — OMALIZUMAB 202.5 MG/1.4ML
150 INJECTION, SOLUTION SUBCUTANEOUS
Qty: 1 | Refills: 0 | Status: COMPLETED | OUTPATIENT
Start: 2019-12-02

## 2019-12-02 RX ADMIN — OMALIZUMAB 0 MG: 202.5 INJECTION, SOLUTION SUBCUTANEOUS at 00:00

## 2019-12-03 NOTE — ASSESSMENT
[FreeTextEntry1] : Mr. Rhoades has HTN, arthritis, tracheomalacia, stenosis, asthma, allergy, GERD, and elevated IgE. He is now here for a Xolair injection; He is currently stable from a pulmonary perspective, and is s/p FOB c/w severe TBM and is s/p his tracheoplasty. \par \par His shortness of breath is felt to be multifactorial due to:\par - Chronic sinusitis \par -asthma\par -poor breathing mechanics \par -cardiac disease\par -anemia\par -out of shape\par -overweight\par -TBM\par \par problem 1: asthma (stable)\par -can continue to use albuterol by the nebulizer up to QID though should use it first thing in the morning and PRN \par -continue to use Breo Ellipta 200 at 1 inhalation QD\par -continue to use Spiriva Handihaler 1 inhalation QD \par -continue to use QVar 80 2 puffs BID\par -continue Singulair 10 mg QHS \par -continue to use rescue inhaler, Ventolin, PRN and before exercise\par \par Asthma is believed to be caused by inherited (genetic) and environmental factor, but its exact cause is unknown. Asthma may be triggered by allergens, lung infections, or irritants in the air. Asthma triggers are different for each person \par -Inhaler technique reviewed as well as oral hygiene techniques reviewed with patient. Avoidance of cold air, extremes of temperature, rescue inhaler should be used before exercise. Order of medication reviewed with patient. Recommended use of a cool mist humidifier in the bedroom \par You have a clinical scenario most c/w acute bronchitis the etiology of which is unknown but empiric antibiotics are indicated. Hydration, mucolytics including Mucinex, Robitussin and the like are indicated. Cough controlling agents will be needed. \par \par problem 2: tracheomalacia (s/p PNA, s/p tracheoplasty)\par -CTSX f/u with Dr. Bustos and Dr. Verde, f/u in a few months \par -Recommended to use the Acapella or Aerobika devices\par -continue amitriptyline 10mg Q8H PRN\par Tracheomalacia is usually acquired in adults and common causes include damage by tracheostomy or endotracheal intubation damaging the tracheal cartilage with increase risk with multiple intubations, prolonged intubation, and concurrent high dose steroid therapy; external chest wall trauma and surgery; chronic compression of the trachea by benign etiologies (eg, benign mediastinal goiter) or malignancy; relapsing polychondritis; or recurrent infection. Tracheomalacia can be asymptomatic, however signs or symptoms can develop as the severity of the airway narrowing progresses with major symptoms include dyspnea, cough, and sputum retention. Other symptoms include severe paroxysms of coughing, wheezing or stridor, barking cough and may be exacerbated by forced expiration, cough, and Valsalva maneuver. Tracheomalacia is diagnosed by a bronchoscopic visualization of dynamic airway collapse on dynamic chest CT. Therapy is warranted in symptomatic patients with severe tracheomalacia and includes surgical repair as tracheobronchoplasty. The patient was referred to Dr. Everardo Jones or Dr. Johan Verde, at [facility] for a surgical consult.\par \par problem 3: allergic rhinitis\par - continue Pulmicort 0.5 mg BID via navage\par -recommended to use "Navage" nasal rinse device \par -Olopatadine .6% 1 sniff/nostril BID\par -Clarinex 5 mg QD at breakfast \par -s/p allergic profile: blood work to include IgE level(+), eosinophil level(-), vitamin D level(-), asthma profile(-), and food IgE level (-)\par \par -Environmental measures for allergies were encouraged including mattress and pillow cover, air purifier, and environmental controls. \par \par problem 4:  GERD\par -continue to use Nexium 40 mg before breakfast\par -continue Pepcid 40 mg QHS \par -Rule of 2s: avoid eating too much, eating too late, eating too spicy, eating two hours before bed\par -Things to avoid including overeating, spicy foods, tight clothing, eating within three hours of bed, this list is not all inclusive. \par -For treatment of reflux, possible options discussed including diet control, H2 blockers, PPIs, as well as coating motility agents discussed as treatment options. Timing of meals and proximity of last meal to sleep were discussed. If symptoms persist, a formal gastrointestinal evaluation is needed. \par \par problem 5: Elevated IgE level (127) (severe allergic asthma)\par - Xolair shot - R/L arms 225 (today)\par -now injections to Q 4 weeks\par -Xolair is a recombinant DNA- derived humanized IgG1K monoclonal antibody that selectively binds ot human immunoglobulin E (IgE). Xolair is produced by a Chinese hamster ovary cell suspension culture in nutrient medium containing the antibiotic gentamicin. Gentamicin is not detectable in the final product. Xolair is a sterile, white, preservative free, lyophilized powder contained in a single use vial that is reconstituted with sterile water for suspension. Side effects include: wheezing, tightness of the chest, trouble breathing, hives, skin rash, feeling anxious or light-headed, fainting, warmth or tingling under skin, or swelling of face, lips, or tongue \par \par problem 6: obesity\par -Weight loss, exercise, and diet control were discussed and are highly encouraged. Treatment options were given such as, aqua therapy, and contacting a nutritionist. Recommended to use the elliptical, stationary bike, less use of treadmill.  Obesity is associated with worsening asthma, shortness of breath, and potential for cardiac disease, diabetes, and other underlying medical conditions. \par \par problem 7: cardiac component\par -recommended to continue to f/u with cardiologist Dr. Jed Wilks\par \par problem 8: poor breathing mechanics\par -Proper breathing techniques were reviewed with an emphasis of exhalation. Patient instructed to breath in for 1 second and out for four seconds. Patient was encouraged to not talk while walking. \par \par problem 9: no OSAS\par -based on his fatigue, EDS, snoring, elevated mallampati class, increased leg size, and changes in memory\par -he is s/p home sleep study\par -recommended Provent in the meantime \par -Sleep apnea is associated with adverse clinical consequences which an affect most organ systems.  Cardiovascular disease risk includes arrhythmias, atrial fibrillation, hypertension, coronary artery disease, and stroke. Metabolic disorders include diabetes type 2, non-alcoholic fatty liver disease. Mood disorder especially depression; and cognitive decline especially in the elderly. Associations with  chronic reflux/Rodríguez’s esophagus some but not all inclusive. \par -Reasons to assess this include arousal consistent with hypopnea; respiratory events most prominent in REM sleep or supine position; therefore sleep staging and body position are important for accurate diagnosis and estimation of AHI.\par \par problem 10: abnormal chest CT\par -c/w TBM and PNA\par -s/p chest CT in 10/19 reconfirmation \par \par problem 11: r/o immunodeficiency\par -s/p blood work: quantitative immunoglobulins, IgG subsets, strep pneumonia titers (all normal)\par \par -Due to the fact that this pt has had more infections than would be expected and immunological blood work is indicated this would include: IgG subclasses, quantitative immunoglobulins, Strep pneumoniae titers as well as Vitamin D levels. Based on this blood work we will be able to decide where the pt needs additional pneumococcal vaccine either Prevnar 13 or pneumovax. Immunology evaluation will also be potentially indicated.\par  \par problem 12: health maintenance\par - Recommended him to have a neurological evaluation with Dr. Jerrell Moore regarding his frequent falling. \par -recommended a yearly flu shot after October 15 -refused\par -recommended strep pneumonia vaccines: Prevnar-13 vaccine, followed by Pneumo vaccine 23 on year following\par -recommended early intervention for URIs\par -recommended osteoporosis evaluations\par -recommended early dermatological evaluations\par -recommended after the age of 50 to the age of 70, colonoscopy every 5 years\par \par F/U in 3 months SPI/NIOX\par He is encouraged to call with any changes, concerns, or questions.

## 2019-12-03 NOTE — REVIEW OF SYSTEMS
[Recent Wt Loss (___ Lbs)] : recent [unfilled] ~Ulb weight loss [Cough] : cough [Dyspnea] : dyspnea [Sputum] : sputum  [Chest Tightness] : chest tightness [Dysphagia] : dysphagia [Arthralgias] : arthralgias [As Noted in HPI] : as noted in HPI [Negative] : Sleep Disorder [Chest Discomfort] : no chest discomfort [Heartburn] : no heartburn [Reflux] : no reflux [Constipation] : no constipation [Diarrhea] : no diarrhea [Dizziness] : no dizziness

## 2019-12-03 NOTE — PROCEDURE
[FreeTextEntry1] : PFT revealed normal flows, with a FEV1 of 3.90L, which is 143% of predicted, with a normal flow volume loop\par \par FENO was 5; a normal value being less than 25\par Fractional exhaled nitric oxide (FENO) is regarded as a simple, noninvasive method for assessing eosinophilic airway inflammation. Produced by a variety of cells within the lung, nitric oxide (NO) concentrations are generally low in healthy individuals. However, high concentrations of NO appear to be involved in nonspecific host defense mechanisms and chronic inflammatory diseases such as asthma. The American Thoracic Society (ATS) therefore has recommended using FENO to aid in the diagnosis and monitoring of eosinophilic airway inflammation and asthma, and for identifying steroid responsive individuals whose chronic respiratory symptoms may be caused by airway inflammation. \par \par CXR-s/p Right thoracic surgery--no infiltrate or effusion

## 2019-12-03 NOTE — HISTORY OF PRESENT ILLNESS
[FreeTextEntry1] : Mr. Rhoades is a 74 year old male with a history of allergic rhinitis, severe persistent asthma, dyspnea, elevated IgE, GERD, neck pain, OW, SOB, tracheal stenosis, and Tracheomalacia presenting to the office today for a follow up visit.  His chief complaint is\par -he notes he occasionally begins to have labored breathing when laying on his back. he notes it can last for 5-6 minutes\par -he notes he becomes SOB very quickly when doing small activities such as reaching or bending\par -he reports having chest pressure, describing it as someone sitting on his chest\par -he reports having occasional dysphagia\par -he notes he has occasional itchy eyes and ears\par -his wife notes he had a corneal abrasion while in the hospital, and was treated\par -he reports having knee, ankle, and big toe from a fall before the surgery\par -he reports having lost about 20 pounds purposely\par -he reports having occasional mucus production. The mucus causes him to cough, but is unable to bring it up\par -he denies any chest pain, diarrhea, constipation, dizziness, sour taste in the mouth, leg swelling, heartburn, reflux

## 2019-12-03 NOTE — ADDENDUM
[FreeTextEntry1] : Documented by Navin Mata acting as a scribe for Dr. Clay Lance on 12/02/2019.\par \par All medical record entries made by the Scribe were at my, Dr. Clay Lance's, direction and personally dictated by me on 12/02/2019. I have reviewed the chart and agree that the record accurately reflects my personal performance of the history, physical exam, assessment and plan. I have also personally directed, reviewed, and agree with the discharge instructions.

## 2019-12-03 NOTE — PHYSICAL EXAM
[General Appearance - Well Developed] : well developed [Normal Appearance] : normal appearance [Well Groomed] : well groomed [General Appearance - In No Acute Distress] : no acute distress [No Deformities] : no deformities [General Appearance - Well Nourished] : well nourished [Normal Conjunctiva] : the conjunctiva exhibited no abnormalities [Eyelids - No Xanthelasma] : the eyelids demonstrated no xanthelasmas [Normal Oropharynx] : normal oropharynx [III] : III [Neck Cervical Mass (___cm)] : no neck mass was observed [Thyroid Diffuse Enlargement] : the thyroid was not enlarged [Neck Appearance] : the appearance of the neck was normal [Jugular Venous Distention Increased] : there was no jugular-venous distention [Heart Rate And Rhythm] : heart rate and rhythm were normal [Thyroid Nodule] : there were no palpable thyroid nodules [Heart Sounds] : normal S1 and S2 [Murmurs] : no murmurs present [Exaggerated Use Of Accessory Muscles For Inspiration] : no accessory muscle use [Respiration, Rhythm And Depth] : normal respiratory rhythm and effort [Abdomen Tenderness] : non-tender [Abdomen Soft] : soft [Abdomen Mass (___ Cm)] : no abdominal mass palpated [Nail Clubbing] : no clubbing of the fingernails [Gait - Sufficient For Exercise Testing] : the gait was sufficient for exercise testing [Abnormal Walk] : normal gait [Cyanosis, Localized] : no localized cyanosis [Petechial Hemorrhages (___cm)] : no petechial hemorrhages [No Venous Stasis] : no venous stasis [] : no rash [Skin Color & Pigmentation] : normal skin color and pigmentation [No Skin Ulcers] : no skin ulcer [Skin Lesions] : no skin lesions [No Xanthoma] : no  xanthoma was observed [Deep Tendon Reflexes (DTR)] : deep tendon reflexes were 2+ and symmetric [No Focal Deficits] : no focal deficits [Sensation] : the sensory exam was normal to light touch and pinprick [Oriented To Time, Place, And Person] : oriented to person, place, and time [Impaired Insight] : insight and judgment were intact [Affect] : the affect was normal [Auscultation Breath Sounds / Voice Sounds] : lungs were clear to auscultation bilaterally [FreeTextEntry1] : s/p right VATS surgery

## 2019-12-04 LAB — SURGICAL PATHOLOGY STUDY: SIGNIFICANT CHANGE UP

## 2019-12-09 DIAGNOSIS — J39.8 OTHER SPECIFIED DISEASES OF UPPER RESPIRATORY TRACT: ICD-10-CM

## 2019-12-09 DIAGNOSIS — Z98.890 OTHER SPECIFIED POSTPROCEDURAL STATES: ICD-10-CM

## 2019-12-09 DIAGNOSIS — Z87.891 PERSONAL HISTORY OF NICOTINE DEPENDENCE: ICD-10-CM

## 2019-12-09 DIAGNOSIS — Z91.81 HISTORY OF FALLING: ICD-10-CM

## 2019-12-09 DIAGNOSIS — H57.11 OCULAR PAIN, RIGHT EYE: ICD-10-CM

## 2019-12-09 DIAGNOSIS — Z87.442 PERSONAL HISTORY OF URINARY CALCULI: ICD-10-CM

## 2019-12-09 DIAGNOSIS — J44.9 CHRONIC OBSTRUCTIVE PULMONARY DISEASE, UNSPECIFIED: ICD-10-CM

## 2019-12-09 DIAGNOSIS — Z96.643 PRESENCE OF ARTIFICIAL HIP JOINT, BILATERAL: ICD-10-CM

## 2019-12-09 DIAGNOSIS — Z86.718 PERSONAL HISTORY OF OTHER VENOUS THROMBOSIS AND EMBOLISM: ICD-10-CM

## 2019-12-09 DIAGNOSIS — M10.9 GOUT, UNSPECIFIED: ICD-10-CM

## 2019-12-09 DIAGNOSIS — M15.9 POLYOSTEOARTHRITIS, UNSPECIFIED: ICD-10-CM

## 2019-12-09 DIAGNOSIS — F32.9 MAJOR DEPRESSIVE DISORDER, SINGLE EPISODE, UNSPECIFIED: ICD-10-CM

## 2019-12-09 DIAGNOSIS — J98.09 OTHER DISEASES OF BRONCHUS, NOT ELSEWHERE CLASSIFIED: ICD-10-CM

## 2019-12-09 DIAGNOSIS — Z96.652 PRESENCE OF LEFT ARTIFICIAL KNEE JOINT: ICD-10-CM

## 2019-12-09 DIAGNOSIS — K21.9 GASTRO-ESOPHAGEAL REFLUX DISEASE WITHOUT ESOPHAGITIS: ICD-10-CM

## 2019-12-09 PROCEDURE — 88305 TISSUE EXAM BY PATHOLOGIST: CPT

## 2019-12-09 PROCEDURE — 84295 ASSAY OF SERUM SODIUM: CPT

## 2019-12-09 PROCEDURE — C9399: CPT

## 2019-12-09 PROCEDURE — C1894: CPT

## 2019-12-09 PROCEDURE — C1889: CPT

## 2019-12-09 PROCEDURE — 85025 COMPLETE CBC W/AUTO DIFF WBC: CPT

## 2019-12-09 PROCEDURE — 86901 BLOOD TYPING SEROLOGIC RH(D): CPT

## 2019-12-09 PROCEDURE — 71250 CT THORAX DX C-: CPT

## 2019-12-09 PROCEDURE — 83605 ASSAY OF LACTIC ACID: CPT

## 2019-12-09 PROCEDURE — 97161 PT EVAL LOW COMPLEX 20 MIN: CPT

## 2019-12-09 PROCEDURE — 85027 COMPLETE CBC AUTOMATED: CPT

## 2019-12-09 PROCEDURE — 86850 RBC ANTIBODY SCREEN: CPT

## 2019-12-09 PROCEDURE — 80053 COMPREHEN METABOLIC PANEL: CPT

## 2019-12-09 PROCEDURE — 36415 COLL VENOUS BLD VENIPUNCTURE: CPT

## 2019-12-09 PROCEDURE — 94640 AIRWAY INHALATION TREATMENT: CPT

## 2019-12-09 PROCEDURE — 85730 THROMBOPLASTIN TIME PARTIAL: CPT

## 2019-12-09 PROCEDURE — 85610 PROTHROMBIN TIME: CPT

## 2019-12-09 PROCEDURE — 86900 BLOOD TYPING SEROLOGIC ABO: CPT

## 2019-12-09 PROCEDURE — 84100 ASSAY OF PHOSPHORUS: CPT

## 2019-12-09 PROCEDURE — 84132 ASSAY OF SERUM POTASSIUM: CPT

## 2019-12-09 PROCEDURE — 71045 X-RAY EXAM CHEST 1 VIEW: CPT

## 2019-12-09 PROCEDURE — 82803 BLOOD GASES ANY COMBINATION: CPT

## 2019-12-09 PROCEDURE — C1781: CPT

## 2019-12-09 PROCEDURE — 83735 ASSAY OF MAGNESIUM: CPT

## 2019-12-09 PROCEDURE — 82330 ASSAY OF CALCIUM: CPT

## 2019-12-09 PROCEDURE — S2900: CPT

## 2019-12-09 PROCEDURE — 80048 BASIC METABOLIC PNL TOTAL CA: CPT

## 2019-12-10 ENCOUNTER — RX RENEWAL (OUTPATIENT)
Age: 74
End: 2019-12-10

## 2019-12-11 ENCOUNTER — RX RENEWAL (OUTPATIENT)
Age: 74
End: 2019-12-11

## 2019-12-11 ENCOUNTER — FORM ENCOUNTER (OUTPATIENT)
Age: 74
End: 2019-12-11

## 2019-12-11 ENCOUNTER — MEDICATION RENEWAL (OUTPATIENT)
Age: 74
End: 2019-12-11

## 2019-12-12 ENCOUNTER — APPOINTMENT (OUTPATIENT)
Dept: THORACIC SURGERY | Facility: CLINIC | Age: 74
End: 2019-12-12
Payer: MEDICARE

## 2019-12-12 ENCOUNTER — OUTPATIENT (OUTPATIENT)
Dept: OUTPATIENT SERVICES | Facility: HOSPITAL | Age: 74
LOS: 1 days | End: 2019-12-12
Payer: MEDICARE

## 2019-12-12 DIAGNOSIS — G56.01 CARPAL TUNNEL SYNDROME, RIGHT UPPER LIMB: Chronic | ICD-10-CM

## 2019-12-12 DIAGNOSIS — Z98.89 OTHER SPECIFIED POSTPROCEDURAL STATES: Chronic | ICD-10-CM

## 2019-12-12 DIAGNOSIS — Z96.652 PRESENCE OF LEFT ARTIFICIAL KNEE JOINT: Chronic | ICD-10-CM

## 2019-12-12 DIAGNOSIS — Z96.643 PRESENCE OF ARTIFICIAL HIP JOINT, BILATERAL: Chronic | ICD-10-CM

## 2019-12-12 PROCEDURE — 71046 X-RAY EXAM CHEST 2 VIEWS: CPT | Mod: 26

## 2019-12-12 PROCEDURE — 99024 POSTOP FOLLOW-UP VISIT: CPT

## 2019-12-12 PROCEDURE — 71046 X-RAY EXAM CHEST 2 VIEWS: CPT

## 2019-12-18 ENCOUNTER — RX RENEWAL (OUTPATIENT)
Age: 74
End: 2019-12-18

## 2019-12-20 ENCOUNTER — APPOINTMENT (OUTPATIENT)
Dept: MRI IMAGING | Facility: CLINIC | Age: 74
End: 2019-12-20

## 2020-01-01 ENCOUNTER — APPOINTMENT (OUTPATIENT)
Dept: ORTHOPEDIC SURGERY | Facility: CLINIC | Age: 75
End: 2020-01-01
Payer: MEDICARE

## 2020-01-01 ENCOUNTER — APPOINTMENT (OUTPATIENT)
Dept: PULMONOLOGY | Facility: CLINIC | Age: 75
End: 2020-01-01
Payer: MEDICARE

## 2020-01-01 ENCOUNTER — RX RENEWAL (OUTPATIENT)
Age: 75
End: 2020-01-01

## 2020-01-01 ENCOUNTER — NON-APPOINTMENT (OUTPATIENT)
Age: 75
End: 2020-01-01

## 2020-01-01 ENCOUNTER — APPOINTMENT (OUTPATIENT)
Dept: CARDIOLOGY | Facility: CLINIC | Age: 75
End: 2020-01-01
Payer: MEDICARE

## 2020-01-01 ENCOUNTER — APPOINTMENT (OUTPATIENT)
Dept: GASTROENTEROLOGY | Facility: CLINIC | Age: 75
End: 2020-01-01

## 2020-01-01 ENCOUNTER — APPOINTMENT (OUTPATIENT)
Dept: THORACIC SURGERY | Facility: CLINIC | Age: 75
End: 2020-01-01

## 2020-01-01 VITALS
WEIGHT: 198 LBS | SYSTOLIC BLOOD PRESSURE: 130 MMHG | RESPIRATION RATE: 17 BRPM | HEART RATE: 86 BPM | OXYGEN SATURATION: 98 % | TEMPERATURE: 97.5 F | BODY MASS INDEX: 30.01 KG/M2 | HEIGHT: 68 IN | DIASTOLIC BLOOD PRESSURE: 90 MMHG

## 2020-01-01 VITALS
RESPIRATION RATE: 16 BRPM | BODY MASS INDEX: 30.76 KG/M2 | SYSTOLIC BLOOD PRESSURE: 140 MMHG | WEIGHT: 196 LBS | TEMPERATURE: 97.8 F | OXYGEN SATURATION: 98 % | HEIGHT: 67 IN | DIASTOLIC BLOOD PRESSURE: 80 MMHG | HEART RATE: 81 BPM

## 2020-01-01 VITALS
OXYGEN SATURATION: 98 % | BODY MASS INDEX: 30.01 KG/M2 | DIASTOLIC BLOOD PRESSURE: 80 MMHG | SYSTOLIC BLOOD PRESSURE: 130 MMHG | WEIGHT: 198 LBS | RESPIRATION RATE: 17 BRPM | HEIGHT: 68 IN | HEART RATE: 86 BPM | TEMPERATURE: 97.3 F

## 2020-01-01 VITALS
OXYGEN SATURATION: 98 % | HEIGHT: 67 IN | RESPIRATION RATE: 17 BRPM | SYSTOLIC BLOOD PRESSURE: 130 MMHG | BODY MASS INDEX: 29.98 KG/M2 | WEIGHT: 191 LBS | DIASTOLIC BLOOD PRESSURE: 70 MMHG | HEART RATE: 78 BPM

## 2020-01-01 VITALS — HEART RATE: 84 BPM | OXYGEN SATURATION: 99 % | RESPIRATION RATE: 17 BRPM

## 2020-01-01 VITALS
WEIGHT: 191 LBS | OXYGEN SATURATION: 99 % | HEART RATE: 72 BPM | HEIGHT: 67 IN | DIASTOLIC BLOOD PRESSURE: 80 MMHG | RESPIRATION RATE: 17 BRPM | SYSTOLIC BLOOD PRESSURE: 140 MMHG | BODY MASS INDEX: 29.98 KG/M2

## 2020-01-01 VITALS
SYSTOLIC BLOOD PRESSURE: 146 MMHG | BODY MASS INDEX: 31.22 KG/M2 | OXYGEN SATURATION: 98 % | WEIGHT: 206 LBS | HEART RATE: 92 BPM | DIASTOLIC BLOOD PRESSURE: 80 MMHG | HEIGHT: 68 IN

## 2020-01-01 VITALS
RESPIRATION RATE: 17 BRPM | TEMPERATURE: 97.9 F | DIASTOLIC BLOOD PRESSURE: 82 MMHG | SYSTOLIC BLOOD PRESSURE: 130 MMHG | OXYGEN SATURATION: 98 % | HEART RATE: 78 BPM | HEIGHT: 67 IN | BODY MASS INDEX: 31.86 KG/M2 | WEIGHT: 203 LBS

## 2020-01-01 VITALS
BODY MASS INDEX: 29.7 KG/M2 | HEART RATE: 60 BPM | HEIGHT: 68 IN | SYSTOLIC BLOOD PRESSURE: 147 MMHG | DIASTOLIC BLOOD PRESSURE: 87 MMHG | WEIGHT: 196 LBS

## 2020-01-01 VITALS
TEMPERATURE: 97.4 F | HEIGHT: 68 IN | WEIGHT: 211 LBS | DIASTOLIC BLOOD PRESSURE: 70 MMHG | RESPIRATION RATE: 17 BRPM | HEART RATE: 104 BPM | OXYGEN SATURATION: 98 % | SYSTOLIC BLOOD PRESSURE: 140 MMHG | BODY MASS INDEX: 31.98 KG/M2

## 2020-01-01 VITALS
WEIGHT: 202 LBS | SYSTOLIC BLOOD PRESSURE: 142 MMHG | TEMPERATURE: 97.9 F | HEART RATE: 73 BPM | BODY MASS INDEX: 31.71 KG/M2 | DIASTOLIC BLOOD PRESSURE: 81 MMHG | HEIGHT: 67 IN

## 2020-01-01 VITALS
HEART RATE: 75 BPM | OXYGEN SATURATION: 99 % | BODY MASS INDEX: 31.39 KG/M2 | SYSTOLIC BLOOD PRESSURE: 150 MMHG | DIASTOLIC BLOOD PRESSURE: 60 MMHG | RESPIRATION RATE: 17 BRPM | HEIGHT: 67 IN | TEMPERATURE: 97.7 F | WEIGHT: 200 LBS

## 2020-01-01 VITALS
OXYGEN SATURATION: 98 % | HEIGHT: 67 IN | RESPIRATION RATE: 17 BRPM | TEMPERATURE: 98.1 F | BODY MASS INDEX: 29.98 KG/M2 | SYSTOLIC BLOOD PRESSURE: 120 MMHG | WEIGHT: 191 LBS | HEART RATE: 77 BPM | DIASTOLIC BLOOD PRESSURE: 82 MMHG

## 2020-01-01 VITALS
BODY MASS INDEX: 29.98 KG/M2 | HEIGHT: 67 IN | OXYGEN SATURATION: 98 % | RESPIRATION RATE: 17 BRPM | SYSTOLIC BLOOD PRESSURE: 130 MMHG | DIASTOLIC BLOOD PRESSURE: 60 MMHG | WEIGHT: 191 LBS | HEART RATE: 79 BPM

## 2020-01-01 DIAGNOSIS — Z11.59 ENCOUNTER FOR SCREENING FOR OTHER VIRAL DISEASES: ICD-10-CM

## 2020-01-01 DIAGNOSIS — M65.331 TRIGGER FINGER, RIGHT MIDDLE FINGER: ICD-10-CM

## 2020-01-01 DIAGNOSIS — R53.82 CHRONIC FATIGUE, UNSPECIFIED: ICD-10-CM

## 2020-01-01 DIAGNOSIS — M19.041 PRIMARY OSTEOARTHRITIS, RIGHT HAND: ICD-10-CM

## 2020-01-01 DIAGNOSIS — Z96.649 AFTERCARE FOLLOWING JOINT REPLACEMENT SURGERY: ICD-10-CM

## 2020-01-01 DIAGNOSIS — M75.52 BURSITIS OF LEFT SHOULDER: ICD-10-CM

## 2020-01-01 DIAGNOSIS — E78.5 HYPERLIPIDEMIA, UNSPECIFIED: ICD-10-CM

## 2020-01-01 DIAGNOSIS — Z96.652 PRESENCE OF LEFT ARTIFICIAL KNEE JOINT: ICD-10-CM

## 2020-01-01 DIAGNOSIS — M54.5 LOW BACK PAIN: ICD-10-CM

## 2020-01-01 DIAGNOSIS — S46.012A STRAIN OF MUSCLE(S) AND TENDON(S) OF THE ROTATOR CUFF OF LEFT SHOULDER, INITIAL ENCOUNTER: ICD-10-CM

## 2020-01-01 DIAGNOSIS — D50.9 IRON DEFICIENCY ANEMIA, UNSPECIFIED: ICD-10-CM

## 2020-01-01 DIAGNOSIS — Z47.1 AFTERCARE FOLLOWING JOINT REPLACEMENT SURGERY: ICD-10-CM

## 2020-01-01 DIAGNOSIS — M67.912 UNSPECIFIED DISORDER OF SYNOVIUM AND TENDON, LEFT SHOULDER: ICD-10-CM

## 2020-01-01 DIAGNOSIS — R06.00 DYSPNEA, UNSPECIFIED: ICD-10-CM

## 2020-01-01 DIAGNOSIS — I49.5 SICK SINUS SYNDROME: ICD-10-CM

## 2020-01-01 DIAGNOSIS — M17.11 UNILATERAL PRIMARY OSTEOARTHRITIS, RIGHT KNEE: ICD-10-CM

## 2020-01-01 DIAGNOSIS — Z01.812 ENCOUNTER FOR PREPROCEDURAL LABORATORY EXAMINATION: ICD-10-CM

## 2020-01-01 DIAGNOSIS — M19.042 PRIMARY OSTEOARTHRITIS, LEFT HAND: ICD-10-CM

## 2020-01-01 DIAGNOSIS — M12.812 OTHER SPECIFIC ARTHROPATHIES, NOT ELSEWHERE CLASSIFIED, LEFT SHOULDER: ICD-10-CM

## 2020-01-01 DIAGNOSIS — R09.89 OTHER SPECIFIED SYMPTOMS AND SIGNS INVOLVING THE CIRCULATORY AND RESPIRATORY SYSTEMS: ICD-10-CM

## 2020-01-01 DIAGNOSIS — M65.332 TRIGGER FINGER, LEFT MIDDLE FINGER: ICD-10-CM

## 2020-01-01 DIAGNOSIS — M18.12 UNILATERAL PRIMARY OSTEOARTHRITIS OF FIRST CARPOMETACARPAL JOINT, LEFT HAND: ICD-10-CM

## 2020-01-01 DIAGNOSIS — J39.8 OTHER SPECIFIED DISEASES OF UPPER RESPIRATORY TRACT: ICD-10-CM

## 2020-01-01 DIAGNOSIS — M18.11 UNILATERAL PRIMARY OSTEOARTHRITIS OF FIRST CARPOMETACARPAL JOINT, RIGHT HAND: ICD-10-CM

## 2020-01-01 LAB
FERRITIN SERPL-MCNC: 30 NG/ML
IRON SATN MFR SERPL: 13 %
IRON SERPL-MCNC: 47 UG/DL
SARS-COV-2 N GENE NPH QL NAA+PROBE: NOT DETECTED
TIBC SERPL-MCNC: 363 UG/DL
UIBC SERPL-MCNC: 316 UG/DL

## 2020-01-01 PROCEDURE — 99215 OFFICE O/P EST HI 40 MIN: CPT

## 2020-01-01 PROCEDURE — 96372 THER/PROPH/DIAG INJ SC/IM: CPT

## 2020-01-01 PROCEDURE — 99214 OFFICE O/P EST MOD 30 MIN: CPT | Mod: 25

## 2020-01-01 PROCEDURE — 99214 OFFICE O/P EST MOD 30 MIN: CPT

## 2020-01-01 PROCEDURE — 20610 DRAIN/INJ JOINT/BURSA W/O US: CPT | Mod: LT

## 2020-01-01 PROCEDURE — 99213 OFFICE O/P EST LOW 20 MIN: CPT | Mod: 25

## 2020-01-01 PROCEDURE — 20550 NJX 1 TENDON SHEATH/LIGAMENT: CPT | Mod: F2

## 2020-01-01 PROCEDURE — 99213 OFFICE O/P EST LOW 20 MIN: CPT

## 2020-01-01 PROCEDURE — 73030 X-RAY EXAM OF SHOULDER: CPT | Mod: LT

## 2020-01-01 PROCEDURE — 93000 ELECTROCARDIOGRAM COMPLETE: CPT

## 2020-01-01 PROCEDURE — 94618 PULMONARY STRESS TESTING: CPT

## 2020-01-01 PROCEDURE — 99203 OFFICE O/P NEW LOW 30 MIN: CPT | Mod: 25

## 2020-01-01 PROCEDURE — 20610 DRAIN/INJ JOINT/BURSA W/O US: CPT | Mod: RT

## 2020-01-01 RX ORDER — ROFLUMILAST 250 UG/1
250 TABLET ORAL
Qty: 90 | Refills: 1 | Status: ACTIVE | COMMUNITY
Start: 2020-01-01 | End: 1900-01-01

## 2020-01-01 RX ORDER — AMITRIPTYLINE HYDROCHLORIDE 10 MG/1
10 TABLET, FILM COATED ORAL
Qty: 270 | Refills: 1 | Status: ACTIVE | COMMUNITY
Start: 2018-06-15 | End: 1900-01-01

## 2020-01-01 RX ORDER — DICLOFENAC SODIUM 50 MG/1
50 TABLET, DELAYED RELEASE ORAL TWICE DAILY
Qty: 60 | Refills: 2 | Status: ACTIVE | COMMUNITY
Start: 2020-01-01 | End: 1900-01-01

## 2020-01-01 RX ORDER — ALBUTEROL SULFATE 2.5 MG/3ML
(2.5 MG/3ML) SOLUTION RESPIRATORY (INHALATION)
Qty: 120 | Refills: 11 | Status: ACTIVE | COMMUNITY
Start: 2017-12-19 | End: 1900-01-01

## 2020-01-01 RX ORDER — KETOROLAC TROMETHAMINE 10 MG/1
10 TABLET, FILM COATED ORAL
Qty: 20 | Refills: 0 | Status: ACTIVE | COMMUNITY
Start: 2019-11-27

## 2020-01-01 RX ORDER — OMALIZUMAB 75 MG/.5ML
75 INJECTION, SOLUTION SUBCUTANEOUS
Qty: 0 | Refills: 0 | Status: COMPLETED | OUTPATIENT
Start: 2020-01-01

## 2020-01-01 RX ORDER — OLOPATADINE HYDROCHLORIDE 665 UG/1
0.6 SPRAY, METERED NASAL
Qty: 3 | Refills: 1 | Status: ACTIVE | COMMUNITY
Start: 2017-06-13 | End: 1900-01-01

## 2020-01-01 RX ORDER — DICLOFENAC SODIUM 50 MG/1
50 TABLET, DELAYED RELEASE ORAL
Qty: 60 | Refills: 2 | Status: ACTIVE | COMMUNITY
Start: 2020-01-01 | End: 1900-01-01

## 2020-01-01 RX ORDER — AMLODIPINE BESYLATE 10 MG/1
10 TABLET ORAL
Qty: 90 | Refills: 2 | Status: ACTIVE | COMMUNITY
Start: 2018-11-27 | End: 1900-01-01

## 2020-01-01 RX ORDER — ATORVASTATIN CALCIUM 10 MG/1
10 TABLET, FILM COATED ORAL
Qty: 90 | Refills: 3 | Status: ACTIVE | COMMUNITY
Start: 2017-09-27 | End: 1900-01-01

## 2020-01-01 RX ORDER — LEVOCETIRIZINE DIHYDROCHLORIDE 5 MG/1
5 TABLET ORAL
Qty: 90 | Refills: 1 | Status: ACTIVE | COMMUNITY
Start: 2019-01-11 | End: 1900-01-01

## 2020-01-01 RX ORDER — DESLORATADINE 5 MG/1
5 TABLET ORAL
Qty: 30 | Refills: 1 | Status: ACTIVE | COMMUNITY
Start: 2019-01-10 | End: 1900-01-01

## 2020-01-01 RX ORDER — OMALIZUMAB 150 MG/ML
150 INJECTION, SOLUTION SUBCUTANEOUS
Qty: 0 | Refills: 0 | Status: COMPLETED | OUTPATIENT
Start: 2020-01-01

## 2020-01-01 RX ORDER — OMALIZUMAB 202.5 MG/1.4ML
150 INJECTION, SOLUTION SUBCUTANEOUS
Qty: 45 | Refills: 0 | Status: COMPLETED | OUTPATIENT
Start: 2020-01-01

## 2020-01-01 RX ORDER — ESOMEPRAZOLE MAGNESIUM 40 MG/1
40 CAPSULE, DELAYED RELEASE ORAL
Qty: 180 | Refills: 1 | Status: ACTIVE | COMMUNITY
Start: 2020-01-01 | End: 1900-01-01

## 2020-01-01 RX ORDER — OMALIZUMAB 150 MG/ML
150 INJECTION, SOLUTION SUBCUTANEOUS
Qty: 2 | Refills: 2 | Status: ACTIVE | COMMUNITY
Start: 2020-01-01 | End: 1900-01-01

## 2020-01-01 RX ORDER — MONTELUKAST 10 MG/1
10 TABLET, FILM COATED ORAL
Qty: 90 | Refills: 1 | Status: ACTIVE | COMMUNITY
Start: 2017-12-07 | End: 1900-01-01

## 2020-01-01 RX ORDER — MOMETASONE 50 UG/1
50 SPRAY, METERED NASAL TWICE DAILY
Qty: 3 | Refills: 1 | Status: ACTIVE | COMMUNITY
Start: 2018-08-06 | End: 1900-01-01

## 2020-01-01 RX ORDER — FLUTICASONE PROPIONATE 93 UG/1
93 SPRAY, METERED NASAL
Qty: 3 | Refills: 1 | Status: ACTIVE | COMMUNITY
Start: 2020-01-01 | End: 1900-01-01

## 2020-01-01 RX ORDER — IPRATROPIUM BROMIDE 42 UG/1
0.06 SPRAY NASAL
Qty: 3 | Refills: 1 | Status: ACTIVE | COMMUNITY
Start: 2017-09-25 | End: 1900-01-01

## 2020-01-01 RX ORDER — FLUTICASONE FUROATE, UMECLIDINIUM BROMIDE AND VILANTEROL TRIFENATATE 100; 62.5; 25 UG/1; UG/1; UG/1
100-62.5-25 POWDER RESPIRATORY (INHALATION)
Qty: 3 | Refills: 1 | Status: ACTIVE | COMMUNITY
Start: 2020-01-01 | End: 1900-01-01

## 2020-01-01 RX ORDER — OMALIZUMAB 75 MG/.5ML
75 INJECTION, SOLUTION SUBCUTANEOUS
Qty: 1 | Refills: 2 | Status: ACTIVE | COMMUNITY
Start: 2020-01-01 | End: 1900-01-01

## 2020-01-01 RX ADMIN — OMALIZUMAB 0 MG/ML: 150 INJECTION, SOLUTION SUBCUTANEOUS at 00:00

## 2020-01-01 RX ADMIN — OMALIZUMAB 0 MG/0.5ML: 75 INJECTION, SOLUTION SUBCUTANEOUS at 00:00

## 2020-01-01 RX ADMIN — OMALIZUMAB 75 MG/0.5ML: 75 INJECTION, SOLUTION SUBCUTANEOUS at 00:00

## 2020-01-02 ENCOUNTER — APPOINTMENT (OUTPATIENT)
Dept: PULMONOLOGY | Facility: CLINIC | Age: 75
End: 2020-01-02

## 2020-01-02 ENCOUNTER — APPOINTMENT (OUTPATIENT)
Dept: PULMONOLOGY | Facility: CLINIC | Age: 75
End: 2020-01-02
Payer: MEDICARE

## 2020-01-02 ENCOUNTER — NON-APPOINTMENT (OUTPATIENT)
Age: 75
End: 2020-01-02

## 2020-01-02 VITALS
HEIGHT: 67 IN | BODY MASS INDEX: 30.76 KG/M2 | SYSTOLIC BLOOD PRESSURE: 120 MMHG | HEART RATE: 92 BPM | RESPIRATION RATE: 17 BRPM | WEIGHT: 196 LBS | DIASTOLIC BLOOD PRESSURE: 85 MMHG | OXYGEN SATURATION: 97 %

## 2020-01-02 PROCEDURE — 96372 THER/PROPH/DIAG INJ SC/IM: CPT

## 2020-01-02 PROCEDURE — 99214 OFFICE O/P EST MOD 30 MIN: CPT | Mod: 25

## 2020-01-02 PROCEDURE — 95012 NITRIC OXIDE EXP GAS DETER: CPT

## 2020-01-02 PROCEDURE — 94010 BREATHING CAPACITY TEST: CPT

## 2020-01-02 RX ORDER — OMALIZUMAB 150 MG/ML
150 INJECTION, SOLUTION SUBCUTANEOUS
Qty: 0 | Refills: 0 | Status: COMPLETED | OUTPATIENT
Start: 2020-01-02

## 2020-01-02 RX ORDER — OMALIZUMAB 75 MG/.5ML
75 INJECTION, SOLUTION SUBCUTANEOUS
Qty: 0 | Refills: 0 | Status: COMPLETED | OUTPATIENT
Start: 2020-01-02

## 2020-01-02 RX ADMIN — OMALIZUMAB 150 MG/ML: 150 INJECTION, SOLUTION SUBCUTANEOUS at 00:00

## 2020-01-02 RX ADMIN — OMALIZUMAB 75 MG/0.5ML: 75 INJECTION, SOLUTION SUBCUTANEOUS at 00:00

## 2020-01-02 NOTE — PROCEDURE
[FreeTextEntry1] : PFT revealed normal flows, with a FEV1 of 2.32L, which is 85% of predicted, with a normal flow volume loop \par \par FENO was 8 ; a normal value being less than 25\par Fractional exhaled nitric oxide (FENO) is regarded as a simple, noninvasive method for assessing eosinophilic airway inflammation. Produced by a variety of cells within the lung, nitric oxide (NO) concentrations are generally low in healthy individuals. However, high concentrations of NO appear to be involved in nonspecific host defense mechanisms and chronic inflammatory diseases such as asthma. The American Thoracic Society (ATS) therefore has recommended using FENO to aid in the diagnosis and monitoring of eosinophilic airway inflammation and asthma, and for identifying steroid responsive individuals whose chronic respiratory symptoms may be caused by airway inflammation.

## 2020-01-02 NOTE — PHYSICAL EXAM
[General Appearance - Well Developed] : well developed [Normal Appearance] : normal appearance [General Appearance - Well Nourished] : well nourished [Well Groomed] : well groomed [No Deformities] : no deformities [General Appearance - In No Acute Distress] : no acute distress [Normal Conjunctiva] : the conjunctiva exhibited no abnormalities [Eyelids - No Xanthelasma] : the eyelids demonstrated no xanthelasmas [Normal Oropharynx] : normal oropharynx [Neck Appearance] : the appearance of the neck was normal [Neck Cervical Mass (___cm)] : no neck mass was observed [Jugular Venous Distention Increased] : there was no jugular-venous distention [Thyroid Nodule] : there were no palpable thyroid nodules [Thyroid Diffuse Enlargement] : the thyroid was not enlarged [Heart Rate And Rhythm] : heart rate and rhythm were normal [Heart Sounds] : normal S1 and S2 [Respiration, Rhythm And Depth] : normal respiratory rhythm and effort [Exaggerated Use Of Accessory Muscles For Inspiration] : no accessory muscle use [Abdomen Tenderness] : non-tender [Abdomen Soft] : soft [Abdomen Mass (___ Cm)] : no abdominal mass palpated [Abnormal Walk] : normal gait [Gait - Sufficient For Exercise Testing] : the gait was sufficient for exercise testing [Nail Clubbing] : no clubbing of the fingernails [Cyanosis, Localized] : no localized cyanosis [Petechial Hemorrhages (___cm)] : no petechial hemorrhages [Skin Color & Pigmentation] : normal skin color and pigmentation [Skin Lesions] : no skin lesions [No Venous Stasis] : no venous stasis [] : no rash [No Xanthoma] : no  xanthoma was observed [No Skin Ulcers] : no skin ulcer [Deep Tendon Reflexes (DTR)] : deep tendon reflexes were 2+ and symmetric [Sensation] : the sensory exam was normal to light touch and pinprick [No Focal Deficits] : no focal deficits [Oriented To Time, Place, And Person] : oriented to person, place, and time [Impaired Insight] : insight and judgment were intact [Affect] : the affect was normal [II] : II [FreeTextEntry1] : s/p right VATS surgery

## 2020-01-02 NOTE — ADDENDUM
[FreeTextEntry1] : Documented by Navin Mata acting as a scribe for Dr. Clay Lance on 01/02/2020.\par \par All medical record entries made by the Scribe were at my, Dr. Clay Lance's, direction and personally dictated by me on 01/02/2020. I have reviewed the chart and agree that the record accurately reflects my personal performance of the history, physical exam, assessment and plan. I have also personally directed, reviewed, and agree with the discharge instructions.

## 2020-01-02 NOTE — ASSESSMENT
[FreeTextEntry1] : Mr. Rhoades has HTN, arthritis, tracheomalacia, stenosis, asthma, allergy, GERD, and elevated IgE. He is now here for a Xolair injection; He is currently stable from a pulmonary perspective, and is s/p FOB c/w severe TBM and is s/p his tracheoplasty. improved by still poor stamina. \par \par His shortness of breath is felt to be multifactorial due to:\par - Chronic sinusitis \par -asthma\par -poor breathing mechanics \par -cardiac disease\par -anemia\par -out of shape\par -overweight\par -TBM\par \par problem 1: asthma (stable)\par -can continue to use albuterol by the nebulizer up to QID though should use it first thing in the morning and PRN \par -continue to use Breo Ellipta 200 at 1 inhalation QD\par -continue to use Spiriva Handihaler 1 inhalation QD \par -continue to use QVar 80 2 puffs BID\par -continue Singulair 10 mg QHS \par -continue to use rescue inhaler, Ventolin, PRN and before exercise\par \par Asthma is believed to be caused by inherited (genetic) and environmental factor, but its exact cause is unknown. Asthma may be triggered by allergens, lung infections, or irritants in the air. Asthma triggers are different for each person \par -Inhaler technique reviewed as well as oral hygiene techniques reviewed with patient. Avoidance of cold air, extremes of temperature, rescue inhaler should be used before exercise. Order of medication reviewed with patient. Recommended use of a cool mist humidifier in the bedroom \par You have a clinical scenario most c/w acute bronchitis the etiology of which is unknown but empiric antibiotics are indicated. Hydration, mucolytics including Mucinex, Robitussin and the like are indicated. Cough controlling agents will be needed. \par \par problem 2: tracheomalacia (s/p PNA, s/p tracheoplasty)\par -CTSX f/u with Dr. Bustos and Dr. Verde, f/u in a few months \par -Recommended to use the Acapella or Aerobika devices\par -continue amitriptyline 10mg Q8H PRN\par Tracheomalacia is usually acquired in adults and common causes include damage by tracheostomy or endotracheal intubation damaging the tracheal cartilage with increase risk with multiple intubations, prolonged intubation, and concurrent high dose steroid therapy; external chest wall trauma and surgery; chronic compression of the trachea by benign etiologies (eg, benign mediastinal goiter) or malignancy; relapsing polychondritis; or recurrent infection. Tracheomalacia can be asymptomatic, however signs or symptoms can develop as the severity of the airway narrowing progresses with major symptoms include dyspnea, cough, and sputum retention. Other symptoms include severe paroxysms of coughing, wheezing or stridor, barking cough and may be exacerbated by forced expiration, cough, and Valsalva maneuver. Tracheomalacia is diagnosed by a bronchoscopic visualization of dynamic airway collapse on dynamic chest CT. Therapy is warranted in symptomatic patients with severe tracheomalacia and includes surgical repair as tracheobronchoplasty. The patient was referred to Dr. Everardo Jones or Dr. Johan Verde, at [facility] for a surgical consult.\par \par problem 3: allergic rhinitis\par - continue Pulmicort 0.5 mg BID via navage\par -recommended to use "Navage" nasal rinse device \par -Olopatadine .6% 1 sniff/nostril BID\par -Clarinex 5 mg QD at breakfast \par -s/p allergic profile: blood work to include IgE level(+), eosinophil level(-), vitamin D level(-), asthma profile(-), and food IgE level (-)\par \par -Environmental measures for allergies were encouraged including mattress and pillow cover, air purifier, and environmental controls. \par \par problem 4:  GERD\par -continue to use Nexium 40 mg before breakfast\par -continue Pepcid 40 mg QHS \par -Rule of 2s: avoid eating too much, eating too late, eating too spicy, eating two hours before bed\par -Things to avoid including overeating, spicy foods, tight clothing, eating within three hours of bed, this list is not all inclusive. \par -For treatment of reflux, possible options discussed including diet control, H2 blockers, PPIs, as well as coating motility agents discussed as treatment options. Timing of meals and proximity of last meal to sleep were discussed. If symptoms persist, a formal gastrointestinal evaluation is needed. \par \par problem 5: Elevated IgE level (127) (severe allergic asthma)\par - Xolair shot - R/L arms 225 (today)\par -now injections to Q 4 weeks\par -Xolair is a recombinant DNA- derived humanized IgG1K monoclonal antibody that selectively binds ot human immunoglobulin E (IgE). Xolair is produced by a Chinese hamster ovary cell suspension culture in nutrient medium containing the antibiotic gentamicin. Gentamicin is not detectable in the final product. Xolair is a sterile, white, preservative free, lyophilized powder contained in a single use vial that is reconstituted with sterile water for suspension. Side effects include: wheezing, tightness of the chest, trouble breathing, hives, skin rash, feeling anxious or light-headed, fainting, warmth or tingling under skin, or swelling of face, lips, or tongue \par \par problem 6: obesity\par -Weight loss, exercise, and diet control were discussed and are highly encouraged. Treatment options were given such as, aqua therapy, and contacting a nutritionist. Recommended to use the elliptical, stationary bike, less use of treadmill.  Obesity is associated with worsening asthma, shortness of breath, and potential for cardiac disease, diabetes, and other underlying medical conditions. \par \par problem 7: cardiac component\par -recommended to continue to f/u with cardiologist Dr. Jed Wilks\par \par problem 8: poor breathing mechanics\par -Proper breathing techniques were reviewed with an emphasis of exhalation. Patient instructed to breath in for 1 second and out for four seconds. Patient was encouraged to not talk while walking. \par \par problem 9: no OSAS\par -based on his fatigue, EDS, snoring, elevated mallampati class, increased leg size, and changes in memory\par -he is s/p home sleep study\par -recommended Provent in the meantime \par -Sleep apnea is associated with adverse clinical consequences which an affect most organ systems.  Cardiovascular disease risk includes arrhythmias, atrial fibrillation, hypertension, coronary artery disease, and stroke. Metabolic disorders include diabetes type 2, non-alcoholic fatty liver disease. Mood disorder especially depression; and cognitive decline especially in the elderly. Associations with  chronic reflux/Rodríguez’s esophagus some but not all inclusive. \par -Reasons to assess this include arousal consistent with hypopnea; respiratory events most prominent in REM sleep or supine position; therefore sleep staging and body position are important for accurate diagnosis and estimation of AHI.\par \par problem 10: abnormal chest CT\par -c/w TBM and PNA\par -s/p chest CT in 10/19 reconfirmation \par \par problem 11: r/o immunodeficiency\par -s/p blood work: quantitative immunoglobulins, IgG subsets, strep pneumonia titers (all normal)\par \par -Due to the fact that this pt has had more infections than would be expected and immunological blood work is indicated this would include: IgG subclasses, quantitative immunoglobulins, Strep pneumoniae titers as well as Vitamin D levels. Based on this blood work we will be able to decide where the pt needs additional pneumococcal vaccine either Prevnar 13 or pneumovax. Immunology evaluation will also be potentially indicated.\par  \par problem 12: health maintenance\par - Recommended him to have a neurological evaluation with Dr. Jerrell Moore regarding his frequent falling. \par -recommended a yearly flu shot after October 15 -refused\par -recommended strep pneumonia vaccines: Prevnar-13 vaccine, followed by Pneumo vaccine 23 on year following\par -recommended early intervention for URIs\par -recommended osteoporosis evaluations\par -recommended early dermatological evaluations\par -recommended after the age of 50 to the age of 70, colonoscopy every 5 years\par \par F/U in 3 months SPI/NIOX\par He is encouraged to call with any changes, concerns, or questions.

## 2020-01-02 NOTE — REVIEW OF SYSTEMS
[As Noted in HPI] : as noted in HPI [Negative] : Sleep Disorder [Fever] : no fever [Chills] : no chills [Cough] : no cough [Wheezing] : no wheezing [Heartburn] : no heartburn [Reflux] : no reflux [Nausea] : no nausea

## 2020-01-02 NOTE — HISTORY OF PRESENT ILLNESS
[FreeTextEntry1] : Mr. Rhoades is a 74 year old male with a history of allergic rhinitis, severe persistent asthma, dyspnea, elevated IgE, GERD, neck pain, OW, SOB, tracheal stenosis, and Tracheomalacia presenting to the office today for a follow up visit.  His chief complaint is his SOB and poor stamina \par -he notes that he exercises but he doesn't exercise rigorously  due to his SOB. \par -he states that he has had itchy ears due to his hearing aids. \par -he states that his stamina is much better than before. \par -he notes that he has not been coughing and  wheezing. \par -he states that he does not walk as much due to his SOB. \par -he states that some days he is not SOB but other times he is very SOB. \par -he notes that he is frustrated that his stamina is not good enough. \par -he states that he feels very fatigue at times. \par \par -he denies any headaches, nausea, vomiting, fever, chills, sweats, chest pain, chest pressure, diarrhea, constipation, dysphagia, dizziness, sour taste in the mouth, leg swelling, leg pain, itchy eyes, heartburn, reflux, myalgias or arthralgias.

## 2020-01-03 PROBLEM — Z09 POSTOP CHECK: Status: ACTIVE | Noted: 2019-12-10

## 2020-01-08 ENCOUNTER — FORM ENCOUNTER (OUTPATIENT)
Age: 75
End: 2020-01-08

## 2020-01-09 ENCOUNTER — APPOINTMENT (OUTPATIENT)
Dept: THORACIC SURGERY | Facility: CLINIC | Age: 75
End: 2020-01-09
Payer: MEDICARE

## 2020-01-09 ENCOUNTER — OUTPATIENT (OUTPATIENT)
Dept: OUTPATIENT SERVICES | Facility: HOSPITAL | Age: 75
LOS: 1 days | End: 2020-01-09
Payer: MEDICARE

## 2020-01-09 DIAGNOSIS — Z96.643 PRESENCE OF ARTIFICIAL HIP JOINT, BILATERAL: Chronic | ICD-10-CM

## 2020-01-09 DIAGNOSIS — Z96.652 PRESENCE OF LEFT ARTIFICIAL KNEE JOINT: Chronic | ICD-10-CM

## 2020-01-09 DIAGNOSIS — Z98.89 OTHER SPECIFIED POSTPROCEDURAL STATES: Chronic | ICD-10-CM

## 2020-01-09 DIAGNOSIS — G56.01 CARPAL TUNNEL SYNDROME, RIGHT UPPER LIMB: Chronic | ICD-10-CM

## 2020-01-09 DIAGNOSIS — Z09 ENCOUNTER FOR FOLLOW-UP EXAMINATION AFTER COMPLETED TREATMENT FOR CONDITIONS OTHER THAN MALIGNANT NEOPLASM: ICD-10-CM

## 2020-01-09 PROCEDURE — 71046 X-RAY EXAM CHEST 2 VIEWS: CPT

## 2020-01-09 PROCEDURE — 99024 POSTOP FOLLOW-UP VISIT: CPT

## 2020-01-09 PROCEDURE — 71046 X-RAY EXAM CHEST 2 VIEWS: CPT | Mod: 26

## 2020-01-13 ENCOUNTER — RX RENEWAL (OUTPATIENT)
Age: 75
End: 2020-01-13

## 2020-01-16 ENCOUNTER — APPOINTMENT (OUTPATIENT)
Dept: UROLOGY | Facility: CLINIC | Age: 75
End: 2020-01-16
Payer: MEDICARE

## 2020-01-16 VITALS
TEMPERATURE: 97.9 F | DIASTOLIC BLOOD PRESSURE: 67 MMHG | HEART RATE: 86 BPM | HEIGHT: 67 IN | WEIGHT: 196 LBS | BODY MASS INDEX: 30.76 KG/M2 | SYSTOLIC BLOOD PRESSURE: 132 MMHG

## 2020-01-16 DIAGNOSIS — R39.15 URGENCY OF URINATION: ICD-10-CM

## 2020-01-16 DIAGNOSIS — N40.0 BENIGN PROSTATIC HYPERPLASIA WITHOUT LOWER URINARY TRACT SYMPMS: ICD-10-CM

## 2020-01-16 PROCEDURE — 99213 OFFICE O/P EST LOW 20 MIN: CPT

## 2020-02-25 PROBLEM — Z96.652 HISTORY OF LEFT KNEE REPLACEMENT: Status: ACTIVE | Noted: 2019-11-20

## 2020-02-25 PROBLEM — M54.5 LUMBAGO: Status: ACTIVE | Noted: 2020-01-01

## 2020-02-25 NOTE — PHYSICAL EXAM
[de-identified] : Both this patient's hips are doing well today he has flexion right hip 120 left hip 120, abduction right hip 60 left hip 70, adduction right hip 10 left hip 20, external rotation right hip 60 left hip 60, internal rotation right hip 0 left hip 20.  His knees are show that his left knee is doing well with a total knee replacement with a 0 to 125 degrees. Where his right knee does have significant osteoarthritis 0 to 115 degrees of flexion diffuse pain over both medial lateral joint line and patellofemoral crepitus. [de-identified] : MRI RIGHT KNEE 11/22/19 IMPRESSION:\par  \par Limited exam secondary to motion and early termination. \par \par No acute fracture. \par \par Mild patellofemoral, moderate to severe medial and severe lateral compartment osteoarthrosis. \par \par Moderate joint effusion and synovitis with intra-articular bodies.

## 2020-02-25 NOTE — DISCUSSION/SUMMARY
[de-identified] : This patient tolerated the local injection well in his right knee his total joints are doing well he is back gives him normal motor and sensory function however he would like to try different lumbar support which will be tried.  Return visit in 6 months to a year.

## 2020-02-25 NOTE — HISTORY OF PRESENT ILLNESS
[de-identified] : Patient returns to office for follow up of right knee pain.  Has significant pain in his low back.  His left total knee replacement and both of his hips are doing well.

## 2020-02-25 NOTE — PROCEDURE
[de-identified] : Procedure Note:\par \par Anatomic Location:  Right  Knee\par \par Diagnosis:  Arthritis\par \par Procedure:  Injection of 2cc  of Marcaine 0.25% plain and Celestone 1cc, 6mg\par \par Local Spray: Ethyl Chloride.\par \par \par Patient has consented for the procedure.\par \par Injection  through a lateral parapatella approach.\par \par Patient tolerated the procedure well.\par \par Patient instructed to call the office if any reaction, fever, chills, increased erythema or swelling.   [phone].

## 2020-04-02 NOTE — HISTORY OF PRESENT ILLNESS
[FreeTextEntry1] : Mr. Rhoades is a 74 year old male with a history of allergic rhinitis, severe persistent asthma, dyspnea, elevated IgE, GERD, neck pain, OW, SOB, tracheal stenosis, and Tracheomalacia presenting to the office today for a follow up visit.  His chief complaint is problem with his stamina, aches and pains. Pt states:\par -he is having trouble walking, out of breath from walking two blocks\par -he is having trouble with stamina\par -he is feeling very exhausted and tired after walking - that its not so much his breathing instead he is aching, sore and fatigued\par -that his balance is good\par -feels pain by touching his chest, also feels some numbness\par -that his weight today is 191 lbs\par -his left ankle looks swollen and deformed\par -denies taking new medications, vitamins, or supplements. \par -today he denies any headaches, nausea, vomiting, fever, chills, sweats, chest pain, chest pressure, diarrhea, constipation, dysphagia, dizziness, leg swelling, leg pain, itchy eyes, itchy ears, heartburn, reflux, or sour taste in the mouth.

## 2020-04-02 NOTE — PHYSICAL EXAM

## 2020-04-02 NOTE — ADDENDUM
[FreeTextEntry1] : Documented by Nba Kaur acting as a scribe for Dr. Clay Lance on 04/02/2020.\par \par All medical record entries made by the Scribe were at my, Dr. Clay Lance's, direction and personally dictated by me on 04/02/2020. I have reviewed the chart and agree that the record accurately reflects my personal performance of the history, physical exam, assessment and plan. I have also personally directed, reviewed, and agree with the discharge instructions.

## 2020-04-02 NOTE — ASSESSMENT
[FreeTextEntry1] : Mr. Rhoades has HTN, arthritis, tracheomalacia, stenosis, asthma, allergy, GERD, and elevated IgE. He is now here for a Xolair injection; He is currently stable from a pulmonary perspective, and is s/p FOB c/w severe TBM and is s/p his tracheoplasty. improved by still poor stamina, aches and pains.\par \par His shortness of breath is felt to be multifactorial due to:\par - Chronic sinusitis \par -asthma\par -poor breathing mechanics \par -cardiac disease\par -anemia\par -out of shape\par -overweight\par -TBM\par \par problem 1: asthma (stable)\par -can continue to use albuterol by the nebulizer up to QID though should use it first thing in the morning and PRN \par -continue to use Breo Ellipta 200 at 1 inhalation QD\par -continue to use Spiriva Handihaler 1 inhalation QD \par -continue to use QVar 80 2 puffs BID\par -continue Singulair 10 mg QHS \par -continue to use rescue inhaler, Ventolin, PRN and before exercise\par \par Asthma is believed to be caused by inherited (genetic) and environmental factor, but its exact cause is unknown. Asthma may be triggered by allergens, lung infections, or irritants in the air. Asthma triggers are different for each person \par -Inhaler technique reviewed as well as oral hygiene techniques reviewed with patient. Avoidance of cold air, extremes of temperature, rescue inhaler should be used before exercise. Order of medication reviewed with patient. Recommended use of a cool mist humidifier in the bedroom \par You have a clinical scenario most c/w acute bronchitis the etiology of which is unknown but empiric antibiotics are indicated. Hydration, mucolytics including Mucinex, Robitussin and the like are indicated. Cough controlling agents will be needed. \par \par problem 2: tracheomalacia (s/p PNA, s/p tracheoplasty)\par -CTSX f/u with Dr. Bustos and Dr. Verde, f/u in a few months \par -Recommended to use the Acapella or Aerobika devices\par -continue amitriptyline 10mg Q8H PRN\par Tracheomalacia is usually acquired in adults and common causes include damage by tracheostomy or endotracheal intubation damaging the tracheal cartilage with increase risk with multiple intubations, prolonged intubation, and concurrent high dose steroid therapy; external chest wall trauma and surgery; chronic compression of the trachea by benign etiologies (eg, benign mediastinal goiter) or malignancy; relapsing polychondritis; or recurrent infection. Tracheomalacia can be asymptomatic, however signs or symptoms can develop as the severity of the airway narrowing progresses with major symptoms include dyspnea, cough, and sputum retention. Other symptoms include severe paroxysms of coughing, wheezing or stridor, barking cough and may be exacerbated by forced expiration, cough, and Valsalva maneuver. Tracheomalacia is diagnosed by a bronchoscopic visualization of dynamic airway collapse on dynamic chest CT. Therapy is warranted in symptomatic patients with severe tracheomalacia and includes surgical repair as tracheobronchoplasty. The patient was referred to Dr. Everardo Jones or Dr. Johan Verde, at [facility] for a surgical consult.\par \par problem 3: allergic rhinitis\par - continue Pulmicort 0.5 mg BID via navage\par -recommended to use "Navage" nasal rinse device \par -Olopatadine .6% 1 sniff/nostril BID\par -Clarinex 5 mg QD at breakfast \par -s/p allergic profile: blood work to include IgE level(+), eosinophil level(-), vitamin D level(-), asthma profile(-), and food IgE level (-)\par \par -Environmental measures for allergies were encouraged including mattress and pillow cover, air purifier, and environmental controls. \par \par problem 4:  GERD\par -continue to use Nexium 40 mg before breakfast\par -continue Pepcid 40 mg QHS \par -Rule of 2s: avoid eating too much, eating too late, eating too spicy, eating two hours before bed\par -Things to avoid including overeating, spicy foods, tight clothing, eating within three hours of bed, this list is not all inclusive. \par -For treatment of reflux, possible options discussed including diet control, H2 blockers, PPIs, as well as coating motility agents discussed as treatment options. Timing of meals and proximity of last meal to sleep were discussed. If symptoms persist, a formal gastrointestinal evaluation is needed. \par \par problem 5: Elevated IgE level (127) (severe allergic asthma)\par - Xolair shot - R/L arms 225 (today)\par -now injections to Q 4 weeks\par -Xolair is a recombinant DNA- derived humanized IgG1K monoclonal antibody that selectively binds ot human immunoglobulin E (IgE). Xolair is produced by a Chinese hamster ovary cell suspension culture in nutrient medium containing the antibiotic gentamicin. Gentamicin is not detectable in the final product. Xolair is a sterile, white, preservative free, lyophilized powder contained in a single use vial that is reconstituted with sterile water for suspension. Side effects include: wheezing, tightness of the chest, trouble breathing, hives, skin rash, feeling anxious or light-headed, fainting, warmth or tingling under skin, or swelling of face, lips, or tongue \par \par problem 6: obesity \par -Weight loss, exercise, and diet control were discussed and are highly encouraged. Treatment options were given such as, aqua therapy, and contacting a nutritionist. Recommended to use the elliptical, stationary bike, less use of treadmill.  Obesity is associated with worsening asthma, shortness of breath, and potential for cardiac disease, diabetes, and other underlying medical conditions. \par \par problem 7: cardiac component\par -recommended to continue to f/u with cardiologist Dr. Jed Wilks\par \par problem 8: poor breathing mechanics\par -Proper breathing techniques were reviewed with an emphasis of exhalation. Patient instructed to breath in for 1 second and out for four seconds. Patient was encouraged to not talk while walking. \par \par problem 9: no OSAS\par -based on his fatigue, EDS, snoring, elevated mallampati class, increased leg size, and changes in memory\par -he is s/p home sleep study\par -recommended Provent in the meantime \par -Sleep apnea is associated with adverse clinical consequences which an affect most organ systems.  Cardiovascular disease risk includes arrhythmias, atrial fibrillation, hypertension, coronary artery disease, and stroke. Metabolic disorders include diabetes type 2, non-alcoholic fatty liver disease. Mood disorder especially depression; and cognitive decline especially in the elderly. Associations with  chronic reflux/Rodríguez’s esophagus some but not all inclusive. \par -Reasons to assess this include arousal consistent with hypopnea; respiratory events most prominent in REM sleep or supine position; therefore sleep staging and body position are important for accurate diagnosis and estimation of AHI.\par \par problem 10: abnormal chest CT\par -c/w TBM and PNA\par -s/p chest CT in 10/19 reconfirmation \par \par problem 11: r/o immunodeficiency\par -s/p blood work: quantitative immunoglobulins, IgG subsets, strep pneumonia titers (all normal)\par \par -Due to the fact that this pt has had more infections than would be expected and immunological blood work is indicated this would include: IgG subclasses, quantitative immunoglobulins, Strep pneumoniae titers as well as Vitamin D levels. Based on this blood work we will be able to decide where the pt needs additional pneumococcal vaccine either Prevnar 13 or pneumovax. Immunology evaluation will also be potentially indicated.\par  \par problem 12: health maintenance\par -Recommended Arthro soothe pills/tablets\par -s/p a neurological evaluation with Dr. Jerrell Moore regarding his frequent falling. \par -recommended a yearly flu shot after October 15 -refused\par -recommended strep pneumonia vaccines: Prevnar-13 vaccine, followed by Pneumo vaccine 23 on year following  (completed)\par -recommended early intervention for URIs\par -recommended osteoporosis evaluations\par -recommended early dermatological evaluations\par -recommended after the age of 50 to the age of 70, colonoscopy every 5 years\par \par F/U in 3 months SPI/NIOX\par He is encouraged to call with any changes, concerns, or questions.

## 2020-04-02 NOTE — REASON FOR VISIT
[Follow-Up] : a follow-up visit [FreeTextEntry1] : allergic rhinitis, severe persistent asthma, dyspnea, elevated IgE, GERD, neck pain, OW, SOB, tracheal stenosis, and Tracheomalacia

## 2020-04-09 PROBLEM — M25.559 HIP PAIN: Status: ACTIVE | Noted: 2017-05-03

## 2020-05-04 PROBLEM — S46.012A TRAUMATIC INCOMPLETE TEAR OF LEFT ROTATOR CUFF, INITIAL ENCOUNTER: Status: ACTIVE | Noted: 2020-01-01

## 2020-06-15 PROBLEM — E78.5 HYPERLIPIDEMIA, UNSPECIFIED HYPERLIPIDEMIA TYPE: Status: ACTIVE | Noted: 2017-09-27

## 2020-06-15 NOTE — HISTORY OF PRESENT ILLNESS
[FreeTextEntry1] : He presents in scheduled followup accompanied by his girlfriend.  s/p right VATS robotic assisted bronchoplasty with Prolene mesh by Dina Jones at [facility] in on November 25, 2019.  His course was uncomplicated no cardiac issues arise.  His improvement was dramatic but he remains breathless with mild exertion yesterday was able to walk a mile cumulatively stopping occasionally for mild dyspnea without chest distress.  Mild dependent left edema.  Planes of mild generalized fatigue which but sleep pattern is erratic.  Often returns very late after watching TV.\par \par No recurrence of chest discomfort.  Mild dependent edema for a few years w/o change.   No orthopnea. Nocturia x1-2. No palpitations.  Rare postural lightheadedness.  No recurrent falls.  No near syncope or syncope and no associated palpitations.  No syncope. No claudication.

## 2020-06-22 PROBLEM — M75.52 SUBACROMIAL BURSITIS OF LEFT SHOULDER JOINT: Status: ACTIVE | Noted: 2020-01-01

## 2020-07-15 NOTE — REASON FOR VISIT
[Follow-Up] : a follow-up visit [Spouse] : spouse [FreeTextEntry1] : Pre-op clerance, allergic rhinitis, severe persistent asthma, dyspnea, elevated IgE, GERD, neck pain, OW, SOB, tracheal stenosis, and Tracheomalacia

## 2020-07-15 NOTE — PHYSICAL EXAM
[General Appearance - Well Developed] : well developed [General Appearance - Well Nourished] : well nourished [Normal Appearance] : normal appearance [Well Groomed] : well groomed [General Appearance - In No Acute Distress] : no acute distress [Normal Conjunctiva] : the conjunctiva exhibited no abnormalities [No Deformities] : no deformities [Normal Oropharynx] : normal oropharynx [Eyelids - No Xanthelasma] : the eyelids demonstrated no xanthelasmas [Neck Appearance] : the appearance of the neck was normal [Neck Cervical Mass (___cm)] : no neck mass was observed [Thyroid Diffuse Enlargement] : the thyroid was not enlarged [Jugular Venous Distention Increased] : there was no jugular-venous distention [Thyroid Nodule] : there were no palpable thyroid nodules [Heart Rate And Rhythm] : heart rate and rhythm were normal [Heart Sounds] : normal S1 and S2 [Respiration, Rhythm And Depth] : normal respiratory rhythm and effort [Exaggerated Use Of Accessory Muscles For Inspiration] : no accessory muscle use [Auscultation Breath Sounds / Voice Sounds] : lungs were clear to auscultation bilaterally [Abdomen Tenderness] : non-tender [Abdomen Soft] : soft [Abdomen Mass (___ Cm)] : no abdominal mass palpated [Abnormal Walk] : normal gait [Gait - Sufficient For Exercise Testing] : the gait was sufficient for exercise testing [Petechial Hemorrhages (___cm)] : no petechial hemorrhages [Nail Clubbing] : no clubbing of the fingernails [Cyanosis, Localized] : no localized cyanosis [Skin Color & Pigmentation] : normal skin color and pigmentation [] : no rash [No Venous Stasis] : no venous stasis [Skin Lesions] : no skin lesions [No Xanthoma] : no  xanthoma was observed [No Skin Ulcers] : no skin ulcer [Deep Tendon Reflexes (DTR)] : deep tendon reflexes were 2+ and symmetric [Sensation] : the sensory exam was normal to light touch and pinprick [No Focal Deficits] : no focal deficits [Oriented To Time, Place, And Person] : oriented to person, place, and time [Affect] : the affect was normal [Impaired Insight] : insight and judgment were intact [III] : III [FreeTextEntry1] : s/p right VATS surgery

## 2020-07-15 NOTE — PROCEDURE
[FreeTextEntry1] : 6 minute walk test reveals a low saturation of 98% with very slight evidence of dyspnea or fatigue; walked 163 meters\par \par

## 2020-07-15 NOTE — HISTORY OF PRESENT ILLNESS
[FreeTextEntry1] : Mr. Rhoades is a 75 year old male with a history of allergic rhinitis, severe persistent asthma, dyspnea, elevated IgE, GERD, neck pain, OW, SOB, tracheal stenosis, and Tracheomalacia presenting to the office today for a follow up visit.  His chief complaint \par \par -he notes generally feeling well\par -he notes stamina is low \par -he denies increased mucus production\par -he notes improvement from surgery\par -he denies cough\par -he denies wheeze\par -he notes bowels are regularly irregular\par -he denies frequent exercise\par -he notes weight fluctuating, around 200 lbs\par -he denies palpitations\par -he notes ankle swelling Left more than right, normally controlled\par -he notes sleep pattern is regular\par -he notes 6 to 7 hours of sleep on average\par -he notes mild PND active and rather constant\par \par -he denies any chest pain, chest pressure, diarrhea, constipation, dysphagia, dizziness, sour taste in the mouth, leg swelling, leg pain, itchy eyes, itchy ears, heartburn, reflux, myalgias or arthralgias.

## 2020-07-15 NOTE — ADDENDUM
[FreeTextEntry1] :                      ******Amended by Miracle Kong on 7.15.2020***********\par \par \par Documented by Kevin Santiago acting as a scribe for Dr. Clay Lance on 07/01/2020.\par \par All medical record entries made by the Scribe were at my, Dr. Clay Lance's, direction and personally dictated by me on 07/01/2020. I have reviewed the chart and agree that the record accurately reflects my personal performance of the history, physical exam, assessment and plan. I have also personally directed, reviewed, and agree with the discharge instructions.

## 2020-07-15 NOTE — ASSESSMENT
[FreeTextEntry1] : Mr. Rhoades is a 75 year old male who has HTN, arthritis, tracheomalacia, stenosis, asthma, allergy, GERD, and elevated IgE. He is now here for a Xolair injection; He is currently stable from a pulmonary perspective, and is s/p FOB c/w severe TBM and is s/p his tracheoplasty. improved but still poor stamina. \par \par  **************PRE-OP clearance for Gallbladder surgery**********\par -at this point in time there are no absolute pulmonary contraindications to go forward with the planned procedure \par -at the time of surgery s/he should have optimal pain control, incentive spirometry, early ambulation, DVT and GI prophylaxis.\par \par \par \par \par His shortness of breath is felt to be multifactorial due to:\par - Chronic sinusitis \par -asthma\par -poor breathing mechanics \par -cardiac disease\par -anemia\par -out of shape\par -overweight\par -TBM\par \par problem 1: asthma (stable)\par -can continue to use albuterol by the nebulizer up to QID though should use it first thing in the morning and PRN \par -continue to use Breo Ellipta 200 at 1 inhalation QD\par -continue to use Spiriva Handihaler 1 inhalation QD \par -continue to use QVar 80 2 puffs BID\par -continue Singulair 10 mg QHS \par -continue to use rescue inhaler, Ventolin, PRN and before exercise\par \par Asthma is believed to be caused by inherited (genetic) and environmental factor, but its exact cause is unknown. Asthma may be triggered by allergens, lung infections, or irritants in the air. Asthma triggers are different for each person \par -Inhaler technique reviewed as well as oral hygiene techniques reviewed with patient. Avoidance of cold air, extremes of temperature, rescue inhaler should be used before exercise. Order of medication reviewed with patient. Recommended use of a cool mist humidifier in the bedroom \par You have a clinical scenario most c/w acute bronchitis the etiology of which is unknown but empiric antibiotics are indicated. Hydration, mucolytics including Mucinex, Robitussin and the like are indicated. Cough controlling agents will be needed. \par \par problem 2: tracheomalacia (s/p PNA, s/p tracheoplasty)\par -CTSX f/u with Dr. Bustos and Dr. Verde, f/u in a few months \par -Recommended to use the Acapella or Aerobika devices\par -continue amitriptyline 10mg Q8H PRN\par Tracheomalacia is usually acquired in adults and common causes include damage by tracheostomy or endotracheal intubation damaging the tracheal cartilage with increase risk with multiple intubations, prolonged intubation, and concurrent high dose steroid therapy; external chest wall trauma and surgery; chronic compression of the trachea by benign etiologies (eg, benign mediastinal goiter) or malignancy; relapsing polychondritis; or recurrent infection. Tracheomalacia can be asymptomatic, however signs or symptoms can develop as the severity of the airway narrowing progresses with major symptoms include dyspnea, cough, and sputum retention. Other symptoms include severe paroxysms of coughing, wheezing or stridor, barking cough and may be exacerbated by forced expiration, cough, and Valsalva maneuver. Tracheomalacia is diagnosed by a bronchoscopic visualization of dynamic airway collapse on dynamic chest CT. Therapy is warranted in symptomatic patients with severe tracheomalacia and includes surgical repair as tracheobronchoplasty. The patient was referred to Dr. Everardo Jones or Dr. Johan Verde, at [facility] for a surgical consult.\par \par problem 3: allergic rhinitis\par -Add Xhance 1 sniff BID \par - continue Pulmicort 0.5 mg BID via navage\par -recommended to use "Navage" nasal rinse device \par -continue Olopatadine .6% 1 sniff/nostril BID\par -continue Clarinex 5 mg QD at breakfast \par -s/p allergic profile: blood work to include IgE level(+), eosinophil level(-), vitamin D level(-), asthma profile(-), and food IgE level (-)\par \par -Environmental measures for allergies were encouraged including mattress and pillow cover, air purifier, and environmental controls. \par \par problem 4:  GERD\par -continue to use Nexium 40 mg before breakfast\par -continue Pepcid 40 mg QHS \par -Rule of 2s: avoid eating too much, eating too late, eating too spicy, eating two hours before bed\par -Things to avoid including overeating, spicy foods, tight clothing, eating within three hours of bed, this list is not all inclusive. \par -For treatment of reflux, possible options discussed including diet control, H2 blockers, PPIs, as well as coating motility agents discussed as treatment options. Timing of meals and proximity of last meal to sleep were discussed. If symptoms persist, a formal gastrointestinal evaluation is needed. \par \par problem 5: Elevated IgE level (127) (severe allergic asthma)\par -s/p Xolair shot - R/L arms 225 \par -now injections to Q 4 weeks\par -Xolair is a recombinant DNA- derived humanized IgG1K monoclonal antibody that selectively binds ot human immunoglobulin E (IgE). Xolair is produced by a Chinese hamster ovary cell suspension culture in nutrient medium containing the antibiotic gentamicin. Gentamicin is not detectable in the final product. Xolair is a sterile, white, preservative free, lyophilized powder contained in a single use vial that is reconstituted with sterile water for suspension. Side effects include: wheezing, tightness of the chest, trouble breathing, hives, skin rash, feeling anxious or light-headed, fainting, warmth or tingling under skin, or swelling of face, lips, or tongue \par \par problem 6: obesity \par -Weight loss, exercise, and diet control were discussed and are highly encouraged. Treatment options were given such as, aqua therapy, and contacting a nutritionist. Recommended to use the elliptical, stationary bike, less use of treadmill.  Obesity is associated with worsening asthma, shortness of breath, and potential for cardiac disease, diabetes, and other underlying medical conditions. \par \par problem 7: cardiac component\par -recommended to continue to f/u with cardiologist Dr. Jed Wilks\par \par problem 8: poor breathing mechanics\par -Proper breathing techniques were reviewed with an emphasis of exhalation. Patient instructed to breath in for 1 second and out for four seconds. Patient was encouraged to not talk while walking. \par \par problem 9: no OSAS\par -based on his fatigue, EDS, snoring, elevated mallampati class, increased leg size, and changes in memory\par -he is s/p home sleep study\par -recommended Provent in the meantime \par -Sleep apnea is associated with adverse clinical consequences which an affect most organ systems.  Cardiovascular disease risk includes arrhythmias, atrial fibrillation, hypertension, coronary artery disease, and stroke. Metabolic disorders include diabetes type 2, non-alcoholic fatty liver disease. Mood disorder especially depression; and cognitive decline especially in the elderly. Associations with  chronic reflux/Rodríguez’s esophagus some but not all inclusive. \par -Reasons to assess this include arousal consistent with hypopnea; respiratory events most prominent in REM sleep or supine position; therefore sleep staging and body position are important for accurate diagnosis and estimation of AHI.\par \par problem 10: abnormal chest CT\par -c/w TBM and PNA\par -s/p chest CT in 10/19 reconfirmation \par \par problem 11: r/o immunodeficiency\par -s/p blood work: quantitative immunoglobulins, IgG subsets, strep pneumonia titers (all normal)\par \par -Due to the fact that this pt has had more infections than would be expected and immunological blood work is indicated this would include: IgG subclasses, quantitative immunoglobulins, Strep pneumoniae titers as well as Vitamin D levels. Based on this blood work we will be able to decide where the pt needs additional pneumococcal vaccine either Prevnar 13 or pneumovax. Immunology evaluation will also be potentially indicated.\par  \par Problem 12: PRE-OP clearance for Gallbladder surgery \par -at this point in time there are no absolute pulmonary contraindications to go forward with the planned procedure \par -at the time of surgery s/he should have optimal pain control, incentive spirometry, early ambulation, DVT and GI prophylaxis.\par \par problem 13: Health Maintenance/COVID19 Precautions:\par - Clean your hands often. Wash your hands often with soap and water for at least 20 seconds, especially after blowing your nose, coughing, or sneezing, or having been in a public place.\par - If soap and water are not available, use a hand  that contains at least 60% alcohol.\par - To the extent possible, avoid touching high-touch surfaces in public places - elevator buttons, door handles, handrails, handshaking with people, etc. Use a tissue or your sleeve to cover your hand or finger if you must touch something.\par - Wash your hands after touching surfaces in public places.\par - Avoid touching your face, nose, eyes, etc.\par - Clean and disinfect your home to remove germs: practice routine cleaning of frequently touched surfaces (for example: tables, doorknobs, light switches, handles, desks, toilets, faucets, sinks & cell phones)\par - Avoid crowds, especially in poorly ventilated spaces. Your risk of exposure to respiratory viruses like COVID-19 may increase in crowded, closed-in settings with little air circulation if there are people in the crowd who are sick. All patients are recommended to practice social distancing and stay at least 6 feet away from others.\par - Avoid all non-essential travel including plane trips, and especially avoid embarking on cruise ships.\par -If COVID-19 is spreading in your community, take extra measures to put distance between yourself and other people to further reduce your risk of being exposed to this new virus.\par -Stay home as much as possible.\par - Consider ways of getting food brought to your house through family, social, or commercial networks\par -Be aware that the virus has been known to live in the air up to 3 hours post exposure, cardboard up to 24 hours post exposure, copper up to 4 hours post exposure, steel and plastic up to 2-3 days post exposure. Risk of transmission from these surfaces are affected by many variables.\par Immune Support Recommendations:\par -OTC Vitamin C 500mg BID \par -OTC Quercetin 250-500mg BID \par -OTC Zinc 75-100mg per day \par -OTC Melatonin 1 or 2 mg a night \par -OTC Vitamin D 1-4000mg per day \par -OTC Tonic Water 8oz per day\par Asthma and COVID19:\par You need to make sure your asthma is under control. This often requires the use of inhaled corticosteroids (and sometimes oral corticosteroids). Inhaled corticosteroids do not likely reduce your immune system’s ability to fight infections, but oral corticosteroids may. It is important to use the steps above to protect yourself to limit your exposure to any respiratory virus. \par \par problem 14: health maintenance\par -Recommended Arthro soothe pills/tablets\par -s/p a neurological evaluation with Dr. Jerrell Moore regarding his frequent falling. \par -recommended a yearly flu shot after October 15 -refused\par -recommended strep pneumonia vaccines: Prevnar-13 vaccine, followed by Pneumo vaccine 23 on year following  (completed)\par -recommended early intervention for URIs\par -recommended osteoporosis evaluations\par -recommended early dermatological evaluations\par -recommended after the age of 50 to the age of 70, colonoscopy every 5 years\par \par F/U in 3 months SPI/NIOX\par He is encouraged to call with any changes, concerns, or questions. \par \par \par \par          **************PRE-OP clearance for Gallbladder surgery**********\par -at this point in time there are no absolute pulmonary contraindications to go forward with the planned procedure \par -at the time of surgery s/he should have optimal pain control, incentive spirometry, early ambulation, DVT and GI prophylaxis.

## 2020-08-24 NOTE — HISTORY OF PRESENT ILLNESS
[de-identified] : 74 year old D retired male presents with Left shoulder pain for the last 9 months and he relates the onset after a fall. The pain is worst with overhead reaching as well as with across the body and behind the back reaching. He denies numbness, tingling or neck pain. He localizes the pain to the shoulder joint and radiates into the upper arm to the elbow. He has used prednisone that he had from prior prescription with some relief as well as Tylenol daily. \par \par He received a cortisone injection to the left shoulder on 6/22/20 and was indicated for PT.  Lately he notes increasing pain in the left shoulder. Ice taking Aleve 3 tablets per day for pain relief. He has a Cryo/Cuff but he has trouble applying it as he lives alone.\par \par He has a history of bilateral hip replacements and and Left knee replacement\par History notable for HTN, \par \par He states the symptoms are stable. Pain levels include a current pain level of 4/10. \par He states the symptoms seem to be intermittent. \par \par Modifying factors - worsened by lifting. Relieving factors include relieved by acetaminophen and relieved by rest.

## 2020-08-24 NOTE — PHYSICAL EXAM
[UE] : Sensory: Intact in bilateral upper extremities [Rad] : radial 2+ and symmetric bilaterally [Shoulder Instab Anterior Apprehension (Crank) Test Left] : negative Crank test [Shoulder Motion On Adduction] : negative AC provocation [Shoulder Pain During Mayorga-Blake Impingement Test Left] : positive [place]a test [Shoulder Muscle Weakness Supraspinatus Left Only] : negative Drop Arm test [Pain Left Shoulder Active Forw Flexion Against Resistance] : positive Empty Can test [Shoulder Motion On Internal Rotation] : negative Lift Off test [Normal RUE] : Right Upper Extremity: No scars, rashes, lesions, ulcers, skin intact [Active Abduction Left Shoulder Decreased] : positive Painful Arc [Normal LUE] : Left Upper Extremity: No scars, rashes, lesions, ulcers, skin intact [Normal] : Alert and in no acute distress [de-identified] : Pain-free active motion head and neck. Left shoulder: Mild swelling/one soft tissues anteriorly. Focal tenderness. Left shoulder active range of motion-150° forward elevation, 80° abduction with a shrug, external rotation 35°, internal rotation L2. Weakness/pain with resisted forward elevation. Marked weakness with resisted left shoulder external rotation. Intact belly press. Right shoulder: Active range of motion within normal limits. Intact forward flexion, abduction and rotation strength with manual muscle testing.

## 2020-08-24 NOTE — DISCUSSION/SUMMARY
[de-identified] : Discussion had with the patient regarding his shoulder. Recommend he take a break from supervised physical therapy. He can stay on the Aleve as needed for pain relief. Strongly encouraged use of the Cryo/Cuff for ice packs to the shoulder to help reduce swelling.Recommend followup in 2 months.

## 2020-08-31 NOTE — PATIENT PROFILE ADULT - NSPROPASSIVESMOKEEXPOSURE_GEN_A_NUR
Rifampin Pregnancy And Lactation Text: This medication is Pregnancy Category C and it isn't know if it is safe during pregnancy. It is also excreted in breast milk and should not be used if you are breast feeding. Yes

## 2020-09-02 PROBLEM — M18.11 PRIMARY OSTEOARTHRITIS OF FIRST CARPOMETACARPAL JOINT OF RIGHT HAND: Status: ACTIVE | Noted: 2020-01-01

## 2020-09-02 PROBLEM — M65.331 TRIGGER MIDDLE FINGER OF RIGHT HAND: Status: ACTIVE | Noted: 2020-01-01

## 2020-09-02 PROBLEM — M18.12 PRIMARY OSTEOARTHRITIS OF FIRST CARPOMETACARPAL JOINT OF LEFT HAND: Status: ACTIVE | Noted: 2020-01-01

## 2020-09-02 PROBLEM — M65.332 TRIGGER MIDDLE FINGER OF LEFT HAND: Status: ACTIVE | Noted: 2020-01-01

## 2020-09-02 PROBLEM — M19.042 PRIMARY OSTEOARTHRITIS OF LEFT HAND: Status: ACTIVE | Noted: 2020-01-01

## 2020-09-02 PROBLEM — M19.041 PRIMARY OSTEOARTHRITIS OF RIGHT HAND: Status: ACTIVE | Noted: 2020-01-01

## 2020-09-04 NOTE — HISTORY OF PRESENT ILLNESS
[Left] : left hand dominant [FreeTextEntry1] : 75 year old male s/p B/L Carpel tunnel release in 2014 ( Dr. Esquivel), which helped numbness and tingling.  \par Patient has a new problem.  Patient presents today complaining of bilateral middle finger triggering x 2 weeks. L>R  No injury reported. Triggering is worse in Am and at night. Report active triggering. Pt notes triggering doesn't really hurt, but can be "annoying" particularly if the fingers locked.  Denies numbness or tingling. \par Patient has pain at times in the right long finger when grabbing or holding.\par Patient has no other notable hand complaints today.

## 2020-09-04 NOTE — PHYSICAL EXAM
[de-identified] : Right hand\par Swelling MP 2, 3.\par MP 3 ulnar angulation volar subluxation.\par Pain at maximum flexion\par Long finger locks and triggers into extension with moderate pain\par Mild A1 pulley tenderness\par Little finger A1 pulley not tender.  Subtle triggering not painful\par There is no other A1 pulley tenderness or triggering in any other finger, including ring finger, right hand.  \par Basal joint prominent with adduction and stiffness minimal pain\par Heberden's nodes index and little finger nontender\par \par Left hand\par Triggering long finger mild pain\par Mild swelling MP 2, 3 without notable pain\par Little finger subtle provokable triggering, no active triggering; No A1 pulley tenderness.\par There is no other A1 pulley tenderness or triggering in any other finger, left hand.\par No pertinent PIP, or DIP joint contributory findings, except some Heberden's nodes; none are clinically painful.\par Basal joint prominent with adduction and stiffness but no pain neurologic: Median, ulnar, and radial motor and sensory are intact. \par Skin: No cyanosis, clubbing, or rashes.\par Vascular: Radial pulses intact.\par Lymphatic: No streaking or epitrochlear adenopathy.\par The patient is awake, alert, and oriented. Affect appropriate. Cooperative. \par  [de-identified] : Radiographs ordered and interpreted by me today, reviewed and discussed with the patient today.\par \par 5 views right wrist and hand.\par Degenerative changes osteophyte formation ulnar head with loose body formation distal radial ulnar joint.\par Marked hypertrophic osteophyte formation with joint irregular irregularity trapezium first metacarpal joint surfaces.  Osteoarthritis stage III.\par Remainder of wrist and carpus without notable degenerative changes.\par Advanced degenerative change with sclerosis, cyst formation, loss of joint space, osteophyte formation and P 3.\par Moderate degenerative change with osteophyte formation in the 4.\par Mild degenerative change with sclerosis subchondral cyst MP 2.\par No notable degenerative change MP 1, 5.\par Moderate to severe degenerative change with osteophyte formation PIP 2, 3, 4, thumb IP joint.  Milder degenerative change PIP 5.\par Marked degenerative change DIP 2, 3, 4 and mild degenerative change DIP 5. \par  No fractures or dislocations.\par \par 5 views left hand and wrist.  Rounded loose body near ulnar styloid.\par Radiocarpal and midcarpal joint spaces maintained.\par Basal joint sclerosis, subchondral cyst formation, osteophyte formation less severe than right, osteoarthritis stage III.\par Mild degenerative change with osteophyte formation and narrowing MP 3.\par Subtle marginal osteophyte formation MP 2, 4.  Fifth metacarpal neck angulation consistent with previous boxer's fracture.\par Mild to moderate degenerative changes thumb IP joint, PIP 2, 3, 5.\par Moderate to marked degenerative change DIP 2, 3, 4, 5.\par No fractures or dislocations.

## 2020-09-04 NOTE — DISCUSSION/SUMMARY
[FreeTextEntry1] : The patient has bilateral wrist and hand osteoarthritis.\par Patient has bilateral long finger trigger fingers treated with cortisone injections.\par Little finger triggering is minimally symptomatic and will be observed.\par If triggering becomes more problematic in the little finger, or, if long fingers continue to be symptomatic or later recur, the patient should return.\par Prognosis is limited.\par Because of GI sensitivity I do not recommend additional NSAID medication for the osteoarthritis.  Patient states that he has taken diclofenac which has been somewhat beneficial and does not cause GI upset.\par Patient has triggering of the long fingers which were treated with cortisone injection as the primary care rendered today.\par Discussion of nature of patient problem, treatment alternatives and associated risks and complications.  The patient and his significant other, Marli, expressed acceptance and understanding.  All questions were answered.  Prognosis limited.

## 2020-09-04 NOTE — PROCEDURE
[] : Trigger Finger: After full discussion of treatment alternatives, and associated risks, complication, limitations, and expectations, and with patient verbal consent following verbal timeout site verification, steroid injections were administered. Following sterile prep with sterile method, 0.5cc Bupivicaine and 6 mg celestone generic were injected into the flexor tendon sheaths of the right and left [3rd] : 3rd finger

## 2020-10-08 PROBLEM — J39.8 TRACHEAL STENOSIS: Status: ACTIVE | Noted: 2018-04-26

## 2020-10-08 NOTE — PHYSICAL EXAM

## 2020-10-08 NOTE — HISTORY OF PRESENT ILLNESS
[FreeTextEntry1] : Mr. Rhoades is a 75 year old male with a history of allergic rhinitis, severe persistent asthma, dyspnea, elevated IgE, GERD, neck pain, OW, SOB, tracheal stenosis, and Tracheomalacia presenting to the office today for a follow up visit.  His chief complaint \par \par -he notes generally feeling well\par -he notes orthopedic discomforts in shoulder considering PRP treatment\par -he notes intermittent diarrhea\par -he notes reflux diet dependent \par -he notes regular bowel movements \par -he notes his memory and concentration are good \par -he denies ankle swelling\par -he denies cough\par -he denies wheeze\par -he notes breathing improved\par -he notes fatigues easily upon exertion \par -he notes weight increased exacerbated by COVID 19 quarantine\par -he notes exercising walking\par -he notes baby aspirin Rx\par -he denies taking any new medications, vitamins, or supplements\par \par -denies any chest pain, chest pressure, constipation, dysphagia, dizziness, leg swelling, leg pain, myalgias, arthralgias, itchy eyes, itchy ears, heartburn, reflux, or sour taste in the mouth.\par \par

## 2020-10-08 NOTE — ADDENDUM
[FreeTextEntry1] : Documented by Kevin Santiago acting as a scribe for Dr. Clay Lance on 10/08/2020.\par \par All medical record entries made by the Scribe were at my, Dr. Clay Lance's, direction and personally dictated by me on 10/08/2020 . I have reviewed the chart and agree that the record accurately reflects my personal performance of the history, physical exam, assessment and plan. I have also personally directed, reviewed, and agree with the discharge instructions. \par

## 2020-10-09 PROBLEM — Z01.812 PRE-PROCEDURAL LABORATORY EXAMINATION: Status: ACTIVE | Noted: 2020-01-01

## 2020-10-26 PROBLEM — M67.912 DISORDER OF ROTATOR CUFF, LEFT: Status: ACTIVE | Noted: 2020-01-01

## 2020-10-26 PROBLEM — M12.812 ROTATOR CUFF ARTHROPATHY OF LEFT SHOULDER: Status: ACTIVE | Noted: 2020-01-01

## 2020-11-12 NOTE — ASSESSMENT
[FreeTextEntry1] : Mr. Rhoades is a 75 year old male who has HTN, arthritis, tracheomalacia, stenosis, asthma, allergy, GERD, and elevated IgE. He is now here for a Xolair injection; He is currently stable from a pulmonary perspective, and is s/p FOB c/w severe TBM and is s/p his tracheoplasty. improved  #1 issue is SOB/ fatigue w/ exertion then ortho pain. \par \par His shortness of breath is felt to be multifactorial due to:\par - Chronic sinusitis \par -asthma\par -poor breathing mechanics \par -cardiac disease\par -anemia\par -out of shape\par -overweight\par -TBM\par \par problem 1: asthma (stable)\par -can continue to use albuterol by the nebulizer up to QID though should use it first thing in the morning and PRN \par -add Trelegy 1 puff QD\par -continue to use QVar 80 2 puffs BID\par -continue Singulair 10 mg QHS \par -continue to use rescue inhaler, Ventolin, PRN and before exercise\par -Add Daliresp 250 mg BID \par Asthma is believed to be caused by inherited (genetic) and environmental factor, but its exact cause is unknown. Asthma may be triggered by allergens, lung infections, or irritants in the air. Asthma triggers are different for each person \par -Inhaler technique reviewed as well as oral hygiene techniques reviewed with patient. Avoidance of cold air, extremes of temperature, rescue inhaler should be used before exercise. Order of medication reviewed with patient. Recommended use of a cool mist humidifier in the bedroom \par You have a clinical scenario most c/w acute bronchitis the etiology of which is unknown but empiric antibiotics are indicated. Hydration, mucolytics including Mucinex, Robitussin and the like are indicated. Cough controlling agents will be needed. \par \par Problem 1A: Anemia\par -complete blood work for Iron Studies\par \par problem 2: tracheomalacia (s/p PNA, s/p tracheoplasty)\par -CTSX f/u with Dr. Bustos and Dr. Verde, f/u in a few months \par -Recommended to use the Acapella or Aerobika devices\par -continue amitriptyline 10mg Q8H PRN\par Tracheomalacia is usually acquired in adults and common causes include damage by tracheostomy or endotracheal intubation damaging the tracheal cartilage with increase risk with multiple intubations, prolonged intubation, and concurrent high dose steroid therapy; external chest wall trauma and surgery; chronic compression of the trachea by benign etiologies (eg, benign mediastinal goiter) or malignancy; relapsing polychondritis; or recurrent infection. Tracheomalacia can be asymptomatic, however signs or symptoms can develop as the severity of the airway narrowing progresses with major symptoms include dyspnea, cough, and sputum retention. Other symptoms include severe paroxysms of coughing, wheezing or stridor, barking cough and may be exacerbated by forced expiration, cough, and Valsalva maneuver. Tracheomalacia is diagnosed by a bronchoscopic visualization of dynamic airway collapse on dynamic chest CT. Therapy is warranted in symptomatic patients with severe tracheomalacia and includes surgical repair as tracheobronchoplasty. The patient was referred to Dr. Everardo Jones or Dr. Johan Verde, at [facility] for a surgical consult.\par \par problem 3: allergic rhinitis\par -continue Xhance 1 sniff BID \par - continue Pulmicort 0.5 mg BID via navage\par -recommended to use "Navage" nasal rinse device \par -continue Olopatadine .6% 1 sniff/nostril BID\par -continue Clarinex 5 mg QD at breakfast \par -s/p allergic profile: blood work to include IgE level(+), eosinophil level(-), vitamin D level(-), asthma profile(-), and food IgE level (-)\par \par -Environmental measures for allergies were encouraged including mattress and pillow cover, air purifier, and environmental controls. \par \par problem 4:  GERD\par -continue to use Nexium 40 mg before breakfast\par -continue Pepcid 40 mg QHS \par -Rule of 2s: avoid eating too much, eating too late, eating too spicy, eating two hours before bed\par -Things to avoid including overeating, spicy foods, tight clothing, eating within three hours of bed, this list is not all inclusive. \par -For treatment of reflux, possible options discussed including diet control, H2 blockers, PPIs, as well as coating motility agents discussed as treatment options. Timing of meals and proximity of last meal to sleep were discussed. If symptoms persist, a formal gastrointestinal evaluation is needed. \par \par problem 5: Elevated IgE level (127) (severe allergic asthma)\par -s/p Xolair shot - R/L arms 225 (administered in office 11/12/2020 ) \par -now injections to Q 4 weeks\par -Xolair is a recombinant DNA- derived humanized IgG1K monoclonal antibody that selectively binds ot human immunoglobulin E (IgE). Xolair is produced by a Chinese hamster ovary cell suspension culture in nutrient medium containing the antibiotic gentamicin. Gentamicin is not detectable in the final product. Xolair is a sterile, white, preservative free, lyophilized powder contained in a single use vial that is reconstituted with sterile water for suspension. Side effects include: wheezing, tightness of the chest, trouble breathing, hives, skin rash, feeling anxious or light-headed, fainting, warmth or tingling under skin, or swelling of face, lips, or tongue \par \par problem 6: obesity \par -Weight loss, exercise, and diet control were discussed and are highly encouraged. Treatment options were given such as, aqua therapy, and contacting a nutritionist. Recommended to use the elliptical, stationary bike, less use of treadmill.  Obesity is associated with worsening asthma, shortness of breath, and potential for cardiac disease, diabetes, and other underlying medical conditions. \par \par problem 7: cardiac component\par -recommended to continue to f/u with cardiologist Dr. Jed Wilks\par \par problem 8: poor breathing mechanics\par -Proper breathing techniques were reviewed with an emphasis of exhalation. Patient instructed to breath in for 1 second and out for four seconds. Patient was encouraged to not talk while walking. \par \par problem 9: no OSAS\par -based on his fatigue, EDS, snoring, elevated mallampati class, increased leg size, and changes in memory\par -he is s/p home sleep study\par -recommended Provent in the meantime \par -Sleep apnea is associated with adverse clinical consequences which an affect most organ systems.  Cardiovascular disease risk includes arrhythmias, atrial fibrillation, hypertension, coronary artery disease, and stroke. Metabolic disorders include diabetes type 2, non-alcoholic fatty liver disease. Mood disorder especially depression; and cognitive decline especially in the elderly. Associations with  chronic reflux/Rodríguez’s esophagus some but not all inclusive. \par -Reasons to assess this include arousal consistent with hypopnea; respiratory events most prominent in REM sleep or supine position; therefore sleep staging and body position are important for accurate diagnosis and estimation of AHI.\par \par problem 10: abnormal chest CT\par -c/w TBM and PNA\par -s/p chest CT in 10/19 reconfirmation \par \par problem 11: r/o immunodeficiency\par -s/p blood work: quantitative immunoglobulins, IgG subsets, strep pneumonia titers (all normal)\par \par -Due to the fact that this pt has had more infections than would be expected and immunological blood work is indicated this would include: IgG subclasses, quantitative immunoglobulins, Strep pneumoniae titers as well as Vitamin D levels. Based on this blood work we will be able to decide where the pt needs additional pneumococcal vaccine either Prevnar 13 or pneumovax. Immunology evaluation will also be potentially indicated.\par \par problem 12: Health Maintenance/COVID19 Precautions:\par - Clean your hands often. Wash your hands often with soap and water for at least 20 seconds, especially after blowing your nose, coughing, or sneezing, or having been in a public place.\par - If soap and water are not available, use a hand  that contains at least 60% alcohol.\par - To the extent possible, avoid touching high-touch surfaces in public places - elevator buttons, door handles, handrails, handshaking with people, etc. Use a tissue or your sleeve to cover your hand or finger if you must touch something.\par - Wash your hands after touching surfaces in public places.\par - Avoid touching your face, nose, eyes, etc.\par - Clean and disinfect your home to remove germs: practice routine cleaning of frequently touched surfaces (for example: tables, doorknobs, light switches, handles, desks, toilets, faucets, sinks & cell phones)\par - Avoid crowds, especially in poorly ventilated spaces. Your risk of exposure to respiratory viruses like COVID-19 may increase in crowded, closed-in settings with little air circulation if there are people in the crowd who are sick. All patients are recommended to practice social distancing and stay at least 6 feet away from others.\par - Avoid all non-essential travel including plane trips, and especially avoid embarking on cruise ships.\par -If COVID-19 is spreading in your community, take extra measures to put distance between yourself and other people to further reduce your risk of being exposed to this new virus.\par -Stay home as much as possible.\par - Consider ways of getting food brought to your house through family, social, or commercial networks\par -Be aware that the virus has been known to live in the air up to 3 hours post exposure, cardboard up to 24 hours post exposure, copper up to 4 hours post exposure, steel and plastic up to 2-3 days post exposure. Risk of transmission from these surfaces are affected by many variables.\par Immune Support Recommendations:\par -OTC Vitamin C 500mg BID \par -OTC Quercetin 250-500mg BID \par -OTC Zinc 75-100mg per day \par -OTC Melatonin 1 or 2 mg a night \par -OTC Vitamin D 1-4000mg per day \par -OTC Tonic Water 8oz per day\par Asthma and COVID19:\par You need to make sure your asthma is under control. This often requires the use of inhaled corticosteroids (and sometimes oral corticosteroids). Inhaled corticosteroids do not likely reduce your immune system’s ability to fight infections, but oral corticosteroids may. It is important to use the steps above to protect yourself to limit your exposure to any respiratory virus. \par \par problem 13: health maintenance\par -Recommended Arthro soothe pills/tablets\par -s/p a neurological evaluation with Dr. Jerrell Moore regarding his frequent falling. \par -recommended a yearly flu shot after October 15 -refused\par -recommended strep pneumonia vaccines: Prevnar-13 vaccine, followed by Pneumo vaccine 23 on year following  (completed)\par -recommended early intervention for URIs\par -recommended osteoporosis evaluations\par -recommended early dermatological evaluations\par -recommended after the age of 50 to the age of 70, colonoscopy every 5 years\par \par F/U in 3 months SPI/NIOX\par He is encouraged to call with any changes, concerns, or questions.

## 2020-11-12 NOTE — ADDENDUM
[FreeTextEntry1] : Documented by Kevin Santiago acting as a scribe for Dr. Clay Lance on 11/12/2020.\par \par All medical record entries made by the Scribe were at my, Dr. Clay Lance's, direction and personally dictated by me on 11/12/2020 . I have reviewed the chart and agree that the record accurately reflects my personal performance of the history, physical exam, assessment and plan. I have also personally directed, reviewed, and agree with the discharge instructions. \par

## 2020-12-07 PROBLEM — R09.89 LABILE HYPERTENSION: Status: ACTIVE | Noted: 2018-11-27

## 2020-12-07 PROBLEM — I49.5 SICK SINUS SYNDROME: Status: ACTIVE | Noted: 2017-09-27

## 2020-12-07 PROBLEM — R06.00 DYSPNEA: Status: ACTIVE | Noted: 2017-09-27

## 2020-12-07 PROBLEM — D50.9 IRON DEFICIENCY ANEMIA: Status: ACTIVE | Noted: 2020-01-01

## 2020-12-07 NOTE — HISTORY OF PRESENT ILLNESS
[FreeTextEntry1] : He presents in scheduled followup accompanied by his girlfriend.  Over the past few months he has been breathless with a lower threshold of effort; compelled to rest after walking 50 to 100 feet, climbing stairs and is also short of breath with bending.  No associated chest discomfort.  Found to be mildly anemic and iron deficient.  Saw Dr. Mueller and last week had his first iron infusion.  Apparently stool for occult blood has not been checked.\par Also complains of generalized fatigue.  Sleeps about 6 hours nightly.  No interruptions for nocturia in recent months.\par Continues to sleep on 2 pillows and minimal edema at days end.\par No recurrence of chest discomfort.   No orthopnea. Nocturia x1-2. No palpitations.  Rare postural lightheadedness.  No recurrent falls.  No near syncope or syncope and no palpitations.  No claudication.

## 2021-01-01 ENCOUNTER — TRANSCRIPTION ENCOUNTER (OUTPATIENT)
Age: 76
End: 2021-01-01

## 2021-01-01 ENCOUNTER — NON-APPOINTMENT (OUTPATIENT)
Age: 76
End: 2021-01-01

## 2021-01-01 ENCOUNTER — APPOINTMENT (OUTPATIENT)
Dept: UROLOGY | Facility: CLINIC | Age: 76
End: 2021-01-01

## 2021-01-01 ENCOUNTER — APPOINTMENT (OUTPATIENT)
Dept: PULMONOLOGY | Facility: CLINIC | Age: 76
End: 2021-01-01

## 2021-01-01 ENCOUNTER — APPOINTMENT (OUTPATIENT)
Dept: PULMONOLOGY | Facility: CLINIC | Age: 76
End: 2021-01-01
Payer: MEDICARE

## 2021-01-01 ENCOUNTER — LABORATORY RESULT (OUTPATIENT)
Age: 76
End: 2021-01-01

## 2021-01-01 ENCOUNTER — OUTPATIENT (OUTPATIENT)
Dept: OUTPATIENT SERVICES | Facility: HOSPITAL | Age: 76
LOS: 1 days | End: 2021-01-01

## 2021-01-01 ENCOUNTER — APPOINTMENT (OUTPATIENT)
Dept: DISASTER EMERGENCY | Facility: HOSPITAL | Age: 76
End: 2021-01-01

## 2021-01-01 ENCOUNTER — INPATIENT (INPATIENT)
Facility: HOSPITAL | Age: 76
LOS: 9 days | DRG: 177 | End: 2021-01-21
Attending: INTERNAL MEDICINE | Admitting: INTERNAL MEDICINE
Payer: MEDICARE

## 2021-01-01 VITALS
OXYGEN SATURATION: 91 % | DIASTOLIC BLOOD PRESSURE: 61 MMHG | SYSTOLIC BLOOD PRESSURE: 103 MMHG | RESPIRATION RATE: 18 BRPM | HEIGHT: 68 IN | TEMPERATURE: 99 F | WEIGHT: 190.04 LBS | HEART RATE: 77 BPM

## 2021-01-01 VITALS — HEIGHT: 68 IN | WEIGHT: 205 LBS | BODY MASS INDEX: 31.07 KG/M2

## 2021-01-01 VITALS
HEART RATE: 84 BPM | WEIGHT: 197.98 LBS | OXYGEN SATURATION: 84 % | SYSTOLIC BLOOD PRESSURE: 123 MMHG | HEIGHT: 68 IN | TEMPERATURE: 98 F | RESPIRATION RATE: 22 BRPM | DIASTOLIC BLOOD PRESSURE: 70 MMHG

## 2021-01-01 VITALS — HEART RATE: 94 BPM | OXYGEN SATURATION: 92 %

## 2021-01-01 DIAGNOSIS — U07.1 COVID-19: ICD-10-CM

## 2021-01-01 DIAGNOSIS — F32.9 MAJOR DEPRESSIVE DISORDER, SINGLE EPISODE, UNSPECIFIED: ICD-10-CM

## 2021-01-01 DIAGNOSIS — Z98.89 OTHER SPECIFIED POSTPROCEDURAL STATES: Chronic | ICD-10-CM

## 2021-01-01 DIAGNOSIS — J45.50 SEVERE PERSISTENT ASTHMA, UNCOMPLICATED: ICD-10-CM

## 2021-01-01 DIAGNOSIS — K21.9 GASTRO-ESOPHAGEAL REFLUX DISEASE W/OUT ESOPHAGITIS: ICD-10-CM

## 2021-01-01 DIAGNOSIS — Z96.643 PRESENCE OF ARTIFICIAL HIP JOINT, BILATERAL: Chronic | ICD-10-CM

## 2021-01-01 DIAGNOSIS — Z96.652 PRESENCE OF LEFT ARTIFICIAL KNEE JOINT: Chronic | ICD-10-CM

## 2021-01-01 DIAGNOSIS — Z20.822 CONTACT WITH AND (SUSPECTED) EXPOSURE TO COVID-19: ICD-10-CM

## 2021-01-01 DIAGNOSIS — E78.5 HYPERLIPIDEMIA, UNSPECIFIED: ICD-10-CM

## 2021-01-01 DIAGNOSIS — R06.00 DYSPNEA, UNSPECIFIED: ICD-10-CM

## 2021-01-01 DIAGNOSIS — J39.8 OTHER SPECIFIED DISEASES OF UPPER RESPIRATORY TRACT: ICD-10-CM

## 2021-01-01 DIAGNOSIS — R53.81 OTHER MALAISE: ICD-10-CM

## 2021-01-01 DIAGNOSIS — G56.01 CARPAL TUNNEL SYNDROME, RIGHT UPPER LIMB: Chronic | ICD-10-CM

## 2021-01-01 DIAGNOSIS — R06.02 SHORTNESS OF BREATH: ICD-10-CM

## 2021-01-01 DIAGNOSIS — R76.8 OTHER SPECIFIED ABNORMAL IMMUNOLOGICAL FINDINGS IN SERUM: ICD-10-CM

## 2021-01-01 DIAGNOSIS — E66.3 OVERWEIGHT: ICD-10-CM

## 2021-01-01 DIAGNOSIS — Z51.5 ENCOUNTER FOR PALLIATIVE CARE: ICD-10-CM

## 2021-01-01 DIAGNOSIS — J30.9 ALLERGIC RHINITIS, UNSPECIFIED: ICD-10-CM

## 2021-01-01 DIAGNOSIS — J44.9 CHRONIC OBSTRUCTIVE PULMONARY DISEASE, UNSPECIFIED: ICD-10-CM

## 2021-01-01 LAB
ALBUMIN SERPL ELPH-MCNC: 2.2 G/DL — LOW (ref 3.3–5)
ALBUMIN SERPL ELPH-MCNC: 2.4 G/DL — LOW (ref 3.3–5)
ALBUMIN SERPL ELPH-MCNC: 2.5 G/DL — LOW (ref 3.3–5)
ALBUMIN SERPL ELPH-MCNC: 2.7 G/DL — LOW (ref 3.3–5)
ALBUMIN SERPL ELPH-MCNC: 2.7 G/DL — LOW (ref 3.3–5)
ALBUMIN SERPL ELPH-MCNC: 2.8 G/DL — LOW (ref 3.3–5)
ALBUMIN SERPL ELPH-MCNC: 3.1 G/DL — LOW (ref 3.3–5)
ALBUMIN SERPL ELPH-MCNC: 3.2 G/DL — LOW (ref 3.3–5)
ALBUMIN SERPL ELPH-MCNC: 3.3 G/DL — SIGNIFICANT CHANGE UP (ref 3.3–5)
ALBUMIN SERPL ELPH-MCNC: 3.3 G/DL — SIGNIFICANT CHANGE UP (ref 3.3–5)
ALBUMIN SERPL ELPH-MCNC: 3.4 G/DL — SIGNIFICANT CHANGE UP (ref 3.3–5)
ALBUMIN SERPL ELPH-MCNC: 3.5 G/DL — SIGNIFICANT CHANGE UP (ref 3.3–5)
ALBUMIN SERPL ELPH-MCNC: 3.8 G/DL — SIGNIFICANT CHANGE UP (ref 3.3–5)
ALBUMIN SERPL ELPH-MCNC: 4.3 G/DL — SIGNIFICANT CHANGE UP (ref 3.3–5)
ALBUMIN SERPL ELPH-MCNC: 4.7 G/DL
ALP BLD-CCNC: 350 U/L
ALP SERPL-CCNC: 304 U/L — HIGH (ref 40–120)
ALP SERPL-CCNC: 316 U/L — HIGH (ref 40–120)
ALP SERPL-CCNC: 317 U/L — HIGH (ref 40–120)
ALP SERPL-CCNC: 317 U/L — HIGH (ref 40–120)
ALP SERPL-CCNC: 324 U/L — HIGH (ref 40–120)
ALP SERPL-CCNC: 338 U/L — HIGH (ref 40–120)
ALP SERPL-CCNC: 361 U/L — HIGH (ref 40–120)
ALP SERPL-CCNC: 387 U/L — HIGH (ref 40–120)
ALP SERPL-CCNC: 393 U/L — HIGH (ref 40–120)
ALP SERPL-CCNC: 394 U/L — HIGH (ref 40–120)
ALP SERPL-CCNC: 402 U/L — HIGH (ref 40–120)
ALP SERPL-CCNC: 405 U/L — HIGH (ref 40–120)
ALP SERPL-CCNC: 409 U/L — HIGH (ref 40–120)
ALP SERPL-CCNC: 416 U/L — HIGH (ref 40–120)
ALP SERPL-CCNC: 504 U/L — HIGH (ref 40–120)
ALP SERPL-CCNC: 526 U/L — HIGH (ref 40–120)
ALT FLD-CCNC: 118 U/L — HIGH (ref 10–45)
ALT FLD-CCNC: 118 U/L — HIGH (ref 10–45)
ALT FLD-CCNC: 126 U/L — HIGH (ref 10–45)
ALT FLD-CCNC: 128 U/L — HIGH (ref 10–45)
ALT FLD-CCNC: 131 U/L — HIGH (ref 10–45)
ALT FLD-CCNC: 134 U/L — HIGH (ref 10–45)
ALT FLD-CCNC: 136 U/L — HIGH (ref 10–45)
ALT FLD-CCNC: 206 U/L — HIGH (ref 10–45)
ALT FLD-CCNC: 207 U/L — HIGH (ref 10–45)
ALT FLD-CCNC: 207 U/L — HIGH (ref 10–45)
ALT FLD-CCNC: 212 U/L — HIGH (ref 10–45)
ALT FLD-CCNC: 214 U/L — HIGH (ref 10–45)
ALT FLD-CCNC: 215 U/L — HIGH (ref 10–45)
ALT FLD-CCNC: 95 U/L — HIGH (ref 10–45)
ALT FLD-CCNC: 97 U/L — HIGH (ref 10–45)
ALT FLD-CCNC: 99 U/L — HIGH (ref 10–45)
ALT SERPL-CCNC: 122 U/L
ANION GAP SERPL CALC-SCNC: 11 MMOL/L — SIGNIFICANT CHANGE UP (ref 5–17)
ANION GAP SERPL CALC-SCNC: 11 MMOL/L — SIGNIFICANT CHANGE UP (ref 5–17)
ANION GAP SERPL CALC-SCNC: 12 MMOL/L — SIGNIFICANT CHANGE UP (ref 5–17)
ANION GAP SERPL CALC-SCNC: 13 MMOL/L — SIGNIFICANT CHANGE UP (ref 5–17)
ANION GAP SERPL CALC-SCNC: 13 MMOL/L — SIGNIFICANT CHANGE UP (ref 5–17)
ANION GAP SERPL CALC-SCNC: 16 MMOL/L
ANION GAP SERPL CALC-SCNC: 17 MMOL/L — SIGNIFICANT CHANGE UP (ref 5–17)
ANION GAP SERPL CALC-SCNC: 18 MMOL/L — HIGH (ref 5–17)
ANION GAP SERPL CALC-SCNC: 18 MMOL/L — HIGH (ref 5–17)
APPEARANCE UR: CLEAR — SIGNIFICANT CHANGE UP
AST SERPL-CCNC: 101 U/L — HIGH (ref 10–40)
AST SERPL-CCNC: 110 U/L — HIGH (ref 10–40)
AST SERPL-CCNC: 123 U/L — HIGH (ref 10–40)
AST SERPL-CCNC: 125 U/L — HIGH (ref 10–40)
AST SERPL-CCNC: 149 U/L — HIGH (ref 10–40)
AST SERPL-CCNC: 152 U/L — HIGH (ref 10–40)
AST SERPL-CCNC: 38 U/L — SIGNIFICANT CHANGE UP (ref 10–40)
AST SERPL-CCNC: 40 U/L — SIGNIFICANT CHANGE UP (ref 10–40)
AST SERPL-CCNC: 60 U/L
AST SERPL-CCNC: 63 U/L — HIGH (ref 10–40)
AST SERPL-CCNC: 72 U/L — HIGH (ref 10–40)
AST SERPL-CCNC: 81 U/L — HIGH (ref 10–40)
AST SERPL-CCNC: 82 U/L — HIGH (ref 10–40)
AST SERPL-CCNC: 84 U/L — HIGH (ref 10–40)
AST SERPL-CCNC: 85 U/L — HIGH (ref 10–40)
AST SERPL-CCNC: 86 U/L — HIGH (ref 10–40)
AST SERPL-CCNC: 94 U/L — HIGH (ref 10–40)
BACTERIA # UR AUTO: NEGATIVE — SIGNIFICANT CHANGE UP
BASE EXCESS BLDA CALC-SCNC: -1.2 MMOL/L — SIGNIFICANT CHANGE UP (ref -2–2)
BASOPHILS # BLD AUTO: 0 K/UL — SIGNIFICANT CHANGE UP (ref 0–0.2)
BASOPHILS # BLD AUTO: 0 K/UL — SIGNIFICANT CHANGE UP (ref 0–0.2)
BASOPHILS # BLD AUTO: 0.01 K/UL — SIGNIFICANT CHANGE UP (ref 0–0.2)
BASOPHILS # BLD AUTO: 0.09 K/UL — SIGNIFICANT CHANGE UP (ref 0–0.2)
BASOPHILS NFR BLD AUTO: 0 % — SIGNIFICANT CHANGE UP (ref 0–2)
BASOPHILS NFR BLD AUTO: 0.2 % — SIGNIFICANT CHANGE UP (ref 0–2)
BASOPHILS NFR BLD AUTO: 0.7 % — SIGNIFICANT CHANGE UP (ref 0–2)
BILIRUB DIRECT SERPL-MCNC: 0.1 MG/DL — SIGNIFICANT CHANGE UP (ref 0–0.2)
BILIRUB DIRECT SERPL-MCNC: 0.2 MG/DL — SIGNIFICANT CHANGE UP (ref 0–0.2)
BILIRUB DIRECT SERPL-MCNC: 0.3 MG/DL — HIGH (ref 0–0.2)
BILIRUB DIRECT SERPL-MCNC: 0.5 MG/DL — HIGH (ref 0–0.2)
BILIRUB DIRECT SERPL-MCNC: 0.7 MG/DL — HIGH (ref 0–0.2)
BILIRUB DIRECT SERPL-MCNC: 0.8 MG/DL — HIGH (ref 0–0.2)
BILIRUB DIRECT SERPL-MCNC: <0.1 MG/DL — SIGNIFICANT CHANGE UP (ref 0–0.2)
BILIRUB INDIRECT FLD-MCNC: 0.4 MG/DL — SIGNIFICANT CHANGE UP (ref 0.2–1)
BILIRUB INDIRECT FLD-MCNC: 0.5 MG/DL — SIGNIFICANT CHANGE UP (ref 0.2–1)
BILIRUB INDIRECT FLD-MCNC: 0.7 MG/DL — SIGNIFICANT CHANGE UP (ref 0.2–1)
BILIRUB INDIRECT FLD-MCNC: >0.9 MG/DL — SIGNIFICANT CHANGE UP (ref 0.2–1)
BILIRUB SERPL-MCNC: 0.4 MG/DL — SIGNIFICANT CHANGE UP (ref 0.2–1.2)
BILIRUB SERPL-MCNC: 0.4 MG/DL — SIGNIFICANT CHANGE UP (ref 0.2–1.2)
BILIRUB SERPL-MCNC: 0.5 MG/DL
BILIRUB SERPL-MCNC: 0.6 MG/DL — SIGNIFICANT CHANGE UP (ref 0.2–1.2)
BILIRUB SERPL-MCNC: 0.6 MG/DL — SIGNIFICANT CHANGE UP (ref 0.2–1.2)
BILIRUB SERPL-MCNC: 0.7 MG/DL — SIGNIFICANT CHANGE UP (ref 0.2–1.2)
BILIRUB SERPL-MCNC: 0.7 MG/DL — SIGNIFICANT CHANGE UP (ref 0.2–1.2)
BILIRUB SERPL-MCNC: 0.9 MG/DL — SIGNIFICANT CHANGE UP (ref 0.2–1.2)
BILIRUB SERPL-MCNC: 0.9 MG/DL — SIGNIFICANT CHANGE UP (ref 0.2–1.2)
BILIRUB SERPL-MCNC: 1 MG/DL — SIGNIFICANT CHANGE UP (ref 0.2–1.2)
BILIRUB SERPL-MCNC: 1.1 MG/DL — SIGNIFICANT CHANGE UP (ref 0.2–1.2)
BILIRUB SERPL-MCNC: 1.2 MG/DL — SIGNIFICANT CHANGE UP (ref 0.2–1.2)
BILIRUB SERPL-MCNC: 1.2 MG/DL — SIGNIFICANT CHANGE UP (ref 0.2–1.2)
BILIRUB SERPL-MCNC: 1.3 MG/DL — HIGH (ref 0.2–1.2)
BILIRUB SERPL-MCNC: 1.4 MG/DL — HIGH (ref 0.2–1.2)
BILIRUB UR-MCNC: NEGATIVE — SIGNIFICANT CHANGE UP
BUN SERPL-MCNC: 18 MG/DL — SIGNIFICANT CHANGE UP (ref 7–23)
BUN SERPL-MCNC: 20 MG/DL — SIGNIFICANT CHANGE UP (ref 7–23)
BUN SERPL-MCNC: 21 MG/DL — SIGNIFICANT CHANGE UP (ref 7–23)
BUN SERPL-MCNC: 22 MG/DL — SIGNIFICANT CHANGE UP (ref 7–23)
BUN SERPL-MCNC: 24 MG/DL — HIGH (ref 7–23)
BUN SERPL-MCNC: 24 MG/DL — HIGH (ref 7–23)
BUN SERPL-MCNC: 27 MG/DL
BUN SERPL-MCNC: 27 MG/DL — HIGH (ref 7–23)
BUN SERPL-MCNC: 27 MG/DL — HIGH (ref 7–23)
BUN SERPL-MCNC: 31 MG/DL — HIGH (ref 7–23)
BUN SERPL-MCNC: 34 MG/DL — HIGH (ref 7–23)
CALCIUM SERPL-MCNC: 8.6 MG/DL — SIGNIFICANT CHANGE UP (ref 8.4–10.5)
CALCIUM SERPL-MCNC: 8.6 MG/DL — SIGNIFICANT CHANGE UP (ref 8.4–10.5)
CALCIUM SERPL-MCNC: 8.7 MG/DL — SIGNIFICANT CHANGE UP (ref 8.4–10.5)
CALCIUM SERPL-MCNC: 8.8 MG/DL — SIGNIFICANT CHANGE UP (ref 8.4–10.5)
CALCIUM SERPL-MCNC: 8.8 MG/DL — SIGNIFICANT CHANGE UP (ref 8.4–10.5)
CALCIUM SERPL-MCNC: 9 MG/DL — SIGNIFICANT CHANGE UP (ref 8.4–10.5)
CALCIUM SERPL-MCNC: 9 MG/DL — SIGNIFICANT CHANGE UP (ref 8.4–10.5)
CALCIUM SERPL-MCNC: 9.1 MG/DL — SIGNIFICANT CHANGE UP (ref 8.4–10.5)
CALCIUM SERPL-MCNC: 9.3 MG/DL
CALCIUM SERPL-MCNC: 9.3 MG/DL — SIGNIFICANT CHANGE UP (ref 8.4–10.5)
CALCIUM SERPL-MCNC: 9.8 MG/DL — SIGNIFICANT CHANGE UP (ref 8.4–10.5)
CHLORIDE SERPL-SCNC: 101 MMOL/L — SIGNIFICANT CHANGE UP (ref 96–108)
CHLORIDE SERPL-SCNC: 101 MMOL/L — SIGNIFICANT CHANGE UP (ref 96–108)
CHLORIDE SERPL-SCNC: 102 MMOL/L
CHLORIDE SERPL-SCNC: 102 MMOL/L — SIGNIFICANT CHANGE UP (ref 96–108)
CHLORIDE SERPL-SCNC: 103 MMOL/L — SIGNIFICANT CHANGE UP (ref 96–108)
CHLORIDE SERPL-SCNC: 105 MMOL/L — SIGNIFICANT CHANGE UP (ref 96–108)
CHLORIDE SERPL-SCNC: 94 MMOL/L — LOW (ref 96–108)
CHLORIDE SERPL-SCNC: 95 MMOL/L — LOW (ref 96–108)
CHLORIDE SERPL-SCNC: 95 MMOL/L — LOW (ref 96–108)
CHLORIDE SERPL-SCNC: 98 MMOL/L — SIGNIFICANT CHANGE UP (ref 96–108)
CHLORIDE SERPL-SCNC: 98 MMOL/L — SIGNIFICANT CHANGE UP (ref 96–108)
CO2 BLDA-SCNC: 22 MMOL/L — SIGNIFICANT CHANGE UP (ref 22–30)
CO2 SERPL-SCNC: 20 MMOL/L — LOW (ref 22–31)
CO2 SERPL-SCNC: 21 MMOL/L — LOW (ref 22–31)
CO2 SERPL-SCNC: 21 MMOL/L — LOW (ref 22–31)
CO2 SERPL-SCNC: 23 MMOL/L
CO2 SERPL-SCNC: 24 MMOL/L — SIGNIFICANT CHANGE UP (ref 22–31)
CO2 SERPL-SCNC: 24 MMOL/L — SIGNIFICANT CHANGE UP (ref 22–31)
CO2 SERPL-SCNC: 25 MMOL/L — SIGNIFICANT CHANGE UP (ref 22–31)
CO2 SERPL-SCNC: 26 MMOL/L — SIGNIFICANT CHANGE UP (ref 22–31)
CO2 SERPL-SCNC: 27 MMOL/L — SIGNIFICANT CHANGE UP (ref 22–31)
COLOR SPEC: YELLOW — SIGNIFICANT CHANGE UP
COMMENT - URINE: SIGNIFICANT CHANGE UP
CREAT SERPL-MCNC: 0.59 MG/DL — SIGNIFICANT CHANGE UP (ref 0.5–1.3)
CREAT SERPL-MCNC: 0.61 MG/DL — SIGNIFICANT CHANGE UP (ref 0.5–1.3)
CREAT SERPL-MCNC: 0.63 MG/DL — SIGNIFICANT CHANGE UP (ref 0.5–1.3)
CREAT SERPL-MCNC: 0.65 MG/DL — SIGNIFICANT CHANGE UP (ref 0.5–1.3)
CREAT SERPL-MCNC: 0.67 MG/DL — SIGNIFICANT CHANGE UP (ref 0.5–1.3)
CREAT SERPL-MCNC: 0.67 MG/DL — SIGNIFICANT CHANGE UP (ref 0.5–1.3)
CREAT SERPL-MCNC: 0.68 MG/DL — SIGNIFICANT CHANGE UP (ref 0.5–1.3)
CREAT SERPL-MCNC: 0.69 MG/DL — SIGNIFICANT CHANGE UP (ref 0.5–1.3)
CREAT SERPL-MCNC: 0.69 MG/DL — SIGNIFICANT CHANGE UP (ref 0.5–1.3)
CREAT SERPL-MCNC: 0.73 MG/DL — SIGNIFICANT CHANGE UP (ref 0.5–1.3)
CREAT SERPL-MCNC: 0.76 MG/DL — SIGNIFICANT CHANGE UP (ref 0.5–1.3)
CREAT SERPL-MCNC: 0.8 MG/DL — SIGNIFICANT CHANGE UP (ref 0.5–1.3)
CREAT SERPL-MCNC: 0.82 MG/DL — SIGNIFICANT CHANGE UP (ref 0.5–1.3)
CREAT SERPL-MCNC: 0.87 MG/DL — SIGNIFICANT CHANGE UP (ref 0.5–1.3)
CREAT SERPL-MCNC: 1.23 MG/DL — SIGNIFICANT CHANGE UP (ref 0.5–1.3)
CREAT SERPL-MCNC: 1.38 MG/DL
CREAT SERPL-MCNC: 2.36 MG/DL — HIGH (ref 0.5–1.3)
CRP SERPL-MCNC: 10.3 MG/DL
CRP SERPL-MCNC: 17.52 MG/DL — HIGH (ref 0–0.4)
CRP SERPL-MCNC: 23.86 MG/DL — HIGH (ref 0–0.4)
CRP SERPL-MCNC: 24.79 MG/DL — HIGH (ref 0–0.4)
CRP SERPL-MCNC: 26.91 MG/DL — HIGH (ref 0–0.4)
CRP SERPL-MCNC: 30.32 MG/DL — HIGH (ref 0–0.4)
CULTURE RESULTS: SIGNIFICANT CHANGE UP
CULTURE RESULTS: SIGNIFICANT CHANGE UP
D DIMER BLD IA.RAPID-MCNC: 1251 NG/ML DDU — HIGH
D DIMER BLD IA.RAPID-MCNC: 244 NG/ML DDU — HIGH
D DIMER BLD IA.RAPID-MCNC: 2482 NG/ML DDU — HIGH
D DIMER BLD IA.RAPID-MCNC: 253 NG/ML DDU — HIGH
D DIMER BLD IA.RAPID-MCNC: 4727 NG/ML DDU — HIGH
D DIMER BLD IA.RAPID-MCNC: 5554 NG/ML DDU — HIGH
D DIMER BLD IA.RAPID-MCNC: 8087 NG/ML DDU — HIGH
D DIMER BLD IA.RAPID-MCNC: HIGH NG/ML DDU
DACRYOCYTES BLD QL SMEAR: SLIGHT — SIGNIFICANT CHANGE UP
DEPRECATED D DIMER PPP IA-ACNC: 173 NG/ML DDU
DIFF PNL FLD: NEGATIVE — SIGNIFICANT CHANGE UP
EOSINOPHIL # BLD AUTO: 0.01 K/UL — SIGNIFICANT CHANGE UP (ref 0–0.5)
EOSINOPHIL # BLD AUTO: 0.13 K/UL — SIGNIFICANT CHANGE UP (ref 0–0.5)
EOSINOPHIL # BLD AUTO: 0.18 K/UL — SIGNIFICANT CHANGE UP (ref 0–0.5)
EOSINOPHIL # BLD AUTO: 0.38 K/UL — SIGNIFICANT CHANGE UP (ref 0–0.5)
EOSINOPHIL NFR BLD AUTO: 0.2 % — SIGNIFICANT CHANGE UP (ref 0–6)
EOSINOPHIL NFR BLD AUTO: 0.9 % — SIGNIFICANT CHANGE UP (ref 0–6)
EOSINOPHIL NFR BLD AUTO: 1 % — SIGNIFICANT CHANGE UP (ref 0–6)
EOSINOPHIL NFR BLD AUTO: 3 % — SIGNIFICANT CHANGE UP (ref 0–6)
EPI CELLS # UR: 1 — SIGNIFICANT CHANGE UP
FERRITIN SERPL-MCNC: 1333 NG/ML
FERRITIN SERPL-MCNC: 3472 NG/ML — HIGH (ref 30–400)
FERRITIN SERPL-MCNC: 3529 NG/ML — HIGH (ref 30–400)
FERRITIN SERPL-MCNC: 3645 NG/ML — HIGH (ref 30–400)
FERRITIN SERPL-MCNC: 4278 NG/ML — HIGH (ref 30–400)
FERRITIN SERPL-MCNC: 4385 NG/ML — HIGH (ref 30–400)
FERRITIN SERPL-MCNC: 4721 NG/ML — HIGH (ref 30–400)
GAS PNL BLDA: SIGNIFICANT CHANGE UP
GAS PNL BLDA: SIGNIFICANT CHANGE UP
GAS PNL BLDV: SIGNIFICANT CHANGE UP
GIANT PLATELETS BLD QL SMEAR: PRESENT — SIGNIFICANT CHANGE UP
GLUCOSE BLDC GLUCOMTR-MCNC: 104 MG/DL — HIGH (ref 70–99)
GLUCOSE BLDC GLUCOMTR-MCNC: 114 MG/DL — HIGH (ref 70–99)
GLUCOSE BLDC GLUCOMTR-MCNC: 139 MG/DL — HIGH (ref 70–99)
GLUCOSE BLDC GLUCOMTR-MCNC: 156 MG/DL — HIGH (ref 70–99)
GLUCOSE BLDC GLUCOMTR-MCNC: 89 MG/DL — SIGNIFICANT CHANGE UP (ref 70–99)
GLUCOSE SERPL-MCNC: 107 MG/DL — HIGH (ref 70–99)
GLUCOSE SERPL-MCNC: 109 MG/DL — HIGH (ref 70–99)
GLUCOSE SERPL-MCNC: 110 MG/DL — HIGH (ref 70–99)
GLUCOSE SERPL-MCNC: 119 MG/DL — HIGH (ref 70–99)
GLUCOSE SERPL-MCNC: 123 MG/DL — HIGH (ref 70–99)
GLUCOSE SERPL-MCNC: 129 MG/DL — HIGH (ref 70–99)
GLUCOSE SERPL-MCNC: 129 MG/DL — HIGH (ref 70–99)
GLUCOSE SERPL-MCNC: 134 MG/DL — HIGH (ref 70–99)
GLUCOSE SERPL-MCNC: 140 MG/DL — HIGH (ref 70–99)
GLUCOSE SERPL-MCNC: 164 MG/DL — HIGH (ref 70–99)
GLUCOSE SERPL-MCNC: 90 MG/DL
GLUCOSE UR QL: NEGATIVE — SIGNIFICANT CHANGE UP
GRAN CASTS # UR COMP ASSIST: 1 /LPF — SIGNIFICANT CHANGE UP
HCO3 BLDA-SCNC: 21 MMOL/L — SIGNIFICANT CHANGE UP (ref 21–29)
HCT VFR BLD CALC: 35.8 % — LOW (ref 39–50)
HCT VFR BLD CALC: 36.6 % — LOW (ref 39–50)
HCT VFR BLD CALC: 38.8 % — LOW (ref 39–50)
HCT VFR BLD CALC: 39.2 % — SIGNIFICANT CHANGE UP (ref 39–50)
HCT VFR BLD CALC: 39.2 % — SIGNIFICANT CHANGE UP (ref 39–50)
HCT VFR BLD CALC: 39.7 % — SIGNIFICANT CHANGE UP (ref 39–50)
HCT VFR BLD CALC: 39.8 % — SIGNIFICANT CHANGE UP (ref 39–50)
HCT VFR BLD CALC: 40.5 % — SIGNIFICANT CHANGE UP (ref 39–50)
HGB BLD-MCNC: 12 G/DL — LOW (ref 13–17)
HGB BLD-MCNC: 12.1 G/DL — LOW (ref 13–17)
HGB BLD-MCNC: 12.8 G/DL — LOW (ref 13–17)
HGB BLD-MCNC: 13.1 G/DL — SIGNIFICANT CHANGE UP (ref 13–17)
HGB BLD-MCNC: 13.1 G/DL — SIGNIFICANT CHANGE UP (ref 13–17)
HGB BLD-MCNC: 13.2 G/DL — SIGNIFICANT CHANGE UP (ref 13–17)
HGB BLD-MCNC: 13.2 G/DL — SIGNIFICANT CHANGE UP (ref 13–17)
HGB BLD-MCNC: 13.8 G/DL — SIGNIFICANT CHANGE UP (ref 13–17)
HYALINE CASTS # UR AUTO: 4 /LPF — SIGNIFICANT CHANGE UP (ref 0–7)
IMM GRANULOCYTES NFR BLD AUTO: 0.4 % — SIGNIFICANT CHANGE UP (ref 0–1.5)
IMM GRANULOCYTES NFR BLD AUTO: 6.1 % — HIGH (ref 0–1.5)
KETONES UR-MCNC: NEGATIVE — SIGNIFICANT CHANGE UP
LACTATE SERPL-SCNC: 2.8 MMOL/L — HIGH (ref 0.7–2)
LEUKOCYTE ESTERASE UR-ACNC: NEGATIVE — SIGNIFICANT CHANGE UP
LYMPHOCYTES # BLD AUTO: 0.38 K/UL — LOW (ref 1–3.3)
LYMPHOCYTES # BLD AUTO: 0.53 K/UL — LOW (ref 1–3.3)
LYMPHOCYTES # BLD AUTO: 0.69 K/UL — LOW (ref 1–3.3)
LYMPHOCYTES # BLD AUTO: 0.91 K/UL — LOW (ref 1–3.3)
LYMPHOCYTES # BLD AUTO: 10.4 % — LOW (ref 13–44)
LYMPHOCYTES # BLD AUTO: 3 % — LOW (ref 13–44)
LYMPHOCYTES # BLD AUTO: 4.6 % — LOW (ref 13–44)
LYMPHOCYTES # BLD AUTO: 5.4 % — LOW (ref 13–44)
MAGNESIUM SERPL-MCNC: 2.3 MG/DL — SIGNIFICANT CHANGE UP (ref 1.6–2.6)
MANUAL SMEAR VERIFICATION: SIGNIFICANT CHANGE UP
MCHC RBC-ENTMCNC: 30.2 PG — SIGNIFICANT CHANGE UP (ref 27–34)
MCHC RBC-ENTMCNC: 30.3 PG — SIGNIFICANT CHANGE UP (ref 27–34)
MCHC RBC-ENTMCNC: 30.4 PG — SIGNIFICANT CHANGE UP (ref 27–34)
MCHC RBC-ENTMCNC: 30.5 PG — SIGNIFICANT CHANGE UP (ref 27–34)
MCHC RBC-ENTMCNC: 30.5 PG — SIGNIFICANT CHANGE UP (ref 27–34)
MCHC RBC-ENTMCNC: 31.1 PG — SIGNIFICANT CHANGE UP (ref 27–34)
MCHC RBC-ENTMCNC: 31.3 PG — SIGNIFICANT CHANGE UP (ref 27–34)
MCHC RBC-ENTMCNC: 31.4 PG — SIGNIFICANT CHANGE UP (ref 27–34)
MCHC RBC-ENTMCNC: 32.9 GM/DL — SIGNIFICANT CHANGE UP (ref 32–36)
MCHC RBC-ENTMCNC: 33 GM/DL — SIGNIFICANT CHANGE UP (ref 32–36)
MCHC RBC-ENTMCNC: 33 GM/DL — SIGNIFICANT CHANGE UP (ref 32–36)
MCHC RBC-ENTMCNC: 33.1 GM/DL — SIGNIFICANT CHANGE UP (ref 32–36)
MCHC RBC-ENTMCNC: 33.5 GM/DL — SIGNIFICANT CHANGE UP (ref 32–36)
MCHC RBC-ENTMCNC: 33.7 GM/DL — SIGNIFICANT CHANGE UP (ref 32–36)
MCHC RBC-ENTMCNC: 33.7 GM/DL — SIGNIFICANT CHANGE UP (ref 32–36)
MCHC RBC-ENTMCNC: 34.1 GM/DL — SIGNIFICANT CHANGE UP (ref 32–36)
MCV RBC AUTO: 91.1 FL — SIGNIFICANT CHANGE UP (ref 80–100)
MCV RBC AUTO: 91.5 FL — SIGNIFICANT CHANGE UP (ref 80–100)
MCV RBC AUTO: 91.9 FL — SIGNIFICANT CHANGE UP (ref 80–100)
MCV RBC AUTO: 92 FL — SIGNIFICANT CHANGE UP (ref 80–100)
MCV RBC AUTO: 92.2 FL — SIGNIFICANT CHANGE UP (ref 80–100)
MCV RBC AUTO: 92.3 FL — SIGNIFICANT CHANGE UP (ref 80–100)
MCV RBC AUTO: 92.5 FL — SIGNIFICANT CHANGE UP (ref 80–100)
MCV RBC AUTO: 92.9 FL — SIGNIFICANT CHANGE UP (ref 80–100)
METAMYELOCYTES # FLD: 1.8 % — HIGH (ref 0–0)
MONOCYTES # BLD AUTO: 0.38 K/UL — SIGNIFICANT CHANGE UP (ref 0–0.9)
MONOCYTES # BLD AUTO: 0.39 K/UL — SIGNIFICANT CHANGE UP (ref 0–0.9)
MONOCYTES # BLD AUTO: 0.46 K/UL — SIGNIFICANT CHANGE UP (ref 0–0.9)
MONOCYTES # BLD AUTO: 0.91 K/UL — HIGH (ref 0–0.9)
MONOCYTES NFR BLD AUTO: 3 % — SIGNIFICANT CHANGE UP (ref 2–14)
MONOCYTES NFR BLD AUTO: 3.6 % — SIGNIFICANT CHANGE UP (ref 2–14)
MONOCYTES NFR BLD AUTO: 4.6 % — SIGNIFICANT CHANGE UP (ref 2–14)
MONOCYTES NFR BLD AUTO: 7.6 % — SIGNIFICANT CHANGE UP (ref 2–14)
MYELOCYTES NFR BLD: 2 % — HIGH (ref 0–0)
NEUTROPHILS # BLD AUTO: 10.62 K/UL — HIGH (ref 1.8–7.4)
NEUTROPHILS # BLD AUTO: 11.36 K/UL — HIGH (ref 1.8–7.4)
NEUTROPHILS # BLD AUTO: 17.43 K/UL — HIGH (ref 1.8–7.4)
NEUTROPHILS # BLD AUTO: 4.16 K/UL — SIGNIFICANT CHANGE UP (ref 1.8–7.4)
NEUTROPHILS NFR BLD AUTO: 81.2 % — HIGH (ref 43–77)
NEUTROPHILS NFR BLD AUTO: 83 % — HIGH (ref 43–77)
NEUTROPHILS NFR BLD AUTO: 83.2 % — HIGH (ref 43–77)
NEUTROPHILS NFR BLD AUTO: 83.6 % — HIGH (ref 43–77)
NEUTS BAND # BLD: 4.5 % — SIGNIFICANT CHANGE UP (ref 0–8)
NEUTS BAND # BLD: 6 % — SIGNIFICANT CHANGE UP (ref 0–8)
NITRITE UR-MCNC: NEGATIVE — SIGNIFICANT CHANGE UP
NRBC # BLD: 0 /100 WBCS — SIGNIFICANT CHANGE UP (ref 0–0)
NT-PROBNP SERPL-SCNC: 156 PG/ML — SIGNIFICANT CHANGE UP (ref 0–300)
NT-PROBNP SERPL-SCNC: 453 PG/ML — HIGH (ref 0–300)
PCO2 BLDA: 28 MMHG — LOW (ref 32–46)
PH BLDA: 7.48 — HIGH (ref 7.35–7.45)
PH UR: 6 — SIGNIFICANT CHANGE UP (ref 5–8)
PHOSPHATE SERPL-MCNC: 4.6 MG/DL — HIGH (ref 2.5–4.5)
PLAT MORPH BLD: NORMAL — SIGNIFICANT CHANGE UP
PLAT MORPH BLD: NORMAL — SIGNIFICANT CHANGE UP
PLATELET # BLD AUTO: 192 K/UL — SIGNIFICANT CHANGE UP (ref 150–400)
PLATELET # BLD AUTO: 202 K/UL — SIGNIFICANT CHANGE UP (ref 150–400)
PLATELET # BLD AUTO: 281 K/UL — SIGNIFICANT CHANGE UP (ref 150–400)
PLATELET # BLD AUTO: 357 K/UL — SIGNIFICANT CHANGE UP (ref 150–400)
PLATELET # BLD AUTO: 360 K/UL — SIGNIFICANT CHANGE UP (ref 150–400)
PLATELET # BLD AUTO: 364 K/UL — SIGNIFICANT CHANGE UP (ref 150–400)
PLATELET # BLD AUTO: 401 K/UL — HIGH (ref 150–400)
PLATELET # BLD AUTO: 459 K/UL — HIGH (ref 150–400)
PO2 BLDA: 174 MMHG — HIGH (ref 74–108)
POIKILOCYTOSIS BLD QL AUTO: SLIGHT — SIGNIFICANT CHANGE UP
POLYCHROMASIA BLD QL SMEAR: SLIGHT — SIGNIFICANT CHANGE UP
POTASSIUM SERPL-MCNC: 3.6 MMOL/L — SIGNIFICANT CHANGE UP (ref 3.5–5.3)
POTASSIUM SERPL-MCNC: 4 MMOL/L — SIGNIFICANT CHANGE UP (ref 3.5–5.3)
POTASSIUM SERPL-MCNC: 4.5 MMOL/L — SIGNIFICANT CHANGE UP (ref 3.5–5.3)
POTASSIUM SERPL-MCNC: 4.7 MMOL/L — SIGNIFICANT CHANGE UP (ref 3.5–5.3)
POTASSIUM SERPL-MCNC: 4.7 MMOL/L — SIGNIFICANT CHANGE UP (ref 3.5–5.3)
POTASSIUM SERPL-MCNC: 4.8 MMOL/L — SIGNIFICANT CHANGE UP (ref 3.5–5.3)
POTASSIUM SERPL-MCNC: 5 MMOL/L — SIGNIFICANT CHANGE UP (ref 3.5–5.3)
POTASSIUM SERPL-SCNC: 3.6 MMOL/L — SIGNIFICANT CHANGE UP (ref 3.5–5.3)
POTASSIUM SERPL-SCNC: 4 MMOL/L — SIGNIFICANT CHANGE UP (ref 3.5–5.3)
POTASSIUM SERPL-SCNC: 4.5 MMOL/L — SIGNIFICANT CHANGE UP (ref 3.5–5.3)
POTASSIUM SERPL-SCNC: 4.7 MMOL/L — SIGNIFICANT CHANGE UP (ref 3.5–5.3)
POTASSIUM SERPL-SCNC: 4.7 MMOL/L — SIGNIFICANT CHANGE UP (ref 3.5–5.3)
POTASSIUM SERPL-SCNC: 4.8 MMOL/L — SIGNIFICANT CHANGE UP (ref 3.5–5.3)
POTASSIUM SERPL-SCNC: 5 MMOL/L
POTASSIUM SERPL-SCNC: 5 MMOL/L — SIGNIFICANT CHANGE UP (ref 3.5–5.3)
PROCALCITONIN SERPL-MCNC: 0.2 NG/ML — HIGH (ref 0.02–0.1)
PROCALCITONIN SERPL-MCNC: 0.29 NG/ML
PROCALCITONIN SERPL-MCNC: 0.86 NG/ML — HIGH (ref 0.02–0.1)
PROCALCITONIN SERPL-MCNC: 1.01 NG/ML — HIGH (ref 0.02–0.1)
PROCALCITONIN SERPL-MCNC: 1.56 NG/ML — HIGH (ref 0.02–0.1)
PROT SERPL-MCNC: 6.3 G/DL — SIGNIFICANT CHANGE UP (ref 6–8.3)
PROT SERPL-MCNC: 6.6 G/DL — SIGNIFICANT CHANGE UP (ref 6–8.3)
PROT SERPL-MCNC: 6.6 G/DL — SIGNIFICANT CHANGE UP (ref 6–8.3)
PROT SERPL-MCNC: 6.7 G/DL — SIGNIFICANT CHANGE UP (ref 6–8.3)
PROT SERPL-MCNC: 6.8 G/DL — SIGNIFICANT CHANGE UP (ref 6–8.3)
PROT SERPL-MCNC: 6.8 G/DL — SIGNIFICANT CHANGE UP (ref 6–8.3)
PROT SERPL-MCNC: 6.9 G/DL — SIGNIFICANT CHANGE UP (ref 6–8.3)
PROT SERPL-MCNC: 7.1 G/DL — SIGNIFICANT CHANGE UP (ref 6–8.3)
PROT SERPL-MCNC: 7.2 G/DL — SIGNIFICANT CHANGE UP (ref 6–8.3)
PROT SERPL-MCNC: 7.2 G/DL — SIGNIFICANT CHANGE UP (ref 6–8.3)
PROT SERPL-MCNC: 7.3 G/DL
PROT SERPL-MCNC: 7.3 G/DL — SIGNIFICANT CHANGE UP (ref 6–8.3)
PROT SERPL-MCNC: 7.3 G/DL — SIGNIFICANT CHANGE UP (ref 6–8.3)
PROT SERPL-MCNC: 7.4 G/DL — SIGNIFICANT CHANGE UP (ref 6–8.3)
PROT SERPL-MCNC: 7.5 G/DL — SIGNIFICANT CHANGE UP (ref 6–8.3)
PROT SERPL-MCNC: 7.8 G/DL — SIGNIFICANT CHANGE UP (ref 6–8.3)
PROT SERPL-MCNC: 8.4 G/DL — HIGH (ref 6–8.3)
PROT UR-MCNC: ABNORMAL
RAPID RVP RESULT: DETECTED
RBC # BLD: 3.93 M/UL — LOW (ref 4.2–5.8)
RBC # BLD: 3.98 M/UL — LOW (ref 4.2–5.8)
RBC # BLD: 4.22 M/UL — SIGNIFICANT CHANGE UP (ref 4.2–5.8)
RBC # BLD: 4.24 M/UL — SIGNIFICANT CHANGE UP (ref 4.2–5.8)
RBC # BLD: 4.25 M/UL — SIGNIFICANT CHANGE UP (ref 4.2–5.8)
RBC # BLD: 4.29 M/UL — SIGNIFICANT CHANGE UP (ref 4.2–5.8)
RBC # BLD: 4.33 M/UL — SIGNIFICANT CHANGE UP (ref 4.2–5.8)
RBC # BLD: 4.39 M/UL — SIGNIFICANT CHANGE UP (ref 4.2–5.8)
RBC # FLD: 14.4 % — SIGNIFICANT CHANGE UP (ref 10.3–14.5)
RBC # FLD: 14.6 % — HIGH (ref 10.3–14.5)
RBC # FLD: 14.7 % — HIGH (ref 10.3–14.5)
RBC # FLD: 14.8 % — HIGH (ref 10.3–14.5)
RBC # FLD: 14.8 % — HIGH (ref 10.3–14.5)
RBC # FLD: 14.9 % — HIGH (ref 10.3–14.5)
RBC # FLD: 14.9 % — HIGH (ref 10.3–14.5)
RBC # FLD: 15 % — HIGH (ref 10.3–14.5)
RBC BLD AUTO: ABNORMAL
RBC BLD AUTO: SIGNIFICANT CHANGE UP
RBC CASTS # UR COMP ASSIST: 1 /HPF — SIGNIFICANT CHANGE UP (ref 0–4)
SAO2 % BLDA: 100 % — HIGH (ref 92–96)
SARS-COV-2 RNA SPEC QL NAA+PROBE: DETECTED
SODIUM SERPL-SCNC: 132 MMOL/L — LOW (ref 135–145)
SODIUM SERPL-SCNC: 134 MMOL/L — LOW (ref 135–145)
SODIUM SERPL-SCNC: 134 MMOL/L — LOW (ref 135–145)
SODIUM SERPL-SCNC: 136 MMOL/L — SIGNIFICANT CHANGE UP (ref 135–145)
SODIUM SERPL-SCNC: 136 MMOL/L — SIGNIFICANT CHANGE UP (ref 135–145)
SODIUM SERPL-SCNC: 137 MMOL/L — SIGNIFICANT CHANGE UP (ref 135–145)
SODIUM SERPL-SCNC: 139 MMOL/L — SIGNIFICANT CHANGE UP (ref 135–145)
SODIUM SERPL-SCNC: 140 MMOL/L — SIGNIFICANT CHANGE UP (ref 135–145)
SODIUM SERPL-SCNC: 141 MMOL/L
SODIUM SERPL-SCNC: 141 MMOL/L — SIGNIFICANT CHANGE UP (ref 135–145)
SODIUM SERPL-SCNC: 142 MMOL/L — SIGNIFICANT CHANGE UP (ref 135–145)
SP GR SPEC: 1.02 — SIGNIFICANT CHANGE UP (ref 1.01–1.02)
SPECIMEN SOURCE: SIGNIFICANT CHANGE UP
SPECIMEN SOURCE: SIGNIFICANT CHANGE UP
UROBILINOGEN FLD QL: NEGATIVE — SIGNIFICANT CHANGE UP
WBC # BLD: 12.57 K/UL — HIGH (ref 3.8–10.5)
WBC # BLD: 12.77 K/UL — HIGH (ref 3.8–10.5)
WBC # BLD: 12.77 K/UL — HIGH (ref 3.8–10.5)
WBC # BLD: 14.99 K/UL — HIGH (ref 3.8–10.5)
WBC # BLD: 15.68 K/UL — HIGH (ref 3.8–10.5)
WBC # BLD: 19.79 K/UL — HIGH (ref 3.8–10.5)
WBC # BLD: 3.85 K/UL — SIGNIFICANT CHANGE UP (ref 3.8–10.5)
WBC # BLD: 5.12 K/UL — SIGNIFICANT CHANGE UP (ref 3.8–10.5)
WBC # FLD AUTO: 12.57 K/UL — HIGH (ref 3.8–10.5)
WBC # FLD AUTO: 12.77 K/UL — HIGH (ref 3.8–10.5)
WBC # FLD AUTO: 12.77 K/UL — HIGH (ref 3.8–10.5)
WBC # FLD AUTO: 14.99 K/UL — HIGH (ref 3.8–10.5)
WBC # FLD AUTO: 15.68 K/UL — HIGH (ref 3.8–10.5)
WBC # FLD AUTO: 19.79 K/UL — HIGH (ref 3.8–10.5)
WBC # FLD AUTO: 3.85 K/UL — SIGNIFICANT CHANGE UP (ref 3.8–10.5)
WBC # FLD AUTO: 5.12 K/UL — SIGNIFICANT CHANGE UP (ref 3.8–10.5)
WBC UR QL: 1 /HPF — SIGNIFICANT CHANGE UP (ref 0–5)

## 2021-01-01 PROCEDURE — 71045 X-RAY EXAM CHEST 1 VIEW: CPT | Mod: 26

## 2021-01-01 PROCEDURE — 76604 US EXAM CHEST: CPT | Mod: 26,GC

## 2021-01-01 PROCEDURE — 99223 1ST HOSP IP/OBS HIGH 75: CPT | Mod: CS

## 2021-01-01 PROCEDURE — 99214 OFFICE O/P EST MOD 30 MIN: CPT | Mod: CS,95

## 2021-01-01 PROCEDURE — 99232 SBSQ HOSP IP/OBS MODERATE 35: CPT | Mod: CS

## 2021-01-01 PROCEDURE — 99233 SBSQ HOSP IP/OBS HIGH 50: CPT | Mod: CS,GC

## 2021-01-01 PROCEDURE — 99285 EMERGENCY DEPT VISIT HI MDM: CPT | Mod: CS,25

## 2021-01-01 PROCEDURE — 99222 1ST HOSP IP/OBS MODERATE 55: CPT | Mod: CS

## 2021-01-01 PROCEDURE — 99291 CRITICAL CARE FIRST HOUR: CPT | Mod: CS,25

## 2021-01-01 PROCEDURE — 93308 TTE F-UP OR LMTD: CPT | Mod: 26,GC

## 2021-01-01 PROCEDURE — 93010 ELECTROCARDIOGRAM REPORT: CPT

## 2021-01-01 PROCEDURE — 99233 SBSQ HOSP IP/OBS HIGH 50: CPT | Mod: CS

## 2021-01-01 PROCEDURE — 93970 EXTREMITY STUDY: CPT | Mod: 26

## 2021-01-01 RX ORDER — INFLUENZA VIRUS VACCINE 15; 15; 15; 15 UG/.5ML; UG/.5ML; UG/.5ML; UG/.5ML
0.5 SUSPENSION INTRAMUSCULAR ONCE
Refills: 0 | Status: COMPLETED | OUTPATIENT
Start: 2021-01-01 | End: 2021-01-01

## 2021-01-01 RX ORDER — DIAZEPAM 5 MG
5 TABLET ORAL
Refills: 0 | Status: DISCONTINUED | OUTPATIENT
Start: 2021-01-01 | End: 2021-01-01

## 2021-01-01 RX ORDER — ASPIRIN/CALCIUM CARB/MAGNESIUM 324 MG
81 TABLET ORAL DAILY
Refills: 0 | Status: DISCONTINUED | OUTPATIENT
Start: 2021-01-01 | End: 2021-01-01

## 2021-01-01 RX ORDER — DULOXETINE HYDROCHLORIDE 30 MG/1
60 CAPSULE, DELAYED RELEASE ORAL DAILY
Refills: 0 | Status: DISCONTINUED | OUTPATIENT
Start: 2021-01-01 | End: 2021-01-01

## 2021-01-01 RX ORDER — PANTOPRAZOLE SODIUM 20 MG/1
40 TABLET, DELAYED RELEASE ORAL ONCE
Refills: 0 | Status: COMPLETED | OUTPATIENT
Start: 2021-01-01 | End: 2021-01-01

## 2021-01-01 RX ORDER — MORPHINE SULFATE 50 MG/1
2 CAPSULE, EXTENDED RELEASE ORAL EVERY 4 HOURS
Refills: 0 | Status: DISCONTINUED | OUTPATIENT
Start: 2021-01-01 | End: 2021-01-01

## 2021-01-01 RX ORDER — DEXAMETHASONE 0.5 MG/5ML
6 ELIXIR ORAL ONCE
Refills: 0 | Status: COMPLETED | OUTPATIENT
Start: 2021-01-01 | End: 2021-01-01

## 2021-01-01 RX ORDER — DEXAMETHASONE 0.5 MG/5ML
6 ELIXIR ORAL ONCE
Refills: 0 | Status: DISCONTINUED | OUTPATIENT
Start: 2021-01-01 | End: 2021-01-01

## 2021-01-01 RX ORDER — MORPHINE SULFATE 50 MG/1
1 CAPSULE, EXTENDED RELEASE ORAL ONCE
Refills: 0 | Status: DISCONTINUED | OUTPATIENT
Start: 2021-01-01 | End: 2021-01-01

## 2021-01-01 RX ORDER — ACETAMINOPHEN 500 MG
1000 TABLET ORAL ONCE
Refills: 0 | Status: COMPLETED | OUTPATIENT
Start: 2021-01-01 | End: 2021-01-01

## 2021-01-01 RX ORDER — DIAZEPAM 5 MG
5 TABLET ORAL ONCE
Refills: 0 | Status: DISCONTINUED | OUTPATIENT
Start: 2021-01-01 | End: 2021-01-01

## 2021-01-01 RX ORDER — REMDESIVIR 5 MG/ML
INJECTION INTRAVENOUS
Refills: 0 | Status: COMPLETED | OUTPATIENT
Start: 2021-01-01 | End: 2021-01-01

## 2021-01-01 RX ORDER — ALBUTEROL 90 UG/1
2 AEROSOL, METERED ORAL EVERY 6 HOURS
Refills: 0 | Status: DISCONTINUED | OUTPATIENT
Start: 2021-01-01 | End: 2021-01-01

## 2021-01-01 RX ORDER — DEXAMETHASONE 6 MG/1
6 TABLET ORAL
Qty: 10 | Refills: 0 | Status: ACTIVE | COMMUNITY
Start: 2021-01-01 | End: 1900-01-01

## 2021-01-01 RX ORDER — AMITRIPTYLINE HCL 25 MG
10 TABLET ORAL THREE TIMES A DAY
Refills: 0 | Status: DISCONTINUED | OUTPATIENT
Start: 2021-01-01 | End: 2021-01-01

## 2021-01-01 RX ORDER — ACETAMINOPHEN 500 MG
650 TABLET ORAL ONCE
Refills: 0 | Status: COMPLETED | OUTPATIENT
Start: 2021-01-01 | End: 2021-01-01

## 2021-01-01 RX ORDER — AZTREONAM 2 G
1000 VIAL (EA) INJECTION EVERY 8 HOURS
Refills: 0 | Status: DISCONTINUED | OUTPATIENT
Start: 2021-01-01 | End: 2021-01-01

## 2021-01-01 RX ORDER — RIVAROXABAN 10 MG/1
10 TABLET, FILM COATED ORAL
Qty: 30 | Refills: 0 | Status: ACTIVE | COMMUNITY
Start: 2021-01-01 | End: 1900-01-01

## 2021-01-01 RX ORDER — ENOXAPARIN SODIUM 100 MG/ML
90 INJECTION SUBCUTANEOUS
Refills: 0 | Status: DISCONTINUED | OUTPATIENT
Start: 2021-01-01 | End: 2021-01-01

## 2021-01-01 RX ORDER — REMDESIVIR 5 MG/ML
200 INJECTION INTRAVENOUS EVERY 24 HOURS
Refills: 0 | Status: COMPLETED | OUTPATIENT
Start: 2021-01-01 | End: 2021-01-01

## 2021-01-01 RX ORDER — SODIUM CHLORIDE 9 MG/ML
500 INJECTION INTRAMUSCULAR; INTRAVENOUS; SUBCUTANEOUS ONCE
Refills: 0 | Status: COMPLETED | OUTPATIENT
Start: 2021-01-01 | End: 2021-01-01

## 2021-01-01 RX ORDER — DEXAMETHASONE 0.5 MG/5ML
6 ELIXIR ORAL DAILY
Refills: 0 | Status: COMPLETED | OUTPATIENT
Start: 2021-01-01 | End: 2021-01-01

## 2021-01-01 RX ORDER — PANTOPRAZOLE SODIUM 20 MG/1
40 TABLET, DELAYED RELEASE ORAL DAILY
Refills: 0 | Status: DISCONTINUED | OUTPATIENT
Start: 2021-01-01 | End: 2021-01-01

## 2021-01-01 RX ORDER — MONTELUKAST 4 MG/1
10 TABLET, CHEWABLE ORAL DAILY
Refills: 0 | Status: DISCONTINUED | OUTPATIENT
Start: 2021-01-01 | End: 2021-01-01

## 2021-01-01 RX ORDER — ATORVASTATIN CALCIUM 80 MG/1
10 TABLET, FILM COATED ORAL AT BEDTIME
Refills: 0 | Status: DISCONTINUED | OUTPATIENT
Start: 2021-01-01 | End: 2021-01-01

## 2021-01-01 RX ORDER — BENZONATATE 200 MG/1
200 CAPSULE ORAL 3 TIMES DAILY
Qty: 90 | Refills: 1 | Status: ACTIVE | COMMUNITY
Start: 2021-01-01 | End: 1900-01-01

## 2021-01-01 RX ORDER — FUROSEMIDE 40 MG
20 TABLET ORAL ONCE
Refills: 0 | Status: COMPLETED | OUTPATIENT
Start: 2021-01-01 | End: 2021-01-01

## 2021-01-01 RX ORDER — MORPHINE SULFATE 50 MG/1
12 CAPSULE, EXTENDED RELEASE ORAL ONCE
Refills: 0 | Status: DISCONTINUED | OUTPATIENT
Start: 2021-01-01 | End: 2021-01-01

## 2021-01-01 RX ORDER — GUAIFENESIN AND DEXTROMETHORPHAN 10; 100 MG/5ML; MG/5ML
10-100 SYRUP ORAL
Qty: 1 | Refills: 0 | Status: ACTIVE | COMMUNITY
Start: 2021-01-01 | End: 1900-01-01

## 2021-01-01 RX ORDER — AMLODIPINE BESYLATE 2.5 MG/1
5 TABLET ORAL DAILY
Refills: 0 | Status: DISCONTINUED | OUTPATIENT
Start: 2021-01-01 | End: 2021-01-01

## 2021-01-01 RX ORDER — REMDESIVIR 5 MG/ML
100 INJECTION INTRAVENOUS EVERY 24 HOURS
Refills: 0 | Status: COMPLETED | OUTPATIENT
Start: 2021-01-01 | End: 2021-01-01

## 2021-01-01 RX ORDER — FAMOTIDINE 10 MG/ML
40 INJECTION INTRAVENOUS
Refills: 0 | Status: DISCONTINUED | OUTPATIENT
Start: 2021-01-01 | End: 2021-01-01

## 2021-01-01 RX ORDER — HEPARIN SODIUM 5000 [USP'U]/ML
5000 INJECTION INTRAVENOUS; SUBCUTANEOUS EVERY 8 HOURS
Refills: 0 | Status: DISCONTINUED | OUTPATIENT
Start: 2021-01-01 | End: 2021-01-01

## 2021-01-01 RX ORDER — ENOXAPARIN SODIUM 100 MG/ML
40 INJECTION SUBCUTANEOUS
Refills: 0 | Status: DISCONTINUED | OUTPATIENT
Start: 2021-01-01 | End: 2021-01-01

## 2021-01-01 RX ORDER — VANCOMYCIN HCL 1 G
1000 VIAL (EA) INTRAVENOUS EVERY 12 HOURS
Refills: 0 | Status: DISCONTINUED | OUTPATIENT
Start: 2021-01-01 | End: 2021-01-01

## 2021-01-01 RX ORDER — MORPHINE SULFATE 50 MG/1
2 CAPSULE, EXTENDED RELEASE ORAL ONCE
Refills: 0 | Status: DISCONTINUED | OUTPATIENT
Start: 2021-01-01 | End: 2021-01-01

## 2021-01-01 RX ORDER — ALLOPURINOL 300 MG
150 TABLET ORAL DAILY
Refills: 0 | Status: DISCONTINUED | OUTPATIENT
Start: 2021-01-01 | End: 2021-01-01

## 2021-01-01 RX ADMIN — Medication 150 MILLIGRAM(S): at 11:47

## 2021-01-01 RX ADMIN — MORPHINE SULFATE 1 MILLIGRAM(S): 50 CAPSULE, EXTENDED RELEASE ORAL at 03:09

## 2021-01-01 RX ADMIN — HEPARIN SODIUM 5000 UNIT(S): 5000 INJECTION INTRAVENOUS; SUBCUTANEOUS at 21:19

## 2021-01-01 RX ADMIN — Medication 6 MILLIGRAM(S): at 05:40

## 2021-01-01 RX ADMIN — MORPHINE SULFATE 1 MILLIGRAM(S): 50 CAPSULE, EXTENDED RELEASE ORAL at 13:25

## 2021-01-01 RX ADMIN — Medication 5 MILLIGRAM(S): at 12:54

## 2021-01-01 RX ADMIN — Medication 5 MILLIGRAM(S): at 21:30

## 2021-01-01 RX ADMIN — Medication 30 MILLILITER(S): at 01:37

## 2021-01-01 RX ADMIN — HEPARIN SODIUM 5000 UNIT(S): 5000 INJECTION INTRAVENOUS; SUBCUTANEOUS at 12:06

## 2021-01-01 RX ADMIN — Medication 10 MILLIGRAM(S): at 14:10

## 2021-01-01 RX ADMIN — Medication 5 MILLIGRAM(S): at 08:58

## 2021-01-01 RX ADMIN — HEPARIN SODIUM 5000 UNIT(S): 5000 INJECTION INTRAVENOUS; SUBCUTANEOUS at 21:17

## 2021-01-01 RX ADMIN — Medication 5 MILLIGRAM(S): at 18:00

## 2021-01-01 RX ADMIN — ATORVASTATIN CALCIUM 10 MILLIGRAM(S): 80 TABLET, FILM COATED ORAL at 21:19

## 2021-01-01 RX ADMIN — Medication 81 MILLIGRAM(S): at 13:05

## 2021-01-01 RX ADMIN — Medication 150 MILLIGRAM(S): at 12:28

## 2021-01-01 RX ADMIN — Medication 150 MILLIGRAM(S): at 13:04

## 2021-01-01 RX ADMIN — Medication 10 MILLIGRAM(S): at 21:36

## 2021-01-01 RX ADMIN — AMLODIPINE BESYLATE 5 MILLIGRAM(S): 2.5 TABLET ORAL at 06:32

## 2021-01-01 RX ADMIN — Medication 6 MILLIGRAM(S): at 06:32

## 2021-01-01 RX ADMIN — Medication 5 MILLIGRAM(S): at 00:20

## 2021-01-01 RX ADMIN — ENOXAPARIN SODIUM 90 MILLIGRAM(S): 100 INJECTION SUBCUTANEOUS at 18:03

## 2021-01-01 RX ADMIN — Medication 50 MILLIGRAM(S): at 19:30

## 2021-01-01 RX ADMIN — REMDESIVIR 500 MILLIGRAM(S): 5 INJECTION INTRAVENOUS at 13:11

## 2021-01-01 RX ADMIN — Medication 10 MILLIGRAM(S): at 13:24

## 2021-01-01 RX ADMIN — Medication 81 MILLIGRAM(S): at 14:10

## 2021-01-01 RX ADMIN — AMLODIPINE BESYLATE 5 MILLIGRAM(S): 2.5 TABLET ORAL at 05:40

## 2021-01-01 RX ADMIN — HEPARIN SODIUM 5000 UNIT(S): 5000 INJECTION INTRAVENOUS; SUBCUTANEOUS at 05:40

## 2021-01-01 RX ADMIN — MORPHINE SULFATE 1 MILLIGRAM(S): 50 CAPSULE, EXTENDED RELEASE ORAL at 10:49

## 2021-01-01 RX ADMIN — Medication 5 MILLIGRAM(S): at 17:49

## 2021-01-01 RX ADMIN — DULOXETINE HYDROCHLORIDE 60 MILLIGRAM(S): 30 CAPSULE, DELAYED RELEASE ORAL at 12:26

## 2021-01-01 RX ADMIN — ATORVASTATIN CALCIUM 10 MILLIGRAM(S): 80 TABLET, FILM COATED ORAL at 22:55

## 2021-01-01 RX ADMIN — HEPARIN SODIUM 5000 UNIT(S): 5000 INJECTION INTRAVENOUS; SUBCUTANEOUS at 14:10

## 2021-01-01 RX ADMIN — Medication 6 MILLIGRAM(S): at 16:32

## 2021-01-01 RX ADMIN — Medication 100 MILLIGRAM(S): at 02:31

## 2021-01-01 RX ADMIN — SODIUM CHLORIDE 500 MILLILITER(S): 9 INJECTION INTRAMUSCULAR; INTRAVENOUS; SUBCUTANEOUS at 16:32

## 2021-01-01 RX ADMIN — HEPARIN SODIUM 5000 UNIT(S): 5000 INJECTION INTRAVENOUS; SUBCUTANEOUS at 06:09

## 2021-01-01 RX ADMIN — Medication 10 MILLIGRAM(S): at 04:42

## 2021-01-01 RX ADMIN — HEPARIN SODIUM 5000 UNIT(S): 5000 INJECTION INTRAVENOUS; SUBCUTANEOUS at 04:42

## 2021-01-01 RX ADMIN — Medication 50 MILLIGRAM(S): at 06:09

## 2021-01-01 RX ADMIN — Medication 150 MILLIGRAM(S): at 12:09

## 2021-01-01 RX ADMIN — PANTOPRAZOLE SODIUM 40 MILLIGRAM(S): 20 TABLET, DELAYED RELEASE ORAL at 16:28

## 2021-01-01 RX ADMIN — PANTOPRAZOLE SODIUM 40 MILLIGRAM(S): 20 TABLET, DELAYED RELEASE ORAL at 12:40

## 2021-01-01 RX ADMIN — ALBUTEROL 2 PUFF(S): 90 AEROSOL, METERED ORAL at 11:51

## 2021-01-01 RX ADMIN — Medication 150 MILLIGRAM(S): at 12:26

## 2021-01-01 RX ADMIN — Medication 10 MILLIGRAM(S): at 06:14

## 2021-01-01 RX ADMIN — Medication 10 MILLIGRAM(S): at 05:40

## 2021-01-01 RX ADMIN — ALBUTEROL 2 PUFF(S): 90 AEROSOL, METERED ORAL at 15:00

## 2021-01-01 RX ADMIN — AMLODIPINE BESYLATE 5 MILLIGRAM(S): 2.5 TABLET ORAL at 06:11

## 2021-01-01 RX ADMIN — DULOXETINE HYDROCHLORIDE 60 MILLIGRAM(S): 30 CAPSULE, DELAYED RELEASE ORAL at 12:08

## 2021-01-01 RX ADMIN — ALBUTEROL 2 PUFF(S): 90 AEROSOL, METERED ORAL at 08:18

## 2021-01-01 RX ADMIN — ENOXAPARIN SODIUM 40 MILLIGRAM(S): 100 INJECTION SUBCUTANEOUS at 06:15

## 2021-01-01 RX ADMIN — Medication 6 MILLIGRAM(S): at 06:14

## 2021-01-01 RX ADMIN — Medication 650 MILLIGRAM(S): at 05:40

## 2021-01-01 RX ADMIN — MONTELUKAST 10 MILLIGRAM(S): 4 TABLET, CHEWABLE ORAL at 12:40

## 2021-01-01 RX ADMIN — ENOXAPARIN SODIUM 90 MILLIGRAM(S): 100 INJECTION SUBCUTANEOUS at 17:05

## 2021-01-01 RX ADMIN — Medication 6 MILLIGRAM(S): at 06:11

## 2021-01-01 RX ADMIN — Medication 5 MILLIGRAM(S): at 18:06

## 2021-01-01 RX ADMIN — Medication 5 MILLIGRAM(S): at 16:38

## 2021-01-01 RX ADMIN — ENOXAPARIN SODIUM 90 MILLIGRAM(S): 100 INJECTION SUBCUTANEOUS at 05:40

## 2021-01-01 RX ADMIN — ENOXAPARIN SODIUM 40 MILLIGRAM(S): 100 INJECTION SUBCUTANEOUS at 06:32

## 2021-01-01 RX ADMIN — Medication 5 MILLIGRAM(S): at 23:00

## 2021-01-01 RX ADMIN — Medication 20 MILLIGRAM(S): at 21:30

## 2021-01-01 RX ADMIN — ATORVASTATIN CALCIUM 10 MILLIGRAM(S): 80 TABLET, FILM COATED ORAL at 22:34

## 2021-01-01 RX ADMIN — Medication 150 MILLIGRAM(S): at 13:24

## 2021-01-01 RX ADMIN — Medication 150 MILLIGRAM(S): at 12:40

## 2021-01-01 RX ADMIN — Medication 50 MILLIGRAM(S): at 16:24

## 2021-01-01 RX ADMIN — ENOXAPARIN SODIUM 90 MILLIGRAM(S): 100 INJECTION SUBCUTANEOUS at 17:49

## 2021-01-01 RX ADMIN — ATORVASTATIN CALCIUM 10 MILLIGRAM(S): 80 TABLET, FILM COATED ORAL at 21:53

## 2021-01-01 RX ADMIN — Medication 10 MILLIGRAM(S): at 06:09

## 2021-01-01 RX ADMIN — MONTELUKAST 10 MILLIGRAM(S): 4 TABLET, CHEWABLE ORAL at 12:08

## 2021-01-01 RX ADMIN — Medication 10 MILLIGRAM(S): at 21:51

## 2021-01-01 RX ADMIN — Medication 10 MILLIGRAM(S): at 06:11

## 2021-01-01 RX ADMIN — Medication 5 MILLIGRAM(S): at 08:17

## 2021-01-01 RX ADMIN — AMLODIPINE BESYLATE 5 MILLIGRAM(S): 2.5 TABLET ORAL at 06:14

## 2021-01-01 RX ADMIN — DULOXETINE HYDROCHLORIDE 60 MILLIGRAM(S): 30 CAPSULE, DELAYED RELEASE ORAL at 14:10

## 2021-01-01 RX ADMIN — Medication 400 MILLIGRAM(S): at 21:30

## 2021-01-01 RX ADMIN — Medication 10 MILLIGRAM(S): at 04:55

## 2021-01-01 RX ADMIN — Medication 10 MILLIGRAM(S): at 12:26

## 2021-01-01 RX ADMIN — Medication 400 MILLIGRAM(S): at 05:20

## 2021-01-01 RX ADMIN — Medication 10 MILLIGRAM(S): at 21:17

## 2021-01-01 RX ADMIN — MONTELUKAST 10 MILLIGRAM(S): 4 TABLET, CHEWABLE ORAL at 13:24

## 2021-01-01 RX ADMIN — Medication 10 MILLIGRAM(S): at 12:08

## 2021-01-01 RX ADMIN — Medication 150 MILLIGRAM(S): at 14:13

## 2021-01-01 RX ADMIN — Medication 250 MILLIGRAM(S): at 12:46

## 2021-01-01 RX ADMIN — AMLODIPINE BESYLATE 5 MILLIGRAM(S): 2.5 TABLET ORAL at 06:01

## 2021-01-01 RX ADMIN — HEPARIN SODIUM 5000 UNIT(S): 5000 INJECTION INTRAVENOUS; SUBCUTANEOUS at 21:40

## 2021-01-01 RX ADMIN — AMLODIPINE BESYLATE 5 MILLIGRAM(S): 2.5 TABLET ORAL at 04:55

## 2021-01-01 RX ADMIN — ATORVASTATIN CALCIUM 10 MILLIGRAM(S): 80 TABLET, FILM COATED ORAL at 21:40

## 2021-01-01 RX ADMIN — REMDESIVIR 500 MILLIGRAM(S): 5 INJECTION INTRAVENOUS at 12:24

## 2021-01-01 RX ADMIN — Medication 10 MILLIGRAM(S): at 21:40

## 2021-01-01 RX ADMIN — MORPHINE SULFATE 2 MILLIGRAM(S): 50 CAPSULE, EXTENDED RELEASE ORAL at 11:00

## 2021-01-01 RX ADMIN — Medication 10 MILLIGRAM(S): at 21:26

## 2021-01-01 RX ADMIN — REMDESIVIR 500 MILLIGRAM(S): 5 INJECTION INTRAVENOUS at 14:06

## 2021-01-01 RX ADMIN — MONTELUKAST 10 MILLIGRAM(S): 4 TABLET, CHEWABLE ORAL at 11:26

## 2021-01-01 RX ADMIN — Medication 81 MILLIGRAM(S): at 12:29

## 2021-01-01 RX ADMIN — Medication 10 MILLIGRAM(S): at 12:40

## 2021-01-01 RX ADMIN — ENOXAPARIN SODIUM 90 MILLIGRAM(S): 100 INJECTION SUBCUTANEOUS at 06:02

## 2021-01-01 RX ADMIN — Medication 150 MILLIGRAM(S): at 11:26

## 2021-01-01 RX ADMIN — PANTOPRAZOLE SODIUM 40 MILLIGRAM(S): 20 TABLET, DELAYED RELEASE ORAL at 09:15

## 2021-01-01 RX ADMIN — ENOXAPARIN SODIUM 90 MILLIGRAM(S): 100 INJECTION SUBCUTANEOUS at 16:25

## 2021-01-01 RX ADMIN — Medication 10 MILLIGRAM(S): at 22:34

## 2021-01-01 RX ADMIN — Medication 81 MILLIGRAM(S): at 12:08

## 2021-01-01 RX ADMIN — Medication 10 MILLIGRAM(S): at 21:19

## 2021-01-01 RX ADMIN — Medication 5 MILLIGRAM(S): at 00:36

## 2021-01-01 RX ADMIN — Medication 81 MILLIGRAM(S): at 13:24

## 2021-01-01 RX ADMIN — Medication 10 MILLIGRAM(S): at 06:10

## 2021-01-01 RX ADMIN — HEPARIN SODIUM 5000 UNIT(S): 5000 INJECTION INTRAVENOUS; SUBCUTANEOUS at 12:56

## 2021-01-01 RX ADMIN — MONTELUKAST 10 MILLIGRAM(S): 4 TABLET, CHEWABLE ORAL at 13:04

## 2021-01-01 RX ADMIN — Medication 10 MILLIGRAM(S): at 21:54

## 2021-01-01 RX ADMIN — PANTOPRAZOLE SODIUM 40 MILLIGRAM(S): 20 TABLET, DELAYED RELEASE ORAL at 13:04

## 2021-01-01 RX ADMIN — Medication 10 MILLIGRAM(S): at 06:32

## 2021-01-01 RX ADMIN — DULOXETINE HYDROCHLORIDE 60 MILLIGRAM(S): 30 CAPSULE, DELAYED RELEASE ORAL at 13:24

## 2021-01-01 RX ADMIN — Medication 5 MILLIGRAM(S): at 09:22

## 2021-01-01 RX ADMIN — ENOXAPARIN SODIUM 90 MILLIGRAM(S): 100 INJECTION SUBCUTANEOUS at 16:38

## 2021-01-01 RX ADMIN — ENOXAPARIN SODIUM 90 MILLIGRAM(S): 100 INJECTION SUBCUTANEOUS at 06:12

## 2021-01-01 RX ADMIN — DULOXETINE HYDROCHLORIDE 60 MILLIGRAM(S): 30 CAPSULE, DELAYED RELEASE ORAL at 12:28

## 2021-01-01 RX ADMIN — DULOXETINE HYDROCHLORIDE 60 MILLIGRAM(S): 30 CAPSULE, DELAYED RELEASE ORAL at 11:47

## 2021-01-01 RX ADMIN — AMLODIPINE BESYLATE 5 MILLIGRAM(S): 2.5 TABLET ORAL at 04:42

## 2021-01-01 RX ADMIN — Medication 10 MILLIGRAM(S): at 13:28

## 2021-01-01 RX ADMIN — Medication 400 MILLIGRAM(S): at 07:59

## 2021-01-01 RX ADMIN — Medication 5 MILLIGRAM(S): at 18:03

## 2021-01-01 RX ADMIN — ENOXAPARIN SODIUM 40 MILLIGRAM(S): 100 INJECTION SUBCUTANEOUS at 18:06

## 2021-01-01 RX ADMIN — ATORVASTATIN CALCIUM 10 MILLIGRAM(S): 80 TABLET, FILM COATED ORAL at 21:50

## 2021-01-01 RX ADMIN — Medication 6 MILLIGRAM(S): at 06:02

## 2021-01-01 RX ADMIN — DULOXETINE HYDROCHLORIDE 60 MILLIGRAM(S): 30 CAPSULE, DELAYED RELEASE ORAL at 11:26

## 2021-01-01 RX ADMIN — DULOXETINE HYDROCHLORIDE 60 MILLIGRAM(S): 30 CAPSULE, DELAYED RELEASE ORAL at 12:40

## 2021-01-01 RX ADMIN — MONTELUKAST 10 MILLIGRAM(S): 4 TABLET, CHEWABLE ORAL at 12:28

## 2021-01-01 RX ADMIN — Medication 5 MILLIGRAM(S): at 08:00

## 2021-01-01 RX ADMIN — MONTELUKAST 10 MILLIGRAM(S): 4 TABLET, CHEWABLE ORAL at 21:18

## 2021-01-01 RX ADMIN — Medication 81 MILLIGRAM(S): at 12:40

## 2021-01-01 RX ADMIN — Medication 250 MILLIGRAM(S): at 22:19

## 2021-01-01 RX ADMIN — AMLODIPINE BESYLATE 5 MILLIGRAM(S): 2.5 TABLET ORAL at 06:09

## 2021-01-01 RX ADMIN — DULOXETINE HYDROCHLORIDE 60 MILLIGRAM(S): 30 CAPSULE, DELAYED RELEASE ORAL at 13:04

## 2021-01-01 RX ADMIN — ATORVASTATIN CALCIUM 10 MILLIGRAM(S): 80 TABLET, FILM COATED ORAL at 21:26

## 2021-01-01 RX ADMIN — Medication 81 MILLIGRAM(S): at 11:47

## 2021-01-01 RX ADMIN — MONTELUKAST 10 MILLIGRAM(S): 4 TABLET, CHEWABLE ORAL at 12:26

## 2021-01-01 RX ADMIN — REMDESIVIR 500 MILLIGRAM(S): 5 INJECTION INTRAVENOUS at 11:46

## 2021-01-01 RX ADMIN — ATORVASTATIN CALCIUM 10 MILLIGRAM(S): 80 TABLET, FILM COATED ORAL at 21:36

## 2021-01-01 RX ADMIN — Medication 10 MILLIGRAM(S): at 21:50

## 2021-01-01 RX ADMIN — HEPARIN SODIUM 5000 UNIT(S): 5000 INJECTION INTRAVENOUS; SUBCUTANEOUS at 21:54

## 2021-01-01 RX ADMIN — Medication 6 MILLIGRAM(S): at 04:42

## 2021-01-01 RX ADMIN — Medication 5 MILLIGRAM(S): at 22:39

## 2021-01-01 RX ADMIN — Medication 5 MILLIGRAM(S): at 13:50

## 2021-01-01 RX ADMIN — ATORVASTATIN CALCIUM 10 MILLIGRAM(S): 80 TABLET, FILM COATED ORAL at 21:17

## 2021-01-01 RX ADMIN — ALBUTEROL 2 PUFF(S): 90 AEROSOL, METERED ORAL at 23:00

## 2021-01-01 RX ADMIN — MONTELUKAST 10 MILLIGRAM(S): 4 TABLET, CHEWABLE ORAL at 11:46

## 2021-01-01 RX ADMIN — Medication 10 MILLIGRAM(S): at 22:47

## 2021-01-01 RX ADMIN — Medication 6 MILLIGRAM(S): at 06:09

## 2021-01-01 RX ADMIN — MONTELUKAST 10 MILLIGRAM(S): 4 TABLET, CHEWABLE ORAL at 14:10

## 2021-01-01 RX ADMIN — Medication 5 MILLIGRAM(S): at 09:54

## 2021-01-01 RX ADMIN — Medication 5 MILLIGRAM(S): at 00:24

## 2021-01-01 RX ADMIN — Medication 10 MILLIGRAM(S): at 12:55

## 2021-01-01 RX ADMIN — Medication 10 MILLIGRAM(S): at 13:04

## 2021-01-01 RX ADMIN — ATORVASTATIN CALCIUM 10 MILLIGRAM(S): 80 TABLET, FILM COATED ORAL at 22:47

## 2021-01-01 RX ADMIN — HEPARIN SODIUM 5000 UNIT(S): 5000 INJECTION INTRAVENOUS; SUBCUTANEOUS at 12:26

## 2021-01-01 RX ADMIN — ENOXAPARIN SODIUM 40 MILLIGRAM(S): 100 INJECTION SUBCUTANEOUS at 18:00

## 2021-01-01 RX ADMIN — Medication 10 MILLIGRAM(S): at 12:28

## 2021-01-01 RX ADMIN — Medication 6 MILLIGRAM(S): at 06:10

## 2021-01-01 RX ADMIN — REMDESIVIR 500 MILLIGRAM(S): 5 INJECTION INTRAVENOUS at 11:24

## 2021-01-01 RX ADMIN — ENOXAPARIN SODIUM 90 MILLIGRAM(S): 100 INJECTION SUBCUTANEOUS at 06:09

## 2021-01-01 RX ADMIN — Medication 10 MILLIGRAM(S): at 06:02

## 2021-01-01 RX ADMIN — HEPARIN SODIUM 5000 UNIT(S): 5000 INJECTION INTRAVENOUS; SUBCUTANEOUS at 04:55

## 2021-01-08 PROBLEM — E66.3 OVERWEIGHT: Status: ACTIVE | Noted: 2017-06-13

## 2021-01-08 PROBLEM — K21.9 GERD WITHOUT ESOPHAGITIS: Status: ACTIVE | Noted: 2017-06-13

## 2021-01-08 PROBLEM — J30.9 ALLERGIC RHINITIS: Status: ACTIVE | Noted: 2018-10-05

## 2021-01-08 PROBLEM — J45.50 ASTHMA, SEVERE PERSISTENT: Status: ACTIVE | Noted: 2018-07-31

## 2021-01-08 PROBLEM — J39.8 TRACHEOMALACIA: Status: ACTIVE | Noted: 2017-07-31

## 2021-01-08 PROBLEM — R06.02 SOB (SHORTNESS OF BREATH): Status: ACTIVE | Noted: 2017-06-13

## 2021-01-08 PROBLEM — Z20.822 CLOSE EXPOSURE TO COVID-19 VIRUS: Status: ACTIVE | Noted: 2021-01-01

## 2021-01-08 PROBLEM — U07.1 COVID-19 VIRUS INFECTION: Status: ACTIVE | Noted: 2021-01-01

## 2021-01-08 PROBLEM — U07.1 INFECTION DUE TO 2019 NOVEL CORONAVIRUS: Status: ACTIVE | Noted: 2021-01-01

## 2021-01-08 PROBLEM — R76.8 ELEVATED IGE LEVEL: Status: ACTIVE | Noted: 2018-06-15

## 2021-01-11 NOTE — CHART NOTE - NSCHARTNOTEFT_GEN_A_CORE
Medicine NP note    CC: Monoclonal Antibody Infusion /COVID 19 Positive      76 y/o male with PMH consists of HTN, COPD, Tracheobroncheomalacia, Gout  referred by Clay Jose presented for Monoclonal antibody treatment with Bamlanivimab.    Patient symptomatic with SOB upon presentation in the tent. O2sat 83 to 84%  Patient hypoxic    Exam/findings:    Vital Signs Last 24 Hrs  Vital Signs Last 24 Hrs  T(C): 36.6 (11 Jan 2021 13:41), Max: 36.6 (11 Jan 2021 13:41)  T(F): 97.8 (11 Jan 2021 13:41), Max: 97.8 (11 Jan 2021 13:41)  HR: 84 (11 Jan 2021 13:41) (84 - 84)  BP: 123/70 (11 Jan 2021 13:41) (123/70 - 123/70)  BP(mean): --  RR: 22 (11 Jan 2021 13:41) (22 - 22)  SpO2: 84% (11 Jan 2021 13:41) (84% - 84%)    PE:   Appearance:  Obese, A&ox3,  tachypneic  HEENT:   Normal oral mucosa,   Lymphatic: No lymphadenopathy  Cardiovascular: Normal S1 S2, No JVD, No murmurs, No edema  Respiratory: Lungs clear to auscultation, tachypneic	  Gastrointestinal:  Soft, Non-tender, + BS	  Skin: warm and dry  Neurologic: Non-focal  Extremities: Normal range of motion B/L U/L extremities    ASSESSMENT:    76 y/o male with PMH consists of HTN, COPD, Tracheobronchomalacia, Gout  referred by Clay Jose presented for Monoclonal antibody treatment with Bamlanivimab  Risk Profile includes: Patient High risk for covid 19 progression in setting of age, COPD, HTN    Covid result positive on 01/08/2021    S/S: cough, HA, SOB on mild exertion, started 6 days ago    Patient symptomatic with SOB, and desaturating to 83 to 84%   Patient states that his o2sat has been in low 80's for last 2 days'  Explained to the patient that he is not in stable condition to receive the infusion in setting of SOB, tachypnea, and hypoxic  Ptaient verbalized understanding, agreed to be seen in the ED  EMS called to transport the patient to ED.    Raghu Rivera [facility]  Department of Medicine  [phone]. Medicine NP note    CC: Monoclonal Antibody Infusion /COVID 19 Positive      76 y/o male with PMH consists of HTN, COPD, Tracheobroncheomalacia, Gout  referred by Clay Jose presented for Monoclonal antibody treatment with Bamlanivimab.    Patient symptomatic with SOB upon presentation in the tent. O2sat 83 to 84%  Patient hypoxic    Exam/findings:    Vital Signs Last 24 Hrs  Vital Signs Last 24 Hrs  T(C): 36.6 (11 Jan 2021 13:41), Max: 36.6 (11 Jan 2021 13:41)  T(F): 97.8 (11 Jan 2021 13:41), Max: 97.8 (11 Jan 2021 13:41)  HR: 84 (11 Jan 2021 13:41) (84 - 84)  BP: 123/70 (11 Jan 2021 13:41) (123/70 - 123/70)  BP(mean): --  RR: 22 (11 Jan 2021 13:41) (22 - 22)  SpO2: 84% (11 Jan 2021 13:41) (84% - 84%)    PE:   Appearance:  Obese, A&ox3,  tachypneic  HEENT:   Normal oral mucosa,   Lymphatic: No lymphadenopathy  Cardiovascular: Normal S1 S2, No JVD, No murmurs, No edema  Respiratory: Lungs clear to auscultation, tachypneic	  Gastrointestinal:  Soft, Non-tender, + BS	  Skin: warm and dry  Neurologic: Non-focal  Extremities: Normal range of motion B/L U/L extremities    ASSESSMENT:    76 y/o male with PMH consists of HTN, COPD (not on home o2), Tracheobronchomalacia, Gout  referred by Clay Jose presented for Monoclonal antibody treatment with Bamlanivimab  Risk Profile includes: Patient High risk for covid 19 progression in setting of age, COPD, HTN    Covid result positive on 01/08/2021    S/S: cough, HA, SOB on mild exertion, started 6 days ago    Patient symptomatic with SOB, and desaturating to 83 to 84%   Patient states that his o2sat has been in low 80's for last 2 days'  Explained to the patient that he is not in stable condition to receive the infusion in setting of SOB, tachypnea, and hypoxic  Ptaient verbalized understanding, agreed to be seen in the ED  EMS called to transport the patient to ED   Attempted to reach red ED Attending, unable to reach   Patient transported to ED accompanied by EMS    Raghu Rivera [facility]  Department of Medicine  [phone].

## 2021-01-11 NOTE — H&P ADULT - ASSESSMENT
76y/o M pmxh COPD, asthma, HTN, tracheobroncheomalacia, gout, arthritis, presenting with shortness of breath and low oxygen from the MAB infusion center. Patient states that he was diagnosed with COVID on 1/2, after developing shortness of breath and weakness that day. He states that he was exposed by his girlfriend who became ill from her neighbor. He states that he has been staying at home alone since the time of his diagnosis, and has been becoming increasingly more short of breath with cough since the time of his diagnosis. He reports fevers as high as 100.6F which he has been taking tylenol for, and chills/body aches. States that he was referred for MAB infusion today by his pulmonologist Dr. Lance, and while at the center this AM was short of breath and his O2 sat was 83-84% on RA. Patient states that he has never required O2 at home even with his COPD. Denies any nausea, vomiting, diarrhea, chest pain.

## 2021-01-11 NOTE — CONSULT NOTE ADULT - ASSESSMENT
76y/o M pmxh COPD, asthma, HTN, tracheobroncheomalacia, gout, arthritis, presenting with shortness of breath and low oxygen from the MAB infusion center. Patient states that he was diagnosed with COVID on 1/2, after developing shortness of breath and weakness that day. He states that he was exposed by his girlfriend who became ill from her neighbor. He states that he has been staying at home alone since the time of his diagnosis, and has been becoming increasingly more short of breath with cough since the time of his diagnosis. He reports fevers as high as 100.6F which he has been taking tylenol for, and chills/body aches. States that he was referred for MAB infusion today by his pulmonologist Dr. Lance, and while at the center this AM was short of breath and his O2 sat was 83-84% on RA. Patient states that he has never required O2 at home even with his COPD. Denies any nausea, vomiting, diarrhea, chest pain. (11 Jan 2021 18:19)    ER afebrile.  Satting 95% on 6L nasal cannula.  Pt with reported covid pcr (+) at [facility] on 1/8.     Covid (+):    - Pt with covid pcr (+) on 1/8, cxr with patchy opacities in RUL and LLL c/w covid pna.  Pt requiring supplemental O2 due to hypoxia.  Cont dexamethasone 6mg IV qdaily.    - Pt not a candidate for MAB therapy due to hypoxia.  Will hold off on remdesivir for now given elevated Cr, and clearance <30.  Monitor for improvement in Cr following IVF bolus, if improving and CrCl >30, will start remdesivir x 5 day course.  Monitor daily renal function and LFTs.     - Monitor pulse ox and titrate oxygen appropriately    - Monitor inflammatory markers:  DD: 253  Ferritin: 3529  CRP: 26.9  PCT: 0.86    - Pt on DVT ppx with Heparin sq    Allyson Downs  [phone]

## 2021-01-11 NOTE — ED ADULT NURSE NOTE - NSIMPLEMENTINTERV_GEN_ALL_ED
Implemented All Fall Risk Interventions:  [place] to call system. Call bell, personal items and telephone within reach. Instruct patient to call for assistance. Room bathroom lighting operational. Non-slip footwear when patient is off stretcher. Physically safe environment: no spills, clutter or unnecessary equipment. Stretcher in lowest position, wheels locked, appropriate side rails in place. Provide visual cue, wrist band, yellow gown, etc. Monitor gait and stability. Monitor for mental status changes and reorient to person, place, and time. Review medications for side effects contributing to fall risk. Reinforce activity limits and safety measures with patient and family.

## 2021-01-11 NOTE — H&P ADULT - NSHPLABSRESULTS_GEN_ALL_CORE
Lab Results:  CBC  CBC Full  -  ( 11 Jan 2021 16:26 )  WBC Count : 5.12 K/uL  RBC Count : 3.98 M/uL  Hemoglobin : 12.1 g/dL  Hematocrit : 36.6 %  Platelet Count - Automated : 192 K/uL  Mean Cell Volume : 92.0 fl  Mean Cell Hemoglobin : 30.4 pg  Mean Cell Hemoglobin Concentration : 33.1 gm/dL  Auto Neutrophil # : 4.16 K/uL  Auto Lymphocyte # : 0.53 K/uL  Auto Monocyte # : 0.39 K/uL  Auto Eosinophil # : 0.01 K/uL  Auto Basophil # : 0.01 K/uL  Auto Neutrophil % : 81.2 %  Auto Lymphocyte % : 10.4 %  Auto Monocyte % : 7.6 %  Auto Eosinophil % : 0.2 %  Auto Basophil % : 0.2 %    .		Differential:	[] Automated		[] Manual  Chemistry                        12.1   5.12  )-----------( 192      ( 11 Jan 2021 16:26 )             36.6     01-11    136  |  98  |  31<H>  ----------------------------<  129<H>  3.6   |  21<L>  |  2.36<H>    Ca    8.6      11 Jan 2021 16:26    TPro  7.5  /  Alb  3.8  /  TBili  0.6  /  DBili  x   /  AST  101<H>  /  ALT  95<H>  /  AlkPhos  504<H>  01-11    LIVER FUNCTIONS - ( 11 Jan 2021 16:26 )  Alb: 3.8 g/dL / Pro: 7.5 g/dL / ALK PHOS: 504 U/L / ALT: 95 U/L / AST: 101 U/L / GGT: x                     MICROBIOLOGY/CULTURES:      RADIOLOGY RESULTS: reviewed
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
## 2021-01-11 NOTE — ED ADULT NURSE NOTE - PSH
Carpal tunnel syndrome on both sides  2014  History of bilateral hip replacements    History of lithotripsy  2012  History of total knee replacement, left    Previous back surgery  L1-L5   June 2014 first surgery got infected; + MRSA  antibiotic given  July 2014 incision and drainage of infected wound  S/P left knee arthroscopy  2012  S/P right knee arthroscopy  1997  S/P tonsillectomy and adenoidectomy  childhood

## 2021-01-11 NOTE — ED PROVIDER NOTE - PHYSICAL EXAMINATION
GEN: Well Appearing, Nontoxic, NAD  HEENT: NC/AT, Symm Facies. PERRL, EOMI, MMM, posterior pharynx clear  CV: No JVD/Bruits or stridor;  +S1S2, RRR w/o m/g/r  RESP: crackles diffusely bilaterally with mildly decreased breath sounds at the bases  ABD: Soft, nt/nd, +BS. No guarding/rebound. No RUQ tender, no CVAT  EXT/MSK: No lower extremity edema or calf tenderness. WWP, palpable pulses. FROMx4  SKIN: No erythema, lesions or rash  Neuro: Grossly intact, AOX3 with normal speech, CN II-XII intact; Sensation intact, motor 5/5 throughout. Gait normal

## 2021-01-11 NOTE — ED PROVIDER NOTE - NS ED ROS FT
Constitutional: No fever or chills +myalgias  Eyes: No visual changes, eye pain or redness  HEENT: No throat pain, ear pain, nasal pain. No nose bleeding.  CV: No chest pain or lower extremity edema  Resp: +sob, cough  GI: No abd pain. No nausea or vomiting. No diarrhea. No constipation.   : No dysuria, hematuria.   MSK: No musculoskeletal pain  Skin: No rash  Neuro: No headache. No numbness or tingling. No weakness.
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
## 2021-01-11 NOTE — ED PROVIDER NOTE - ATTENDING CONTRIBUTION TO CARE
74 yo male hx of asthma/COPD p/w cough/SOB, COVID + test taken 1/2, girlfriend positive 12/31 or 1/1.  hypoxic to 80s during monoclonal antibody infusion.  no overt respiratory distress on my assessment.  check labs, CXR, steroids, likely admit for further management.

## 2021-01-11 NOTE — ED PROVIDER NOTE - CLINICAL SUMMARY MEDICAL DECISION MAKING FREE TEXT BOX
75M copd, asthma, tracheoboncheomalacia presenting from [facility] center for hypoxia to 83% on RA and SOB, covid positive on 1/2 and worsening shortness of breath since that time, sent in by pulm. PE remarkable for crackles diffusely and hypoxia requiring 5L Nc for 94% O2. Will obtain covid labs, CXR, provide steroids and TBA
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
## 2021-01-11 NOTE — ED ADULT NURSE NOTE - OBJECTIVE STATEMENT
76 y/o male patient presents ambulatory to ED, brought in by EMS, sent from [facility] infusion center. Patient states he tested positive for COVID 1/2/21 after developing weakness and shortness of breath. Patient states he was exposed to COVID from his girlfriend who got sick from the neighbor. Per patient, he has been quarantining at home but feels the shortness of breath is worsening and is associated with cough. Max temp at home 100.6F associated with chills and body aches, taking Tylenol with minimal relief. Patient referred to infusion tent from pulmonologist Dr. Lance, was found to have "O2 84% on room air". Patient sent to the ED for further evaluation. Patient denies CP, N/V/D; afebrile in ED. PMHX COPD, asthma

## 2021-01-11 NOTE — ED PROVIDER NOTE - OBJECTIVE STATEMENT
74y/o M pmxh COPD, asthma, HTN, tracheobroncheomalacia, gout, arthritis, presenting with shortness of breath and low oxygen from the [facility] infusion center. Patient states that he was diagnosed with COVID on 1/2, after developing shortness of breath and weakness that day. He states that he was exposed by his girlfriend who became ill from her neighbor. He states that he has been staying at home alone since the time of his diagnosis, and has been becoming increasingly more short of breath with cough since the time of his diagnosis. He reports fevers as high as 100.6F which he has been taking tylenol for, and chills/body aches. States that he was referred for [facility] infusion today by his pulmonologist Dr. Lance, and while at the center this AM was short of breath and his O2 sat was 83-84% on RA. Patient states that he has never required O2 at home even with his COPD. Denies any nausea, vomiting, diarrhea, chest pain.

## 2021-01-11 NOTE — H&P ADULT - NSHPPHYSICALEXAM_GEN_ALL_CORE
General: NAD  Respiratory: Coarse breath sounds  B/L  CV: RRR, S1S2, no murmurs, rubs or gallops  Abdominal: Soft, NT, ND +BS, Last BM  Extremities: No edema, + peripheral pulses  Skin Normal

## 2021-01-11 NOTE — H&P ADULT - NSICDXPASTSURGICALHX_GEN_ALL_CORE_FT
PAST SURGICAL HISTORY:  Carpal tunnel syndrome on both sides 2014    History of bilateral hip replacements     History of lithotripsy 2012    History of total knee replacement, left     Previous back surgery L1-L5   June 2014 first surgery got infected; + MRSA  antibiotic given  July 2014 incision and drainage of infected wound    S/P left knee arthroscopy 2012    S/P right knee arthroscopy 1997    S/P tonsillectomy and adenoidectomy childhood

## 2021-01-12 NOTE — REASON FOR VISIT
[Follow-Up] : a follow-up visit [Spouse] : spouse [FreeTextEntry1] : video call - allergic rhinitis, severe persistent asthma, dyspnea, elevated IgE, GERD, neck pain, OW, SOB, tracheal stenosis, and Tracheomalacia
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
## 2021-01-12 NOTE — CONSULT NOTE ADULT - ASSESSMENT
EKG SR no ischemic changes no ischemic changes     Assessment and Plan     1) COVID 19 PNA: likely cause of SOB , c.w remdesivir and dexa , monitor o2 levels , ID and pulm recs     2) HTN : c.w amlodipi ne     3) DVT PPX Heparin

## 2021-01-12 NOTE — PROGRESS NOTE ADULT - SUBJECTIVE AND OBJECTIVE BOX
Patient is a 75y old  Male who presents with a chief complaint of sob (12 Jan 2021 11:08)    Date of servie : 01-12-21 @ 15:24  INTERVAL HPI/OVERNIGHT EVENTS:  T(C): 36.3 (01-12-21 @ 13:06), Max: 36.8 (01-11-21 @ 17:30)  HR: 79 (01-12-21 @ 13:06) (62 - 79)  BP: 111/58 (01-12-21 @ 13:06) (100/85 - 137/75)  RR: 20 (01-12-21 @ 13:06) (20 - 22)  SpO2: 96% (01-12-21 @ 13:06) (72% - 97%)  Wt(kg): --  I&O's Summary    11 Jan 2021 07:01  -  12 Jan 2021 07:00  --------------------------------------------------------  IN: 1000 mL / OUT: 1300 mL / NET: -300 mL    12 Jan 2021 07:01  -  12 Jan 2021 15:24  --------------------------------------------------------  IN: 500 mL / OUT: 300 mL / NET: 200 mL        LABS:                        13.8   3.85  )-----------( 202      ( 12 Jan 2021 06:44 )             40.5     01-12    141  |  103  |  24<H>  ----------------------------<  164<H>  4.7   |  20<L>  |  1.23    Ca    9.8      12 Jan 2021 06:44    TPro  8.4<H>  /  Alb  4.3  /  TBili  0.4  /  DBili  x   /  AST  72<H>  /  ALT  99<H>  /  AlkPhos  526<H>  01-12        CAPILLARY BLOOD GLUCOSE                MEDICATIONS  (STANDING):  allopurinol 150 milliGRAM(s) Oral daily  amitriptyline 10 milliGRAM(s) Oral three times a day  amLODIPine   Tablet 5 milliGRAM(s) Oral daily  aspirin  chewable 81 milliGRAM(s) Oral daily  atorvastatin 10 milliGRAM(s) Oral at bedtime  dexAMETHasone     Tablet 6 milliGRAM(s) Oral daily  DULoxetine 60 milliGRAM(s) Oral daily  heparin   Injectable 5000 Unit(s) SubCutaneous every 8 hours  montelukast 10 milliGRAM(s) Oral daily    MEDICATIONS  (PRN):  ALBUTerol    90 MICROgram(s) HFA Inhaler 2 Puff(s) Inhalation every 6 hours PRN Bronchospasm          PHYSICAL EXAM:  GENERAL: NAD, well-groomed, well-developed  HEAD:  Atraumatic, Normocephalic  CHEST/LUNG: Clear to percussion bilaterally; No rales, rhonchi, wheezing, or rubs  HEART: Regular rate and rhythm; No murmurs, rubs, or gallops  ABDOMEN: Soft, Nontender, Nondistended; Bowel sounds present  EXTREMITIES:  2+ Peripheral Pulses, No clubbing, cyanosis, or edema  LYMPH: No lymphadenopathy noted  SKIN: No rashes or lesions    Care Discussed with Consultants/Other Providers [ ] YES  [ ] NO

## 2021-01-12 NOTE — CHART NOTE - NSCHARTNOTEFT_GEN_A_CORE
Informed by primary RN of increased O2 requirements.  Patient was on ventimask 15L however was noted with O2sat 70s-80s.  Patient was not compliant with his mask because he was eating however when prompted to remain with mask on did not improve.  Was placed on NRB with O2 sat increasing to 96%.  Patient assessed at bedside and does not appear to be in any acute distress.  Does not appear tachypnic however he states that he feels very SOB unchanged from when he was admitted.    Will check ABG.  Will keep patient at this time on the NRB at this time to maintain sat however may need Hi-flow if unable to maintain appropriate O2 saturations.   Discussed plan with patient.    Miriam Gutiérrez, ANP-C  [phone]

## 2021-01-12 NOTE — PROGRESS NOTE ADULT - ASSESSMENT
74y/o M pmxh COPD, asthma, HTN, tracheobroncheomalacia, gout, arthritis, presenting with shortness of breath and low oxygen from the [facility] infusion center. Patient states that he was diagnosed with COVID on 1/2, after developing shortness of breath and weakness that day. He states that he was exposed by his girlfriend who became ill from her neighbor. He states that he has been staying at home alone since the time of his diagnosis, and has been becoming increasingly more short of breath with cough since the time of his diagnosis. He reports fevers as high as 100.6F which he has been taking tylenol for, and chills/body aches. States that he was referred for [facility] infusion today by his pulmonologist Dr. Lance, and while at the center this AM was short of breath and his O2 sat was 83-84% on RA. Patient states that he has never required O2 at home even with his COPD. Denies any nausea, vomiting, diarrhea, chest pain.    Problem/Plan - 1:  ·  Problem: COVID-19.  Plan: cw decadron  unable to do remdesivir sec to [facility]  ID FU  pulmonary fu appreciated     Problem/Plan - 2:  ·  Problem: COPD (chronic obstructive pulmonary disease).  Plan: cw albuterol  pulmonary fu     Problem/Plan - 3:  ·  Problem: Depression.  Plan: cw home meds.     Problem/Plan - 4:  ·  Problem: HLD (hyperlipidemia).  Plan: cw statin.

## 2021-01-12 NOTE — CONSULT NOTE ADULT - ASSESSMENT
74 y/o M w/severe persistent asthma recently diagnosed with COVID-19 now admitted for hypoxemia in setting of COVID-19 PNA.    - Continue decadron  - Bronchodilators  - Supportive care 74 y/o M w/severe persistent asthma recently diagnosed with COVID-19 now admitted for hypoxemia in setting of COVID-19 PNA.    - Wean supplemental O2 as tolerated  - Continue decadron  - Bronchodilators, acapella  - Supportive care

## 2021-01-12 NOTE — ASSESSMENT
[FreeTextEntry1] : Mr. Rhoades is a 75 year old male who has HTN, arthritis, tracheomalacia, stenosis, asthma, allergy, GERD, and elevated IgE. He is now here for a Xolair injection; He is currently stable from a pulmonary perspective, and is s/p FOB c/w severe TBM and is s/p his tracheoplasty. improved  #1 issue is Covid19 infection as of 1/5/2021 confirmed \par \par His shortness of breath is felt to be multifactorial due to:\par - Chronic sinusitis \par -asthma\par -poor breathing mechanics \par -cardiac disease\par -anemia\par -out of shape\par -overweight\par -TBM\par \par problem 1: asthma (stable)\par -can continue to use albuterol by the nebulizer up to QID though should use it first thing in the morning and PRN \par -continue Trelegy 1 puff QD\par -continue to use QVar 80 2 puffs BID\par -continue Singulair 10 mg QHS \par -continue to use rescue inhaler, Ventolin, PRN and before exercise\par -continue Daliresp 250 mg BID \par Asthma is believed to be caused by inherited (genetic) and environmental factor, but its exact cause is unknown. Asthma may be triggered by allergens, lung infections, or irritants in the air. Asthma triggers are different for each person \par -Inhaler technique reviewed as well as oral hygiene techniques reviewed with patient. Avoidance of cold air, extremes of temperature, rescue inhaler should be used before exercise. Order of medication reviewed with patient. Recommended use of a cool mist humidifier in the bedroom \par You have a clinical scenario most c/w acute bronchitis the etiology of which is unknown but empiric antibiotics are indicated. Hydration, mucolytics including Mucinex, Robitussin and the like are indicated. Cough controlling agents will be needed. \par \par Problem 1A: Anemia\par -complete blood work for Iron Studies\par \par problem 1B: Covid19 infection\par -candidate for MAB \par -Add Xarelto 10 qD\par -Add Dexamethasone 6mg/day for 10 days \par -Complete home blood draws \par -OTC Vitamin C 500mg BID \par -OTC Quercetin 250-500mg BID \par -OTC Zinc 75-100mg per day \par -OTC Melatonin 1 or 2mg a night \par -OTC Vitamin D 2000mg per day \par -OTC Tonic Water 8oz per day\par -Water 0.5-1 gallon per day\par -\par \par problem 2: tracheomalacia (s/p PNA, s/p tracheoplasty)\par -CTSX f/u with Dr. Bustos and Dr. Verde, f/u in a few months \par -Recommended to use the Acapella or Aerobika devices\par -continue amitriptyline 10mg Q8H PRN\par Tracheomalacia is usually acquired in adults and common causes include damage by tracheostomy or endotracheal intubation damaging the tracheal cartilage with increase risk with multiple intubations, prolonged intubation, and concurrent high dose steroid therapy; external chest wall trauma and surgery; chronic compression of the trachea by benign etiologies (eg, benign mediastinal goiter) or malignancy; relapsing polychondritis; or recurrent infection. Tracheomalacia can be asymptomatic, however signs or symptoms can develop as the severity of the airway narrowing progresses with major symptoms include dyspnea, cough, and sputum retention. Other symptoms include severe paroxysms of coughing, wheezing or stridor, barking cough and may be exacerbated by forced expiration, cough, and Valsalva maneuver. Tracheomalacia is diagnosed by a bronchoscopic visualization of dynamic airway collapse on dynamic chest CT. Therapy is warranted in symptomatic patients with severe tracheomalacia and includes surgical repair as tracheobronchoplasty. The patient was referred to Dr. Everardo Jones or Dr. Johan Verde, at [facility] for a surgical consult.\par \par problem 3: allergic rhinitis\par -continue Xhance 1 sniff BID \par - continue Pulmicort 0.5 mg BID via navage\par -recommended to use "Navage" nasal rinse device \par -continue Olopatadine .6% 1 sniff/nostril BID\par -continue Clarinex 5 mg QD at breakfast \par -s/p allergic profile: blood work to include IgE level(+), eosinophil level(-), vitamin D level(-), asthma profile(-), and food IgE level (-)\par \par -Environmental measures for allergies were encouraged including mattress and pillow cover, air purifier, and environmental controls. \par \par problem 4:  GERD\par -continue to use Nexium 40 mg before breakfast\par -continue Pepcid 40 mg QHS \par -Rule of 2s: avoid eating too much, eating too late, eating too spicy, eating two hours before bed\par -Things to avoid including overeating, spicy foods, tight clothing, eating within three hours of bed, this list is not all inclusive. \par -For treatment of reflux, possible options discussed including diet control, H2 blockers, PPIs, as well as coating motility agents discussed as treatment options. Timing of meals and proximity of last meal to sleep were discussed. If symptoms persist, a formal gastrointestinal evaluation is needed. \par \par problem 5: Elevated IgE level (127) (severe allergic asthma)\par -s/p Xolair shot - R/L arms 225 (administered in office 11/12/2020 ) \par -now injections to Q 4 weeks\par -Xolair is a recombinant DNA- derived humanized IgG1K monoclonal antibody that selectively binds ot human immunoglobulin E (IgE). Xolair is produced by a Chinese hamster ovary cell suspension culture in nutrient medium containing the antibiotic gentamicin. Gentamicin is not detectable in the final product. Xolair is a sterile, white, preservative free, lyophilized powder contained in a single use vial that is reconstituted with sterile water for suspension. Side effects include: wheezing, tightness of the chest, trouble breathing, hives, skin rash, feeling anxious or light-headed, fainting, warmth or tingling under skin, or swelling of face, lips, or tongue \par \par problem 6: obesity \par -Weight loss, exercise, and diet control were discussed and are highly encouraged. Treatment options were given such as, aqua therapy, and contacting a nutritionist. Recommended to use the elliptical, stationary bike, less use of treadmill.  Obesity is associated with worsening asthma, shortness of breath, and potential for cardiac disease, diabetes, and other underlying medical conditions. \par \par problem 7: cardiac component\par -recommended to continue to f/u with cardiologist Dr. Jed Wilks\par \par problem 8: poor breathing mechanics\par -Proper breathing techniques were reviewed with an emphasis of exhalation. Patient instructed to breath in for 1 second and out for four seconds. Patient was encouraged to not talk while walking. \par \par problem 9: no OSAS\par -based on his fatigue, EDS, snoring, elevated mallampati class, increased leg size, and changes in memory\par -he is s/p home sleep study\par -recommended Provent in the meantime \par -Sleep apnea is associated with adverse clinical consequences which an affect most organ systems.  Cardiovascular disease risk includes arrhythmias, atrial fibrillation, hypertension, coronary artery disease, and stroke. Metabolic disorders include diabetes type 2, non-alcoholic fatty liver disease. Mood disorder especially depression; and cognitive decline especially in the elderly. Associations with  chronic reflux/Rodríguez’s esophagus some but not all inclusive. \par -Reasons to assess this include arousal consistent with hypopnea; respiratory events most prominent in REM sleep or supine position; therefore sleep staging and body position are important for accurate diagnosis and estimation of AHI.\par \par problem 10: abnormal chest CT\par -c/w TBM and PNA\par -s/p chest CT in 10/19 reconfirmation \par \par problem 11: r/o immunodeficiency\par -s/p blood work: quantitative immunoglobulins, IgG subsets, strep pneumonia titers (all normal)\par \par -Due to the fact that this pt has had more infections than would be expected and immunological blood work is indicated this would include: IgG subclasses, quantitative immunoglobulins, Strep pneumoniae titers as well as Vitamin D levels. Based on this blood work we will be able to decide where the pt needs additional pneumococcal vaccine either Prevnar 13 or pneumovax. Immunology evaluation will also be potentially indicated.\par \par problem 12: Health Maintenance/COVID19 Precautions:\par - Clean your hands often. Wash your hands often with soap and water for at least 20 seconds, especially after blowing your nose, coughing, or sneezing, or having been in a public place.\par - If soap and water are not available, use a hand  that contains at least 60% alcohol.\par - To the extent possible, avoid touching high-touch surfaces in public places - elevator buttons, door handles, handrails, handshaking with people, etc. Use a tissue or your sleeve to cover your hand or finger if you must touch something.\par - Wash your hands after touching surfaces in public places.\par - Avoid touching your face, nose, eyes, etc.\par - Clean and disinfect your home to remove germs: practice routine cleaning of frequently touched surfaces (for example: tables, doorknobs, light switches, handles, desks, toilets, faucets, sinks & cell phones)\par - Avoid crowds, especially in poorly ventilated spaces. Your risk of exposure to respiratory viruses like COVID-19 may increase in crowded, closed-in settings with little air circulation if there are people in the crowd who are sick. All patients are recommended to practice social distancing and stay at least 6 feet away from others.\par - Avoid all non-essential travel including plane trips, and especially avoid embarking on cruise ships.\par -If COVID-19 is spreading in your community, take extra measures to put distance between yourself and other people to further reduce your risk of being exposed to this new virus.\par -Stay home as much as possible.\par - Consider ways of getting food brought to your house through family, social, or commercial networks\par -Be aware that the virus has been known to live in the air up to 3 hours post exposure, cardboard up to 24 hours post exposure, copper up to 4 hours post exposure, steel and plastic up to 2-3 days post exposure. Risk of transmission from these surfaces are affected by many variables.\par Immune Support Recommendations:\par -OTC Vitamin C 500mg BID \par -OTC Quercetin 250-500mg BID \par -OTC Zinc 75-100mg per day \par -OTC Melatonin 1 or 2 mg a night \par -OTC Vitamin D 1-4000mg per day \par -OTC Tonic Water 8oz per day\par Asthma and COVID19:\par You need to make sure your asthma is under control. This often requires the use of inhaled corticosteroids (and sometimes oral corticosteroids). Inhaled corticosteroids do not likely reduce your immune system’s ability to fight infections, but oral corticosteroids may. It is important to use the steps above to protect yourself to limit your exposure to any respiratory virus. \par \par problem 13: health maintenance\par -Recommended Arthro soothe pills/tablets\par -s/p a neurological evaluation with Dr. Jerrell Moore regarding his frequent falling. \par -recommended a yearly flu shot after October 15 -refused\par -recommended strep pneumonia vaccines: Prevnar-13 vaccine, followed by Pneumo vaccine 23 on year following  (completed)\par -recommended early intervention for URIs\par -recommended osteoporosis evaluations\par -recommended early dermatological evaluations\par -recommended after the age of 50 to the age of 70, colonoscopy every 5 years\par \par F/U in 3 months SPI/NIOX\par He is encouraged to call with any changes, concerns, or questions.

## 2021-01-12 NOTE — PROGRESS NOTE ADULT - ASSESSMENT
74y/o M pmxh COPD, asthma, HTN, tracheobroncheomalacia, gout, arthritis, presenting with shortness of breath and low oxygen from the MAB infusion center. Patient states that he was diagnosed with COVID on 1/2, after developing shortness of breath and weakness that day. He states that he was exposed by his girlfriend who became ill from her neighbor. He states that he has been staying at home alone since the time of his diagnosis, and has been becoming increasingly more short of breath with cough since the time of his diagnosis. He reports fevers as high as 100.6F which he has been taking tylenol for, and chills/body aches. States that he was referred for MAB infusion today by his pulmonologist Dr. Lance, and while at the center this AM was short of breath and his O2 sat was 83-84% on RA. Patient states that he has never required O2 at home even with his COPD. Denies any nausea, vomiting, diarrhea, chest pain. (11 Jan 2021 18:19)    ER afebrile.  Satting 95% on 6L nasal cannula.  Pt with reported covid pcr (+) at [facility] on 1/8.     Covid (+):    - Pt with covid pcr (+) on 1/8, cxr with patchy opacities in RUL and LLL c/w covid pna.  Pt requiring supplemental O2 due to hypoxia.  Cont dexamethasone 6mg IV qdaily.    - Pt not a candidate for MAB therapy due to hypoxia.  Will hold off on remdesivir for now given elevated Cr, and clearance <30.  Monitor for improvement in Cr following IVF bolus, CrCl >30, will start remdesivir x 5 day course.  Monitor daily renal function and LFTs.     - Monitor pulse ox and titrate oxygen appropriately    - Monitor inflammatory markers:  DD: 253  Ferritin: 3529  CRP: 26.9  PCT: 0.86    - Pt on DVT ppx with Heparin sq.      Allyson Downs  [phone]
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
## 2021-01-12 NOTE — ADDENDUM
[FreeTextEntry1] : ******************Amended by Navin Mata on 1/12/2021***************\par \par Documented by Onel Cardenas acting as a scribe for Dr. Clay Lance on (01/08/2021).\par \par All medical record entries made by the Scribe were at my, Dr. Clay Lance's, direction and personally dictated by me on (01/08/2021). I have reviewed the chart and agree that the record accurately reflects my personal performance of the history, physical exam, assessment and plan. I have also personally directed, reviewed, and agree with the discharge instructions.

## 2021-01-12 NOTE — CONSULT NOTE ADULT - ASSESSMENT
76y/o M pmxh COPD, asthma, HTN, tracheobroncheomalacia, gout, arthritis, admitted with hypoxia from covid 19 and influenza     LUDA   Pt admitted with elevated SCr  2.3  Baseline Scr 0.8 in 11/ 2019  LUDA in the setting of infection   Scr improved today s/p IVF   LUDA likely pre-renal   UA with out protein or blood  avoid nephrotoxins     HTN   BP controlled

## 2021-01-12 NOTE — HISTORY OF PRESENT ILLNESS
[Home] : at home, [unfilled] , at the time of the visit. [Medical Office: ([facility])___] : at the medical office located in  [Verbal consent obtained from patient] : the patient, [unfilled] [FreeTextEntry1] : Mr. Rhoades is a 75 year old male with a history of allergic rhinitis, severe persistent asthma, dyspnea, elevated IgE, GERD, neck pain, OW, SOB, tracheal stenosis, and Tracheomalacia presenting to the office today via video call for a follow up visit.  His chief complaint \par -He notes being exposed to Covid-19 by his significant other \par -He notes starting to feel symptomatic tuesday 1/5/2020\par -He notes feeling achy, tired, congested, and chills at night \par -He notes having 99.1 temperature last night \par -He notes feeling more exhausted \par -He denies palpitations \par -He notes wheezing \par -He notes being SOB\par -He notes taking bethanechol which has improved is coughing \par -He notes \par -He notes getting positive covid test this morning 1/8/2020, and tested 1/7/2020\par -He notes taking baby aspirin \par  \par \par - denies any headaches, nausea, vomiting, fever, chills, sweats, chest pain, chest pressure, diarrhea, constipation, dysphagia, dizziness, leg swelling, leg pain, itchy eyes, itchy ears, heartburn, reflux, or sour taste in the mouth.

## 2021-01-12 NOTE — PROGRESS NOTE ADULT - SUBJECTIVE AND OBJECTIVE BOX
Infectious Diseases progress note:    Subjective: Pt on high flow O2.  Awake, alert, mentating well.  Renal function improved post IVF    ROS:  CONSTITUTIONAL:  No fever, chills, rigors  CARDIOVASCULAR:  No chest pain or palpitations  RESPIRATORY:   No SOB, cough, dyspnea on exertion.  No wheezing  GASTROINTESTINAL:  No abd pain, N/V, diarrhea/constipation  EXTREMITIES:  No swelling or joint pain  GENITOURINARY:  No burning on urination, increased frequency or urgency.  No flank pain  NEUROLOGIC:  No HA, visual disturbances  SKIN: No rashes    Allergies    beta-lactam antibiotics (Swelling)  Keflex (Anaphylaxis)  nitrates causes vertigo per pt (Nausea)  penicillin (Anaphylaxis)  Versed (Nausea)    Intolerances        ANTIBIOTICS/RELEVANT:  antimicrobials    immunologic:    OTHER:  ALBUTerol    90 MICROgram(s) HFA Inhaler 2 Puff(s) Inhalation every 6 hours PRN  allopurinol 150 milliGRAM(s) Oral daily  amitriptyline 10 milliGRAM(s) Oral three times a day  amLODIPine   Tablet 5 milliGRAM(s) Oral daily  aspirin  chewable 81 milliGRAM(s) Oral daily  atorvastatin 10 milliGRAM(s) Oral at bedtime  dexAMETHasone     Tablet 6 milliGRAM(s) Oral daily  DULoxetine 60 milliGRAM(s) Oral daily  heparin   Injectable 5000 Unit(s) SubCutaneous every 8 hours  montelukast 10 milliGRAM(s) Oral daily      Objective:  Vital Signs Last 24 Hrs  T(C): 36.5 (12 Jan 2021 16:15), Max: 36.8 (12 Jan 2021 04:27)  T(F): 97.7 (12 Jan 2021 16:15), Max: 98.2 (12 Jan 2021 04:27)  HR: 78 (12 Jan 2021 18:30) (62 - 79)  BP: 118/66 (12 Jan 2021 16:15) (111/58 - 137/75)  BP(mean): --  RR: 24 (12 Jan 2021 18:30) (20 - 26)  SpO2: 98% (12 Jan 2021 18:30) (72% - 98%)    PHYSICAL EXAM:  Constitutional:NAD  Eyes:KARENA, EOMI  Ear/Nose/Throat: no thrush, mucositis.  Moist mucous membranes	  Neck:no JVD, no lymphadenopathy, supple  Respiratory: CTA robyn  Cardiovascular: S1S2 RRR, no murmurs  Gastrointestinal:soft, nontender,  nondistended (+) BS  Extremities:no e/e/c  Skin:  no rashes, open wounds or ulcerations        LABS:                        13.8   3.85  )-----------( 202      ( 12 Jan 2021 06:44 )             40.5     01-12    141  |  103  |  24<H>  ----------------------------<  164<H>  4.7   |  20<L>  |  1.23    Ca    9.8      12 Jan 2021 06:44    TPro  8.4<H>  /  Alb  4.3  /  TBili  0.4  /  DBili  x   /  AST  72<H>  /  ALT  99<H>  /  AlkPhos  526<H>  01-12          Procalcitonin, Serum: 0.86 (01-11 @ 16:26)            Rapid RVP Result: Detected          MICROBIOLOGY:          RADIOLOGY & ADDITIONAL STUDIES:
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
## 2021-01-13 NOTE — PROGRESS NOTE ADULT - ASSESSMENT
74 y/o M w/severe persistent asthma recently diagnosed with COVID-19 now admitted for hypoxemia in setting of COVID-19 PNA.    - Wean supplemental O2 as tolerated  - Continue decadron  - Bronchodilators, acapella  - Supportive care  *******************  1/12- no significant changes in status

## 2021-01-13 NOTE — PROGRESS NOTE ADULT - SUBJECTIVE AND OBJECTIVE BOX
Infectious Diseases progress note:    Subjective: Events noted, s/p RRT.  Pt declined high flow earlier today due to claustrophobia and placed on NRB.  Pt given valium, now agreeable to high flow.  Pt awake, mentating well.      ROS:  CONSTITUTIONAL:  No fever, chills, rigors  CARDIOVASCULAR:  No chest pain or palpitations  RESPIRATORY:   No SOB, cough, dyspnea on exertion.  No wheezing  GASTROINTESTINAL:  No abd pain, N/V, diarrhea/constipation  EXTREMITIES:  No swelling or joint pain  GENITOURINARY:  No burning on urination, increased frequency or urgency.  No flank pain  NEUROLOGIC:  No HA, visual disturbances  SKIN: No rashes    Allergies    beta-lactam antibiotics (Swelling)  Keflex (Anaphylaxis)  nitrates causes vertigo per pt (Nausea)  penicillin (Anaphylaxis)  Versed (Nausea)    Intolerances        ANTIBIOTICS/RELEVANT:  antimicrobials  remdesivir  IVPB   IV Intermittent     immunologic:    OTHER:  ALBUTerol    90 MICROgram(s) HFA Inhaler 2 Puff(s) Inhalation every 6 hours PRN  allopurinol 150 milliGRAM(s) Oral daily  amitriptyline 10 milliGRAM(s) Oral three times a day  amLODIPine   Tablet 5 milliGRAM(s) Oral daily  aspirin  chewable 81 milliGRAM(s) Oral daily  atorvastatin 10 milliGRAM(s) Oral at bedtime  dexAMETHasone     Tablet 6 milliGRAM(s) Oral daily  diazepam    Tablet 5 milliGRAM(s) Oral four times a day PRN  DULoxetine 60 milliGRAM(s) Oral daily  heparin   Injectable 5000 Unit(s) SubCutaneous every 8 hours  montelukast 10 milliGRAM(s) Oral daily      Objective:  Vital Signs Last 24 Hrs  T(C): 36.3 (13 Jan 2021 16:10), Max: 37.1 (13 Jan 2021 10:17)  T(F): 97.4 (13 Jan 2021 16:10), Max: 98.8 (13 Jan 2021 10:17)  HR: 79 (13 Jan 2021 16:10) (75 - 90)  BP: 136/71 (13 Jan 2021 16:10) (124/71 - 139/82)  BP(mean): --  RR: 20 (13 Jan 2021 16:10) (20 - 26)  SpO2: 95% (13 Jan 2021 16:10) (72% - 98%)    PHYSICAL EXAM:  Constitutional:NAD  Eyes:KARENA, EOMI  Ear/Nose/Throat: no thrush, mucositis.  Moist mucous membranes	  Neck:no JVD, no lymphadenopathy, supple  Respiratory: on NRB  Cardiovascular: S1S2 RRR, no murmurs  Gastrointestinal:soft, nontender,  nondistended (+) BS  Extremities:no e/e/c  Skin:  no rashes, open wounds or ulcerations        LABS:                        13.8   3.85  )-----------( 202      ( 12 Jan 2021 06:44 )             40.5     01-13    142  |  105  |  22  ----------------------------<  119<H>  4.8   |  24  |  0.82    Ca    9.0      13 Jan 2021 07:42    TPro  7.2  /  Alb  3.5  /  TBili  0.4  /  DBili  0.1  /  AST  84<H>  /  ALT  97<H>  /  AlkPhos  394<H>  01-13          Procalcitonin, Serum: 0.86 (01-11 @ 16:26)            Rapid RVP Result: Detected          MICROBIOLOGY:    Culture - Blood (01.12.21 @ 00:26)   Specimen Source: .Blood Blood   Culture Results:   No growth to date.           RADIOLOGY & ADDITIONAL STUDIES:    < from: Xray Chest 1 View- PORTABLE-Urgent (01.11.21 @ 17:01) >    The heart is normal in size. Airspace opacities is seen in right upper lobe and left lower lobe compatible with pneumonia. Covid 19 pneumonia cannot be ruled out. No pleural effusion. No pneumothorax. No acute bony pathology could be identified.    < end of copied text >

## 2021-01-13 NOTE — PROGRESS NOTE ADULT - SUBJECTIVE AND OBJECTIVE BOX
CHIEF COMPLAINT: f/up sob, resp failure, severe persistent asthma, TBM, osas, covid 19 pneumonitis--remains weak and sob, cough    Interval Events: dex/remdisivir, hiflo    REVIEW OF SYSTEMS:  Constitutional: No fevers or chills. No weight loss. + fatigue or generalized malaise.  Eyes: No itching or discharge from the eyes  ENT: No ear pain. No ear discharge. No nasal congestion. No post nasal drip. No epistaxis. No throat pain. No sore throat. No difficulty swallowing.   CV: No chest pain. No palpitations. No lightheadedness or dizziness.   Resp: No dyspnea at rest. + dyspnea on exertion. No orthopnea. No wheezing. + cough. No stridor. No sputum production. No chest pain with respiration.  GI: No nausea. No vomiting. No diarrhea.  MSK: No joint pain or pain in any extremities  Integumentary: No skin lesions. No pedal edema.  Neurological: + gross motor weakness. No sensory changes.  [+ ] All other systems negative  [ ] Unable to assess ROS because ________    OBJECTIVE:  ICU Vital Signs Last 24 Hrs  T(C): 36.7 (13 Jan 2021 00:21), Max: 36.7 (13 Jan 2021 00:21)  T(F): 98.1 (13 Jan 2021 00:21), Max: 98.1 (13 Jan 2021 00:21)  HR: 75 (13 Jan 2021 00:21) (71 - 79)  BP: 137/72 (13 Jan 2021 00:21) (111/58 - 137/72)  BP(mean): --  ABP: --  ABP(mean): --  RR: 22 (13 Jan 2021 00:21) (20 - 26)  SpO2: 92% (13 Jan 2021 00:21) (72% - 98%)        01-11 @ 07:01  -  01-12 @ 07:00  --------------------------------------------------------  IN: 1000 mL / OUT: 1300 mL / NET: -300 mL    01-12 @ 07:01 - 01-13 @ 05:32  --------------------------------------------------------  IN: 650 mL / OUT: 800 mL / NET: -150 mL      CAPILLARY BLOOD GLUCOSE          PHYSICAL EXAM:  General: Awake, alert, oriented X 3.   HEENT: Atraumatic, normocephalic.                 Mallampatti Grade 3                No nasal congestion.                No tonsillar or pharyngeal exudates.  Lymph Nodes: No palpable lymphadenopathy  Neck: No JVD. No carotid bruit.   Respiratory: Normal chest expansion                         Normal percussion                         Normal and equal air entry                         + wheeze, rhonchi and  rales.  Cardiovascular: S1 S2 normal. No murmurs, rubs or gallops.   Abdomen: Soft, non-tender, non-distended. No organomegaly. Normoactive bowel sounds.  Extremities: Warm to touch. Peripheral pulse palpable. No pedal edema.   Skin: No rashes or skin lesions  Neurological: Motor and sensory examination equal and normal in all four extremities.  Psychiatry: Appropriate mood and affect.    HOSPITAL MEDICATIONS:  MEDICATIONS  (STANDING):  allopurinol 150 milliGRAM(s) Oral daily  amitriptyline 10 milliGRAM(s) Oral three times a day  amLODIPine   Tablet 5 milliGRAM(s) Oral daily  aspirin  chewable 81 milliGRAM(s) Oral daily  atorvastatin 10 milliGRAM(s) Oral at bedtime  dexAMETHasone     Tablet 6 milliGRAM(s) Oral daily  DULoxetine 60 milliGRAM(s) Oral daily  heparin   Injectable 5000 Unit(s) SubCutaneous every 8 hours  montelukast 10 milliGRAM(s) Oral daily  remdesivir  IVPB   IV Intermittent   remdesivir  IVPB 200 milliGRAM(s) IV Intermittent every 24 hours    MEDICATIONS  (PRN):  ALBUTerol    90 MICROgram(s) HFA Inhaler 2 Puff(s) Inhalation every 6 hours PRN Bronchospasm      LABS:                        13.8   3.85  )-----------( 202      ( 12 Jan 2021 06:44 )             40.5     01-12    141  |  103  |  24<H>  ----------------------------<  164<H>  4.7   |  20<L>  |  1.23    Ca    9.8      12 Jan 2021 06:44    TPro  8.4<H>  /  Alb  4.3  /  TBili  0.4  /  DBili  x   /  AST  72<H>  /  ALT  99<H>  /  AlkPhos  526<H>  01-12        Arterial Blood Gas:  01-12 @ 16:06  7.48/28/174/21/100/-1.2  ABG lactate: --    Venous Blood Gas:  01-11 @ 16:26  7.38/37/40/21/72  VBG Lactate: 2.0      MICROBIOLOGY:     RADIOLOGY:  [ ] Reviewed and interpreted by me    Point of Care Ultrasound Findings:    PFT:    EKG:

## 2021-01-13 NOTE — PROGRESS NOTE ADULT - SUBJECTIVE AND OBJECTIVE BOX
[facility]   MD David Montalvo MD, D.O. Ruoru Wong, PA    From 7 AM - 5 PM:  OFFICE: [phone]  Dr. Moore cell: [phone]  Dr. Tijerina cell: [phone]  Dr. Claros cell: [phone]  [facility] eL cell: [phone]    From 5 PM - 7 AM: Answering Service: [phone]  Date of service: 01-13-21 @ 12:18    RENAL FOLLOW UP NOTE  --------------------------------------------------------------------------------  HPI:  Pt covid +    PAST HISTORY  --------------------------------------------------------------------------------  No significant changes to PMH, PSH, FHx, SHx, unless otherwise noted    ALLERGIES & MEDICATIONS  --------------------------------------------------------------------------------  Allergies    beta-lactam antibiotics (Swelling)  Keflex (Anaphylaxis)  nitrates causes vertigo per pt (Nausea)  penicillin (Anaphylaxis)  Versed (Nausea)    Intolerances      Standing Inpatient Medications  allopurinol 150 milliGRAM(s) Oral daily  amitriptyline 10 milliGRAM(s) Oral three times a day  amLODIPine   Tablet 5 milliGRAM(s) Oral daily  aspirin  chewable 81 milliGRAM(s) Oral daily  atorvastatin 10 milliGRAM(s) Oral at bedtime  dexAMETHasone     Tablet 6 milliGRAM(s) Oral daily  diazepam    Tablet 5 milliGRAM(s) Oral once  DULoxetine 60 milliGRAM(s) Oral daily  heparin   Injectable 5000 Unit(s) SubCutaneous every 8 hours  montelukast 10 milliGRAM(s) Oral daily  remdesivir  IVPB   IV Intermittent     PRN Inpatient Medications  ALBUTerol    90 MICROgram(s) HFA Inhaler 2 Puff(s) Inhalation every 6 hours PRN      REVIEW OF SYSTEMS  --------------------------------------------------------------------------------  unable to obtain     VITALS/PHYSICAL EXAM  --------------------------------------------------------------------------------  T(C): 37.1 (01-13-21 @ 10:17), Max: 37.1 (01-13-21 @ 10:17)  HR: 84 (01-13-21 @ 10:17) (74 - 84)  BP: 139/82 (01-13-21 @ 10:17) (111/58 - 139/82)  RR: 20 (01-13-21 @ 10:17) (20 - 26)  SpO2: 93% (01-13-21 @ 10:17) (72% - 98%)  Wt(kg): --  Height (cm): 172.7 (01-11-21 @ 14:44)  Weight (kg): 86.2 (01-11-21 @ 14:44)  BMI (kg/m2): 28.9 (01-11-21 @ 14:44)  BSA (m2): 2 (01-11-21 @ 14:44)      01-12-21 @ 07:01  -  01-13-21 @ 07:00  --------------------------------------------------------  IN: 650 mL / OUT: 1050 mL / NET: -400 mL    LABS/STUDIES  --------------------------------------------------------------------------------              13.8   3.85  >-----------<  202      [01-12-21 @ 06:44]              40.5     142  |  105  |  22  ----------------------------<  119      [01-13-21 @ 07:42]  4.8   |  24  |  0.82        Ca     9.0     [01-13-21 @ 07:42]    TPro  7.2  /  Alb  3.5  /  TBili  0.4  /  DBili  0.1  /  AST  84  /  ALT  97  /  AlkPhos  394  [01-13-21 @ 07:42]    Creatinine Trend:  SCr 0.82 [01-13 @ 07:42]  SCr 1.23 [01-12 @ 06:44]  SCr 2.36 [01-11 @ 16:26]    Ferritin 3529      [01-11-21 @ 19:25]  HbA1c 5.2      [04-26-18 @ 17:35]

## 2021-01-13 NOTE — PROGRESS NOTE ADULT - ATTENDING COMMENTS
as above-  multifactorial resp failure-severe persistent asthma, TBM, debility, CAD, covid 19 pneumonitis--O2 keep sat above 90%--reduce hiflo as able  severe persistent asthma-symbicort 160, spiriva 1 q day, singulair 10, out pt xolair  TBM-s/p surgery-acapella, incentive spirometry, amitriptyline   covid19 pna--dexameth 6mg/remdisivir D2 of 5/10 respectively, zn, melatonin, vit d//c; f/up mediators-trops, crp, ddimer, feritin etc.  DVT prophylaxis-lovenox rx     OOB     GI pepcid prophylaxis        PT as able    prog guarded  Clay Lance MD-Pulmonary   [phone]

## 2021-01-13 NOTE — PROGRESS NOTE ADULT - ASSESSMENT
EKG SR no ischemic changes no ischemic changes     Assessment and Plan     1) COVID 19 PNA: likely cause of SOB , c.w remdesivir and dexa , monitor o2 levels , ID and pulm recs on NRB    2) HTN : c.w amlodipi ne     3) DVT PPX Heparin
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
## 2021-01-13 NOTE — PROGRESS NOTE ADULT - ASSESSMENT
76y/o M pmxh COPD, asthma, HTN, tracheobroncheomalacia, gout, arthritis, presenting with shortness of breath and low oxygen from the [facility] infusion center. Patient states that he was diagnosed with COVID on 1/2, after developing shortness of breath and weakness that day. He states that he was exposed by his girlfriend who became ill from her neighbor. He states that he has been staying at home alone since the time of his diagnosis, and has been becoming increasingly more short of breath with cough since the time of his diagnosis. He reports fevers as high as 100.6F which he has been taking tylenol for, and chills/body aches. States that he was referred for MAB infusion today by his pulmonologist Dr. Lance, and while at the center this AM was short of breath and his O2 sat was 83-84% on RA. Patient states that he has never required O2 at home even with his COPD. Denies any nausea, vomiting, diarrhea, chest pain.    Problem/Plan - 1:  ·  Problem: COVID-19.  Plan: cw decadron  unable to do remdesivir sec to LUDA  ID FU  pulmonary fu appreciated     Problem/Plan - 2:  ·  Problem: COPD (chronic obstructive pulmonary disease).  Plan: cw albuterol  pulmonary fu     Problem/Plan - 3:  ·  Problem: Depression.  Plan: cw home meds.     Problem/Plan - 4:  ·  Problem: HLD (hyperlipidemia).  Plan: cw statin.

## 2021-01-13 NOTE — PROGRESS NOTE ADULT - SUBJECTIVE AND OBJECTIVE BOX
Patient is a 75y old  Male who presents with a chief complaint of sob (13 Jan 2021 12:18)    Date of servie : 01-13-21 @ 16:25  INTERVAL HPI/OVERNIGHT EVENTS:  T(C): 37 (01-13-21 @ 14:11), Max: 37.1 (01-13-21 @ 10:17)  HR: 90 (01-13-21 @ 14:11) (75 - 90)  BP: 138/75 (01-13-21 @ 14:11) (124/71 - 139/82)  RR: 20 (01-13-21 @ 14:11) (20 - 26)  SpO2: 94% (01-13-21 @ 14:11) (72% - 98%)  Wt(kg): --  I&O's Summary    12 Jan 2021 07:01  -  13 Jan 2021 07:00  --------------------------------------------------------  IN: 650 mL / OUT: 1050 mL / NET: -400 mL    13 Jan 2021 07:01  -  13 Jan 2021 16:25  --------------------------------------------------------  IN: 600 mL / OUT: 400 mL / NET: 200 mL        LABS:                        13.8   3.85  )-----------( 202      ( 12 Jan 2021 06:44 )             40.5     01-13    142  |  105  |  22  ----------------------------<  119<H>  4.8   |  24  |  0.82    Ca    9.0      13 Jan 2021 07:42    TPro  7.2  /  Alb  3.5  /  TBili  0.4  /  DBili  0.1  /  AST  84<H>  /  ALT  97<H>  /  AlkPhos  394<H>  01-13        CAPILLARY BLOOD GLUCOSE        ABG - ( 12 Jan 2021 16:06 )  pH, Arterial: 7.48  pH, Blood: x     /  pCO2: 28    /  pO2: 174   / HCO3: 21    / Base Excess: -1.2  /  SaO2: 100                     MEDICATIONS  (STANDING):  allopurinol 150 milliGRAM(s) Oral daily  amitriptyline 10 milliGRAM(s) Oral three times a day  amLODIPine   Tablet 5 milliGRAM(s) Oral daily  aspirin  chewable 81 milliGRAM(s) Oral daily  atorvastatin 10 milliGRAM(s) Oral at bedtime  dexAMETHasone     Tablet 6 milliGRAM(s) Oral daily  DULoxetine 60 milliGRAM(s) Oral daily  heparin   Injectable 5000 Unit(s) SubCutaneous every 8 hours  montelukast 10 milliGRAM(s) Oral daily  remdesivir  IVPB   IV Intermittent     MEDICATIONS  (PRN):  ALBUTerol    90 MICROgram(s) HFA Inhaler 2 Puff(s) Inhalation every 6 hours PRN Bronchospasm          PHYSICAL EXAM:  GENERAL: NAD, well-groomed, well-developed  HEAD:  Atraumatic, Normocephalic  CHEST/LUNG: Clear to percussion bilaterally; No rales, rhonchi, wheezing, or rubs  HEART: Regular rate and rhythm; No murmurs, rubs, or gallops  ABDOMEN: Soft, Nontender, Nondistended; Bowel sounds present  EXTREMITIES:  2+ Peripheral Pulses, No clubbing, cyanosis, or edema  LYMPH: No lymphadenopathy noted  SKIN: No rashes or lesions    Care Discussed with Consultants/Other Providers [ ] YES  [ ] NO

## 2021-01-13 NOTE — PROGRESS NOTE ADULT - ASSESSMENT
76y/o M pmxh COPD, asthma, HTN, tracheobroncheomalacia, gout, arthritis, presenting with shortness of breath and low oxygen from the [facility] infusion center. Patient states that he was diagnosed with COVID on 1/2, after developing shortness of breath and weakness that day. He states that he was exposed by his girlfriend who became ill from her neighbor. He states that he has been staying at home alone since the time of his diagnosis, and has been becoming increasingly more short of breath with cough since the time of his diagnosis. He reports fevers as high as 100.6F which he has been taking tylenol for, and chills/body aches. States that he was referred for MAB infusion today by his pulmonologist Dr. Lance, and while at the center this AM was short of breath and his O2 sat was 83-84% on RA. Patient states that he has never required O2 at home even with his COPD. Denies any nausea, vomiting, diarrhea, chest pain. (11 Jan 2021 18:19)    ER afebrile.  Satting 95% on 6L nasal cannula.  Pt with reported covid pcr (+) at [facility] on 1/8.     Covid (+):    - Pt with covid pcr (+) on 1/8, cxr with patchy opacities in RUL and LLL c/w covid pna.  Pt requiring supplemental O2 due to hypoxia.  Cont dexamethasone 6mg IV qdaily.    - Pt not a candidate for MAB therapy due to hypoxia.  Will hold off on remdesivir for now given elevated Cr, and clearance <30.  Monitor for improvement in Cr following IVF bolus, CrCl >30, will start remdesivir x 5 day course.  Monitor daily renal function and LFTs.     - Monitor pulse ox and titrate oxygen appropriately.  Pt on NRB today (refused high flow O2, s/p RRT for hypoxia).  Pt s/p anxiolytic, agreeable to high flow now.  f/u with Respiratory    - Monitor inflammatory markers:  DD: 253  Ferritin: 3529  CRP: 26.9  PCT: 0.86    - Pt on DVT ppx with Heparin sq.      Allyson Campuzanoi  [phone]

## 2021-01-13 NOTE — PROGRESS NOTE ADULT - ASSESSMENT
&74y/o M pmxh COPD, asthma, HTN, tracheobroncheomalacia, gout, arthritis, admitted with hypoxia from covid 19 and influenza     LUDA   Pt admitted with elevated SCr  2.3  Baseline Scr 0.8 in 11/ 2019  LUDA in the setting of infection   Scr improved today s/p IVF   LUDA likely pre-renal   UA with out protein or blood  avoid nephrotoxins     HTN   BP controlled     Covid 19+   on steriods  &remdesivir

## 2021-01-13 NOTE — RAPID RESPONSE TEAM SUMMARY - NSSITUATIONBACKGROUNDRRT_GEN_ALL_CORE
RRT for hypoxia  Per RN patient does not want to wear hi flow.   Pt says "he would rather anything except for hi flow, I have severe claustrophobia". He expresses reluctance about escalation of therapy and intubation, saying "I am 75 and have lived long enough".   Primary team could not be located. I will call to review.     Patient on NRB and is comfortable, speaking full sentences, O2 sat 85-87%.     We will continue to monitor on NRB given stable status otherwise and full capacity to refuse escalation of care. I do recommend that primary team consider PRN anxiolytics including ativan to help with his self-described anxiety and claustrophobia.

## 2021-01-14 NOTE — PROGRESS NOTE ADULT - ASSESSMENT
&76y/o M pmxh COPD, asthma, HTN, tracheobroncheomalacia, gout, arthritis, admitted with hypoxia from covid 19 and influenza     LUDA   Pt admitted with elevated SCr  2.3  Baseline Scr 0.8 in 11/ 2019  LUDA in the setting of infection   Scr improved today s/p IVF   LUDA likely pre-renal   UA with out protein or blood  avoid nephrotoxins     HTN   BP controlled     Covid 19+   on steriods  &remdesivir

## 2021-01-14 NOTE — RAPID RESPONSE TEAM SUMMARY - NSSITUATIONBACKGROUNDRRT_GEN_ALL_CORE
RRT called for hypoxia to 75 on 90% FIO2, 50 L on hfNC    On arrival patient was tachypneic and endorsed being tired of breathing. He was proned with some improvement in O2 saturation to 80's. hfNC settings increased to 90%/60LPM. O2 switched to BIPAP mask. IV tylenol for temp 100.0 orally, presumed higher rectally. ABG refused by patient. Patient with significant anxiety. PRN valium ordered (5 mg given earlier this AM), and will be given for any anxiety symptoms, this was discussed with RN.     GOC discussion had at bedside. "I do not want to be intubated, I do not want ventilator, I do not want life support".  Consequences of prolonged hypoxia including death were discussed with patient, he would like to be DNR. Order placed in EMR, CHICHO filled out. Patient does not want medical team speaking with his son, he says "I am able to make my own decisions".  We offered facetime with his son, this should be offered again later today.     Plan d/w primary team, NP at bedside.

## 2021-01-14 NOTE — PROGRESS NOTE ADULT - ASSESSMENT
Problem/Plan - 1:  ·  Problem: COVID-19.  Plan: cw decadron  ID FU  pulmonary fu appreciated     Problem/Plan - 2:  ·  Problem: COPD (chronic obstructive pulmonary disease).  Plan: cw albuterol  pulmonary fu     Problem/Plan - 3:·  Problem: Depression.  Plan: cw home meds.     Problem/Plan - 4:  ·  Problem: HLD (hyperlipidemia).  Plan: cw statin.    ·  Problem: Depression.  Plan: cw home meds.     Problem/Plan - 4:  ·  Problem: HLD (hyperlipidemia).  Plan: cw statin.

## 2021-01-14 NOTE — PROGRESS NOTE ADULT - SUBJECTIVE AND OBJECTIVE BOX
CHIEF COMPLAINT: f/up sob, resp failure, severe persistent asthma, TBM, osas, covid 19 pneumonitis--very weak and sob, cough--less anxious today    Interval Events: dex/remdisivir, hiflo    REVIEW OF SYSTEMS:  Constitutional: No fevers or chills. No weight loss. + fatigue or generalized malaise.  Eyes: No itching or discharge from the eyes  ENT: No ear pain. No ear discharge. No nasal congestion. No post nasal drip. No epistaxis. No throat pain. No sore throat. No difficulty swallowing.   CV: No chest pain. No palpitations. No lightheadedness or dizziness.   Resp: No dyspnea at rest. + dyspnea on exertion. No orthopnea. No wheezing. + cough. No stridor. No sputum production. No chest pain with respiration.  GI: No nausea. No vomiting. No diarrhea.  MSK: No joint pain or pain in any extremities  Integumentary: No skin lesions. No pedal edema.  Neurological: No gross motor weakness. No sensory changes.  [+ ] All other systems negative  [ ] Unable to assess ROS because ________    OBJECTIVE:  ICU Vital Signs Last 24 Hrs  T(C): 37.2 (14 Jan 2021 04:54), Max: 37.2 (14 Jan 2021 04:54)  T(F): 99 (14 Jan 2021 04:54), Max: 99 (14 Jan 2021 04:54)  HR: 83 (14 Jan 2021 04:54) (72 - 90)  BP: 128/70 (14 Jan 2021 04:54) (128/70 - 144/75)  BP(mean): --  ABP: --  ABP(mean): --  RR: 20 (14 Jan 2021 04:54) (20 - 22)  SpO2: 91% (14 Jan 2021 04:54) (90% - 95%)        01-12 @ 07:01  -  01-13 @ 07:00  --------------------------------------------------------  IN: 650 mL / OUT: 1050 mL / NET: -400 mL    01-13 @ 07:01  -  01-14 @ 05:58  --------------------------------------------------------  IN: 975 mL / OUT: 1450 mL / NET: -475 mL      CAPILLARY BLOOD GLUCOSE          PHYSICAL EXAM: on [facility]  General: Awake, alert, oriented X 3.   HEENT: Atraumatic, normocephalic.                 Mallampatti Grade 3                No nasal congestion.                No tonsillar or pharyngeal exudates.  Lymph Nodes: No palpable lymphadenopathy  Neck: No JVD. No carotid bruit.   Respiratory: Normal chest expansion                         Normal percussion                         Normal and equal air entry                         ++ wheeze, rhonchi or rales.  Cardiovascular: S1 S2 normal. No murmurs, rubs or gallops.   Abdomen: Soft, non-tender, non-distended. No organomegaly. Normoactive bowel sounds.  Extremities: Warm to touch. Peripheral pulse palpable. No pedal edema.   Skin: No rashes or skin lesions  Neurological: Motor and sensory examination equal and normal in all four extremities.  Psychiatry: Appropriate mood and affect.    HOSPITAL MEDICATIONS:  MEDICATIONS  (STANDING):  allopurinol 150 milliGRAM(s) Oral daily  amitriptyline 10 milliGRAM(s) Oral three times a day  amLODIPine   Tablet 5 milliGRAM(s) Oral daily  aspirin  chewable 81 milliGRAM(s) Oral daily  atorvastatin 10 milliGRAM(s) Oral at bedtime  dexAMETHasone     Tablet 6 milliGRAM(s) Oral daily  DULoxetine 60 milliGRAM(s) Oral daily  heparin   Injectable 5000 Unit(s) SubCutaneous every 8 hours  montelukast 10 milliGRAM(s) Oral daily  remdesivir  IVPB   IV Intermittent   remdesivir  IVPB 100 milliGRAM(s) IV Intermittent every 24 hours    MEDICATIONS  (PRN):  ALBUTerol    90 MICROgram(s) HFA Inhaler 2 Puff(s) Inhalation every 6 hours PRN Bronchospasm  diazepam    Tablet 5 milliGRAM(s) Oral four times a day PRN anxiety      LABS:                        13.8   3.85  )-----------( 202      ( 12 Jan 2021 06:44 )             40.5     01-13    142  |  105  |  22  ----------------------------<  119<H>  4.8   |  24  |  0.82    Ca    9.0      13 Jan 2021 07:42    TPro  7.2  /  Alb  3.5  /  TBili  0.4  /  DBili  0.1  /  AST  84<H>  /  ALT  97<H>  /  AlkPhos  394<H>  01-13        Arterial Blood Gas:  01-12 @ 16:06  7.48/28/174/21/100/-1.2  ABG lactate: --        MICROBIOLOGY:     RADIOLOGY:  [ ] Reviewed and interpreted by me    Point of Care Ultrasound Findings:    PFT:    EKG:

## 2021-01-14 NOTE — PROGRESS NOTE ADULT - ATTENDING COMMENTS
as above-slighlty better-less anxious-ok anxiolytics  multifactorial resp failure-severe persistent asthma, TBM, debility, CAD, covid 19 pneumonitis--O2 keep sat above 90%--reduce hiflo as able  severe persistent asthma-symbicort 160, spiriva 1 q day, singulair 10, out pt xolair  TBM-s/p surgery-acapella, incentive spirometry, amitriptyline   covid19 pna--dexameth 6mg/remdisivir D2 of 5/10 respectively, zn, melatonin, vit d//c; f/up mediators-trops, crp, ddimer, feritin etc.  DVT prophylaxis-lovenox rx     OOB     GI pepcid prophylaxis        PT as able    prog guarded  Clay Lance MD-Pulmonary   [phone]
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
## 2021-01-14 NOTE — CHART NOTE - NSCHARTNOTEFT_GEN_A_CORE
Called by nurse to report that patient's oxygen saturation is 70- high 80's and is anxious. RRT team called, patient now proned, bipap applied and patient consented to DNR. See RRT team note for details. Patient requested not to discuss RRT with his family at this time, wishes granted. Dr Stringer made aware.

## 2021-01-14 NOTE — PROGRESS NOTE ADULT - ASSESSMENT
76 y/o M w/severe persistent asthma recently diagnosed with COVID-19 now admitted for hypoxemia in setting of COVID-19 PNA.    - Wean supplemental O2 as tolerated  - Continue decadron  - Bronchodilators, acapella  - Supportive care  *******************  1/13- no significant changes in status  1/14-more comfortable today, anxiolytics ok here

## 2021-01-14 NOTE — PROGRESS NOTE ADULT - ASSESSMENT
EKG SR no ischemic changes no ischemic changes     Assessment and Plan     1) COVID 19 PNA: likely cause of SOB , c.w remdesivir and dexa , monitor o2 levels , ID and pulm recs s/p RRT on BIPAP    2) HTN : c.w amlodipi ne     3) DVT PPX Heparin

## 2021-01-14 NOTE — PROGRESS NOTE ADULT - SUBJECTIVE AND OBJECTIVE BOX
[facility]   MD David Montalvo MD, D.O. Ruoru Wong, PA    From 7 AM - 5 PM:  OFFICE: [phone]  Dr. Moore cell: [phone]  Dr. Tijerina cell: [phone]  Dr. Claros cell: [phone]  ISRAEL Le cell: [phone]    From 5 PM - 7 AM: Answering Service: [phone]  Date of service: 01-14-21 @ 11:26    RENAL FOLLOW UP NOTE  --------------------------------------------------------------------------------  HPI:  Pt covid +    PAST HISTORY  --------------------------------------------------------------------------------  No significant changes to PMH, PSH, FHx, SHx, unless otherwise noted    ALLERGIES & MEDICATIONS  --------------------------------------------------------------------------------  Allergies    beta-lactam antibiotics (Swelling)  Keflex (Anaphylaxis)  nitrates causes vertigo per pt (Nausea)  penicillin (Anaphylaxis)  Versed (Nausea)    Intolerances      Standing Inpatient Medications  allopurinol 150 milliGRAM(s) Oral daily  amitriptyline 10 milliGRAM(s) Oral three times a day  amLODIPine   Tablet 5 milliGRAM(s) Oral daily  aspirin  chewable 81 milliGRAM(s) Oral daily  atorvastatin 10 milliGRAM(s) Oral at bedtime  dexAMETHasone     Tablet 6 milliGRAM(s) Oral daily  DULoxetine 60 milliGRAM(s) Oral daily  heparin   Injectable 5000 Unit(s) SubCutaneous every 8 hours  montelukast 10 milliGRAM(s) Oral daily  remdesivir  IVPB   IV Intermittent   remdesivir  IVPB 100 milliGRAM(s) IV Intermittent every 24 hours    PRN Inpatient Medications  ALBUTerol    90 MICROgram(s) HFA Inhaler 2 Puff(s) Inhalation every 6 hours PRN  diazepam    Tablet 5 milliGRAM(s) Oral four times a day PRN      REVIEW OF SYSTEMS  --------------------------------------------------------------------------------  unable to obtain     VITALS/PHYSICAL EXAM  --------------------------------------------------------------------------------  T(C): 37.9 (01-14-21 @ 08:03), Max: 37.9 (01-14-21 @ 08:03)  HR: 90 (01-14-21 @ 10:22) (72 - 90)  BP: 129/72 (01-14-21 @ 08:03) (128/70 - 144/75)  RR: 20 (01-14-21 @ 10:22) (20 - 22)  SpO2: 90% (01-14-21 @ 10:22) (90% - 95%)  Wt(kg): --        01-13-21 @ 07:01  -  01-14-21 @ 07:00  --------------------------------------------------------  IN: 975 mL / OUT: 1450 mL / NET: -475 mL    01-14-21 @ 07:01  -  01-14-21 @ 11:26  --------------------------------------------------------  IN: 120 mL / OUT: 300 mL / NET: -180 mL    LABS/STUDIES  --------------------------------------------------------------------------------    137  |  101  |  18  ----------------------------<  109      [01-14-21 @ 07:10]  4.0   |  24  |  0.69        Ca     8.6     [01-14-21 @ 07:10]    TPro  7.1  /  Alb  3.3  /  TBili  0.7  /  DBili  0.3  /  AST  149  /  ALT  207  /  AlkPhos  405  [01-14-21 @ 07:10]    Creatinine Trend:  SCr 0.69 [01-14 @ 07:10]  SCr 0.82 [01-13 @ 07:42]  SCr 1.23 [01-12 @ 06:44]  SCr 2.36 [01-11 @ 16:26]    Ferritin 3529      [01-11-21 @ 19:25]  HbA1c 5.2      [04-26-18 @ 17:35]

## 2021-01-14 NOTE — PROGRESS NOTE ADULT - SUBJECTIVE AND OBJECTIVE BOX
Patient is a 75y old  Male who presents with a chief complaint of sob (14 Jan 2021 11:28)    Date of servie : 01-14-21 @ 18:36  INTERVAL HPI/OVERNIGHT EVENTS:  T(C): 36.3 (01-14-21 @ 16:26), Max: 37.9 (01-14-21 @ 08:03)  HR: 96 (01-14-21 @ 17:14) (67 - 110)  BP: 139/74 (01-14-21 @ 16:26) (115/66 - 144/75)  RR: 21 (01-14-21 @ 16:26) (20 - 22)  SpO2: 94% (01-14-21 @ 17:14) (90% - 99%)  Wt(kg): --  I&O's Summary    13 Jan 2021 07:01  -  14 Jan 2021 07:00  --------------------------------------------------------  IN: 975 mL / OUT: 1450 mL / NET: -475 mL    14 Jan 2021 07:01  -  14 Jan 2021 18:36  --------------------------------------------------------  IN: 120 mL / OUT: 950 mL / NET: -830 mL        LABS:    01-14    137  |  101  |  18  ----------------------------<  109<H>  4.0   |  24  |  0.69    Ca    8.6      14 Jan 2021 07:10    TPro  7.1  /  Alb  3.3  /  TBili  0.7  /  DBili  0.3<H>  /  AST  149<H>  /  ALT  207<H>  /  AlkPhos  405<H>  01-14        CAPILLARY BLOOD GLUCOSE      POCT Blood Glucose.: 114 mg/dL (14 Jan 2021 08:46)            MEDICATIONS  (STANDING):  allopurinol 150 milliGRAM(s) Oral daily  amitriptyline 10 milliGRAM(s) Oral three times a day  amLODIPine   Tablet 5 milliGRAM(s) Oral daily  aspirin  chewable 81 milliGRAM(s) Oral daily  atorvastatin 10 milliGRAM(s) Oral at bedtime  dexAMETHasone     Tablet 6 milliGRAM(s) Oral daily  DULoxetine 60 milliGRAM(s) Oral daily  heparin   Injectable 5000 Unit(s) SubCutaneous every 8 hours  montelukast 10 milliGRAM(s) Oral daily  remdesivir  IVPB   IV Intermittent   remdesivir  IVPB 100 milliGRAM(s) IV Intermittent every 24 hours    MEDICATIONS  (PRN):  ALBUTerol    90 MICROgram(s) HFA Inhaler 2 Puff(s) Inhalation every 6 hours PRN Bronchospasm  diazepam    Tablet 5 milliGRAM(s) Oral four times a day PRN anxiety          PHYSICAL EXAM:  GENERAL: NAD, well-groomed, well-developed  HEAD:  Atraumatic, Normocephalic  CHEST/LUNG: Clear to percussion bilaterally; No rales, rhonchi, wheezing, or rubs  HEART: Regular rate and rhythm; No murmurs, rubs, or gallops  ABDOMEN: Soft, Nontender, Nondistended; Bowel sounds present  EXTREMITIES:  2+ Peripheral Pulses, No clubbing, cyanosis, or edema  LYMPH: No lymphadenopathy noted  SKIN: No rashes or lesions    Care Discussed with Consultants/Other Providers [ ] YES  [ ] NO

## 2021-01-15 NOTE — PROGRESS NOTE ADULT - SUBJECTIVE AND OBJECTIVE BOX
Patient is a 75y old  Male who presents with a chief complaint of sob (15 Tyson 2021 17:03)    Date of servie : 01-15-21 @ 19:07  INTERVAL HPI/OVERNIGHT EVENTS:  T(C): 36.4 (01-15-21 @ 16:42), Max: 36.9 (01-15-21 @ 00:17)  HR: 70 (01-15-21 @ 16:42) (69 - 104)  BP: 134/69 (01-15-21 @ 16:42) (127/73 - 159/78)  RR: 24 (01-15-21 @ 16:42) (22 - 28)  SpO2: 97% (01-15-21 @ 16:42) (93% - 98%)  Wt(kg): --  I&O's Summary    14 Jan 2021 07:01  -  15 Jan 2021 07:00  --------------------------------------------------------  IN: 180 mL / OUT: 1500 mL / NET: -1320 mL        LABS:    01-15    139  |  101  |  21  ----------------------------<  110<H>  4.8   |  26  |  0.61    Ca    9.1      15 Tyson 2021 08:05    TPro  7.8  /  Alb  3.3  /  TBili  0.6  /  DBili  0.2  /  AST  85<H>  /  ALT  214<H>  /  AlkPhos  416<H>  01-15        CAPILLARY BLOOD GLUCOSE                MEDICATIONS  (STANDING):  allopurinol 150 milliGRAM(s) Oral daily  amitriptyline 10 milliGRAM(s) Oral three times a day  amLODIPine   Tablet 5 milliGRAM(s) Oral daily  aspirin  chewable 81 milliGRAM(s) Oral daily  atorvastatin 10 milliGRAM(s) Oral at bedtime  dexAMETHasone     Tablet 6 milliGRAM(s) Oral daily  DULoxetine 60 milliGRAM(s) Oral daily  enoxaparin Injectable 40 milliGRAM(s) SubCutaneous two times a day  montelukast 10 milliGRAM(s) Oral daily  remdesivir  IVPB   IV Intermittent   remdesivir  IVPB 100 milliGRAM(s) IV Intermittent every 24 hours    MEDICATIONS  (PRN):  ALBUTerol    90 MICROgram(s) HFA Inhaler 2 Puff(s) Inhalation every 6 hours PRN Bronchospasm  diazepam    Tablet 5 milliGRAM(s) Oral four times a day PRN anxiety          PHYSICAL EXAM:  GENERAL: NAD, well-groomed, well-developed  HEAD:  Atraumatic, Normocephalic  CHEST/LUNG: Clear to percussion bilaterally; No rales, rhonchi, wheezing, or rubs  HEART: Regular rate and rhythm; No murmurs, rubs, or gallops  ABDOMEN: Soft, Nontender, Nondistended; Bowel sounds present  EXTREMITIES:  2+ Peripheral Pulses, No clubbing, cyanosis, or edema  LYMPH: No lymphadenopathy noted  SKIN: No rashes or lesions    Care Discussed with Consultants/Other Providers [ ] YES  [ ] NO
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
## 2021-01-15 NOTE — PROGRESS NOTE ADULT - ASSESSMENT
76 y/o M w/severe persistent asthma recently diagnosed with COVID-19 now admitted for hypoxemia in setting of COVID-19 PNA.    - Wean supplemental O2 as tolerated  - Continue decadron  - Bronchodilators, acapella  - Supportive care  *******************  1/13- no significant changes in status  1/14-more comfortable today, anxiolytics ok here  1/15-on bipap, more comfortable

## 2021-01-15 NOTE — PROGRESS NOTE ADULT - SUBJECTIVE AND OBJECTIVE BOX
[facility]   MD David Montalvo MD, D.O. Ruoru Wong, PA    From 7 AM - 5 PM:  OFFICE: [phone]  Dr. Moore cell: [phone]  Dr. Tijerina cell: [phone]  Dr. Claros cell: [phone]  ISRAEL Le cell: [phone]    From 5 PM - 7 AM: Answering Service: [phone]  Date of service: 01-15-21 @ 17:03    RENAL FOLLOW UP NOTE  --------------------------------------------------------------------------------  HPI:  pt covid +    PAST HISTORY  --------------------------------------------------------------------------------  No significant changes to PMH, PSH, FHx, SHx, unless otherwise noted    ALLERGIES & MEDICATIONS  --------------------------------------------------------------------------------  Allergies    beta-lactam antibiotics (Swelling)  Keflex (Anaphylaxis)  nitrates causes vertigo per pt (Nausea)  penicillin (Anaphylaxis)  Versed (Nausea)    Intolerances      Standing Inpatient Medications  allopurinol 150 milliGRAM(s) Oral daily  amitriptyline 10 milliGRAM(s) Oral three times a day  amLODIPine   Tablet 5 milliGRAM(s) Oral daily  aspirin  chewable 81 milliGRAM(s) Oral daily  atorvastatin 10 milliGRAM(s) Oral at bedtime  dexAMETHasone     Tablet 6 milliGRAM(s) Oral daily  DULoxetine 60 milliGRAM(s) Oral daily  enoxaparin Injectable 40 milliGRAM(s) SubCutaneous two times a day  montelukast 10 milliGRAM(s) Oral daily  remdesivir  IVPB   IV Intermittent   remdesivir  IVPB 100 milliGRAM(s) IV Intermittent every 24 hours    PRN Inpatient Medications  ALBUTerol    90 MICROgram(s) HFA Inhaler 2 Puff(s) Inhalation every 6 hours PRN  diazepam    Tablet 5 milliGRAM(s) Oral four times a day PRN      REVIEW OF SYSTEMS  --------------------------------------------------------------------------------  unable to obtain     VITALS/PHYSICAL EXAM  --------------------------------------------------------------------------------  T(C): 36.4 (01-15-21 @ 16:42), Max: 36.9 (01-15-21 @ 00:17)  HR: 70 (01-15-21 @ 16:42) (69 - 104)  BP: 134/69 (01-15-21 @ 16:42) (127/73 - 159/78)  RR: 24 (01-15-21 @ 16:42) (22 - 28)  SpO2: 97% (01-15-21 @ 16:42) (93% - 98%)  Wt(kg): --    01-14-21 @ 07:01  -  01-15-21 @ 07:00  --------------------------------------------------------  IN: 180 mL / OUT: 1500 mL / NET: -1320 mL      LABS/STUDIES  --------------------------------------------------------------------------------    139  |  101  |  21  ----------------------------<  110      [01-15-21 @ 08:05]  4.8   |  26  |  0.61        Ca     9.1     [01-15-21 @ 08:05]    TPro  7.8  /  Alb  3.3  /  TBili  0.6  /  DBili  0.2  /  AST  85  /  ALT  214  /  AlkPhos  416  [01-15-21 @ 08:05]    Creatinine Trend:  SCr 0.61 [01-15 @ 08:05]  SCr 0.69 [01-14 @ 07:10]  SCr 0.82 [01-13 @ 07:42]  SCr 1.23 [01-12 @ 06:44]  SCr 2.36 [01-11 @ 16:26]        Ferritin 3472      [01-14-21 @ 14:30]  HbA1c 5.2      [04-26-18 @ 17:35]

## 2021-01-15 NOTE — PROGRESS NOTE ADULT - ASSESSMENT
Assessment and Plan    74y/o M pmxh COPD, asthma, HTN, tracheobroncheomalacia, gout, arthritis, presenting with shortness of breath and low oxygen from the [facility]    EKG: SR no ischemic changes no ischemic changes     1) COVID 19 PNA:   -likely cause of SOB   -c.w remdesivir and dexa   -monitor o2 levels   -f/u ID and pulm recs   -s/p RRT on BIPAP    2) HTN   -controlled  -continue with amlodipine     3) DVT PPX   -Heparin subq

## 2021-01-15 NOTE — PROGRESS NOTE ADULT - SUBJECTIVE AND OBJECTIVE BOX
Infectious Diseases progress note:    Subjective:  Events noted.  Pt on bipap, awake and alert.  s/p RRT yesterday for hypoxia. LFTS stable, remain slightly elevated, no RUQ tenderness    ROS:  CONSTITUTIONAL:  No fever, chills, rigors  CARDIOVASCULAR:  No chest pain or palpitations  RESPIRATORY:   No SOB, cough, dyspnea on exertion.  No wheezing  GASTROINTESTINAL:  No abd pain, N/V, diarrhea/constipation  EXTREMITIES:  No swelling or joint pain  GENITOURINARY:  No burning on urination, increased frequency or urgency.  No flank pain  NEUROLOGIC:  No HA, visual disturbances  SKIN: No rashes    Allergies    beta-lactam antibiotics (Swelling)  Keflex (Anaphylaxis)  nitrates causes vertigo per pt (Nausea)  penicillin (Anaphylaxis)  Versed (Nausea)    Intolerances        ANTIBIOTICS/RELEVANT:  antimicrobials  remdesivir  IVPB   IV Intermittent   remdesivir  IVPB 100 milliGRAM(s) IV Intermittent every 24 hours    immunologic:    OTHER:  ALBUTerol    90 MICROgram(s) HFA Inhaler 2 Puff(s) Inhalation every 6 hours PRN  allopurinol 150 milliGRAM(s) Oral daily  amitriptyline 10 milliGRAM(s) Oral three times a day  amLODIPine   Tablet 5 milliGRAM(s) Oral daily  aspirin  chewable 81 milliGRAM(s) Oral daily  atorvastatin 10 milliGRAM(s) Oral at bedtime  dexAMETHasone     Tablet 6 milliGRAM(s) Oral daily  diazepam    Tablet 5 milliGRAM(s) Oral four times a day PRN  DULoxetine 60 milliGRAM(s) Oral daily  enoxaparin Injectable 40 milliGRAM(s) SubCutaneous two times a day  montelukast 10 milliGRAM(s) Oral daily      Objective:  Vital Signs Last 24 Hrs  T(C): 36.6 (15 Tyson 2021 12:31), Max: 36.9 (15 Tyson 2021 00:17)  T(F): 97.8 (15 Tyson 2021 12:31), Max: 98.5 (15 Tyson 2021 04:41)  HR: 69 (15 Tyson 2021 15:25) (69 - 104)  BP: 136/87 (15 Tyson 2021 12:31) (127/73 - 159/78)  BP(mean): --  RR: 26 (15 Tyson 2021 12:31) (21 - 28)  SpO2: 96% (15 Tyson 2021 15:25) (93% - 99%)    PHYSICAL EXAM:  Constitutional:NAD  Eyes:KARENA, EOMI  Ear/Nose/Throat: no thrush, mucositis.  Moist mucous membranes	  Neck:no JVD, no lymphadenopathy, supple  Respiratory: CTA robyn  Cardiovascular: S1S2 RRR, no murmurs  Gastrointestinal:soft, nontender,  nondistended (+) BS, No RUQ tenderness  Extremities:no e/e/c  Skin:  no rashes, open wounds or ulcerations        LABS:    01-15    139  |  101  |  21  ----------------------------<  110<H>  4.8   |  26  |  0.61    Ca    9.1      15 Tyson 2021 08:05    TPro  7.8  /  Alb  3.3  /  TBili  0.6  /  DBili  0.2  /  AST  85<H>  /  ALT  214<H>  /  AlkPhos  416<H>  01-15          Procalcitonin, Serum: 0.20 (01-14 @ 07:10)  Procalcitonin, Serum: 0.86 (01-11 @ 16:26)            Rapid RVP Result: Detected          MICROBIOLOGY:          RADIOLOGY & ADDITIONAL STUDIES:    < from: Xray Chest 1 View- PORTABLE-Urgent (Xray Chest 1 View- PORTABLE-Urgent .) (01.14.21 @ 14:17) >  IMPRESSION:  Progression of patchy infiltrates.    Thank you for the courtesy of this referral.    < end of copied text >

## 2021-01-15 NOTE — PROGRESS NOTE ADULT - SUBJECTIVE AND OBJECTIVE BOX
CHIEF COMPLAINT: f/up sob, resp failure, severe persistent asthma, TBM, osas, covid 19 pneumonitis-bad day yesterday but better this am-less sob and cough    Interval Events: dex/remdisivir, hiflo    REVIEW OF SYSTEMS:  Constitutional: No fevers or chills. No weight loss. + fatigue or generalized malaise.  Eyes: No itching or discharge from the eyes  ENT: No ear pain. No ear discharge. No nasal congestion. No post nasal drip. No epistaxis. No throat pain. No sore throat. No difficulty swallowing.   CV: No chest pain. No palpitations. No lightheadedness or dizziness.   Resp: No dyspnea at rest. + dyspnea on exertion. No orthopnea. No wheezing. No cough. No stridor. No sputum production. No chest pain with respiration.  GI: No nausea. No vomiting. No diarrhea.  MSK: No joint pain or pain in any extremities  Integumentary: No skin lesions. No pedal edema.  Neurological: No gross motor weakness. No sensory changes.  [+ ] All other systems negative  [ ] Unable to assess ROS because ________    OBJECTIVE:  ICU Vital Signs Last 24 Hrs  T(C): 36.9 (15 Tyson 2021 04:41), Max: 37.9 (14 Jan 2021 08:03)  T(F): 98.5 (15 Tyson 2021 04:41), Max: 100.2 (14 Jan 2021 08:03)  HR: 70 (15 Tyson 2021 04:41) (67 - 110)  BP: 147/80 (15 Tyson 2021 04:41) (115/66 - 159/78)  BP(mean): --  ABP: --  ABP(mean): --  RR: 25 (15 Tyson 2021 04:41) (20 - 28)  SpO2: 97% (15 Tyson 2021 04:41) (90% - 99%)        01-13 @ 07:01  -  01-14 @ 07:00  --------------------------------------------------------  IN: 975 mL / OUT: 1450 mL / NET: -475 mL    01-14 @ 07:01  -  01-15 @ 05:58  --------------------------------------------------------  IN: 180 mL / OUT: 1500 mL / NET: -1320 mL      CAPILLARY BLOOD GLUCOSE      POCT Blood Glucose.: 114 mg/dL (14 Jan 2021 08:46)      PHYSICAL EXAM: on cpap  General: Awake, alert, oriented X 3.   HEENT: Atraumatic, normocephalic.                 Mallampatti Grade 3                No nasal congestion.                No tonsillar or pharyngeal exudates.  Lymph Nodes: No palpable lymphadenopathy  Neck: No JVD. No carotid bruit.   Respiratory: Normal chest expansion                         Normal percussion                         Normal and equal air entry                         No wheeze, rhonchi but bibasilar rales.  Cardiovascular: S1 S2 normal. No murmurs, rubs or gallops.   Abdomen: Soft, non-tender, non-distended. No organomegaly. Normoactive bowel sounds.  Extremities: Warm to touch. Peripheral pulse palpable. No pedal edema.   Skin: No rashes or skin lesions  Neurological: Motor and sensory examination equal and normal in all four extremities.  Psychiatry: Appropriate mood and affect.    HOSPITAL MEDICATIONS:  MEDICATIONS  (STANDING):  allopurinol 150 milliGRAM(s) Oral daily  amitriptyline 10 milliGRAM(s) Oral three times a day  amLODIPine   Tablet 5 milliGRAM(s) Oral daily  aspirin  chewable 81 milliGRAM(s) Oral daily  atorvastatin 10 milliGRAM(s) Oral at bedtime  dexAMETHasone     Tablet 6 milliGRAM(s) Oral daily  DULoxetine 60 milliGRAM(s) Oral daily  heparin   Injectable 5000 Unit(s) SubCutaneous every 8 hours  montelukast 10 milliGRAM(s) Oral daily  remdesivir  IVPB   IV Intermittent   remdesivir  IVPB 100 milliGRAM(s) IV Intermittent every 24 hours    MEDICATIONS  (PRN):  ALBUTerol    90 MICROgram(s) HFA Inhaler 2 Puff(s) Inhalation every 6 hours PRN Bronchospasm  diazepam    Tablet 5 milliGRAM(s) Oral four times a day PRN anxiety      LABS:    01-14    137  |  101  |  18  ----------------------------<  109<H>  4.0   |  24  |  0.69    Ca    8.6      14 Jan 2021 07:10    TPro  7.1  /  Alb  3.3  /  TBili  0.7  /  DBili  0.3<H>  /  AST  149<H>  /  ALT  207<H>  /  AlkPhos  405<H>  01-14              MICROBIOLOGY:     RADIOLOGY:  [ ] Reviewed and interpreted by me    Point of Care Ultrasound Findings:    PFT:    EKG:

## 2021-01-15 NOTE — PROGRESS NOTE ADULT - ASSESSMENT
74y/o M pmxh COPD, asthma, HTN, tracheobroncheomalacia, gout, arthritis, presenting with shortness of breath and low oxygen from the [facility] infusion center. Patient states that he was diagnosed with COVID on 1/2, after developing shortness of breath and weakness that day. He states that he was exposed by his girlfriend who became ill from her neighbor. He states that he has been staying at home alone since the time of his diagnosis, and has been becoming increasingly more short of breath with cough since the time of his diagnosis. He reports fevers as high as 100.6F which he has been taking tylenol for, and chills/body aches. States that he was referred for MAB infusion today by his pulmonologist Dr. Lance, and while at the center this AM was short of breath and his O2 sat was 83-84% on RA. Patient states that he has never required O2 at home even with his COPD. Denies any nausea, vomiting, diarrhea, chest pain. (11 Jan 2021 18:19)    ER afebrile.  Satting 95% on 6L nasal cannula.  Pt with reported covid pcr (+) at [facility] on 1/8.     Covid (+):    - Pt with covid pcr (+) on 1/8, cxr with patchy opacities in RUL and LLL c/w covid pna.  s/p RRT on 1/14, now on bipap.   Cont dexamethasone 6mg IV qdaily.    - Pt not a candidate for MAB therapy due to hypoxia. On  remdesivir x 5 day course (Day #3/5).  Monitor daily renal function and LFTs.  ALP in 400s.  AST/ALT 85/214.  ALT remains < 10x ULN, will cont for now.  No RUQ tenderness.  Reassess daily.     - Monitor pulse ox and titrate oxygen appropriately.  Pt Bipap.     - Monitor inflammatory markers:  DD: 1251 <-- 244 <-- 253  Ferritin: 3472 <-- 3529  CRP: 17 <-- 26.9  PCT: 0.2 <-- 0.86    - Pt therapeutic lovenox 40mg sq bid    Allyson Downs  [phone]
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
## 2021-01-16 NOTE — PROGRESS NOTE ADULT - ASSESSMENT
&76y/o M pmxh COPD, asthma, HTN, tracheobroncheomalacia, gout, arthritis, admitted with hypoxia from covid 19 and influenza     LUDA   Pt admitted with elevated SCr  2.3  Baseline Scr 0.8 in 11/ 2019  LUDA in the setting of infection   Scr improved  s/p IVF   LUDA likely pre-renal   UA with out protein or blood  avoid nephrotoxins     HTN   BP controlled     Covid 19+   on steriods  &remdesivir

## 2021-01-16 NOTE — PROGRESS NOTE ADULT - ASSESSMENT
76y/o M pmxh COPD, asthma, HTN, tracheobroncheomalacia, gout, arthritis, presenting with shortness of breath and low oxygen from the [facility] infusion center. diagnosed with COVID PCR+ on 1/2, onset of symptoms 1/2   was referred for MAB infusion today by his pulmonologist Dr. Lance, and  Sats 95% on 6L nasal cannula.  Pt with reported covid pcr (+) at [facility] on 1/8. Adm 1/11     Covid (+):  - Pt not a candidate for MAB therapy due to hypoxia and timing .  On  remdesivir x 5 day course last day 1/17.  Monitor daily renal function and LFTs.    continue LMWH, steroids, follow for any role of further immune modulation    TOCILIZUMAB role still under investigation (limited benefit without pretreatment with steroids) but may be considered for patients progressing after start of steroids (criteria per [facility]: Ferritin>700, D-dimer >1,000, CRP increase by >30%) w some evidence to suggest reduced progression to mechanical ventilation/death and shortening of hospital stay(EMPACTA/Remap-Cap-trials), caution with AST/ALT >1.5 ULN, would avoid without steroids

## 2021-01-16 NOTE — PROGRESS NOTE ADULT - ATTENDING COMMENTS
COVID pneumonia, hypoxic respiratory failure, multiple comorb  on remdesivir, dexa  has been bipap dependent  markers going up [id number], crp 23, ferritin [id number]  on bid lovenox  awake and alert, eating, wants off bipap for a while--   would try HFNC for eating and for breaks  cont to monitor markers, dimer  prognosis however is guarded  pt is DNR

## 2021-01-16 NOTE — PROGRESS NOTE ADULT - ASSESSMENT
Assessment and Plan    76y/o M pmxh COPD, asthma, HTN, tracheobroncheomalacia, gout, arthritis, presenting with shortness of breath and low oxygen from the [facility] infusion center    EKG: SR no ischemic changes no ischemic changes     1) COVID 19 PNA:   -likely cause of SOB   -c.w remdesivir and dexa   -monitor o2 levels   -f/u ID and pulm recs   -on BIPAP    2) HTN   -controlled  -continue with amlodipine     3) DVT PPX   -lovenox subq

## 2021-01-16 NOTE — PROGRESS NOTE ADULT - SUBJECTIVE AND OBJECTIVE BOX
CHIEF COMPLAINT:    Interval Events:    REVIEW OF SYSTEMS:  Constitutional: No fevers or chills. No weight loss. No fatigue or generalised malaise.  Eyes: No itching or discharge from the eyes  ENT: No ear pain. No ear discharge. No nasal congestion. No post nasal drip. No epistaxis. No throat pain. No sore throat. No difficulty swallowing.   CV: No chest pain. No palpitations. No lightheadedness or dizziness.   Resp: No dyspnea at rest. No dyspnea on exertion. No orthopnea. No wheezing. No cough. No stridor. No sputum production. No chest pain with respiration.  GI: No nausea. No vomiting. No diarrhea.  MSK: No joint pain or pain in any extremities  Integumentary: No skin lesions. No pedal edema.  Neurological: No gross motor weakness. No sensory changes.  [ ] All other systems negative  [ ] Unable to assess ROS because ________    OBJECTIVE:  ICU Vital Signs Last 24 Hrs  T(C): 37.1 (16 Jan 2021 08:24), Max: 37.1 (16 Jan 2021 08:24)  T(F): 98.7 (16 Jan 2021 08:24), Max: 98.7 (16 Jan 2021 08:24)  HR: 82 (16 Jan 2021 09:16) (66 - 84)  BP: 146/74 (16 Jan 2021 08:24) (112/68 - 146/74)  BP(mean): --  ABP: --  ABP(mean): --  RR: 20 (16 Jan 2021 08:24) (20 - 28)  SpO2: 96% (16 Jan 2021 09:16) (95% - 99%)        01-15 @ 07:01  -  01-16 @ 07:00  --------------------------------------------------------  IN: 0 mL / OUT: 700 mL / NET: -700 mL      CAPILLARY BLOOD GLUCOSE          PHYSICAL EXAM:  General: Awake, alert, oriented X 3.   HEENT: Atraumatic, normocephalic.                 Mallampatti Grade                 No nasal congestion.                No tonsillar or pharyngeal exudates.  Lymph Nodes: No palpable lymphadenopathy  Neck: No JVD. No carotid bruit.   Respiratory: Normal chest expansion                         Normal percussion                         Normal and equal air entry                         No wheeze, rhonchi or rales.  Cardiovascular: S1 S2 normal. No murmurs, rubs or gallops.   Abdomen: Soft, non-tender, non-distended. No organomegaly.  Extremities: Warm to touch. Peripheral pulse palpable. No pedal edema.   Skin: No rashes or skin lesions  Neurological: Motor and sensory examination equal and normal in all four extremities.  Psychiatry: Appropriate mood and affect.    HOSPITAL MEDICATIONS:  MEDICATIONS  (STANDING):  allopurinol 150 milliGRAM(s) Oral daily  amitriptyline 10 milliGRAM(s) Oral three times a day  amLODIPine   Tablet 5 milliGRAM(s) Oral daily  aspirin  chewable 81 milliGRAM(s) Oral daily  atorvastatin 10 milliGRAM(s) Oral at bedtime  dexAMETHasone     Tablet 6 milliGRAM(s) Oral daily  DULoxetine 60 milliGRAM(s) Oral daily  enoxaparin Injectable 40 milliGRAM(s) SubCutaneous two times a day  montelukast 10 milliGRAM(s) Oral daily  remdesivir  IVPB   IV Intermittent   remdesivir  IVPB 100 milliGRAM(s) IV Intermittent every 24 hours    MEDICATIONS  (PRN):  ALBUTerol    90 MICROgram(s) HFA Inhaler 2 Puff(s) Inhalation every 6 hours PRN Bronchospasm  diazepam    Tablet 5 milliGRAM(s) Oral four times a day PRN anxiety      LABS:                        13.2   14.99 )-----------( 360      ( 16 Jan 2021 09:15 )             39.2     01-16    140  |  102  |  20  ----------------------------<  134<H>  4.8   |  27  |  0.80    Ca    9.3      16 Jan 2021 09:15    TPro  7.3  /  Alb  3.1<L>  /  TBili  0.9  /  DBili  0.5<H>  /  AST  123<H>  /  ALT  212<H>  /  AlkPhos  402<H>  01-16              MICROBIOLOGY:     RADIOLOGY:  [ ] Reviewed and interpreted by me    Point of Care Ultrasound Findings:    PFT:    EKG: CHIEF COMPLAINT:  Has been essentially bipap dependent    Has been eating as well -- takes off mask to take a bite...    yesterday, trial of hfnc with desaturation    ROS - doestn like the bipap, otherwise no complaint  OBJECTIVE:  ICU Vital Signs Last 24 Hrs  T(C): 37.1 (16 Jan 2021 08:24), Max: 37.1 (16 Jan 2021 08:24)  T(F): 98.7 (16 Jan 2021 08:24), Max: 98.7 (16 Jan 2021 08:24)  HR: 82 (16 Jan 2021 09:16) (66 - 84)  BP: 146/74 (16 Jan 2021 08:24) (112/68 - 146/74)  BP(mean): --  ABP: --  ABP(mean): --  RR: 20 (16 Jan 2021 08:24) (20 - 28)  SpO2: 96% (16 Jan 2021 09:16) (95% - 99%)        01-15 @ 07:01  -  01-16 @ 07:00  --------------------------------------------------------  IN: 0 mL / OUT: 700 mL / NET: -700 mL      CAPILLARY BLOOD GLUCOSE          PHYSICAL EXAM:  General: Awake, alert, oriented X 3.   HEENT: bipap  Lymph Nodes: No palpable lymphadenopathy  Neck: No JVD. No carotid bruit.   Respiratory: clear  Cardiovascular: S1 S2 normal. No murmurs, rubs or gallops.   Abdomen: Soft, non-tender, non-distended. No organomegaly.  Extremities: Warm to touch. Peripheral pulse palpable. No pedal edema.   Skin: No rashes or skin lesions  Neurological: Motor and sensory examination equal and normal in all four extremities.  Psychiatry: Appropriate mood and affect.    HOSPITAL MEDICATIONS:  MEDICATIONS  (STANDING):  allopurinol 150 milliGRAM(s) Oral daily  amitriptyline 10 milliGRAM(s) Oral three times a day  amLODIPine   Tablet 5 milliGRAM(s) Oral daily  aspirin  chewable 81 milliGRAM(s) Oral daily  atorvastatin 10 milliGRAM(s) Oral at bedtime  dexAMETHasone     Tablet 6 milliGRAM(s) Oral daily  DULoxetine 60 milliGRAM(s) Oral daily  enoxaparin Injectable 40 milliGRAM(s) SubCutaneous two times a day  montelukast 10 milliGRAM(s) Oral daily  remdesivir  IVPB   IV Intermittent   remdesivir  IVPB 100 milliGRAM(s) IV Intermittent every 24 hours    MEDICATIONS  (PRN):  ALBUTerol    90 MICROgram(s) HFA Inhaler 2 Puff(s) Inhalation every 6 hours PRN Bronchospasm  diazepam    Tablet 5 milliGRAM(s) Oral four times a day PRN anxiety      LABS:                        13.2   14.99 )-----------( 360      ( 16 Jan 2021 09:15 )             39.2     01-16    140  |  102  |  20  ----------------------------<  134<H>  4.8   |  27  |  0.80    Ca    9.3      16 Jan 2021 09:15    TPro  7.3  /  Alb  3.1<L>  /  TBili  0.9  /  DBili  0.5<H>  /  AST  123<H>  /  ALT  212<H>  /  AlkPhos  402<H>  01-16              MICROBIOLOGY:     RADIOLOGY:  [ ] Reviewed and interpreted by me    Point of Care Ultrasound Findings:    PFT:    EKG:

## 2021-01-16 NOTE — PROGRESS NOTE ADULT - SUBJECTIVE AND OBJECTIVE BOX
[facility]   MD EDENILSON GRAF MD RUORU WONG, PA    TEL:  OFFICE: [phone]  DR SENIOR CELL: [phone]  CASSY GARNER CELL: [phone]  DR. CANNON CELL: [phone]  DR. LAZCANO CELL: [phone]    FROM 5 PM - 7 AM PLEASE CALL ANSWERING SERVICE: [phone]    RENAL FOLLOW UP NOTE--Date of Service 01-16-21 @ 10:04  --------------------------------------------------------------------------------  HPI:      Pt covid 19+    PAST HISTORY  --------------------------------------------------------------------------------  No significant changes to PMH, PSH, FHx, SHx, unless otherwise noted    ALLERGIES & MEDICATIONS  --------------------------------------------------------------------------------  Allergies    beta-lactam antibiotics (Swelling)  Keflex (Anaphylaxis)  nitrates causes vertigo per pt (Nausea)  penicillin (Anaphylaxis)  Versed (Nausea)    Intolerances      Standing Inpatient Medications  allopurinol 150 milliGRAM(s) Oral daily  amitriptyline 10 milliGRAM(s) Oral three times a day  amLODIPine   Tablet 5 milliGRAM(s) Oral daily  aspirin  chewable 81 milliGRAM(s) Oral daily  atorvastatin 10 milliGRAM(s) Oral at bedtime  dexAMETHasone     Tablet 6 milliGRAM(s) Oral daily  DULoxetine 60 milliGRAM(s) Oral daily  enoxaparin Injectable 40 milliGRAM(s) SubCutaneous two times a day  montelukast 10 milliGRAM(s) Oral daily  remdesivir  IVPB   IV Intermittent   remdesivir  IVPB 100 milliGRAM(s) IV Intermittent every 24 hours    PRN Inpatient Medications  ALBUTerol    90 MICROgram(s) HFA Inhaler 2 Puff(s) Inhalation every 6 hours PRN  diazepam    Tablet 5 milliGRAM(s) Oral four times a day PRN      REVIEW OF SYSTEMS  --------------------------------------------------------------------------------  General: no fever    MSK: no edema     VITALS/PHYSICAL EXAM  --------------------------------------------------------------------------------  T(C): 37.1 (01-16-21 @ 08:24), Max: 37.1 (01-16-21 @ 08:24)  HR: 84 (01-16-21 @ 08:24) (66 - 84)  BP: 146/74 (01-16-21 @ 08:24) (112/68 - 146/74)  RR: 20 (01-16-21 @ 08:24) (20 - 28)  SpO2: 96% (01-16-21 @ 08:24) (95% - 99%)  Wt(kg): --        01-15-21 @ 07:01  -  01-16-21 @ 07:00  --------------------------------------------------------  IN: 0 mL / OUT: 700 mL / NET: -700 mL        LABS/STUDIES  --------------------------------------------------------------------------------              13.2   14.99 >-----------<  360      [01-16-21 @ 09:15]              39.2     139  |  101  |  21  ----------------------------<  110      [01-15-21 @ 08:05]  4.8   |  26  |  0.61        Ca     9.1     [01-15-21 @ 08:05]    TPro  7.8  /  Alb  3.3  /  TBili  0.6  /  DBili  0.2  /  AST  85  /  ALT  214  /  AlkPhos  416  [01-15-21 @ 08:05]          Creatinine Trend:  SCr 0.61 [01-15 @ 08:05]  SCr 0.69 [01-14 @ 07:10]  SCr 0.82 [01-13 @ 07:42]  SCr 1.23 [01-12 @ 06:44]  SCr 2.36 [01-11 @ 16:26]        Ferritin 4385      [01-15-21 @ 10:20]  HbA1c 5.2      [04-26-18 @ 17:35]

## 2021-01-16 NOTE — PROGRESS NOTE ADULT - SUBJECTIVE AND OBJECTIVE BOX
[facility] DIVISION of INFECTIOUS DISEASE  Luiz Wilks MD PhD, Stacy Stuart MD, Whit Dela Cruz MD, Ashu Gamboa MD  and providing coverage with Allyson Downs MD and Jerilyn Argueta MD  Providing Infectious Disease Consultations at [facility], [facility], [facility]'s      Office# [phone] to schedule follow up appointments  Answering Service for urgent calls or New Consults [phone]  Cell# to text for urgent issues Luiz Wilks [phone]     Infectious diseases progress note:    CHIDI RUVALCABA is a 75y y. o. Male patient    COVID Patient    Allergies    beta-lactam antibiotics (Swelling)  Keflex (Anaphylaxis)  nitrates causes vertigo per pt (Nausea)  penicillin (Anaphylaxis)  Versed (Nausea)    Intolerances        ANTIBIOTICS/RELEVANT:  antimicrobials  remdesivir  IVPB   IV Intermittent   remdesivir  IVPB 100 milliGRAM(s) IV Intermittent every 24 hours    immunologic:    OTHER:  ALBUTerol    90 MICROgram(s) HFA Inhaler 2 Puff(s) Inhalation every 6 hours PRN  allopurinol 150 milliGRAM(s) Oral daily  amitriptyline 10 milliGRAM(s) Oral three times a day  amLODIPine   Tablet 5 milliGRAM(s) Oral daily  aspirin  chewable 81 milliGRAM(s) Oral daily  atorvastatin 10 milliGRAM(s) Oral at bedtime  dexAMETHasone     Tablet 6 milliGRAM(s) Oral daily  diazepam    Tablet 5 milliGRAM(s) Oral four times a day PRN  DULoxetine 60 milliGRAM(s) Oral daily  enoxaparin Injectable 40 milliGRAM(s) SubCutaneous two times a day  montelukast 10 milliGRAM(s) Oral daily      Objective:  Vital Signs Last 24 Hrs  T(C): 37.2 (16 Jan 2021 13:22), Max: 37.2 (16 Jan 2021 13:22)  T(F): 98.9 (16 Jan 2021 13:22), Max: 98.9 (16 Jan 2021 13:22)  HR: 73 (16 Jan 2021 13:22) (66 - 84)  BP: 132/70 (16 Jan 2021 13:22) (112/68 - 146/74)  BP(mean): --  RR: 20 (16 Jan 2021 13:22) (20 - 28)  SpO2: 97% (16 Jan 2021 13:22) (95% - 99%)    T(C): 37.2 (01-16-21 @ 13:22), Max: 37.2 (01-16-21 @ 13:22)  T(C): 37.2 (01-16-21 @ 13:22), Max: 37.9 (01-14-21 @ 08:03)  T(C): 37.2 (01-16-21 @ 13:22), Max: 37.9 (01-14-21 @ 08:03)    PHYSICAL EXAM: on BIPAP  HEENT: NC atraumatic  Neck: supple  Respiratory: no accessory muscle use, breathing with Bipapa  Cardiovascular: distant  Gastrointestinal: normal appearing, nondistended  Extremities: no clubbing, no cyanosis,        LABS:                          13.2   14.99 )-----------( 360      ( 16 Jan 2021 09:15 )             39.2       14.99 01-16 @ 09:15  3.85 01-12 @ 06:44  5.12 01-11 @ 16:26      01-16    140  |  102  |  20  ----------------------------<  134<H>  4.8   |  27  |  0.80    Ca    9.3      16 Jan 2021 09:15    TPro  7.3  /  Alb  3.1<L>  /  TBili  0.9  /  DBili  0.5<H>  /  AST  123<H>  /  ALT  212<H>  /  AlkPhos  402<H>  01-16      Creatinine, Serum: 0.80 mg/dL (01-16-21 @ 09:15)  Creatinine, Serum: 0.76 mg/dL (01-16-21 @ 09:15)  Creatinine, Serum: 0.61 mg/dL (01-15-21 @ 08:05)  Creatinine, Serum: 0.69 mg/dL (01-14-21 @ 07:10)  Creatinine, Serum: 0.67 mg/dL (01-14-21 @ 07:10)  Creatinine, Serum: 0.82 mg/dL (01-13-21 @ 07:42)  Creatinine, Serum: 1.23 mg/dL (01-12-21 @ 06:44)  Creatinine, Serum: 2.36 mg/dL (01-11-21 @ 16:26)                COVID RISK SCORE  Auto Neutrophil #: 4.16 K/uL (01-11-21 @ 16:26)  Auto Lymphocyte #: 0.53 K/uL (01-11-21 @ 16:26)      Auto Eosinophil #: 0.01 K/uL (01-11-21 @ 16:26)        Procalcitonin, Serum: 0.20 ng/mL (01-14-21 @ 07:10)  Procalcitonin, Serum: 0.86 ng/mL (01-11-21 @ 16:26)            Ferritin, Serum: 4385 ng/mL (01-15-21 @ 10:20)  Ferritin, Serum: 3472 ng/mL (01-14-21 @ 14:30)  Ferritin, Serum: 3529 ng/mL (01-11-21 @ 19:25)          D-Dimer Assay, Quantitative: 2482 ng/mL DDU (01-16-21 @ 09:15)  D-Dimer Assay, Quantitative: 1251 ng/mL DDU (01-15-21 @ 06:41)  D-Dimer Assay, Quantitative: 244 ng/mL DDU (01-13-21 @ 07:42)  D-Dimer Assay, Quantitative: 253 ng/mL DDU (01-11-21 @ 16:26)        MICROBIOLOGY:              RADIOLOGY & ADDITIONAL STUDIES:

## 2021-01-16 NOTE — PROGRESS NOTE ADULT - SUBJECTIVE AND OBJECTIVE BOX
Patient is a 75y old  Male who presents with a chief complaint of sob (16 Jan 2021 14:48)    Date of servie : 01-16-21 @ 16:29  INTERVAL HPI/OVERNIGHT EVENTS:  T(C): 37.2 (01-16-21 @ 13:22), Max: 37.2 (01-16-21 @ 13:22)  HR: 73 (01-16-21 @ 13:22) (66 - 84)  BP: 132/70 (01-16-21 @ 13:22) (112/68 - 146/74)  RR: 20 (01-16-21 @ 13:22) (20 - 28)  SpO2: 97% (01-16-21 @ 13:22) (95% - 99%)  Wt(kg): --  I&O's Summary    15 Tyson 2021 07:01  -  16 Jan 2021 07:00  --------------------------------------------------------  IN: 0 mL / OUT: 700 mL / NET: -700 mL    16 Jan 2021 07:01  -  16 Jan 2021 16:29  --------------------------------------------------------  IN: 360 mL / OUT: 1300 mL / NET: -940 mL        LABS:                        13.2   14.99 )-----------( 360      ( 16 Jan 2021 09:15 )             39.2     01-16    140  |  102  |  20  ----------------------------<  134<H>  4.8   |  27  |  0.80    Ca    9.3      16 Jan 2021 09:15    TPro  7.3  /  Alb  3.1<L>  /  TBili  0.9  /  DBili  0.5<H>  /  AST  123<H>  /  ALT  212<H>  /  AlkPhos  402<H>  01-16        CAPILLARY BLOOD GLUCOSE                MEDICATIONS  (STANDING):  allopurinol 150 milliGRAM(s) Oral daily  amitriptyline 10 milliGRAM(s) Oral three times a day  amLODIPine   Tablet 5 milliGRAM(s) Oral daily  aspirin  chewable 81 milliGRAM(s) Oral daily  atorvastatin 10 milliGRAM(s) Oral at bedtime  dexAMETHasone     Tablet 6 milliGRAM(s) Oral daily  DULoxetine 60 milliGRAM(s) Oral daily  enoxaparin Injectable 40 milliGRAM(s) SubCutaneous two times a day  montelukast 10 milliGRAM(s) Oral daily  remdesivir  IVPB   IV Intermittent   remdesivir  IVPB 100 milliGRAM(s) IV Intermittent every 24 hours    MEDICATIONS  (PRN):  ALBUTerol    90 MICROgram(s) HFA Inhaler 2 Puff(s) Inhalation every 6 hours PRN Bronchospasm  diazepam    Tablet 5 milliGRAM(s) Oral four times a day PRN anxiety          PHYSICAL EXAM:  GENERAL: NAD, well-groomed, well-developed  HEAD:  Atraumatic, Normocephalic  CHEST/LUNG: Clear to percussion bilaterally; No rales, rhonchi, wheezing, or rubs  HEART: Regular rate and rhythm; No murmurs, rubs, or gallops  ABDOMEN: Soft, Nontender, Nondistended; Bowel sounds present  EXTREMITIES:  2+ Peripheral Pulses, No clubbing, cyanosis, or edema  LYMPH: No lymphadenopathy noted  SKIN: No rashes or lesions    Care Discussed with Consultants/Other Providers [ ] YES  [ ] NO

## 2021-01-17 NOTE — PROGRESS NOTE ADULT - ATTENDING COMMENTS
COVID pneumonia, multiple comorbidities  still bipap dependent   completing remdesivir  on dexamethasone, bid lovenox  ferritin and crp still very elevated yesterday  Dimer is up over 8,000 today -- I would have bedside LE dopplers done at this point.  Overall prognosis is poor  Pt does not wish to be intubated

## 2021-01-17 NOTE — PROGRESS NOTE ADULT - ASSESSMENT
74y/o M pmxh COPD, asthma, HTN, tracheobroncheomalacia, gout, arthritis, presenting with shortness of breath and low oxygen from the [facility] infusion center. diagnosed with COVID PCR+ on 1/2, onset of symptoms 1/2   was referred for MAB infusion today by his pulmonologist Dr. Lance, and  Sats 95% on 6L nasal cannula.  Pt with reported covid pcr (+) at [facility] on 1/8. Adm 1/11     Covid (+):  - Pt not a candidate for MAB therapy due to hypoxia and timing .  On  remdesivir x 5 day course last day 1/17.  Monitor daily renal function and LFTs.    continue LMWH, steroids, follow for any role of further immune modulation    1/17 last day of remdesivir, remains on Bipap, will order additional labs to clarify role of esalation of therapy    TOCILIZUMAB role still under investigation (limited benefit without pretreatment with steroids) but may be considered for patients progressing after start of steroids (criteria per [facility]: Ferritin>700, D-dimer >1,000, CRP increase by >30%) w some evidence to suggest reduced progression to mechanical ventilation/death and shortening of hospital stay(EMPACTA/Remap-Cap-trials), caution with AST/ALT >1.5 ULN, would avoid without steroids

## 2021-01-17 NOTE — PROGRESS NOTE ADULT - SUBJECTIVE AND OBJECTIVE BOX
[facility] [place] [facility]   MD EDENILSON GRAF MD RUORU WONG, PA    TEL:  OFFICE: [phone]  DR SENIOR CELL: [phone]  CASSY GARNER CELL: [phone]  DR. CANNON CELL: [phone]  DR. LAZCANO CELL: [phone]    FROM 5 PM - 7 AM PLEASE CALL ANSWERING SERVICE: [phone]    RENAL FOLLOW UP NOTE--Date of Service 01-17-21 @ 08:17  --------------------------------------------------------------------------------  HPI:      Pt covid 19+  PAST HISTORY  --------------------------------------------------------------------------------  No significant changes to PMH, PSH, FHx, SHx, unless otherwise noted    ALLERGIES & MEDICATIONS  --------------------------------------------------------------------------------  Allergies    beta-lactam antibiotics (Swelling)  Keflex (Anaphylaxis)  nitrates causes vertigo per pt (Nausea)  penicillin (Anaphylaxis)  Versed (Nausea)    Intolerances      Standing Inpatient Medications  allopurinol 150 milliGRAM(s) Oral daily  amitriptyline 10 milliGRAM(s) Oral three times a day  amLODIPine   Tablet 5 milliGRAM(s) Oral daily  aspirin  chewable 81 milliGRAM(s) Oral daily  atorvastatin 10 milliGRAM(s) Oral at bedtime  dexAMETHasone     Tablet 6 milliGRAM(s) Oral daily  DULoxetine 60 milliGRAM(s) Oral daily  enoxaparin Injectable 40 milliGRAM(s) SubCutaneous two times a day  montelukast 10 milliGRAM(s) Oral daily  remdesivir  IVPB 100 milliGRAM(s) IV Intermittent every 24 hours  remdesivir  IVPB   IV Intermittent     PRN Inpatient Medications  ALBUTerol    90 MICROgram(s) HFA Inhaler 2 Puff(s) Inhalation every 6 hours PRN  diazepam    Tablet 5 milliGRAM(s) Oral four times a day PRN      REVIEW OF SYSTEMS  --------------------------------------------------------------------------------  General: no fever    MSK: no edema     VITALS/PHYSICAL EXAM  --------------------------------------------------------------------------------  T(C): 37 (01-17-21 @ 05:43), Max: 37.2 (01-16-21 @ 13:22)  HR: 103 (01-17-21 @ 07:05) (69 - 108)  BP: 147/75 (01-17-21 @ 05:43) (125/71 - 148/93)  RR: 20 (01-17-21 @ 05:43) (20 - 22)  SpO2: 95% (01-17-21 @ 07:05) (89% - 99%)  Wt(kg): --        01-16-21 @ 07:01  -  01-17-21 @ 07:00  --------------------------------------------------------  IN: 360 mL / OUT: 1940 mL / NET: -1580 mL        LABS/STUDIES  --------------------------------------------------------------------------------              13.2   14.99 >-----------<  360      [01-16-21 @ 09:15]              39.2     140  |  102  |  20  ----------------------------<  134      [01-16-21 @ 09:15]  4.8   |  27  |  0.80        Ca     9.3     [01-16-21 @ 09:15]    TPro  7.3  /  Alb  3.1  /  TBili  0.9  /  DBili  0.5  /  AST  123  /  ALT  212  /  AlkPhos  402  [01-16-21 @ 09:15]          Creatinine Trend:  SCr 0.80 [01-16 @ 09:15]  SCr 0.61 [01-15 @ 08:05]  SCr 0.69 [01-14 @ 07:10]  SCr 0.82 [01-13 @ 07:42]  SCr 1.23 [01-12 @ 06:44]        Ferritin 4278      [01-16-21 @ 16:42]  HbA1c 5.2      [04-26-18 @ 17:35]
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
## 2021-01-17 NOTE — PROGRESS NOTE ADULT - SUBJECTIVE AND OBJECTIVE BOX
[facility] DIVISION of INFECTIOUS DISEASE  Luiz Wilks MD PhD, Stacy Stuart MD, Whit Dela Cruz MD, Ashu Gamboa MD  and providing coverage with Allyson Downs MD and Jerilyn Argueta MD  Providing Infectious Disease Consultations at [facility], [facility], [facility]'s      Office# [phone] to schedule follow up appointments  Answering Service for urgent calls or New Consults [phone]  Cell# to text for urgent issues Luiz Wilks [phone]     Infectious diseases progress note:    CHIDI RUVALCABA is a 75y y. o. Male patient    COVID Patient, on Bipap, diaphoretic nodding that he can continue breathing with mask    Allergies    beta-lactam antibiotics (Swelling)  Keflex (Anaphylaxis)  nitrates causes vertigo per pt (Nausea)  penicillin (Anaphylaxis)  Versed (Nausea)    Intolerances        ANTIBIOTICS/RELEVANT:  antimicrobials  remdesivir  IVPB 100 milliGRAM(s) IV Intermittent every 24 hours  remdesivir  IVPB   IV Intermittent     immunologic:    OTHER:  ALBUTerol    90 MICROgram(s) HFA Inhaler 2 Puff(s) Inhalation every 6 hours PRN  allopurinol 150 milliGRAM(s) Oral daily  amitriptyline 10 milliGRAM(s) Oral three times a day  amLODIPine   Tablet 5 milliGRAM(s) Oral daily  aspirin  chewable 81 milliGRAM(s) Oral daily  atorvastatin 10 milliGRAM(s) Oral at bedtime  dexAMETHasone     Tablet 6 milliGRAM(s) Oral daily  diazepam    Tablet 5 milliGRAM(s) Oral four times a day PRN  DULoxetine 60 milliGRAM(s) Oral daily  enoxaparin Injectable 40 milliGRAM(s) SubCutaneous two times a day  montelukast 10 milliGRAM(s) Oral daily      Objective:  Vital Signs Last 24 Hrs  T(C): 37 (17 Jan 2021 05:43), Max: 37.2 (16 Jan 2021 13:22)  T(F): 98.6 (17 Jan 2021 05:43), Max: 98.9 (16 Jan 2021 13:22)  HR: 107 (17 Jan 2021 09:30) (69 - 108)  BP: 147/75 (17 Jan 2021 05:43) (125/71 - 148/93)  BP(mean): --  RR: 20 (17 Jan 2021 05:43) (20 - 22)  SpO2: 88% (17 Jan 2021 09:30) (88% - 99%)    T(C): 37 (01-17-21 @ 05:43), Max: 37.2 (01-16-21 @ 13:22)  T(C): 37 (01-17-21 @ 05:43), Max: 37.2 (01-16-21 @ 13:22)  T(C): 37 (01-17-21 @ 05:43), Max: 37.9 (01-14-21 @ 08:03)    PHYSICAL EXAM: on BIPAP  HEENT: NC atraumatic  Neck: supple  Respiratory: no accessory muscle use, not breathing comfortably  Cardiovascular: distant  Gastrointestinal: normal appearing, nondistended  Extremities: no clubbing, no cyanosis,        LABS:                          13.2   14.99 )-----------( 360      ( 16 Jan 2021 09:15 )             39.2       14.99 01-16 @ 09:15  3.85 01-12 @ 06:44  5.12 01-11 @ 16:26      01-16    140  |  102  |  20  ----------------------------<  134<H>  4.8   |  27  |  0.80    Ca    9.3      16 Jan 2021 09:15    TPro  7.3  /  Alb  3.1<L>  /  TBili  0.9  /  DBili  0.5<H>  /  AST  123<H>  /  ALT  212<H>  /  AlkPhos  402<H>  01-16      Creatinine, Serum: 0.80 mg/dL (01-16-21 @ 09:15)  Creatinine, Serum: 0.76 mg/dL (01-16-21 @ 09:15)  Creatinine, Serum: 0.61 mg/dL (01-15-21 @ 08:05)  Creatinine, Serum: 0.69 mg/dL (01-14-21 @ 07:10)  Creatinine, Serum: 0.67 mg/dL (01-14-21 @ 07:10)  Creatinine, Serum: 0.82 mg/dL (01-13-21 @ 07:42)  Creatinine, Serum: 1.23 mg/dL (01-12-21 @ 06:44)  Creatinine, Serum: 2.36 mg/dL (01-11-21 @ 16:26)    COVID RISK SCORE  Auto Neutrophil #: 4.16 K/uL (01-11-21 @ 16:26)  Auto Lymphocyte #: 0.53 K/uL (01-11-21 @ 16:26)      Auto Eosinophil #: 0.01 K/uL (01-11-21 @ 16:26)        Procalcitonin, Serum: 0.20 ng/mL (01-14-21 @ 07:10)  Procalcitonin, Serum: 0.86 ng/mL (01-11-21 @ 16:26)      Ferritin, Serum: 4278 ng/mL (01-16-21 @ 16:42)  Ferritin, Serum: 4385 ng/mL (01-15-21 @ 10:20)  Ferritin, Serum: 3472 ng/mL (01-14-21 @ 14:30)  Ferritin, Serum: 3529 ng/mL (01-11-21 @ 19:25)          D-Dimer Assay, Quantitative: 2482 ng/mL DDU (01-16-21 @ 09:15)  D-Dimer Assay, Quantitative: 1251 ng/mL DDU (01-15-21 @ 06:41)  D-Dimer Assay, Quantitative: 244 ng/mL DDU (01-13-21 @ 07:42)  D-Dimer Assay, Quantitative: 253 ng/mL DDU (01-11-21 @ 16:26)        MICROBIOLOGY:              RADIOLOGY & ADDITIONAL STUDIES:

## 2021-01-17 NOTE — PROVIDER CONTACT NOTE (OTHER) - REASON
Pt. continues to remove the straps to secure the bipap Pt. continues to remove the straps that secure the bipap Pt. continues to remove the straps that secure his bipap

## 2021-01-17 NOTE — PROGRESS NOTE ADULT - SUBJECTIVE AND OBJECTIVE BOX
Still dependent on bipap, 10/5 and 100% fio2    OBJECTIVE:  ICU Vital Signs Last 24 Hrs  T(C): 36.9 (17 Jan 2021 10:13), Max: 37.2 (16 Jan 2021 13:22)  T(F): 98.5 (17 Jan 2021 10:13), Max: 98.9 (16 Jan 2021 13:22)  HR: 96 (17 Jan 2021 10:13) (69 - 108)  BP: 119/74 (17 Jan 2021 10:13) (119/74 - 148/93)  BP(mean): --  ABP: --  ABP(mean): --  RR: 20 (17 Jan 2021 10:13) (20 - 22)  SpO2: 95% (17 Jan 2021 10:13) (88% - 99%)        01-16 @ 07:01 - 01-17 @ 07:00  --------------------------------------------------------  IN: 360 mL / OUT: 1940 mL / NET: -1580 mL    01-17 @ 07:01 - 01-17 @ 12:58  --------------------------------------------------------  IN: 240 mL / OUT: 300 mL / NET: -60 mL      CAPILLARY BLOOD GLUCOSE          PHYSICAL EXAM:  General: on bipap, no distress  Lymph Nodes: No palpable lymphadenopathy  Neck: No JVD. No carotid bruit.   Respiratory: clear  vascular: S1 S2 normal. No murmurs, rubs or gallops.   Abdomen: Soft, non-tender, non-distended. No organomegaly.  Extremities: Warm to touch. Peripheral pulse palpable. No pedal edema.   Skin: No rashes or skin lesions  Neurological: Motor and sensory examination equal and normal in all four extremities.  Psychiatry: Appropriate mood and affect.    HOSPITAL MEDICATIONS:  MEDICATIONS  (STANDING):  allopurinol 150 milliGRAM(s) Oral daily  amitriptyline 10 milliGRAM(s) Oral three times a day  amLODIPine   Tablet 5 milliGRAM(s) Oral daily  aspirin  chewable 81 milliGRAM(s) Oral daily  atorvastatin 10 milliGRAM(s) Oral at bedtime  dexAMETHasone     Tablet 6 milliGRAM(s) Oral daily  DULoxetine 60 milliGRAM(s) Oral daily  enoxaparin Injectable 90 milliGRAM(s) SubCutaneous two times a day  montelukast 10 milliGRAM(s) Oral daily  morphine  - Injectable 1 milliGRAM(s) SubCutaneous once    MEDICATIONS  (PRN):  ALBUTerol    90 MICROgram(s) HFA Inhaler 2 Puff(s) Inhalation every 6 hours PRN Bronchospasm  diazepam    Tablet 5 milliGRAM(s) Oral four times a day PRN anxiety      LABS:                        12.8   15.68 )-----------( 364      ( 17 Jan 2021 09:45 )             38.8     01-17    x   |  x   |  x   ----------------------------<  x   x    |  x   |  0.73    Ca    9.3      16 Jan 2021 09:15    TPro  6.8  /  Alb  2.8<L>  /  TBili  1.3<H>  /  DBili  0.8<H>  /  AST  81<H>  /  ALT  206<H>  /  AlkPhos  387<H>  01-17              MICROBIOLOGY:     RADIOLOGY:  [ ] Reviewed and interpreted by me    Point of Care Ultrasound Findings:    PFT:    EKG:

## 2021-01-17 NOTE — PROGRESS NOTE ADULT - ASSESSMENT
Problem/Plan - 1:  ·  Problem: COVID-19.  Plan: cw decadron  ID FU  pulmonary fu appreciated     Problem/Plan - 2:  ·  Problem: COPD (chronic obstructive pulmonary disease).  Plan: cw albuterol  pulmonary fu     Problem/Plan - 3:·  Problem: Depression.  Plan: cw home meds.     Problem/Plan - 4:  ·  Problem: HLD (hyperlipidemia).  Plan: cw statin.    ·  Problem: Depression.  Plan: cw home meds.     Problem/Plan - 4:  ·  Problem: HLD (hyperlipidemia).  Plan: cw statin.    pt doing poorly , palliative consult

## 2021-01-17 NOTE — CHART NOTE - NSCHARTNOTEFT_GEN_A_CORE
76y/o M pmxh COPD, asthma, HTN, tracheobroncheomalacia, gout, arthritis. Patient was diagnosed with COVID PCR+. Patient is DNR/DNI.   Called by primary team in regards to goals of care. Per ACP, patient may have dyspnea and was ordered for Morphine 1mg, dose not administered yet.    Recommendations  - Please continue ongoing goals of care with patient.   - Can try IV Morphine 1mg for dyspnea. If dose is ineffective for symptom relief, can try IV Morphine 2mg.     Discussed with Dr. Clifford      In the event of newly developing, evolving, or worsening symptoms, please contact the Palliative Medicine team via pager (if the patient is at [facility] #[phone] or if the patient is at [facility] #[phone]) The Geriatric and Palliative Medicine service has coverage 24 hours a day/ 7 days a week to provide medical recommendations regarding symptom management needs via telephone 76y/o M pmxh COPD, asthma, HTN, tracheobroncheomalacia, gout, arthritis. Patient was diagnosed with COVID PCR+. Patient is DNR/DNI.   Called by primary team in regards to goals of care. Per ACP, patient may have dyspnea and was ordered for Morphine 1mg, dose not administered yet.    Recommendations  - Please continue ongoing goals of care with patient.   - Can try IV Morphine 1mg for dyspnea and reassess patient afterwards. If dose is ineffective for symptom relief, can try IV Morphine 2mg.     Discussed with Dr. Clifford      In the event of newly developing, evolving, or worsening symptoms, please contact the Palliative Medicine team via pager (if the patient is at [facility] #[phone] or if the patient is at [facility] #[phone]) The Geriatric and Palliative Medicine service has coverage 24 hours a day/ 7 days a week to provide medical recommendations regarding symptom management needs via telephone

## 2021-01-17 NOTE — PROVIDER CONTACT NOTE (OTHER) - SITUATION
Pt. A&ox4. Pt. removed straps that secure his bipap a couple of times throughout the night. Pt. was educated on the reason for keeping the the bipap inplace. Due to this pt, desat 58% to77%, Pt. A&ox4. Pt. removed straps that secure his bipap a couple of times throughout the night. Pt. was educated on the reason for keeping the the bipap inplace. Due to this pt, desat once at 58%&77%.,

## 2021-01-17 NOTE — PROGRESS NOTE ADULT - ASSESSMENT
&76y/o M pmxh COPD, asthma, HTN, tracheobroncheomalacia, gout, arthritis, admitted with hypoxia from covid 19 and influenza     LUDA   Pt admitted with elevated SCr  2.3  Baseline Scr 0.8 in 11/ 2019  LUDA in the setting of infection   Scr improved  s/p IVF   LUDA likely pre-renal   Pending BMP today  UA with out protein or blood  avoid nephrotoxins     HTN   BP controlled   Monitor BP    Covid 19+   on steriods  Folow up Pulm  &remdesivir

## 2021-01-17 NOTE — PROGRESS NOTE ADULT - ASSESSMENT
Assessment and Plan    74y/o M pmxh COPD, asthma, HTN, tracheobroncheomalacia, gout, arthritis, presenting with shortness of breath and low oxygen from the [facility]    EKG: SR no ischemic changes no ischemic changes     1) COVID 19 PNA:   -likely cause of SOB   -c.w remdesivir and dexa   -monitor o2 levels   -f/u ID and pulm recs   -on BIPAP  -ddimer elevated, on lovenox BID    2) HTN   -controlled  -continue with amlodipine     3) DVT PPX   -lovenox subq

## 2021-01-17 NOTE — PROGRESS NOTE ADULT - SUBJECTIVE AND OBJECTIVE BOX
Patient is a 75y old  Male who presents with a chief complaint of sob (17 Jan 2021 12:58)    Date of servie : 01-17-21 @ 16:01  INTERVAL HPI/OVERNIGHT EVENTS:  T(C): 36.6 (01-17-21 @ 13:17), Max: 37 (01-17-21 @ 05:43)  HR: 91 (01-17-21 @ 13:17) (69 - 108)  BP: 128/72 (01-17-21 @ 13:17) (119/74 - 148/93)  RR: 20 (01-17-21 @ 13:17) (20 - 22)  SpO2: 92% (01-17-21 @ 13:17) (88% - 99%)  Wt(kg): --  I&O's Summary    16 Jan 2021 07:01  -  17 Jan 2021 07:00  --------------------------------------------------------  IN: 360 mL / OUT: 1940 mL / NET: -1580 mL    17 Jan 2021 07:01  -  17 Jan 2021 16:01  --------------------------------------------------------  IN: 360 mL / OUT: 700 mL / NET: -340 mL        LABS:                        12.8   15.68 )-----------( 364      ( 17 Jan 2021 09:45 )             38.8     01-17    x   |  x   |  x   ----------------------------<  x   x    |  x   |  0.73    Ca    9.3      16 Jan 2021 09:15    TPro  6.8  /  Alb  2.8<L>  /  TBili  1.3<H>  /  DBili  0.8<H>  /  AST  81<H>  /  ALT  206<H>  /  AlkPhos  387<H>  01-17        CAPILLARY BLOOD GLUCOSE                MEDICATIONS  (STANDING):  allopurinol 150 milliGRAM(s) Oral daily  amitriptyline 10 milliGRAM(s) Oral three times a day  amLODIPine   Tablet 5 milliGRAM(s) Oral daily  aspirin  chewable 81 milliGRAM(s) Oral daily  atorvastatin 10 milliGRAM(s) Oral at bedtime  dexAMETHasone     Tablet 6 milliGRAM(s) Oral daily  DULoxetine 60 milliGRAM(s) Oral daily  enoxaparin Injectable 90 milliGRAM(s) SubCutaneous two times a day  montelukast 10 milliGRAM(s) Oral daily    MEDICATIONS  (PRN):  ALBUTerol    90 MICROgram(s) HFA Inhaler 2 Puff(s) Inhalation every 6 hours PRN Bronchospasm  diazepam    Tablet 5 milliGRAM(s) Oral four times a day PRN anxiety          PHYSICAL EXAM:  GENERAL: frail  CHEST/LUNG: Clear to percussion bilaterally; No rales, rhonchi, wheezing, or rubs  HEART: Regular rate and rhythm; No murmurs, rubs, or gallops  ABDOMEN: Soft, Nontender, Nondistended; Bowel sounds present  EXTREMITIES:  edema +    Care Discussed with Consultants/Other Providers [ ] YES  [ ] NO

## 2021-01-17 NOTE — PROVIDER CONTACT NOTE (OTHER) - BACKGROUND
When pt. on Bipap, O2 sat 96% When pt. on Bipap, O2 sat 96%. Pt. on valium 5mg prn for anxiety. When pt. on Bipap, O2 sat goes up to 96%. Pt. on valium 5mg prn for anxiety.

## 2021-01-18 NOTE — PROGRESS NOTE ADULT - SUBJECTIVE AND OBJECTIVE BOX
Patient is a 75y old  Male who presents with a chief complaint of sob (18 Jan 2021 16:51)    Date of servie : 01-18-21 @ 18:28  INTERVAL HPI/OVERNIGHT EVENTS:  T(C): 36.3 (01-18-21 @ 16:58), Max: 38.8 (01-18-21 @ 06:27)  HR: 68 (01-18-21 @ 16:58) (68 - 106)  BP: 117/71 (01-18-21 @ 16:58) (110/68 - 138/78)  RR: 20 (01-18-21 @ 16:58) (20 - 29)  SpO2: 97% (01-18-21 @ 16:58) (88% - 97%)  Wt(kg): --  I&O's Summary    17 Jan 2021 07:01  -  18 Jan 2021 07:00  --------------------------------------------------------  IN: 360 mL / OUT: 1500 mL / NET: -1140 mL        LABS:                        12.0   12.77 )-----------( 357      ( 18 Jan 2021 07:22 )             35.8     01-18    136  |  98  |  24<H>  ----------------------------<  129<H>  4.5   |  27  |  0.69    Ca    9.0      18 Jan 2021 07:22    TPro  6.6  /  Alb  2.7<L>  /  TBili  1.2  /  DBili  0.7<H>  /  AST  38  /  ALT  134<H>  /  AlkPhos  317<H>  01-18        CAPILLARY BLOOD GLUCOSE                MEDICATIONS  (STANDING):  allopurinol 150 milliGRAM(s) Oral daily  amitriptyline 10 milliGRAM(s) Oral three times a day  amLODIPine   Tablet 5 milliGRAM(s) Oral daily  aspirin  chewable 81 milliGRAM(s) Oral daily  atorvastatin 10 milliGRAM(s) Oral at bedtime  dexAMETHasone     Tablet 6 milliGRAM(s) Oral daily  DULoxetine 60 milliGRAM(s) Oral daily  enoxaparin Injectable 90 milliGRAM(s) SubCutaneous two times a day  montelukast 10 milliGRAM(s) Oral daily    MEDICATIONS  (PRN):  ALBUTerol    90 MICROgram(s) HFA Inhaler 2 Puff(s) Inhalation every 6 hours PRN Bronchospasm  diazepam    Tablet 5 milliGRAM(s) Oral four times a day PRN anxiety          PHYSICAL EXAM:  GENERAL: NAD, well-groomed, well-developed  HEAD:  Atraumatic, Normocephalic  CHEST/LUNG: Clear to percussion bilaterally; No rales, rhonchi, wheezing, or rubs  HEART: Regular rate and rhythm; No murmurs, rubs, or gallops  ABDOMEN: Soft, Nontender, Nondistended; Bowel sounds present  EXTREMITIES:  2edema +    Care Discussed with Consultants/Other Providers [x ] YES  [ ] NO
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
## 2021-01-18 NOTE — PROGRESS NOTE ADULT - ASSESSMENT
Assessment and Plan    76y/o M pmxh COPD, asthma, HTN, tracheobroncheomalacia, gout, arthritis, presenting with shortness of breath and low oxygen from the [facility] infusion center    EKG: SR no ischemic changes no ischemic changes     1) COVID 19 PNA:   -likely cause of SOB   -c.w remdesivir and dexa   -monitor o2 levels   -f/u ID and pulm recs   -on BIPAP  -ddimer elevated, on lovenox BID    2) HTN   -controlled  -continue with amlodipine     3) DVT PPX   -lovenox subq

## 2021-01-18 NOTE — CHART NOTE - NSCHARTNOTEFT_GEN_A_CORE
Called by bedside RN.  States patient had temp of 101.8 axillary.  Patient seen and examined at bedside.  Patient denies chills, cough, nausea, vomiting, abdominal distention.  States breathing feels a little better. Currently patient is on Bipap.     Neuro: A&Ox3  Resp: CTAB, tachypneic   CV: S1S2, RRR    # Fever likely 2/2 COVID  > Ofirmev and cooling measures for pyrexia   > Blood cultures x 2  > Urgent CXR  > Monitor vital signs  > F/u am labs     Will endorse to primary team in am.  Attending to follow.    Man Elizabeth NP  ACP Providers  Spectra # [phone]

## 2021-01-18 NOTE — PROGRESS NOTE ADULT - SUBJECTIVE AND OBJECTIVE BOX
Infectious Diseases progress note:    Subjective: Coverage appreciated, pt with fever this Am 101.  Pt remains on dexamethasone.   On therapeutic lovenox, pt on bipap.     ROS:  CONSTITUTIONAL:  No fever, chills, rigors  CARDIOVASCULAR:  No chest pain or palpitations  RESPIRATORY:   + sob  GASTROINTESTINAL:  No abd pain, N/V, diarrhea/constipation  EXTREMITIES:  No swelling or joint pain  GENITOURINARY:  No burning on urination, increased frequency or urgency.  No flank pain  NEUROLOGIC:  No HA, visual disturbances  SKIN: No rashes    Allergies    beta-lactam antibiotics (Swelling)  Keflex (Anaphylaxis)  nitrates causes vertigo per pt (Nausea)  penicillin (Anaphylaxis)  Versed (Nausea)    Intolerances        ANTIBIOTICS/RELEVANT:  antimicrobials    immunologic:    OTHER:  ALBUTerol    90 MICROgram(s) HFA Inhaler 2 Puff(s) Inhalation every 6 hours PRN  allopurinol 150 milliGRAM(s) Oral daily  amitriptyline 10 milliGRAM(s) Oral three times a day  amLODIPine   Tablet 5 milliGRAM(s) Oral daily  aspirin  chewable 81 milliGRAM(s) Oral daily  atorvastatin 10 milliGRAM(s) Oral at bedtime  dexAMETHasone     Tablet 6 milliGRAM(s) Oral daily  diazepam    Tablet 5 milliGRAM(s) Oral four times a day PRN  DULoxetine 60 milliGRAM(s) Oral daily  enoxaparin Injectable 90 milliGRAM(s) SubCutaneous two times a day  montelukast 10 milliGRAM(s) Oral daily      Objective:  Vital Signs Last 24 Hrs  T(C): 36.4 (18 Jan 2021 13:15), Max: 38.8 (18 Jan 2021 06:27)  T(F): 97.6 (18 Jan 2021 13:15), Max: 101.8 (18 Jan 2021 06:27)  HR: 72 (18 Jan 2021 16:47) (72 - 106)  BP: 126/72 (18 Jan 2021 13:15) (110/68 - 138/78)  BP(mean): --  RR: 20 (18 Jan 2021 13:15) (20 - 29)  SpO2: 96% (18 Jan 2021 16:47) (88% - 96%)    PHYSICAL EXAM:  Constitutional:NAD  Eyes:KARENA, EOMI  Ear/Nose/Throat: no thrush, mucositis.  Moist mucous membranes	  Neck:no JVD, no lymphadenopathy, supple  Respiratory: on bipap  Cardiovascular: S1S2 RRR, no murmurs  Gastrointestinal:soft, nontender,  nondistended (+) BS  Extremities:no e/e/c  Skin:  no rashes, open wounds or ulcerations        LABS:                        12.0   12.77 )-----------( 357      ( 18 Jan 2021 07:22 )             35.8     01-18    136  |  98  |  24<H>  ----------------------------<  129<H>  4.5   |  27  |  0.69    Ca    9.0      18 Jan 2021 07:22    TPro  6.6  /  Alb  2.7<L>  /  TBili  1.2  /  DBili  0.7<H>  /  AST  38  /  ALT  134<H>  /  AlkPhos  317<H>  01-18          Procalcitonin, Serum: 1.56 (01-18 @ 07:22)  Procalcitonin, Serum: 0.20 (01-14 @ 07:10)  Procalcitonin, Serum: 0.86 (01-11 @ 16:26)            Rapid RVP Result: Detected          MICROBIOLOGY:      Culture - Blood (01.12.21 @ 00:26)   Specimen Source: .Blood Blood   Culture Results:   No Growth Final       Culture - Blood (01.12.21 @ 00:26)   Specimen Source: .Blood Blood   Culture Results:   No Growth Final   RADIOLOGY & ADDITIONAL STUDIES:    < from: Xray Chest 1 View- PORTABLE-Urgent (Xray Chest 1 View- PORTABLE-Urgent .) (01.18.21 @ 09:16) >    EXAM:  XR CHEST PORTABLE URGENT 1V                            PROCEDURE DATE:  01/18/2021            INTERPRETATION:  TIME OF EXAM: January 18, 2021 at 8:46 AM    CLINICAL INFORMATION: Covid positive; fever.    TECHNIQUE:   Portable chest    INTERPRETATION:    Persistent bilateral perihilar and peripheral opacities consistent with severe multifocal covid 19 pneumonia. No effusions or pneumothorax.      COMPARISON:  January 14 with no improvement.      IMPRESSION:  Follow-up multifocal covid 19 pneumonia.    < end of copied text >

## 2021-01-18 NOTE — PROGRESS NOTE ADULT - SUBJECTIVE AND OBJECTIVE BOX
[facility]   MD EDENILSON GRAF MD RUORU WONG, PA    TEL:  OFFICE: [phone]  DR SENIOR CELL: [phone]  CASSY GARNER CELL: [phone]  DR. CANNON CELL: [phone]  DR. LAZCANO CELL: [phone]    FROM 5 PM - 7 AM PLEASE CALL ANSWERING SERVICE: [phone]    RENAL FOLLOW UP NOTE--Date of Service 01-18-21 @ 09:57  --------------------------------------------------------------------------------  HPI:      Pt covid 19+    PAST HISTORY  --------------------------------------------------------------------------------  No significant changes to PMH, PSH, FHx, SHx, unless otherwise noted    ALLERGIES & MEDICATIONS  --------------------------------------------------------------------------------  Allergies    beta-lactam antibiotics (Swelling)  Keflex (Anaphylaxis)  nitrates causes vertigo per pt (Nausea)  penicillin (Anaphylaxis)  Versed (Nausea)    Intolerances      Standing Inpatient Medications  allopurinol 150 milliGRAM(s) Oral daily  amitriptyline 10 milliGRAM(s) Oral three times a day  amLODIPine   Tablet 5 milliGRAM(s) Oral daily  aspirin  chewable 81 milliGRAM(s) Oral daily  atorvastatin 10 milliGRAM(s) Oral at bedtime  dexAMETHasone     Tablet 6 milliGRAM(s) Oral daily  DULoxetine 60 milliGRAM(s) Oral daily  enoxaparin Injectable 90 milliGRAM(s) SubCutaneous two times a day  montelukast 10 milliGRAM(s) Oral daily  morphine  - Injectable 2 milliGRAM(s) IV Push once    PRN Inpatient Medications  ALBUTerol    90 MICROgram(s) HFA Inhaler 2 Puff(s) Inhalation every 6 hours PRN  diazepam    Tablet 5 milliGRAM(s) Oral four times a day PRN      REVIEW OF SYSTEMS  --------------------------------------------------------------------------------  General: + fever    MSK: no edema     VITALS/PHYSICAL EXAM  --------------------------------------------------------------------------------  T(C): 37.5 (01-18-21 @ 09:00), Max: 38.8 (01-18-21 @ 06:27)  HR: 96 (01-18-21 @ 09:00) (70 - 106)  BP: 110/68 (01-18-21 @ 09:00) (110/68 - 138/78)  RR: 20 (01-18-21 @ 09:00) (20 - 23)  SpO2: 90% (01-18-21 @ 09:00) (90% - 97%)  Wt(kg): --        01-17-21 @ 07:01  -  01-18-21 @ 07:00  --------------------------------------------------------  IN: 360 mL / OUT: 1500 mL / NET: -1140 mL      P  LABS/STUDIES  --------------------------------------------------------------------------------              12.0   12.77 >-----------<  357      [01-18-21 @ 07:22]              35.8     136  |  98  |  24  ----------------------------<  129      [01-18-21 @ 07:22]  4.5   |  27  |  0.69        Ca     9.0     [01-18-21 @ 07:22]    TPro  6.6  /  Alb  2.7  /  TBili  1.2  /  DBili  0.7  /  AST  38  /  ALT  134  /  AlkPhos  317  [01-18-21 @ 07:22]          Creatinine Trend:  SCr 0.69 [01-18 @ 07:22]  SCr 0.73 [01-17 @ 09:46]  SCr 0.80 [01-16 @ 09:15]  SCr 0.61 [01-15 @ 08:05]  SCr 0.69 [01-14 @ 07:10]        Ferritin 4278      [01-16-21 @ 16:42]  HbA1c 5.2      [04-26-18 @ 17:35]

## 2021-01-18 NOTE — CONSULT NOTE ADULT - PROBLEM SELECTOR RECOMMENDATION 9
Inferred by primary team  On BIPAP  decreased Sa02  While on HiFlo and eating  Reports of tachypnea  Discussed with primary team regarding assessment pre and post opioids    In the event of newly developing, evolving, or worsening symptoms, please contact the Palliative Medicine team via pager (if the patient is at [facility] #[phone] or if the patient is at [facility] #[phone]) The Geriatric and Palliative Medicine service has coverage 24 hours a day/ 7 days a week to provide medical recommendations regarding symptom management needs via telephone

## 2021-01-18 NOTE — PROGRESS NOTE ADULT - ASSESSMENT
Problem/Plan - 1:  ·  Problem: COVID-19.  Plan: cw decadron  ID FU  pulmonary fu appreciated     Problem/Plan - 2:  ·  Problem: COPD (chronic obstructive pulmonary disease).  Plan: cw albuterol  pulmonary fu     Problem/Plan - 3:·  Problem: Depression.  Plan: cw home meds.     Problem/Plan - 4:  ·  Problem: DVT   Plan: on full dose AC    Prognosis guarded  palliative following
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
## 2021-01-18 NOTE — PROGRESS NOTE ADULT - ASSESSMENT
76y/o M pmxh COPD, asthma, HTN, tracheobroncheomalacia, gout, arthritis, presenting with shortness of breath and low oxygen from the [facility] infusion center. diagnosed with COVID PCR+ on 1/2, onset of symptoms 1/2   was referred for MAB infusion today by his pulmonologist Dr. Lance, and  Sats 95% on 6L nasal cannula.  Pt with reported covid pcr (+) at [facility] on 1/8. Adm 1/11     Covid (+):    - Pt not a candidate for MAB therapy due to hypoxia and timing .  On  remdesivir x 5 day course last day 1/17.  Monitor daily renal function and LFTs.    continue LMWH, steroids, follow for any role of further immune modulation    1/17 last day of remdesivir, remains on Bipap.    1/18 - pt with fever this AM.  Blood cultures sent.  pt c/o cough, sob.   Send sputum cx.  pct and other inflammatory markers remain elevated.  Repeat bcx with multifocal infiltrates.  Will cont to f/u results and reassess need to escalate therapy. Cont therapeutic lovenox bid.    Allyson Downs  [phone]

## 2021-01-18 NOTE — CHART NOTE - NSCHARTNOTEFT_GEN_A_CORE
Informed by vascular tech of prelim findings of DVT in Right soleal vein.     Patient has been on full dose anticoagulation since 1/17 given uptrending D-dimer to >8800 and worsening respiratory status.    Awaiting final read.    c/w full dose lovenox.  Monitor kidney functions    Dr. Stringer informed.    Gerry Guadarrama RPA-C  Dept of Medicine   [id number]

## 2021-01-18 NOTE — CONSULT NOTE ADULT - CONSULT REASON
sob, recent covid
COVID-19
SOB
LUDA
consulted for assistance with medical decision making in the context of a patient with COVID

## 2021-01-18 NOTE — PROGRESS NOTE ADULT - ASSESSMENT
&74y/o M pmxh COPD, asthma, HTN, tracheobroncheomalacia, gout, arthritis, admitted with hypoxia from covid 19 and influenza     LUDA   Pt admitted with elevated SCr  2.3  Baseline Scr 0.8 in 11/ 2019  LUDA in the setting of infection   Scr improved  s/p IVF   LUDA likely pre-renal   Improved  UA with out protein or blood  avoid nephrotoxins     HTN   BP controlled   Monitor BP    Covid 19+   on steriods  Folow up Pulm  &remdesivir

## 2021-01-19 NOTE — PROGRESS NOTE ADULT - ASSESSMENT
74 y/o M w/severe persistent asthma recently diagnosed with COVID-19 now admitted for hypoxemia in setting of COVID-19 PNA.    - Wean supplemental O2 as tolerated  - Continue decadron  - Bronchodilators, acapella  - Supportive care  *******************  1/13- no significant changes in status  1/14-more comfortable today, anxiolytics ok here  1/15-on bipap, more comfortable  1/19-completed remdisivi 1/17, on dex 6mg until 1/22; ID f/up

## 2021-01-19 NOTE — PROGRESS NOTE ADULT - SUBJECTIVE AND OBJECTIVE BOX
Infectious Diseases progress note:    Subjective: LE doppler (+) DVT in rt calf.  Pt on full dose lovenox since 1/17 coinciding with uptrending of d-dimer    ROS:  CONSTITUTIONAL:  No fever, chills, rigors  CARDIOVASCULAR:  No chest pain or palpitations  RESPIRATORY:   + sob  GASTROINTESTINAL:  No abd pain, N/V, diarrhea/constipation  EXTREMITIES:  No swelling or joint pain  GENITOURINARY:  No burning on urination, increased frequency or urgency.  No flank pain  NEUROLOGIC:  No HA, visual disturbances  SKIN: No rashes    Allergies    beta-lactam antibiotics (Swelling)  Keflex (Anaphylaxis)  nitrates causes vertigo per pt (Nausea)  penicillin (Anaphylaxis)  Versed (Nausea)    Intolerances        ANTIBIOTICS/RELEVANT:  antimicrobials    immunologic:    OTHER:  ALBUTerol    90 MICROgram(s) HFA Inhaler 2 Puff(s) Inhalation every 6 hours PRN  allopurinol 150 milliGRAM(s) Oral daily  amitriptyline 10 milliGRAM(s) Oral three times a day  amLODIPine   Tablet 5 milliGRAM(s) Oral daily  aspirin  chewable 81 milliGRAM(s) Oral daily  atorvastatin 10 milliGRAM(s) Oral at bedtime  dexAMETHasone     Tablet 6 milliGRAM(s) Oral daily  diazepam    Tablet 5 milliGRAM(s) Oral four times a day PRN  DULoxetine 60 milliGRAM(s) Oral daily  enoxaparin Injectable 90 milliGRAM(s) SubCutaneous two times a day  montelukast 10 milliGRAM(s) Oral daily  pantoprazole    Tablet 40 milliGRAM(s) Oral daily      Objective:  Vital Signs Last 24 Hrs  T(C): 36.8 (19 Jan 2021 09:23), Max: 37.8 (19 Jan 2021 05:31)  T(F): 98.2 (19 Jan 2021 09:23), Max: 100.1 (19 Jan 2021 05:31)  HR: 103 (19 Jan 2021 09:33) (57 - 103)  BP: 111/73 (19 Jan 2021 09:23) (111/73 - 139/86)  BP(mean): --  RR: 20 (19 Jan 2021 11:27) (20 - 29)  SpO2: 92% (19 Jan 2021 11:27) (91% - 97%)    PHYSICAL EXAM:  Constitutional:NAD  Eyes:KARENA, EOMI  Ear/Nose/Throat: no thrush, mucositis.  Moist mucous membranes	  Neck:no JVD, no lymphadenopathy, supple  Respiratory: on bipap, coarse bs b/l  Cardiovascular: S1S2 RRR, no murmurs  Gastrointestinal:soft, nontender,  nondistended (+) BS  Extremities:no e/e/c  Skin:  no rashes, open wounds or ulcerations        LABS:                        13.2   12.77 )-----------( 281      ( 19 Jan 2021 09:32 )             39.2     01-19    132<L>  |  95<L>  |  27<H>  ----------------------------<  123<H>  4.7   |  25  |  0.68    Ca    8.7      19 Jan 2021 09:31    TPro  6.7  /  Alb  2.5<L>  /  TBili  1.0  /  DBili  <0.1  /  AST  63<H>  /  ALT  126<H>  /  AlkPhos  317<H>  01-19          Procalcitonin, Serum: 1.56 (01-18 @ 07:22)  Procalcitonin, Serum: 0.20 (01-14 @ 07:10)  Procalcitonin, Serum: 0.86 (01-11 @ 16:26)            Rapid RVP Result: Detected          MICROBIOLOGY:    Culture - Blood (01.18.21 @ 10:27)   Specimen Source: .Blood Blood-Peripheral   Culture Results:   No growth to date.           RADIOLOGY & ADDITIONAL STUDIES:    < from: VA Duplex Lower Ext Vein Scan, Bilat (01.18.21 @ 15:47) >  There is right soleal vein deep vein thrombosis. The rest of the visualized calf veins are unremarkable.    IMPRESSION:  Right calf deep vein thrombosis.  Acute deep venous thrombosis: below the knee.    < end of copied text >

## 2021-01-19 NOTE — PROGRESS NOTE ADULT - ATTENDING COMMENTS
as above-slighlty better-on bipap 100% at present and anxiolytics  multifactorial resp failure-severe persistent asthma, TBM, debility, CAD, covid 19 pneumonitis--O2 keep sat above 90%--reduce hiflo as able/bipap  severe persistent asthma-symbicort 160, spiriva 1 q day, singulair 10, out pt xolair  TBM-s/p surgery-acapella, incentive spirometry, amitriptyline   covid19 pna--dexameth 6mg D7/10 and completed 5 days of remdisivir, zn, melatonin, vit d//c; f/up mediators-trops, crp, ddimer, feritin etc.  ID f/up-elev temp/wbc-await cx                                            CV mult meds  DVT prophylaxis-lovenox rx     OOB     GI pepcid prophylaxis        PT-OOB as able    prog guarded  Clay Lance MD-Pulmonary   [phone]

## 2021-01-19 NOTE — PROGRESS NOTE ADULT - ASSESSMENT
76y/o M pmxh COPD, asthma, HTN, tracheobroncheomalacia, gout, arthritis, presenting with shortness of breath and low oxygen from the [facility] infusion center. diagnosed with COVID PCR+ on 1/2, onset of symptoms 1/2   was referred for MAB infusion today by his pulmonologist Dr. Lance, and  Sats 95% on 6L nasal cannula.  Pt with reported covid pcr (+) at [facility] on 1/8. Adm 1/11     Covid (+):    - Pt not a candidate for MAB therapy due to hypoxia and timing .  On  remdesivir x 5 day course last day 1/17.  Monitor daily renal function and LFTs.    continue LMWH, steroids, follow for any role of further immune modulation    1/17 last day of remdesivir, remains on Bipap.    1/18 - pt with fever this AM.  Blood cultures sent.  pt c/o cough, sob.   Send sputum cx.  pct and other inflammatory markers remain elevated.  Repeat bcx with multifocal infiltrates.  Will cont to f/u results and reassess need to escalate therapy. Cont therapeutic lovenox bid.  1/19 - bcx ngtd.  tmax 100.1.  (+) RLE dvt, pt remains on full dose lovenox.  s/p completion of remdesivir. Cont dexamethasone.     Allyson Downs  [phone]

## 2021-01-19 NOTE — PROGRESS NOTE ADULT - SUBJECTIVE AND OBJECTIVE BOX
[facility]   MD David Montalvo MD, D.O. Ruoru Wong, PA    From 7 AM - 5 PM:  OFFICE: [phone]  Dr. Moore cell: [phone]  Dr. Tijerina cell: [phone]  Dr. Claros cell: [phone]  ISRAEL Le cell: [phone]    From 5 PM - 7 AM: Answering Service: [phone]  Date of service: 01-19-21 @ 15:19    RENAL FOLLOW UP NOTE  --------------------------------------------------------------------------------  HPI:  Pt Covid +     PAST HISTORY  --------------------------------------------------------------------------------  No significant changes to PMH, PSH, FHx, SHx, unless otherwise noted    ALLERGIES & MEDICATIONS  --------------------------------------------------------------------------------  Allergies    beta-lactam antibiotics (Swelling)  Keflex (Anaphylaxis)  nitrates causes vertigo per pt (Nausea)  penicillin (Anaphylaxis)  Versed (Nausea)    Intolerances      Standing Inpatient Medications  allopurinol 150 milliGRAM(s) Oral daily  amitriptyline 10 milliGRAM(s) Oral three times a day  amLODIPine   Tablet 5 milliGRAM(s) Oral daily  aspirin  chewable 81 milliGRAM(s) Oral daily  atorvastatin 10 milliGRAM(s) Oral at bedtime  dexAMETHasone     Tablet 6 milliGRAM(s) Oral daily  DULoxetine 60 milliGRAM(s) Oral daily  enoxaparin Injectable 90 milliGRAM(s) SubCutaneous two times a day  montelukast 10 milliGRAM(s) Oral daily  pantoprazole    Tablet 40 milliGRAM(s) Oral daily    PRN Inpatient Medications  ALBUTerol    90 MICROgram(s) HFA Inhaler 2 Puff(s) Inhalation every 6 hours PRN  diazepam    Tablet 5 milliGRAM(s) Oral four times a day PRN      REVIEW OF SYSTEMS  --------------------------------------------------------------------------------  unable to obtain     VITALS/PHYSICAL EXAM  --------------------------------------------------------------------------------  T(C): 36.9 (01-19-21 @ 13:00), Max: 37.8 (01-19-21 @ 05:31)  HR: 99 (01-19-21 @ 13:00) (57 - 103)  BP: 129/76 (01-19-21 @ 13:00) (111/73 - 139/86)  RR: 22 (01-19-21 @ 13:00) (20 - 29)  SpO2: 90% (01-19-21 @ 13:00) (90% - 97%)  Wt(kg): --    01-18-21 @ 07:01  -  01-19-21 @ 07:00  --------------------------------------------------------  IN: 0 mL / OUT: 250 mL / NET: -250 mL    01-19-21 @ 07:01  -  01-19-21 @ 15:19  --------------------------------------------------------  IN: 100 mL / OUT: 0 mL / NET: 100 mL    LABS/STUDIES  --------------------------------------------------------------------------------              13.2   12.77 >-----------<  281      [01-19-21 @ 09:32]              39.2     132  |  95  |  27  ----------------------------<  123      [01-19-21 @ 09:31]  4.7   |  25  |  0.68        Ca     8.7     [01-19-21 @ 09:31]    TPro  6.7  /  Alb  2.5  /  TBili  1.0  /  DBili  <0.1  /  AST  63  /  ALT  126  /  AlkPhos  317  [01-19-21 @ 09:31]    Creatinine Trend:  SCr 0.68 [01-19 @ 09:31]  SCr 0.69 [01-18 @ 07:22]  SCr 0.73 [01-17 @ 09:46]  SCr 0.80 [01-16 @ 09:15]  SCr 0.61 [01-15 @ 08:05]    Ferritin 3645      [01-18-21 @ 09:53]  HbA1c 5.2      [04-26-18 @ 17:35]

## 2021-01-19 NOTE — PROGRESS NOTE ADULT - ASSESSMENT
&74y/o M pmxh COPD, asthma, HTN, tracheobroncheomalacia, gout, arthritis, admitted with hypoxia from covid 19 and influenza     LUDA   Pt admitted with elevated SCr  2.3  Baseline Scr 0.8 in 11/ 2019  LUDA in the setting of infection   Scr improved  s/p IVF   LUDA likely pre-renal   Improved  UA without protein or blood  avoid nephrotoxins     HTN   BP controlled   Monitor BP    Covid 19+   on steriods &remdesivir   Follow up Pulm

## 2021-01-19 NOTE — PROGRESS NOTE ADULT - SUBJECTIVE AND OBJECTIVE BOX
CHIEF COMPLAINT: f/up sob, resp failure, severe persistent asthma, TBM, osas, covid 19 pneumonitis-slighlty less sob, cough but very weak and achy    Interval Events: dex in progress/remdisivir-completed, bipap but has not gotten oob    REVIEW OF SYSTEMS:  Constitutional: No fevers or chills. No weight loss. + fatigue or generalized malaise.  Eyes: No itching or discharge from the eyes  ENT: No ear pain. No ear discharge. No nasal congestion. No post nasal drip. No epistaxis. No throat pain. No sore throat. No difficulty swallowing.   CV: No chest pain. No palpitations. No lightheadedness or dizziness.   Resp: No dyspnea at rest. + dyspnea on exertion. No orthopnea. No wheezing. No cough. No stridor. No sputum production. No chest pain with respiration.  GI: No nausea. No vomiting. No diarrhea.  MSK: No joint pain or pain in any extremities  Integumentary: No skin lesions. No pedal edema.  Neurological: + gross motor weakness. No sensory changes.  [+ ] All other systems negative  [ ] Unable to assess ROS because ________    OBJECTIVE:  ICU Vital Signs Last 24 Hrs  T(C): 36.4 (19 Jan 2021 00:16), Max: 38.8 (18 Jan 2021 06:27)  T(F): 97.5 (19 Jan 2021 00:16), Max: 101.8 (18 Jan 2021 06:27)  HR: 86 (19 Jan 2021 00:16) (68 - 106)  BP: 126/80 (19 Jan 2021 00:16) (110/68 - 139/86)  BP(mean): --  ABP: --  ABP(mean): --  RR: 20 (19 Jan 2021 00:16) (20 - 29)  SpO2: 95% (19 Jan 2021 00:16) (88% - 97%)        01-17 @ 07:01  -  01-18 @ 07:00  --------------------------------------------------------  IN: 360 mL / OUT: 1500 mL / NET: -1140 mL      CAPILLARY BLOOD GLUCOSE          PHYSICAL EXAM: NAD in bed on bipap  General: Awake, alert, oriented X 3.   HEENT: Atraumatic, normocephalic.                 Mallampatti Grade 3                No nasal congestion.                No tonsillar or pharyngeal exudates.  Lymph Nodes: No palpable lymphadenopathy  Neck: No JVD. No carotid bruit.   Respiratory: Normal chest expansion                         Normal percussion                         Normal and equal air entry                         No wheeze, rhonchi but bibasilar rales.  Cardiovascular: S1 S2 normal. No murmurs, rubs or gallops.   Abdomen: Soft, non-tender, non-distended. No organomegaly. Normoactive bowel sounds.  Extremities: Warm to touch. Peripheral pulse palpable. No pedal edema.   Skin: No rashes or skin lesions  Neurological: Motor and sensory examination equal and normal in all four extremities.  Psychiatry: Appropriate mood and affect.    HOSPITAL MEDICATIONS:  MEDICATIONS  (STANDING):  acetaminophen   Tablet .. 650 milliGRAM(s) Oral once  allopurinol 150 milliGRAM(s) Oral daily  amitriptyline 10 milliGRAM(s) Oral three times a day  amLODIPine   Tablet 5 milliGRAM(s) Oral daily  aspirin  chewable 81 milliGRAM(s) Oral daily  atorvastatin 10 milliGRAM(s) Oral at bedtime  dexAMETHasone     Tablet 6 milliGRAM(s) Oral daily  DULoxetine 60 milliGRAM(s) Oral daily  enoxaparin Injectable 90 milliGRAM(s) SubCutaneous two times a day  montelukast 10 milliGRAM(s) Oral daily    MEDICATIONS  (PRN):  ALBUTerol    90 MICROgram(s) HFA Inhaler 2 Puff(s) Inhalation every 6 hours PRN Bronchospasm  diazepam    Tablet 5 milliGRAM(s) Oral four times a day PRN anxiety      LABS:                        12.0   12.77 )-----------( 357      ( 18 Jan 2021 07:22 )             35.8     01-18    136  |  98  |  24<H>  ----------------------------<  129<H>  4.5   |  27  |  0.69    Ca    9.0      18 Jan 2021 07:22    TPro  6.6  /  Alb  2.7<L>  /  TBili  1.2  /  DBili  0.7<H>  /  AST  38  /  ALT  134<H>  /  AlkPhos  317<H>  01-18              MICROBIOLOGY:     RADIOLOGY:  [ ] Reviewed and interpreted by me    Point of Care Ultrasound Findings:    PFT:    EKG:

## 2021-01-19 NOTE — PROGRESS NOTE ADULT - SUBJECTIVE AND OBJECTIVE BOX
Patient is a 75y old  Male who presents with a chief complaint of sob (19 Jan 2021 12:56)    Date of servie : 01-19-21 @ 15:06  INTERVAL HPI/OVERNIGHT EVENTS:  T(C): 36.9 (01-19-21 @ 13:00), Max: 37.8 (01-19-21 @ 05:31)  HR: 99 (01-19-21 @ 13:00) (57 - 103)  BP: 129/76 (01-19-21 @ 13:00) (111/73 - 139/86)  RR: 22 (01-19-21 @ 13:00) (20 - 29)  SpO2: 90% (01-19-21 @ 13:00) (90% - 97%)  Wt(kg): --  I&O's Summary    18 Jan 2021 07:01  -  19 Jan 2021 07:00  --------------------------------------------------------  IN: 0 mL / OUT: 250 mL / NET: -250 mL    19 Jan 2021 07:01  -  19 Jan 2021 15:06  --------------------------------------------------------  IN: 100 mL / OUT: 0 mL / NET: 100 mL        LABS:                        13.2   12.77 )-----------( 281      ( 19 Jan 2021 09:32 )             39.2     01-19    132<L>  |  95<L>  |  27<H>  ----------------------------<  123<H>  4.7   |  25  |  0.68    Ca    8.7      19 Jan 2021 09:31    TPro  6.7  /  Alb  2.5<L>  /  TBili  1.0  /  DBili  <0.1  /  AST  63<H>  /  ALT  126<H>  /  AlkPhos  317<H>  01-19        CAPILLARY BLOOD GLUCOSE                MEDICATIONS  (STANDING):  allopurinol 150 milliGRAM(s) Oral daily  amitriptyline 10 milliGRAM(s) Oral three times a day  amLODIPine   Tablet 5 milliGRAM(s) Oral daily  aspirin  chewable 81 milliGRAM(s) Oral daily  atorvastatin 10 milliGRAM(s) Oral at bedtime  dexAMETHasone     Tablet 6 milliGRAM(s) Oral daily  DULoxetine 60 milliGRAM(s) Oral daily  enoxaparin Injectable 90 milliGRAM(s) SubCutaneous two times a day  montelukast 10 milliGRAM(s) Oral daily  pantoprazole    Tablet 40 milliGRAM(s) Oral daily    MEDICATIONS  (PRN):  ALBUTerol    90 MICROgram(s) HFA Inhaler 2 Puff(s) Inhalation every 6 hours PRN Bronchospasm  diazepam    Tablet 5 milliGRAM(s) Oral four times a day PRN anxiety          PHYSICAL EXAM:  GENERAL: NAD, well-groomed, well-developed  HEAD:  Atraumatic, Normocephalic  CHEST/LUNG: Clear to percussion bilaterally; No rales, rhonchi, wheezing, or rubs  HEART: Regular rate and rhythm; No murmurs, rubs, or gallops  ABDOMEN: Soft, Nontender, Nondistended; Bowel sounds present  EXTREMITIES:  2+ Peripheral Pulses, No clubbing, cyanosis, or edema  LYMPH: No lymphadenopathy noted  SKIN: No rashes or lesions    Care Discussed with Consultants/Other Providers [ ] YES  [ ] NO

## 2021-01-19 NOTE — PROGRESS NOTE ADULT - ASSESSMENT
Problem/Plan - 1:  ·  Problem: COVID-19.  Plan: cw decadron  ID FU  pulmonary fu appreciated   remains on bipap    Problem/Plan - 2:  ·  Problem: COPD (chronic obstructive pulmonary disease).  Plan: cw albuterol  pulmonary fu     Problem/Plan - 3:·  Problem: Depression.  Plan: cw home meds.     Problem/Plan - 4:  ·  Problem: DVT   Plan: on full dose AC    Prognosis guarded  palliative following   pt doing poorly
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
## 2021-01-20 NOTE — PROGRESS NOTE ADULT - SUBJECTIVE AND OBJECTIVE BOX
[facility]   MD David Montalvo MD, D.O. Ruoru Wong, PA    From 7 AM - 5 PM:  OFFICE: [phone]  Dr. Moore cell: [phone]  Dr. Tijerina cell: [phone]  Dr. Claros cell: [phone]  ISRAEL Le cell: [phone]    From 5 PM - 7 AM: Answering Service: [phone]  Date of service: 01-20-21 @ 14:22    RENAL FOLLOW UP NOTE  --------------------------------------------------------------------------------  HPI:  Pt covid+    PAST HISTORY  --------------------------------------------------------------------------------  No significant changes to PMH, PSH, FHx, SHx, unless otherwise noted    ALLERGIES & MEDICATIONS  --------------------------------------------------------------------------------  Allergies    beta-lactam antibiotics (Swelling)  Keflex (Anaphylaxis)  nitrates causes vertigo per pt (Nausea)  penicillin (Anaphylaxis)  Versed (Nausea)    Intolerances      Standing Inpatient Medications  allopurinol 150 milliGRAM(s) Oral daily  amitriptyline 10 milliGRAM(s) Oral three times a day  amLODIPine   Tablet 5 milliGRAM(s) Oral daily  aspirin  chewable 81 milliGRAM(s) Oral daily  atorvastatin 10 milliGRAM(s) Oral at bedtime  dexAMETHasone     Tablet 6 milliGRAM(s) Oral daily  DULoxetine 60 milliGRAM(s) Oral daily  enoxaparin Injectable 90 milliGRAM(s) SubCutaneous two times a day  montelukast 10 milliGRAM(s) Oral daily  pantoprazole    Tablet 40 milliGRAM(s) Oral daily    PRN Inpatient Medications  ALBUTerol    90 MICROgram(s) HFA Inhaler 2 Puff(s) Inhalation every 6 hours PRN  diazepam    Tablet 5 milliGRAM(s) Oral four times a day PRN      REVIEW OF SYSTEMS  --------------------------------------------------------------------------------  unable to obtain     VITALS/PHYSICAL EXAM  --------------------------------------------------------------------------------  T(C): 36.8 (01-20-21 @ 12:46), Max: 39.6 (01-20-21 @ 05:06)  HR: 79 (01-20-21 @ 13:47) (61 - 116)  BP: 131/75 (01-20-21 @ 12:46) (113/71 - 137/90)  RR: 24 (01-20-21 @ 12:46) (20 - 40)  SpO2: 91% (01-20-21 @ 13:47) (81% - 99%)  Wt(kg): --        01-19-21 @ 07:01  -  01-20-21 @ 07:00  --------------------------------------------------------  IN: 660 mL / OUT: 500 mL / NET: 160 mL      LABS/STUDIES  --------------------------------------------------------------------------------              13.2   12.77 >-----------<  281      [01-19-21 @ 09:32]              39.2     x   |  x   |  x   ----------------------------<  x       [01-20-21 @ 11:22]  x    |  x   |  0.65        Ca     8.7     [01-19-21 @ 09:31]    TPro  6.6  /  Alb  2.2  /  TBili  1.2  /  DBili  0.5  /  AST  94  /  ALT  118  /  AlkPhos  324  [01-20-21 @ 11:22]    Creatinine Trend:  SCr 0.65 [01-20 @ 11:22]  SCr 0.68 [01-19 @ 09:31]  SCr 0.69 [01-18 @ 07:22]  SCr 0.73 [01-17 @ 09:46]  SCr 0.80 [01-16 @ 09:15]        Ferritin 3645      [01-18-21 @ 09:53]  HbA1c 5.2      [04-26-18 @ 17:35]

## 2021-01-20 NOTE — PROGRESS NOTE ADULT - ATTENDING COMMENTS
as above-slighlty better-on bipap 100% at present and anxiolytics  multifactorial resp failure-severe persistent asthma, TBM, debility, CAD, covid 19 pneumonitis--O2 keep sat above 90%--reduce hiflo as able/bipap  severe persistent asthma-symbicort 160, spiriva 1 q day, singulair 10, out pt xolair  TBM-s/p surgery-acapella, incentive spirometry, amitriptyline   covid19 pna--dexameth 6mg D7/10 and completed 5 days of remdisivir, zn, melatonin, vit d//c; f/up mediators-trops, crp, ddimer, feritin etc.  ID f/up-elev temp/wbc-await cx                                            CV mult meds  DVT prophylaxis-lovenox rx     OOB     GI pepcid prophylaxis        PT-OOB as able    prog guarded  Clay Lance MD-Pulmonary   [phone] as above-temps (w/up in progress)-on bipap 100% at present and anxiolytics  multifactorial resp failure-severe persistent asthma, TBM, debility, CAD, covid 19 pneumonitis--O2 keep sat above 90%--reduce hiflo as able/bipap  severe persistent asthma-symbicort 160, spiriva 1 q day, singulair 10, out pt xolair  TBM-s/p surgery-acapella, incentive spirometry, amitriptyline   covid19 pna--dexameth 6mg D7/10 and completed 5 days of remdisivir, zn, melatonin, vit d//c; f/up mediators-trops, crp, ddimer, feritin etc.  ID f/up-elev temp/wbc-await cx-? due to PE/DVT vs other--ABX as per ID (procalcitonin)                                           CV mult meds  DVT RLE-full dose lovenox rx     OOB     GI pepcid prophylaxis        PT-OOB as able    prog guarded  Clay Lance MD-Pulmonary   [phone]

## 2021-01-20 NOTE — PROGRESS NOTE ADULT - ASSESSMENT
Assessment and Plan    76y/o M pmxh COPD, asthma, HTN, tracheobroncheomalacia, gout, arthritis, presenting with shortness of breath and low oxygen from the [facility] infusion center    EKG: SR no ischemic changes no ischemic changes     1) COVID 19 PNA:   -likely cause of SOB   -c.w remdesivir and dexa   -monitor o2 levels   -f/u ID and pulm recs   -on BIPAP  -ddimer elevated, on lovenox BID  -LE dopplers with evidence of DVTs    2) HTN   -controlled  -continue with amlodipine     3) DVT  -LE dopplers with Right calf deep vein thrombosis & Acute deep venous thrombosis: below the knee.  -on therapeutic lovenox dosage now   Assessment and Plan    76y/o M pmxh COPD, asthma, HTN, tracheobroncheomalacia, gout, arthritis, presenting with shortness of breath and low oxygen from the [facility] infusion center    EKG: SR no ischemic changes no ischemic changes     1) COVID 19 PNA:   -likely cause of SOB   -c.w remdesivir and dexa   -monitor o2 levels   -f/u ID and pulm recs   -on BIPAP, lethargic today recommend ABG  -ddimer elevated, on lovenox BID  -LE dopplers with evidence of DVTs    2) HTN   -controlled  -continue with amlodipine     3) DVT  -LE dopplers with Right calf deep vein thrombosis & Acute deep venous thrombosis: below the knee.  -on therapeutic lovenox dosage now
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
## 2021-01-20 NOTE — CHART NOTE - NSCHARTNOTEFT_GEN_A_CORE
Patient had another RRT today in the setting of fevers (102.3), tachycardia (130s), and hypoxia (unreadable O2 sat on monitor). He was on BIPAP 10/5 at the time and had an ABG with PaO2 40.    Given the acute change in his vital signs I was called for assessment and discussion regarding possible tPA given that patient has a known DVT. Patient has been on therapeutic AC since 1/17. Surprisingly he has remained with stable BP (-115) during my time in the room.    Bedside US done:  ECHO: difficult parasternal views but grossly RVOT not enlarged, good AP4 chamber views with RV smaller than LV, no significant pericardial effusion, and VTI 12-14  LUNGS: Bilateral B-lines with irregular pleura, no pleural effusion  ABD: Left kidney without evidence of hydro. Bladder full    Horowitz catheter placed with good urine output      Patient confirmed DNR/DNI status. He was mentating well on BIPAP. His BIPAP settings were adjusted with improvement in O2 sats 88%. Patient is very ill and likely due to COVID-19 pneumonia, ARDS, and likely superimposed bacterial pneumonia. His fevers are of concern. He has been started on broad spectrum antibiotics.      FULL NOTE TO FOLLOW  NO TPA Patient had another RRT today in the setting of fevers (102.3), tachycardia (130s), and hypoxia (unreadable O2 sat on monitor). He was on BIPAP 10/5 at the time and had an ABG with PaO2 40.    Given the acute change in his vital signs I was called for assessment and discussion regarding possible tPA given that patient has a known DVT. Patient has been on therapeutic AC since 1/17. Surprisingly he has remained with stable BP (-115) during my time in the room.    Patient has been febrile for a while and antibiotics were ordered but have not yet been given. Patients symptoms of worsened hypoxia and tachypnea appear to correlate with his current fever. He has just been given tylenol and antibiotics are to be started. He was given Lasix during RRT and a connolly catheter was placed with good urine output.    Bedside US done:  ECHO: difficult parasternal views but grossly RVOT not enlarged, good AP4 chamber views with RV smaller than LV, no significant pericardial effusion, and VTI 12-14  LUNGS: Bilateral B-lines with irregular pleura, no pleural effusion  ABD: Left kidney without evidence of hydro. Bladder full    ABG - ( 20 Jan 2021 21:14 )  pH, Arterial: 7.43  pH, Blood: x     /  pCO2: 38    /  pO2: 40    / HCO3: 24    / Base Excess: .7    /  SaO2: 72        ICU Vital Signs Last 24 Hrs  T(C): 39.1 (20 Jan 2021 20:35), Max: 39.6 (20 Jan 2021 05:06)  T(F): 102.3 (20 Jan 2021 20:35), Max: 103.2 (20 Jan 2021 05:06)  HR: 91 (20 Jan 2021 18:23) (61 - 116)  BP: 130/79 (20 Jan 2021 16:08) (113/71 - 134/89)  RR: 18 (20 Jan 2021 16:08) (18 - 40)  SpO2: 90% (20 Jan 2021 18:23) (81% - 99%)    MEDICATIONS  (STANDING):  allopurinol 150 milliGRAM(s) Oral daily  amitriptyline 10 milliGRAM(s) Oral three times a day  amLODIPine   Tablet 5 milliGRAM(s) Oral daily  aspirin  chewable 81 milliGRAM(s) Oral daily  atorvastatin 10 milliGRAM(s) Oral at bedtime  aztreonam  IVPB 1000 milliGRAM(s) IV Intermittent every 8 hours  dexAMETHasone     Tablet 6 milliGRAM(s) Oral daily  DULoxetine 60 milliGRAM(s) Oral daily  enoxaparin Injectable 90 milliGRAM(s) SubCutaneous two times a day  montelukast 10 milliGRAM(s) Oral daily  pantoprazole    Tablet 40 milliGRAM(s) Oral daily  vancomycin  IVPB 1000 milliGRAM(s) IV Intermittent every 12 hours      Patient confirmed DNR/DNI status. He was mentating well on BIPAP. His BIPAP settings were adjusted with improvement in O2 sats 88%. Patient is very ill and likely due to COVID-19 pneumonia, ARDS, and likely superimposed bacterial pneumonia. His fevers are of concern. He has been started on broad spectrum antibiotics.    Patient continues to have persistent acute hypoxemic respiratory failure in the setting of COVID-19 ARDS, likely superimposed bacterial pneumonia, volume overload, and known venous thromboembolism. Unfortunately he remains critically ill despite aggressive therapies and remains BIPAP dependent. He again CONFIRMS that he would not want to be intubated and would not want to be on life support.  - Continue BIPAP at current settings and try and maintain O2 sat > 88% as able. At end of RRT patient was saturating 88-91%. Can try proning but reportedly patient did not tolerate this previously  - Patient has completed therapy for COVID-19 with Remdesivir and is almost completed Dexamethasone  - Would stop antihypertensives at this time (Amlodipine)  - Initiation and continuation of broad spectrum antibiotics in case of superimposed infection. Followup cultures  - Antipyretics for fever control  - Continue therapeutic AC for known venous thromboembolism. Given that right heart is smaller than left heart on POCUS and there are other potential causes of hypoxia would hold off giving tPA at this time as risks outweigh the benefits  - Continue diuresis as able with goal net negative  - Consider low dose opiates for dyspnea and ease of work of breathing  - Consider Palliative Care followup as patient has been dependent on significant amount of support without clear improvement and in fact appears to be worsening.    I have provided 35 minutes of critical care time independent of procedures and/or teaching services.    Patient appears to be having continued progression of his COVID-19 disease process. Continue current therapies and GOC discussions. ICU admission would not be in line with patients GOC at this time. Please reconsult should things change.  D/W RRT team and patient

## 2021-01-20 NOTE — PROVIDER CONTACT NOTE (OTHER) - SITUATION
Pt tachypnoeic ,  chest rise . rr - 40/m , spo2 - 90 % on BIPAP , hr - 110/m . Pt says he has difficulty breathing. Pt c/o anxiety , PO Valium given bedtime.

## 2021-01-20 NOTE — PROGRESS NOTE ADULT - SUBJECTIVE AND OBJECTIVE BOX
Infectious Diseases progress note:    Subjective: Events noted.  Pt with Tmax 103.2 this AM with tachycardia.  On bipap    ROS:  CONSTITUTIONAL:  No fever, chills, rigors  CARDIOVASCULAR:  No chest pain or palpitations  RESPIRATORY:  on bipap, hankins  GASTROINTESTINAL:  No abd pain, N/V, diarrhea/constipation  EXTREMITIES:  No swelling or joint pain  GENITOURINARY:  No burning on urination, increased frequency or urgency.  No flank pain  NEUROLOGIC:  No HA, visual disturbances  SKIN: No rashes    Allergies    beta-lactam antibiotics (Swelling)  Keflex (Anaphylaxis)  nitrates causes vertigo per pt (Nausea)  penicillin (Anaphylaxis)  Versed (Nausea)    Intolerances        ANTIBIOTICS/RELEVANT:  antimicrobials    immunologic:    OTHER:  ALBUTerol    90 MICROgram(s) HFA Inhaler 2 Puff(s) Inhalation every 6 hours PRN  allopurinol 150 milliGRAM(s) Oral daily  amitriptyline 10 milliGRAM(s) Oral three times a day  amLODIPine   Tablet 5 milliGRAM(s) Oral daily  aspirin  chewable 81 milliGRAM(s) Oral daily  atorvastatin 10 milliGRAM(s) Oral at bedtime  dexAMETHasone     Tablet 6 milliGRAM(s) Oral daily  diazepam    Tablet 5 milliGRAM(s) Oral four times a day PRN  DULoxetine 60 milliGRAM(s) Oral daily  enoxaparin Injectable 90 milliGRAM(s) SubCutaneous two times a day  montelukast 10 milliGRAM(s) Oral daily  pantoprazole    Tablet 40 milliGRAM(s) Oral daily      Objective:  Vital Signs Last 24 Hrs  T(C): 36.6 (20 Jan 2021 16:08), Max: 39.6 (20 Jan 2021 05:06)  T(F): 97.9 (20 Jan 2021 16:08), Max: 103.2 (20 Jan 2021 05:06)  HR: 81 (20 Jan 2021 16:08) (61 - 116)  BP: 130/79 (20 Jan 2021 16:08) (113/71 - 134/89)  BP(mean): --  RR: 18 (20 Jan 2021 16:08) (18 - 40)  SpO2: 92% (20 Jan 2021 16:08) (81% - 99%)    PHYSICAL EXAM:  Constitutional: on bipap  Eyes:KARENA, EOMI  Ear/Nose/Throat: no thrush, mucositis.  Moist mucous membranes	  Neck:no JVD, no lymphadenopathy, supple  Respiratory: decr bs b/l  Cardiovascular: S1S2 RRR, no murmurs  Gastrointestinal:soft, nontender,  nondistended (+) BS  Extremities:no e/e/c  Skin:  no rashes, open wounds or ulcerations        LABS:                        13.2   12.77 )-----------( 281      ( 19 Jan 2021 09:32 )             39.2     01-20    x   |  x   |  x   ----------------------------<  x   x    |  x   |  0.65    Ca    8.7      19 Jan 2021 09:31    TPro  6.6  /  Alb  2.2<L>  /  TBili  1.2  /  DBili  0.5<H>  /  AST  94<H>  /  ALT  118<H>  /  AlkPhos  324<H>  01-20          Procalcitonin, Serum: 1.01 (01-20 @ 11:22)  Procalcitonin, Serum: 1.56 (01-18 @ 07:22)  Procalcitonin, Serum: 0.20 (01-14 @ 07:10)  Procalcitonin, Serum: 0.86 (01-11 @ 16:26)            Rapid RVP Result: Detected          MICROBIOLOGY:    Culture - Blood (01.18.21 @ 10:27)   Specimen Source: .Blood Blood-Peripheral   Culture Results:   No growth to date.       Culture - Blood (01.18.21 @ 10:27)   Specimen Source: .Blood Blood-Peripheral   Culture Results:   No growth to date.       Culture - Blood (01.12.21 @ 00:26)   Specimen Source: .Blood Blood   Culture Results:   No Growth Final         RADIOLOGY & ADDITIONAL STUDIES:    < from: [facility] Duplex Lower Ext Vein Scan, Bilat (01.18.21 @ 15:47) >    EXAM:  DUPLEX SCAN EXT VEINS LOWER BI                            PROCEDURE DATE:  01/18/2021            INTERPRETATION:  CLINICAL INFORMATION: Hypoxia. Elevated d-dimer    COMPARISON: None available.    TECHNIQUE: Duplex sonography of the BILATERALLOWER extremity veins with color and spectral Doppler, with and without compression.    FINDINGS:    There is normal compressibility of the bilateral common femoral, femoral and popliteal veins.  Doppler examination shows normal spontaneous and phasic flow.    There is right soleal vein deep vein thrombosis. The rest of the visualized calf veins are unremarkable.    IMPRESSION:  Right calf deep vein thrombosis.  Acute deep venous thrombosis: below the knee.    < end of copied text >      < from: Xray Chest 1 View- PORTABLE-Urgent (Xray Chest 1 View- PORTABLE-Urgent .) (01.18.21 @ 09:16) >  IMPRESSION:  Follow-up multifocal covid 19 pneumonia.    < end of copied text >

## 2021-01-20 NOTE — PROGRESS NOTE ADULT - ASSESSMENT
74 y/o M w/severe persistent asthma recently diagnosed with COVID-19 now admitted for hypoxemia in setting of COVID-19 PNA.    - Wean supplemental O2 as tolerated  - Continue decadron  - Bronchodilators, acapella  - Supportive care  *******************  1/13- no significant changes in status  1/14-more comfortable today, anxiolytics ok here  1/15-on bipap, more comfortable  1/19-completed remdisivi 1/17, on dex 6mg until 1/22; ID f/up 76 y/o M w/severe persistent asthma recently diagnosed with COVID-19 now admitted for hypoxemia in setting of COVID-19 PNA.    - Wean supplemental O2 as tolerated  - Continue decadron  - Bronchodilators, acapella  - Supportive care  *******************  1/13- no significant changes in status  1/14-more comfortable today, anxiolytics ok here  1/15-on bipap, more comfortable  1/19-completed remdisivi 1/17, on dex 6mg until 1/22; ID f/up  1/20-no signif changes-fevers--fully cultured
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
## 2021-01-20 NOTE — PROGRESS NOTE ADULT - SUBJECTIVE AND OBJECTIVE BOX
Patient is a 75y old  Male who presents with a chief complaint of sob (20 Jan 2021 15:14)    Date of servie : 01-20-21 @ 15:46  INTERVAL HPI/OVERNIGHT EVENTS:  T(C): 36.8 (01-20-21 @ 12:46), Max: 39.6 (01-20-21 @ 05:06)  HR: 79 (01-20-21 @ 13:47) (61 - 116)  BP: 131/75 (01-20-21 @ 12:46) (113/71 - 137/90)  RR: 24 (01-20-21 @ 12:46) (20 - 40)  SpO2: 91% (01-20-21 @ 13:47) (81% - 99%)  Wt(kg): --  I&O's Summary    19 Jan 2021 07:01  -  20 Jan 2021 07:00  --------------------------------------------------------  IN: 660 mL / OUT: 500 mL / NET: 160 mL        LABS:                        13.2   12.77 )-----------( 281      ( 19 Jan 2021 09:32 )             39.2     01-20    x   |  x   |  x   ----------------------------<  x   x    |  x   |  0.65    Ca    8.7      19 Jan 2021 09:31    TPro  6.6  /  Alb  2.2<L>  /  TBili  1.2  /  DBili  0.5<H>  /  AST  94<H>  /  ALT  118<H>  /  AlkPhos  324<H>  01-20        CAPILLARY BLOOD GLUCOSE      POCT Blood Glucose.: 139 mg/dL (20 Jan 2021 13:01)            MEDICATIONS  (STANDING):  allopurinol 150 milliGRAM(s) Oral daily  amitriptyline 10 milliGRAM(s) Oral three times a day  amLODIPine   Tablet 5 milliGRAM(s) Oral daily  aspirin  chewable 81 milliGRAM(s) Oral daily  atorvastatin 10 milliGRAM(s) Oral at bedtime  dexAMETHasone     Tablet 6 milliGRAM(s) Oral daily  DULoxetine 60 milliGRAM(s) Oral daily  enoxaparin Injectable 90 milliGRAM(s) SubCutaneous two times a day  montelukast 10 milliGRAM(s) Oral daily  pantoprazole    Tablet 40 milliGRAM(s) Oral daily    MEDICATIONS  (PRN):  ALBUTerol    90 MICROgram(s) HFA Inhaler 2 Puff(s) Inhalation every 6 hours PRN Bronchospasm  diazepam    Tablet 5 milliGRAM(s) Oral four times a day PRN anxiety          PHYSICAL EXAM:  GENERAL: NAD, well-groomed, well-developed  HEAD:  Atraumatic, Normocephalic  CHEST/LUNG: Clear to percussion bilaterally; No rales, rhonchi, wheezing, or rubs  HEART: Regular rate and rhythm; No murmurs, rubs, or gallops  ABDOMEN: Soft, Nontender, Nondistended; Bowel sounds present  EXTREMITIES:  2+ Peripheral Pulses, No clubbing, cyanosis, or edema  LYMPH: No lymphadenopathy noted  SKIN: No rashes or lesions    Care Discussed with Consultants/Other Providers [ ] YES  [ ] NO

## 2021-01-20 NOTE — CHART NOTE - NSCHARTNOTEFT_GEN_A_CORE
Episodic Note     Called by RN to evaluate patient with a with temperature of 102.3. Pt. seen and examined at bedside.    VITAL SIGNS:  T(C): 39.1 (01-20-21 @ 20:35), Max: 39.6 (01-20-21 @ 05:06)  HR: 91 (01-20-21 @ 18:23) (61 - 116)  BP: 130/79 (01-20-21 @ 16:08) (113/71 - 134/89)  RR: 18 (01-20-21 @ 16:08) (18 - 40)  SpO2: 90% (01-20-21 @ 18:23) (81% - 99%)    MICROBIOLOGY:   Blood Culture x1 from 1/20/2021 specimen received by performing department  Blood Culture x1 ordered on 1/20/2021 pending collection    PRELIMINARY READ Culture - Blood (01.18.21 @ 10:27)   Specimen Source: .Blood Blood-Peripheral   Culture Results:   No growth to date.    PRELIMINARY READ Culture - Blood (01.18.21 @ 10:27)   Specimen Source: .Blood Blood-Peripheral   Culture Results:   No growth to date.     RADIOLOGY:  < from: Xray Chest 1 View- PORTABLE-Urgent (Xray Chest 1 View- PORTABLE-Urgent .) (01.18.21 @ 09:16) >      IMPRESSION:  Follow-up multifocal covid 19 pneumonia.    < end of copied text >    MEDICATIONS:   Patient being followed by ID - who started the patient on Aztreonam and Vancomycin    PHYSICAL EXAM:  Constitutional: on bipap  Respiratory: known decr bs b/l  Cardiovascular: S1S2 RRR, no murmurs  Gastrointestinal: soft, nontender,  nondistended   Extremities e/e/c    ASSESSMENT/PLAN:   HPI:    IMPRESSION:     > Ttylenol and cooling measures prn for pyrexia  > BC x2, UA/UC  > Chest x-ray  > Continue with     Will endorse to primary team in am; attending to follow.    Marcia Ward PA-C  Department of Medicine Episodic Note     Called by RN to evaluate patient with a with temperature of 102.3. Patient seen and examined at bedside. Patient at this time found to be tachycardic and hypoxic.        VITAL SIGNS:  T(C): 39.1 (01-20-21 @ 20:35), Max: 39.6 (01-20-21 @ 05:06)  HR: 91 (01-20-21 @ 18:23) (61 - 116)  BP: 130/79 (01-20-21 @ 16:08) (113/71 - 134/89)  RR: 18 (01-20-21 @ 16:08) (18 - 40)  SpO2: 90% (01-20-21 @ 18:23) (81% - 99%)    MICROBIOLOGY:   Blood Culture x1 from 1/20/2021 specimen received by performing department  Blood Culture x1 ordered on 1/20/2021 pending collection    PRELIMINARY READ Culture - Blood (01.18.21 @ 10:27)   Specimen Source: .Blood Blood-Peripheral   Culture Results:   No growth to date.    PRELIMINARY READ Culture - Blood (01.18.21 @ 10:27)   Specimen Source: .Blood Blood-Peripheral   Culture Results:   No growth to date.     RADIOLOGY:  < from: Xray Chest 1 View- PORTABLE-Urgent (Xray Chest 1 View- PORTABLE-Urgent .) (01.18.21 @ 09:16) >      IMPRESSION:  Follow-up multifocal covid 19 pneumonia.    < end of copied text >    MEDICATIONS:   Patient being followed by ID - who started the patient on Aztreonam and Vancomycin    PHYSICAL EXAM:  Constitutional: alert  Respiratory: known decr bs b/l  Cardiovascular: S1S2 RRR, no murmurs  Gastrointestinal: soft, nontender,  nondistended   Extremities e/e/c    ASSESSMENT/PLAN:   HPI: 74y/o M pmxh COPD, asthma, HTN, tracheobroncheomalacia, gout, arthritis, presented with shortness of breath and low oxygen from the [facility] infusion center. Now presents with a fever, hypoxic, and tachycardic.     IMPRESSION: Unstable vitals of fever, tachycardic, hypoxia     > Ttylenol and cooling measures prn for pyrexia  > BC x2, UA/UC  > Chest x-ray  > Continue with     Will endorse to primary team in am; attending to follow.    Marcia Ward PA-C  Department of Medicine Episodic Note     Called by RN to evaluate patient with a with temperature of 102.3. Patient seen and examined at bedside. Patient at this time found to be tachycardic and hypoxic.    VITAL SIGNS:  T(C): 39.1 (01-20-21 @ 20:35), Max: 39.6 (01-20-21 @ 05:06)  HR: 91 (01-20-21 @ 18:23) (61 - 116)  BP: 130/79 (01-20-21 @ 16:08) (113/71 - 134/89)  RR: 18 (01-20-21 @ 16:08) (18 - 40)  SpO2: 90% (01-20-21 @ 18:23) (81% - 99%)    MICROBIOLOGY:   Blood Culture x1 from 1/20/2021 specimen received by performing department  Blood Culture x1 ordered on 1/20/2021 pending collection    PRELIMINARY READ Culture - Blood (01.18.21 @ 10:27)   Specimen Source: .Blood Blood-Peripheral   Culture Results:   No growth to date.    PRELIMINARY READ Culture - Blood (01.18.21 @ 10:27)   Specimen Source: .Blood Blood-Peripheral   Culture Results:   No growth to date.     RADIOLOGY:  < from: Xray Chest 1 View- PORTABLE-Urgent (Xray Chest 1 View- PORTABLE-Urgent .) (01.18.21 @ 09:16) >      IMPRESSION:  Follow-up multifocal covid 19 pneumonia.    < end of copied text >    MEDICATIONS:   Patient being followed by ID - who started the patient on Aztreonam and Vancomycin    PHYSICAL EXAM:  Constitutional: alert  Respiratory: known decr bs b/l, moderate work of breathing  Cardiovascular: S1S2 RRR, no murmurs  Gastrointestinal: soft, nontender,  nondistended   Extremities e/e/c    ASSESSMENT/PLAN:   HPI: 76y/o M pmxh COPD, asthma, HTN, tracheobroncheomalacia, gout, arthritis, presented with shortness of breath and low oxygen from the [facility] infusion center. Now presents with a fever, hypoxic, and tachycardic.     IMPRESSION: Unstable vitals of fever, tachycardic, hypoxia     > RRT immediately called.  - Please refer to RRT note and MICU attending's note for further details.   - Per RRT - they sent an ABG, increased the BiPAP setting to 22/18 100%, repeat CXR done with bilateral patchy opacities, Tylenol IV, 1 dose and Lasix 20 mg IV x 1 dose given. Horowitz placed (due to RRT advisement). Patient was also given valium 5mg for anxiety. Pt with know RLE DVT on therapeutic  Lovenox, with concerns about PE, but COVID ICU attending Dr. Carcamo consulted for possible tPA. Bedside ultrasound with b/l Blines, no RV strain noted. Per MICU attending, Dr. Carcamo, given that right heart is smaller than left heart on POCUS and there are other potential causes of hypoxia would hold off giving tPA at this time as risks outweigh the benefits  - RRT aware of the ABG results from 1/20/2021 at 21:14, and the CBC and CMP results from 1/20/2021at 21:25. RRT also aware of the lactate value on 1/20/2021 at 21:14. Per RRT, continue to monitor for the lactate value. Repeat lactate in the AM to trend. [facility] to f/u and manage accordingly.  > [facility] to f/u on the final read of the preliminary above blood cultures and to f/u on the repeat pending blood cultures.   > UA ordered. [facility] to f/u and manage accordingly.  > Continue with current antibiotics of Vancomycin and Azactam.   > By the end of RRT, patient fever improved and his SPO2 improved to 88-91%.   > As patient request, proper family member notified. Dr. Stringer aware of the above patient's status. Dr. Stringer also aware of ABG results from 1/20/2021 at 21:14, and the CBC and CMP results from 1/20/2021 at 21:25. Dr. Stringer also aware of the lactate value on 1/20/2021 at 21:14.   > Continue to monitor patient and vitals closely.   > F/u with the primary care team in the AM.     RN aware of the above management plan.    Marcia Ward PA-C  Department of Medicine  #[id number] Episodic Note     Called by RN to evaluate patient with a with temperature of 102.3. Patient seen and examined at bedside. Patient at this time found to be tachycardic and hypoxic.    VITAL SIGNS:  T(C): 39.1 (01-20-21 @ 20:35), Max: 39.6 (01-20-21 @ 05:06)  HR: 91 (01-20-21 @ 18:23) (61 - 116)  BP: 130/79 (01-20-21 @ 16:08) (113/71 - 134/89)  RR: 18 (01-20-21 @ 16:08) (18 - 40)  SpO2: 90% (01-20-21 @ 18:23) (81% - 99%)    MICROBIOLOGY:   Blood Culture x1 from 1/20/2021 specimen received by performing department  Blood Culture x1 ordered on 1/20/2021 pending collection    PRELIMINARY READ Culture - Blood (01.18.21 @ 10:27)   Specimen Source: .Blood Blood-Peripheral   Culture Results:   No growth to date.    PRELIMINARY READ Culture - Blood (01.18.21 @ 10:27)   Specimen Source: .Blood Blood-Peripheral   Culture Results:   No growth to date.     RADIOLOGY:  < from: Xray Chest 1 View- PORTABLE-Urgent (Xray Chest 1 View- PORTABLE-Urgent .) (01.18.21 @ 09:16) >      IMPRESSION:  Follow-up multifocal covid 19 pneumonia.    < end of copied text >    MEDICATIONS:   Patient being followed by ID - who started the patient on Aztreonam and Vancomycin    PHYSICAL EXAM:  Constitutional: alert  Respiratory: known decr bs b/l, moderate work of breathing  Cardiovascular: S1S2 RRR, no murmurs  Gastrointestinal: soft, nontender,  nondistended   Extremities e/e/c    ASSESSMENT/PLAN:   HPI: 74y/o M pmxh COPD, asthma, HTN, tracheobroncheomalacia, gout, arthritis, presented with shortness of breath and low oxygen from the [facility] infusion center. Now presents with a fever, hypoxic, and tachycardic.     IMPRESSION: Unstable vitals of fever, tachycardic, hypoxia     > RRT immediately called.  - Please refer to RRT note and MICU attending's note for further details.   - Per RRT - they sent an ABG, increased the BiPAP setting to 22/18 100%, repeat CXR done with bilateral patchy opacities, Tylenol IV, 1 dose and Lasix 20 mg IV x 1 dose given. Horowitz placed (due to RRT advisement). Patient was also given valium 5mg for anxiety. Pt with know RLE DVT on therapeutic  Lovenox, with concerns about PE, but COVID ICU attending Dr. Carcamo consulted for possible tPA. Bedside ultrasound with b/l Blines, no RV strain noted. Per MICU attending, Dr. Carcamo, given that right heart is smaller than left heart on POCUS and there are other potential causes of hypoxia would hold off giving tPA at this time as risks outweigh the benefits  - RRT aware of the ABG results from 1/20/2021 at 21:14, and the CBC and CMP results from 1/20/2021at 21:25. RRT also aware of the lactate value on 1/20/2021 at 21:14. Per RRT, continue to monitor for the lactate value. Repeat lactate in the AM to trend. [facility] to f/u and manage accordingly.  > [facility] to f/u on the final read of the preliminary above blood cultures and to f/u on the repeat pending blood cultures.   > UA ordered. [facility] to f/u and manage accordingly.  > Continue with current antibiotics of Vancomycin and Azactam.   > By the end of RRT, patient fever improved and his SPO2 improved to 88-91%. Patient's tachycardia also improving.   > As patient request, proper family member notified. Dr. Stringer aware of the above patient's status. Dr. Stringer also aware of ABG results from 1/20/2021 at 21:14, and the CBC and CMP results from 1/20/2021 at 21:25. Dr. Stringer also aware of the lactate value on 1/20/2021 at 21:14.   > Continue to monitor patient and vitals closely.   > F/u with the primary care team in the AM.     RN aware of the above management plan.    Marcia Ward PA-C  Department of Medicine  #[id number] Episodic Note     Called by RN to evaluate patient with a with temperature of 102.3. Patient seen and examined at bedside. Patient at this time found to be tachycardic and hypoxic.    VITAL SIGNS:  T(C): 39.1 (01-20-21 @ 20:35), Max: 39.6 (01-20-21 @ 05:06)  HR: 91 (01-20-21 @ 18:23) (61 - 116)  BP: 130/79 (01-20-21 @ 16:08) (113/71 - 134/89)  RR: 18 (01-20-21 @ 16:08) (18 - 40)  SpO2: 90% (01-20-21 @ 18:23) (81% - 99%)    MICROBIOLOGY:   Blood Culture x1 from 1/20/2021 specimen received by performing department  Blood Culture x1 ordered on 1/20/2021 pending collection    PRELIMINARY READ Culture - Blood (01.18.21 @ 10:27)   Specimen Source: .Blood Blood-Peripheral   Culture Results:   No growth to date.    PRELIMINARY READ Culture - Blood (01.18.21 @ 10:27)   Specimen Source: .Blood Blood-Peripheral   Culture Results:   No growth to date.     RADIOLOGY:  < from: Xray Chest 1 View- PORTABLE-Urgent (Xray Chest 1 View- PORTABLE-Urgent .) (01.18.21 @ 09:16) >      IMPRESSION:  Follow-up multifocal covid 19 pneumonia.    < end of copied text >    MEDICATIONS:   Patient being followed by ID - who started the patient on Aztreonam and Vancomycin    PHYSICAL EXAM:  Constitutional: alert  Respiratory: known decr bs b/l, moderate work of breathing  Cardiovascular: S1S2 RRR, no murmurs  Gastrointestinal: soft, nontender,  nondistended   Extremities e/e/c    ASSESSMENT/PLAN:   HPI: 76y/o M pmxh COPD, asthma, HTN, tracheobroncheomalacia, gout, arthritis, presented with shortness of breath and low oxygen from the [facility] infusion center. Now presents with a fever, hypoxic, and tachycardic.     IMPRESSION: Unstable vitals of fever, tachycardic, hypoxia     > RRT immediately called.  - Please refer to RRT note and MICU attending's note for further details.   - Per RRT - they sent an ABG, increased the BiPAP setting to 22/18 100%, repeat CXR done with bilateral patchy opacities, 1 dose of antipyretic and Lasix 20 mg IV x 1 dose given. Horowitz placed (due to RRT advisement). Patient was also given valium 5mg for anxiety. Pt with know RLE DVT on therapeutic  Lovenox, with concerns about PE, but COVID ICU attending Dr. Carcamo consulted for possible tPA. Bedside ultrasound with b/l Blines, no RV strain noted. Per MICU attending, Dr. Carcamo, given that right heart is smaller than left heart on POCUS and there are other potential causes of hypoxia would hold off giving tPA at this time as risks outweigh the benefits  - RRT aware of the ABG results from 1/20/2021 at 21:14, and the CBC and CMP results from 1/20/2021at 21:25. RRT also aware of the lactate value on 1/20/2021 at 21:14. Per RRT, continue to monitor for the lactate value. Repeat lactate in the AM to trend. [facility] to f/u and manage accordingly.  > [facility] day provider to f/u on the final read of the preliminary above blood cultures and to f/u on the repeat pending blood cultures.   > UA ordered. [facility] day provider to f/u and manage accordingly.  > Continue with current antibiotics of Vancomycin and Azactam.   > By the end of RRT, patient fever improved and his SPO2 improved to 88-91%. Patient's tachycardia also improving.   > As patient request, proper family member notified. Dr. Stringer aware of the above patient's status. Dr. Stringer also aware of ABG results from 1/20/2021 at 21:14, and the CBC and CMP results from 1/20/2021 at 21:25. Dr. Stringer also aware of the lactate value on 1/20/2021 at 21:14.   > Continue to monitor patient and vitals closely.   > F/u with the primary care team in the AM.     RN aware of the above management plan.    Marcia Ward PA-C  Department of Medicine  #[id number] Episodic Note     Called by RN to evaluate patient with a with temperature of 102.3. Patient seen and examined at bedside. Patient at this time found to be tachycardic and hypoxic.    VITAL SIGNS:  T(C): 39.1 (01-20-21 @ 20:35), Max: 39.6 (01-20-21 @ 05:06)  HR: 91 (01-20-21 @ 18:23) (61 - 116)  BP: 130/79 (01-20-21 @ 16:08) (113/71 - 134/89)  RR: 18 (01-20-21 @ 16:08) (18 - 40)  SpO2: 90% (01-20-21 @ 18:23) (81% - 99%)    MICROBIOLOGY:   Blood Culture x1 from 1/20/2021 specimen received by performing department  Blood Culture x1 ordered on 1/20/2021 pending collection    PRELIMINARY READ Culture - Blood (01.18.21 @ 10:27)   Specimen Source: .Blood Blood-Peripheral   Culture Results:   No growth to date.    PRELIMINARY READ Culture - Blood (01.18.21 @ 10:27)   Specimen Source: .Blood Blood-Peripheral   Culture Results:   No growth to date.     RADIOLOGY:  < from: Xray Chest 1 View- PORTABLE-Urgent (Xray Chest 1 View- PORTABLE-Urgent .) (01.18.21 @ 09:16) >      IMPRESSION:  Follow-up multifocal covid 19 pneumonia.    < end of copied text >    MEDICATIONS:   Patient being followed by ID - who started the patient on Aztreonam and Vancomycin    PHYSICAL EXAM:  Constitutional: alert  Respiratory: known decr bs b/l, moderate work of breathing  Cardiovascular: S1S2 RRR, no murmurs  Gastrointestinal: soft, nontender,  nondistended   Extremities e/e/c    ASSESSMENT/PLAN:   HPI: 76y/o M pmxh COPD, asthma, HTN, tracheobroncheomalacia, gout, arthritis, presented with shortness of breath and low oxygen from the [facility] infusion center. Now presents with a fever, hypoxic, and tachycardic.     IMPRESSION: Unstable vitals of fever, tachycardic, hypoxia     > RRT immediately called.  - Please refer to RRT note and MICU attending's note for further details.   - Per RRT - they sent an ABG, increased the BiPAP setting to 22/18 100%, did a repeat chest x-ray done with preliminary read of bilateral patchy opacities, and 1x dose of antipyretic and Lasix 20 mg IV x 1 dose given. Horowitz placed (due to RRT advisement). Patient was also given valium 5mg for anxiety. Pt with know RLE DVT on therapeutic Lovenox, with concerns about PE, but COVID ICU attending Dr. Carcamo consulted for possible tPA. Bedside ultrasound with b/l Blines, no RV strain noted. Per MICU attending, Dr. Carcamo, given that right heart is smaller than left heart on POCUS and there are other potential causes of hypoxia would hold off giving tPA at this time as risks outweigh the benefits.  - Will endorse to the [facility] ([facility]) day provider to f/u on the above final chest x-ray read.  - RRT aware of the ABG results from 1/20/2021 at 21:14, and the CBC and CMP results from 1/20/2021at 21:25. RRT also aware of the lactate value on 1/20/2021 at 21:14. Per RRT, continue to monitor for the lactate value. Repeat lactate in the AM to trend. Will endorse to the [facility] ([facility]) day provider to f/u and manage accordingly.  > [facility] day provider to f/u on the final read of the preliminary above blood cultures and to f/u on the repeat pending blood cultures.   > UA ordered. [facility] day provider to f/u and manage accordingly.  > Continue with current antibiotics of Vancomycin and Azactam.   > By the end of RRT, patient fever improved and his SPO2 improved to 88-91%. Patient's tachycardia also improving.   > As patient request, proper family member notified. Dr. Stringer aware of the above patient's status. Dr. Stringer also aware of ABG results from 1/20/2021 at 21:14, and the CBC and CMP results from 1/20/2021 at 21:25. Dr. Stringer also aware of the lactate value on 1/20/2021 at 21:14.   > Continue to monitor patient and vitals closely.   > F/u with the primary care team in the AM.     RN aware of the above management plan.    Marcia Ward PA-C  Department of Medicine  #[id number] Episodic Note     Called by the RN to evaluate patient with a with temperature of 102.3. Patient seen and examined at bedside. Patient at this time found to be tachycardic and hypoxic.    VITAL SIGNS:  T(C): 39.1 (01-20-21 @ 20:35), Max: 39.6 (01-20-21 @ 05:06)  HR: 91 (01-20-21 @ 18:23) (61 - 116)  BP: 130/79 (01-20-21 @ 16:08) (113/71 - 134/89)  RR: 18 (01-20-21 @ 16:08) (18 - 40)  SpO2: 90% (01-20-21 @ 18:23) (81% - 99%)    MICROBIOLOGY:   Blood Culture x1 from 1/20/2021 specimen received by performing department  Blood Culture x1 ordered on 1/20/2021 pending collection    PRELIMINARY READ Culture - Blood (01.18.21 @ 10:27)   Specimen Source: .Blood Blood-Peripheral   Culture Results:   No growth to date.    PRELIMINARY READ Culture - Blood (01.18.21 @ 10:27)   Specimen Source: .Blood Blood-Peripheral   Culture Results:   No growth to date.     RADIOLOGY:  < from: Xray Chest 1 View- PORTABLE-Urgent (Xray Chest 1 View- PORTABLE-Urgent .) (01.18.21 @ 09:16) >      IMPRESSION:  Follow-up multifocal covid 19 pneumonia.    < end of copied text >    MEDICATIONS:   Patient being followed by ID - who started the patient on Aztreonam and Vancomycin    PHYSICAL EXAM:  Constitutional: alert  Respiratory: known decr bs b/l, moderate work of breathing  Cardiovascular: S1S2 RRR, no murmurs  Gastrointestinal: soft, nontender,  nondistended   Extremities e/e/c    ASSESSMENT/PLAN:   HPI: 76y/o M pmxh COPD, asthma, HTN, tracheobroncheomalacia, gout, arthritis, presented with shortness of breath and low oxygen from the [facility] infusion center. Now presents with a fever, hypoxic, and tachycardic.     IMPRESSION: Unstable vitals of fever, tachycardia, hypoxia     > RRT immediately called.  - Please refer to RRT note and MICU attending's note for further details.   - Per RRT - they sent an ABG, increased the BiPAP setting to 22/18 100%, did a repeat chest x-ray done with preliminary read of bilateral patchy opacities, and 1x dose of antipyretic and Lasix 20 mg IV x 1 dose given. Horowitz placed (due to RRT advisement). Patient was also given valium 5mg for anxiety. Pt with know RLE DVT on therapeutic Lovenox, with RRT concerns about PE, but COVID ICU attending, Dr. Carcamo consulted for possible tPA. Bedside ultrasound with b/l Blines, no RV strain noted. Per MICU attending, Dr. Carcamo, given that right heart is smaller than left heart on POCUS and there are other potential causes of hypoxia would hold off giving tPA at this time as risks outweigh the benefits.  - Will endorse to the [facility] ([facility]) day provider to f/u on the above final chest x-ray read.  - RRT aware of the ABG results from 1/20/2021 at 21:14, and the CBC and CMP results from 1/20/2021 at 21:25. RRT also aware of the lactate value on 1/20/2021 at 21:14. Per RRT, continue to monitor the lactate value. Repeat lactate in the AM to trend. [facility] day provider to f/u and manage accordingly.  > [facility] day provider to f/u on the final read of the preliminary above blood cultures and to f/u on the repeat pending/specimen received blood cultures.   > UA ordered. Will endorse to the [facility] ([facility]) day provider to f/u and manage accordingly.  > Continue with current antibiotics of Vancomycin and Azactam.   > By the end of RRT, patient's fever improved and his SPO2 improved to 88-91%. Patient's tachycardia also improving.   > As patient's request, proper family member notified. Dr. Stringer aware of the above patient's status. Dr. Stringer also aware of ABG results from 1/20/2021 at 21:14, and the CBC and CMP results from 1/20/2021 at 21:25. Dr. Stringer also aware of the lactate value on 1/20/2021 at 21:14. Dr. Stringer in agreement with the above management plan.   > Continue to monitor patient and vitals closely.   > F/u with the primary care team in the AM.     RN aware of the above management plan.    Marcia Ward PA-C  Department of Medicine  #[id number] Episodic Note     Called by the RN to evaluate patient with a with temperature of 102.3. Patient seen and examined at bedside. Patient at this time found to be tachycardic and hypoxic.    VITAL SIGNS:  T(C): 39.1 (01-20-21 @ 20:35), Max: 39.6 (01-20-21 @ 05:06)  HR: 91 (01-20-21 @ 18:23) (61 - 116)  BP: 130/79 (01-20-21 @ 16:08) (113/71 - 134/89)  RR: 18 (01-20-21 @ 16:08) (18 - 40)  SpO2: 90% (01-20-21 @ 18:23) (81% - 99%)    MICROBIOLOGY:   Blood Culture x1 from 1/20/2021 specimen received by performing department  Blood Culture x1 ordered on 1/20/2021 pending collection    PRELIMINARY READ Culture - Blood (01.18.21 @ 10:27)   Specimen Source: .Blood Blood-Peripheral   Culture Results:   No growth to date.    PRELIMINARY READ Culture - Blood (01.18.21 @ 10:27)   Specimen Source: .Blood Blood-Peripheral   Culture Results:   No growth to date.     RADIOLOGY:  < from: Xray Chest 1 View- PORTABLE-Urgent (Xray Chest 1 View- PORTABLE-Urgent .) (01.18.21 @ 09:16) >      IMPRESSION:  Follow-up multifocal covid 19 pneumonia.    < end of copied text >    MEDICATIONS:   Patient being followed by ID - who started the patient on Aztreonam and Vancomycin    PHYSICAL EXAM:  Constitutional: alert  Respiratory: known decr bs b/l, moderate work of breathing  Cardiovascular: S1S2 RRR, no murmurs  Gastrointestinal: soft, nontender,  nondistended   Extremities e/e/c    ASSESSMENT/PLAN:   HPI: 76y/o M pmxh COPD, asthma, HTN, tracheobroncheomalacia, gout, arthritis, presented with shortness of breath and low oxygen from the [facility] infusion center. Now presents with a fever, hypoxic, and tachycardic.     IMPRESSION: Unstable vitals of fever, tachycardia, hypoxia     > RRT immediately called.  - Please refer to RRT note and MICU attending's note for further details.   - Per RRT - they sent an ABG, increased the already ordered BiPAP setting to 22/18 100%, did a repeat chest x-ray done with preliminary read of bilateral patchy opacities, and 1x dose of antipyretic and Lasix 20 mg IV x 1 dose given. Horowitz placed (due to RRT advisement). Patient was also given valium 5mg for anxiety. Pt with know RLE DVT on therapeutic Lovenox, with RRT concerns about PE, but COVID ICU attending, Dr. Carcamo consulted for possible tPA. Bedside ultrasound with b/l Blines, no RV strain noted. Per MICU attending, Dr. Carcamo, given that right heart is smaller than left heart on POCUS and there are other potential causes of hypoxia would hold off giving tPA at this time as risks outweigh the benefits.  - Will endorse to the [facility] ([facility]) day provider to f/u on the above final chest x-ray read.  - RRT aware of the ABG results from 1/20/2021 at 21:14, and the CBC and CMP results from 1/20/2021 at 21:25. RRT also aware of the lactate value on 1/20/2021 at 21:14. Per RRT, continue to monitor the lactate value. Repeat lactate in the AM to trend. [facility] to f/u and manage accordingly.  > [facility] day provider to f/u on the final read of the preliminary above blood cultures and to f/u on the repeat pending/specimen received blood cultures.   > UA ordered. Will endorse to the [facility] ([facility]) day provider to f/u and manage accordingly.  > Continue with current antibiotics of Vancomycin and Azactam.   > By the end of RRT, patient's fever improved and his SPO2 improved to 88-91%. Patient's tachycardia also improving.   > As patient's request, proper family member notified. Dr. Stringer aware of the above patient's status. Dr. Stringer also aware of ABG results from 1/20/2021 at 21:14, and the CBC and CMP results from 1/20/2021 at 21:25. Dr. Stringer also aware of the lactate value on 1/20/2021 at 21:14. Dr. Stringer in agreement with the above management plan.   > Continue to monitor patient and vitals closely.   > F/u with the primary care team in the AM.     RN aware of the above management plan.    Marcia Ward PA-C  Department of Medicine  #[id number] Episodic Note     Called by the RN to evaluate patient with a with temperature of 102.3. Patient seen and examined at bedside. Patient at this time found to be tachycardic and hypoxic.    VITAL SIGNS:  T(C): 39.1 (01-20-21 @ 20:35), Max: 39.6 (01-20-21 @ 05:06)  HR: 91 (01-20-21 @ 18:23) (61 - 116)  BP: 130/79 (01-20-21 @ 16:08) (113/71 - 134/89)  RR: 18 (01-20-21 @ 16:08) (18 - 40)  SpO2: 90% (01-20-21 @ 18:23) (81% - 99%)    MICROBIOLOGY:   Blood Culture x1 from 1/20/2021 specimen received by performing department  Blood Culture x1 ordered on 1/20/2021 pending collection    PRELIMINARY READ Culture - Blood (01.18.21 @ 10:27)   Specimen Source: .Blood Blood-Peripheral   Culture Results:   No growth to date.    PRELIMINARY READ Culture - Blood (01.18.21 @ 10:27)   Specimen Source: .Blood Blood-Peripheral   Culture Results:   No growth to date.     RADIOLOGY:  < from: Xray Chest 1 View- PORTABLE-Urgent (Xray Chest 1 View- PORTABLE-Urgent .) (01.18.21 @ 09:16) >      IMPRESSION:  Follow-up multifocal covid 19 pneumonia.    < end of copied text >    MEDICATIONS:   Patient being followed by ID - who started the patient on Aztreonam and Vancomycin    PHYSICAL EXAM:  Constitutional: alert  Respiratory: known decr bs b/l, moderate work of breathing  Cardiovascular: S1S2 RRR, no murmurs  Gastrointestinal: soft, nontender,  nondistended   Extremities e/e/c    ASSESSMENT/PLAN:   HPI: 74y/o M pmxh COPD, asthma, HTN, tracheobroncheomalacia, gout, arthritis, presented with shortness of breath and low oxygen from the [facility] infusion center. Now presents with a fever, hypoxic, and tachycardic.     IMPRESSION: Unstable vitals of fever, tachycardia, hypoxia     > RRT immediately called.  - Please refer to RRT note and MICU attending's note for further details.   - Per RRT - they sent an ABG, increased the already ordered BiPAP setting to 22/18 100%, did a repeat chest x-ray done with preliminary read of bilateral patchy opacities, and 1x dose of antipyretic and Lasix 20 mg IV x 1 dose given. Horowitz placed (due to RRT advisement). Patient was also given valium 5mg for anxiety. Pt with know RLE DVT on therapeutic Lovenox, with RRT concerns about PE, but COVID ICU attending, Dr. Carcamo consulted for possible tPA. Bedside ultrasound with b/l Blines, no RV strain noted. Per MICU attending, Dr. Carcamo, given that right heart is smaller than left heart on POCUS and there are other potential causes of hypoxia would hold off giving tPA at this time as risks outweigh the benefits.  - Will endorse to the [facility] ([facility]) day provider to f/u on the above final chest x-ray read.  - RRT aware of the ABG results from 1/20/2021 at 21:14, and the CBC and CMP results from 1/20/2021 at 21:25. RRT also aware of the lactate value on 1/20/2021 at 21:14. Per RRT, continue to monitor the lactate value. Repeat lactate in the AM to trend. [facility] to f/u and manage accordingly.  > [facility] day provider to f/u on the final read of the preliminary above blood cultures and to f/u on the repeat pending/specimen received blood cultures.   > UA ordered. Will endorse to the [facility] ([facility]) day provider to f/u and manage accordingly.  > Continue with current antibiotics of Vancomycin and Azactam.   > By the end of RRT, patient's fever improved and his SPO2 improved to 88-91%. Patient's tachycardia also improving.   > As patient's request, proper family member notified. Dr. Stringer aware of the above patient's status. Dr. Stringer also aware of ABG results from 1/20/2021 at 21:14, and the CBC and CMP results from 1/20/2021 at 21:25. Dr. Stringer also aware of the lactate value on 1/20/2021 at 21:14. Dr. Stringer in agreement with the above management plan. Dr. Stringer aware patient is on BiPAP and in agreement with this.  > Continue to monitor patient and vitals closely.   > F/u with the primary care team in the AM.     RN aware of the above management plan.    Marcia Ward PA-C  Department of Medicine  #[id number] Episodic Note     Called by the RN to evaluate patient with a with temperature of 102.3. Patient seen and examined at bedside. Patient at this time found to be tachycardic and hypoxic.    VITAL SIGNS:  T(C): 39.1 (01-20-21 @ 20:35), Max: 39.6 (01-20-21 @ 05:06)  HR: 91 (01-20-21 @ 18:23) (61 - 116)  BP: 130/79 (01-20-21 @ 16:08) (113/71 - 134/89)  RR: 18 (01-20-21 @ 16:08) (18 - 40)  SpO2: 90% (01-20-21 @ 18:23) (81% - 99%)    MICROBIOLOGY:   Blood Culture x1 from 1/20/2021 specimen received by performing department, pending result  Blood Culture x1 from 1/21/2021 specimen received by performing department, pending result    PRELIMINARY READ Culture - Blood (01.18.21 @ 10:27)   Specimen Source: .Blood Blood-Peripheral   Culture Results:   No growth to date.    PRELIMINARY READ Culture - Blood (01.18.21 @ 10:27)   Specimen Source: .Blood Blood-Peripheral   Culture Results:   No growth to date.     RADIOLOGY:  < from: Xray Chest 1 View- PORTABLE-Urgent (Xray Chest 1 View- PORTABLE-Urgent .) (01.18.21 @ 09:16) >      IMPRESSION:  Follow-up multifocal covid 19 pneumonia.    < end of copied text >    MEDICATIONS:   Patient being followed by ID - who started the patient on Aztreonam and Vancomycin    PHYSICAL EXAM:  Constitutional: alert  Respiratory: known decr bs b/l, moderate work of breathing  Cardiovascular: S1S2 RRR, no murmurs  Gastrointestinal: soft, nontender,  nondistended   Extremities e/e/c    ASSESSMENT/PLAN:   HPI: 74y/o M pmxh COPD, asthma, HTN, tracheobroncheomalacia, gout, arthritis, presented with shortness of breath and low oxygen from the [facility] infusion center. Now presents with a fever, hypoxic, and tachycardic.     IMPRESSION: Unstable vitals of fever, tachycardia, hypoxia     > RRT immediately called.  - Please refer to RRT note and MICU attending's note for further details.   - Per RRT - they sent an ABG, increased the already ordered BiPAP setting to 22/18 100%, did a repeat chest x-ray done with preliminary read of bilateral patchy opacities, and 1x dose of antipyretic and Lasix 20 mg IV x 1 dose given. Horowitz placed (due to RRT advisement). Patient was also given valium 5mg for anxiety. Pt with know RLE DVT on therapeutic Lovenox, with RRT concerns about PE, but COVID ICU attending, Dr. Carcamo consulted for possible tPA. Bedside ultrasound with b/l Blines, no RV strain noted. Per MICU attending, Dr. Carcamo, given that right heart is smaller than left heart on POCUS and there are other potential causes of hypoxia would hold off giving tPA at this time as risks outweigh the benefits.  - Will endorse to the [facility] ([facility]) day provider to f/u on the above final chest x-ray read.  - RRT aware of the ABG results from 1/20/2021 at 21:14, and the CBC and CMP results from 1/20/2021 at 21:25. RRT also aware of the lactate value on 1/20/2021 at 21:14. Per RRT, continue to monitor the lactate value. Repeat lactate in the AM to trend. Will endorse to the [facility] Medicine ([facility]) day provider to f/u and manage accordingly.  > Will endorse to the [facility] ([facility]) day provider to f/u on the final read of the preliminary above blood cultures and to f/u on the repeat pending/specimen received blood cultures.   > UA ordered. Will endorse to the [facility] ([facility]) day provider to f/u and manage accordingly.  > Continue with current antibiotics of Vancomycin and Azactam.   > By the end of RRT, patient's fever improved and his SPO2 improved to 88-91%. Patient's tachycardia also improving.   > As patient's request, proper family member notified. Dr. Stringer aware of the above patient's status. Dr. Stringer also aware of ABG results from 1/20/2021 at 21:14, and the CBC and CMP results from 1/20/2021 at 21:25. Dr. Stringer also aware of the lactate value on 1/20/2021 at 21:14. Dr. Stringer in agreement with the above management plan. Dr. Stringer aware patient is on BiPAP and in agreement with this.  > Continue to monitor patient and vitals closely.   > F/u with the primary care team in the AM.     RN aware of the above management plan.    Marcia Ward PA-C  Department of Medicine  #[id number] Episodic Note     Called by the RN to evaluate patient with a with temperature of 102.3. Patient seen and examined at bedside. Patient at this time found to be tachycardic and hypoxic.    VITAL SIGNS:  T(C): 39.1 (01-20-21 @ 20:35), Max: 39.6 (01-20-21 @ 05:06)  HR: 91 (01-20-21 @ 18:23) (61 - 116)  BP: 130/79 (01-20-21 @ 16:08) (113/71 - 134/89)  RR: 18 (01-20-21 @ 16:08) (18 - 40)  SpO2: 90% (01-20-21 @ 18:23) (81% - 99%)    MICROBIOLOGY:   Blood Culture x1 from 1/20/2021 specimen received by performing department, pending result  Blood Culture x1 from 1/21/2021 specimen received by performing department, pending result    PRELIMINARY READ Culture - Blood (01.18.21 @ 10:27)   Specimen Source: .Blood Blood-Peripheral   Culture Results:   No growth to date.    PRELIMINARY READ Culture - Blood (01.18.21 @ 10:27)   Specimen Source: .Blood Blood-Peripheral   Culture Results:   No growth to date.     RADIOLOGY:  < from: Xray Chest 1 View- PORTABLE-Urgent (Xray Chest 1 View- PORTABLE-Urgent .) (01.18.21 @ 09:16) >      IMPRESSION:  Follow-up multifocal covid 19 pneumonia.    < end of copied text >    MEDICATIONS:   Patient being followed by ID - who started the patient on Aztreonam and Vancomycin    PHYSICAL EXAM:  Constitutional: alert  Respiratory: known decr bs b/l, moderate work of breathing  Cardiovascular: S1S2 RRR, no murmurs  Gastrointestinal: soft, nontender,  nondistended   Extremities e/e/c    ASSESSMENT/PLAN:   HPI: 76y/o M pmxh COPD, asthma, HTN, tracheobroncheomalacia, gout, arthritis, presented with shortness of breath and low oxygen from the [facility] infusion center. Now presents with a fever, hypoxic, and tachycardic.     IMPRESSION: Unstable vitals of fever, tachycardia, hypoxia     > RRT immediately called.  - Please refer to RRT note and MICU attending's note for further details.   - Per RRT - they sent an ABG, increased the already ordered BiPAP setting to 22/18 100%, did a repeat chest x-ray done with preliminary read of bilateral patchy opacities, and 1x dose of antipyretic and Lasix 20 mg IV x 1 dose given. Horowitz placed (due to RRT advisement). Patient was also given valium 5mg for anxiety. Pt with know RLE DVT on therapeutic Lovenox, with RRT concerns about PE, but COVID ICU attending, Dr. Carcamo consulted for possible tPA. Bedside ultrasound with b/l Blines, no RV strain noted. Per MICU attending, Dr. Carcamo, given that right heart is smaller than left heart on POCUS and there are other potential causes of hypoxia would hold off giving tPA at this time as risks outweigh the benefits.  - Will endorse to the [facility] ([facility]) day provider to f/u on the above final chest x-ray read.  - RRT aware of the ABG results from 1/20/2021 at 21:14, and the CBC and CMP results from 1/20/2021 at 21:25. RRT also aware of the lactate value on 1/20/2021 at 21:14. Per RRT, continue to monitor the lactate value. Repeat lactate in the AM to trend. Will endorse to the [facility] ([facility]) day provider to f/u and manage accordingly.  > Will endorse to the [facility] ([facility]) day provider to f/u on the final read of the preliminary above blood cultures and to f/u on the repeat pending/specimen received blood cultures.   > UA ordered. Will endorse to the [facility] ([facility]) day provider to f/u and manage accordingly.  > Continue with current antibiotics of Vancomycin and Azactam.   > By the end of RRT, patient's fever improved and his SPO2 improved to 88-91%. Patient's tachycardia also improving.   > As patient's requested, proper family member notified. Dr. Stringer aware of the above patient's status. Dr. Stringer also aware of ABG results from 1/20/2021 at 21:14, and the CBC and CMP results from 1/20/2021 at 21:25. Dr. Stringer also aware of the lactate value on 1/20/2021 at 21:14. Dr. Stringer in agreement with the above management plan. Dr. Stringer aware patient is on BiPAP and in agreement with this.  > Continue to monitor patient and vitals closely.   > F/u with the primary care team in the AM.     RN aware of the above management plan.    Marcia Ward PA-C  Department of Medicine  #[id number]

## 2021-01-20 NOTE — CHART NOTE - NSCHARTNOTEFT_GEN_A_CORE
Called by RN as pt having fever of 103.2. + Chills as well. + FRIED.       Vital Signs Last 24 Hrs  T(C): 39.6 (20 Jan 2021 05:06), Max: 39.6 (20 Jan 2021 05:06)  T(F): 103.2 (20 Jan 2021 05:06), Max: 103.2 (20 Jan 2021 05:06)  HR: 116 (20 Jan 2021 05:06) (57 - 116)  BP: 134/89 (20 Jan 2021 05:06) (111/73 - 137/90)  BP(mean): --  RR: 20 (20 Jan 2021 05:06) (20 - 40)  SpO2: 90% (20 Jan 2021 05:06) (90% - 95%)      Labs:                          13.2   12.77 )-----------( 281      ( 19 Jan 2021 09:32 )             39.2     01-19    132<L>  |  95<L>  |  27<H>  ----------------------------<  123<H>  4.7   |  25  |  0.68    Ca    8.7      19 Jan 2021 09:31    TPro  6.7  /  Alb  2.5<L>  /  TBili  1.0  /  DBili  <0.1  /  AST  63<H>  /  ALT  126<H>  /  AlkPhos  317<H>  01-19          Assessment & Plan:    76 y/o M w/severe persistent asthma recently diagnosed with COVID-19 now admitted for hypoxemia in setting of COVID-19 PNA. Now with fever of 103.2    Fever likely secondary to Covid 19  - Cooling measrues  - IV Tylenol X 1  - F/U blood culture from 1/18 (prelim neg)  - Continue Bipap  - s/p Remdesvir, continue Dexamethasone   -Will continue to monitor   F/U with primary team in AM    Krysta Davenport NP  [id number]

## 2021-01-20 NOTE — PROGRESS NOTE ADULT - ASSESSMENT
76y/o M pmxh COPD, asthma, HTN, tracheobroncheomalacia, gout, arthritis, presenting with shortness of breath and low oxygen from the [facility] infusion center. diagnosed with COVID PCR+ on 1/2, onset of symptoms 1/2   was referred for [facility] infusion today by his pulmonologist Dr. Lance, and  Sats 95% on 6L nasal cannula.  Pt with reported covid pcr (+) at [facility] on 1/8. Adm 1/11     Covid (+):    - s/p completion of remdesivir x 5 day course last day 1/17.    - Cont 10 day course dexamethasone  - Pt with fever, bcx 1/18 ngtd.  Tmax 1/20 103.2.  Send repeat blood cx x 2.  Noted elevated of PCT.  Will start abx empirically (vanco/aztreonam.  Pt with allergy to pcn)    - DD remains in the thousands.  LE doppler (+) RLE dvt.  On full dose therapeutic lovenox.  - Pt hypoxic, requiring bipap.     Allyson Downs  [phone]

## 2021-01-20 NOTE — PROGRESS NOTE ADULT - SUBJECTIVE AND OBJECTIVE BOX
CHIEF COMPLAINT: f/up sob, resp failure, severe persistent asthma, TBM, osas, covid 19 pneumonitis-slighlty less sob, cough but very weak and achy    Interval Events: dex in progress/remdisivir-completed, bipap but has not gotten oob    REVIEW OF SYSTEMS:  Constitutional: No fevers or chills. No weight loss. No fatigue or generalized malaise.  Eyes: No itching or discharge from the eyes  ENT: No ear pain. No ear discharge. No nasal congestion. No post nasal drip. No epistaxis. No throat pain. No sore throat. No difficulty swallowing.   CV: No chest pain. No palpitations. No lightheadedness or dizziness.   Resp: No dyspnea at rest. No dyspnea on exertion. No orthopnea. No wheezing. No cough. No stridor. No sputum production. No chest pain with respiration.  GI: No nausea. No vomiting. No diarrhea.  MSK: No joint pain or pain in any extremities  Integumentary: No skin lesions. No pedal edema.  Neurological: No gross motor weakness. No sensory changes.  [ ] All other systems negative  [ ] Unable to assess ROS because ________    OBJECTIVE:  ICU Vital Signs Last 24 Hrs  T(C): 39.6 (20 Jan 2021 05:06), Max: 39.6 (20 Jan 2021 05:06)  T(F): 103.2 (20 Jan 2021 05:06), Max: 103.2 (20 Jan 2021 05:06)  HR: 111 (20 Jan 2021 05:06) (57 - 111)  BP: 134/89 (20 Jan 2021 05:06) (111/73 - 137/90)  BP(mean): --  ABP: --  ABP(mean): --  RR: 20 (20 Jan 2021 05:06) (20 - 29)  SpO2: 90% (20 Jan 2021 05:06) (90% - 95%)        01-18 @ 07:01  -  01-19 @ 07:00  --------------------------------------------------------  IN: 0 mL / OUT: 250 mL / NET: -250 mL    01-19 @ 07:01  -  01-20 @ 05:43  --------------------------------------------------------  IN: 160 mL / OUT: 0 mL / NET: 160 mL      CAPILLARY BLOOD GLUCOSE          PHYSICAL EXAM:  General: Awake, alert, oriented X 3.   HEENT: Atraumatic, normocephalic.                 Mallampatti Grade                 No nasal congestion.                No tonsillar or pharyngeal exudates.  Lymph Nodes: No palpable lymphadenopathy  Neck: No JVD. No carotid bruit.   Respiratory: Normal chest expansion                         Normal percussion                         Normal and equal air entry                         No wheeze, rhonchi or rales.  Cardiovascular: S1 S2 normal. No murmurs, rubs or gallops.   Abdomen: Soft, non-tender, non-distended. No organomegaly. Normoactive bowel sounds.  Extremities: Warm to touch. Peripheral pulse palpable. No pedal edema.   Skin: No rashes or skin lesions  Neurological: Motor and sensory examination equal and normal in all four extremities.  Psychiatry: Appropriate mood and affect.    HOSPITAL MEDICATIONS:  MEDICATIONS  (STANDING):  acetaminophen  IVPB .. 1000 milliGRAM(s) IV Intermittent once  allopurinol 150 milliGRAM(s) Oral daily  amitriptyline 10 milliGRAM(s) Oral three times a day  amLODIPine   Tablet 5 milliGRAM(s) Oral daily  aspirin  chewable 81 milliGRAM(s) Oral daily  atorvastatin 10 milliGRAM(s) Oral at bedtime  dexAMETHasone     Tablet 6 milliGRAM(s) Oral daily  DULoxetine 60 milliGRAM(s) Oral daily  enoxaparin Injectable 90 milliGRAM(s) SubCutaneous two times a day  montelukast 10 milliGRAM(s) Oral daily  pantoprazole    Tablet 40 milliGRAM(s) Oral daily    MEDICATIONS  (PRN):  ALBUTerol    90 MICROgram(s) HFA Inhaler 2 Puff(s) Inhalation every 6 hours PRN Bronchospasm  diazepam    Tablet 5 milliGRAM(s) Oral four times a day PRN anxiety      LABS:                        13.2   12.77 )-----------( 281      ( 19 Jan 2021 09:32 )             39.2     01-19    132<L>  |  95<L>  |  27<H>  ----------------------------<  123<H>  4.7   |  25  |  0.68    Ca    8.7      19 Jan 2021 09:31    TPro  6.7  /  Alb  2.5<L>  /  TBili  1.0  /  DBili  <0.1  /  AST  63<H>  /  ALT  126<H>  /  AlkPhos  317<H>  01-19              MICROBIOLOGY:     RADIOLOGY:  [ ] Reviewed and interpreted by me    Point of Care Ultrasound Findings:    PFT:    EKG: CHIEF COMPLAINT: f/up sob, resp failure, severe persistent asthma, TBM, osas, covid 19 pneumonitis-remains very weak, FRIED and achy    Interval Events: dex in progress/remdisivir-completed, bipap but has not gotten oob, fevers persist    REVIEW OF SYSTEMS:  Constitutional: No fevers or chills. No weight loss. + fatigue or generalized malaise.  Eyes: No itching or discharge from the eyes  ENT: No ear pain. No ear discharge. No nasal congestion. No post nasal drip. No epistaxis. No throat pain. No sore throat. No difficulty swallowing.   CV: No chest pain. No palpitations. No lightheadedness or dizziness.   Resp: No dyspnea at rest. + dyspnea on exertion. No orthopnea. No wheezing. No cough. No stridor. No sputum production. No chest pain with respiration.  GI: No nausea. No vomiting. No diarrhea.  MSK: No joint pain or pain in any extremities  Integumentary: No skin lesions. No pedal edema.  Neurological: + gross motor weakness. No sensory changes.  [ +] All other systems negative  [ ] Unable to assess ROS because ________    OBJECTIVE:  ICU Vital Signs Last 24 Hrs  T(C): 39.6 (20 Jan 2021 05:06), Max: 39.6 (20 Jan 2021 05:06)  T(F): 103.2 (20 Jan 2021 05:06), Max: 103.2 (20 Jan 2021 05:06)  HR: 111 (20 Jan 2021 05:06) (57 - 111)  BP: 134/89 (20 Jan 2021 05:06) (111/73 - 137/90)  BP(mean): --  ABP: --  ABP(mean): --  RR: 20 (20 Jan 2021 05:06) (20 - 29)  SpO2: 90% (20 Jan 2021 05:06) (90% - 95%)        01-18 @ 07:01  -  01-19 @ 07:00  --------------------------------------------------------  IN: 0 mL / OUT: 250 mL / NET: -250 mL    01-19 @ 07:01  -  01-20 @ 05:43  --------------------------------------------------------  IN: 160 mL / OUT: 0 mL / NET: 160 mL      CAPILLARY BLOOD GLUCOSE          PHYSICAL EXAM: on bipap  General: Awake, alert, oriented X 3.   HEENT: Atraumatic, normocephalic.                 Mallampatti Grade 3                No nasal congestion.                No tonsillar or pharyngeal exudates.  Lymph Nodes: No palpable lymphadenopathy  Neck: No JVD. No carotid bruit.   Respiratory: Normal chest expansion                         Normal percussion                         Normal and equal air entry                         No wheeze, rhonchi but bibasilar  rales.  Cardiovascular: S1 S2 normal. No murmurs, rubs or gallops.   Abdomen: Soft, non-tender, non-distended. No organomegaly. Normoactive bowel sounds.  Extremities: Warm to touch. Peripheral pulse palpable. + pedal edema.   Skin: No rashes or skin lesions  Neurological: Motor and sensory examination equal and normal in all four extremities.  Psychiatry: Appropriate mood and affect.    HOSPITAL MEDICATIONS:  MEDICATIONS  (STANDING):  acetaminophen  IVPB .. 1000 milliGRAM(s) IV Intermittent once  allopurinol 150 milliGRAM(s) Oral daily  amitriptyline 10 milliGRAM(s) Oral three times a day  amLODIPine   Tablet 5 milliGRAM(s) Oral daily  aspirin  chewable 81 milliGRAM(s) Oral daily  atorvastatin 10 milliGRAM(s) Oral at bedtime  dexAMETHasone     Tablet 6 milliGRAM(s) Oral daily  DULoxetine 60 milliGRAM(s) Oral daily  enoxaparin Injectable 90 milliGRAM(s) SubCutaneous two times a day  montelukast 10 milliGRAM(s) Oral daily  pantoprazole    Tablet 40 milliGRAM(s) Oral daily    MEDICATIONS  (PRN):  ALBUTerol    90 MICROgram(s) HFA Inhaler 2 Puff(s) Inhalation every 6 hours PRN Bronchospasm  diazepam    Tablet 5 milliGRAM(s) Oral four times a day PRN anxiety      LABS:                        13.2   12.77 )-----------( 281      ( 19 Jan 2021 09:32 )             39.2     01-19    132<L>  |  95<L>  |  27<H>  ----------------------------<  123<H>  4.7   |  25  |  0.68    Ca    8.7      19 Jan 2021 09:31    TPro  6.7  /  Alb  2.5<L>  /  TBili  1.0  /  DBili  <0.1  /  AST  63<H>  /  ALT  126<H>  /  AlkPhos  317<H>  01-19              MICROBIOLOGY:     RADIOLOGY:  [ ] Reviewed and interpreted by me    Point of Care Ultrasound Findings:    PFT:    EKG:

## 2021-01-20 NOTE — RAPID RESPONSE TEAM SUMMARY - NSSITUATIONBACKGROUNDRRT_GEN_ALL_CORE
This is a 76y/o M pmxh COPD, asthma, HTN, tracheobroncheomalacia, gout, arthritis presented with COVID PNA, multiple RRTs in the past, this time RRT called for hypoxia to 40s. On arrival to RRT, pt in bed on BiPAP with moderate work of breathing. Pt febrile 102, SBP in 120s, HR in 120s and o2sat 38-40 on 100% fio2. Pt given tylenol IV 1g and send all labs including Abg. Pt with know RLE DVT on therapeutic  Lovenox, concerns about PE, COVID ICU attending Dr. Carcamo consulted. Bedside ultrasound with b/l Blines, no RV strain noted, VTI = 12-14. Bipap setting increased to 22/18 100%, eventually pt's o2sat increased to 88-89%. CXR with b/l patchy opacities, gave Lasix 20mg x and Horowitz placed. Pt also given valium 5mg for anxiety. Provider at bedside, knows the plan, will speak to the family about this event. Need to monitor closely. Pt DNR/DNI, need a palliative consult in the morning. This is a 76y/o M pmxh COPD, asthma, HTN, tracheobroncheomalacia, gout, arthritis presented with COVID PNA, multiple RRTs in the past, this time RRT called for hypoxia to 40s. On arrival to RRT, pt in bed on BiPAP with moderate work of breathing. Pt febrile 102, SBP in 120s, HR in 120s and o2sat 38-40 on 100% fio2. Pt given tylenol IV 1g and send all labs including Abg. Pt with know RLE DVT on therapeutic  Lovenox, concerns about PE, COVID ICU attending Dr. Carcamo consulted for possible tPA. Bedside ultrasound with b/l Blines, no RV strain noted, VTI = 12-14. Bipap setting increased to 22/18 100%, eventually pt's o2sat increased to 88-89%. CXR with b/l patchy opacities, gave Lasix 20mg x and Horowitz placed. Pt also given valium 5mg for anxiety. Provider at bedside, knows the plan, will speak to the family about this event. Need to monitor closely. Pt DNR/DNI, need a palliative consult in the morning. This is a 76y/o M pmxh COPD, asthma, HTN, tracheobroncheomalacia, gout, arthritis presented with COVID PNA, multiple RRTs in the past, this time RRT called for hypoxia to 40s. On arrival to RRT, pt in bed on BiPAP with moderate work of breathing. Pt febrile 102, SBP in 120s, HR in 120s and o2sat 38-40 on 100% fio2. Pt given tylenol IV 1g and send all labs including Abg.  Bipap setting increased to 22/18 100%, eventually pt's o2sat increased to 88-89%. CXR with b/l patchy opacities, gave Lasix 20mg and Horowitz placed. Pt also given valium 5mg for anxiety. Pt with know RLE DVT on therapeutic  Lovenox, concerns about PE, COVID ICU attending Dr. Carcamo consulted for possible tPA. Bedside ultrasound with b/l Blines, no RV strain noted, VTI = 12-14. Provider at bedside, knows the plan, will speak to the family about this event. Pt DNR/DNI, need a palliative consult in the morning.

## 2021-01-21 NOTE — PROGRESS NOTE ADULT - NSREFPHYEXINPTDOCREFER_GEN_ALL_CORE
IM
hospitalist
Primary Team
Primary Team

## 2021-01-21 NOTE — PROGRESS NOTE ADULT - SUBJECTIVE AND OBJECTIVE BOX
CHIEF COMPLAINT: f/up sob, resp failure, severe persistent asthma, TBM, osas, covid 19 pneumonitis-remains depressed and weak and still sob (no changes over the day)    Interval Events: dex in progress/remdisivir-completed, bipap but has not gotten oob; bipap     REVIEW OF SYSTEMS:  Constitutional: No fevers or chills. No weight loss. + fatigue or generalized malaise.  Eyes: No itching or discharge from the eyes  ENT: No ear pain. No ear discharge. No nasal congestion. No post nasal drip. No epistaxis. No throat pain. No sore throat. No difficulty swallowing.   CV: No chest pain. No palpitations. No lightheadedness or dizziness.   Resp: No dyspnea at rest. + dyspnea on exertion. No orthopnea. No wheezing. No cough. No stridor. No sputum production. No chest pain with respiration.  GI: No nausea. No vomiting. No diarrhea.  MSK: No joint pain or pain in any extremities  Integumentary: No skin lesions. No pedal edema.  Neurological: + gross motor weakness. No sensory changes.  [+ ] All other systems negative  [ ] Unable to assess ROS because ________    OBJECTIVE:  ICU Vital Signs Last 24 Hrs  T(C): 36.9 (2021 04:31), Max: 39.1 (2021 20:35)  T(F): 98.5 (2021 04:31), Max: 102.3 (2021 20:35)  HR: 98 (2021 04:31) (61 - 110)  BP: 104/70 (2021 04:31) (104/70 - 131/75)  BP(mean): --  ABP: --  ABP(mean): --  RR: 28 (2021 04:31) (18 - 28)  SpO2: 89% (2021 04:31) (81% - 99%)         @ 07:01  -   @ 07:00  --------------------------------------------------------  IN: 660 mL / OUT: 500 mL / NET: 160 mL     @ 07:01   @ 05:27  --------------------------------------------------------  IN: 60 mL / OUT: 1000 mL / NET: -940 mL      CAPILLARY BLOOD GLUCOSE      POCT Blood Glucose.: 89 mg/dL (2021 20:56)      PHYSICAL EXAM: on bipap in bed  General: Awake, alert, oriented X 3.   HEENT: Atraumatic, normocephalic.                 Mallampatti Grade 3                No nasal congestion.                No tonsillar or pharyngeal exudates.  Lymph Nodes: No palpable lymphadenopathy  Neck: No JVD. No carotid bruit.   Respiratory: abnormal chest expansion-reduced BS bases                         abnormal percussion                         Normal and equal air entry                         No wheeze, rhonchi but bibasilar rales.  Cardiovascular: S1 S2 normal. No murmurs, rubs or gallops.   Abdomen: Soft, non-tender, non-distended. No organomegaly. Normoactive bowel sounds.  Extremities: Warm to touch. Peripheral pulse palpable. + pedal edema.   Skin: No rashes or skin lesions  Neurological: Motor and sensory examination equal and normal in all four extremities.  Psychiatry: Appropriate mood and affect.    HOSPITAL MEDICATIONS:  MEDICATIONS  (STANDING):  allopurinol 150 milliGRAM(s) Oral daily  amitriptyline 10 milliGRAM(s) Oral three times a day  amLODIPine   Tablet 5 milliGRAM(s) Oral daily  aspirin  chewable 81 milliGRAM(s) Oral daily  atorvastatin 10 milliGRAM(s) Oral at bedtime  aztreonam  IVPB 1000 milliGRAM(s) IV Intermittent every 8 hours  dexAMETHasone     Tablet 6 milliGRAM(s) Oral daily  DULoxetine 60 milliGRAM(s) Oral daily  enoxaparin Injectable 90 milliGRAM(s) SubCutaneous two times a day  montelukast 10 milliGRAM(s) Oral daily  pantoprazole    Tablet 40 milliGRAM(s) Oral daily  vancomycin  IVPB 1000 milliGRAM(s) IV Intermittent every 12 hours    MEDICATIONS  (PRN):  ALBUTerol    90 MICROgram(s) HFA Inhaler 2 Puff(s) Inhalation every 6 hours PRN Bronchospasm      LABS:                        13.1   19.79 )-----------( 459      ( 2021 21:25 )             39.8         134<L>  |  95<L>  |  27<H>  ----------------------------<  140<H>  5.0   |  21<L>  |  0.63    Ca    8.8      2021 21:25  Phos  4.6       Mg     2.3         TPro  7.4  /  Alb  2.8<L>  /  TBili  1.4<H>  /  DBili  x   /  AST  110<H>  /  ALT  131<H>  /  AlkPhos  361<H>        Urinalysis Basic - ( 2021 00:27 )    Color: Yellow / Appearance: Clear / S.023 / pH: x  Gluc: x / Ketone: Negative  / Bili: Negative / Urobili: Negative   Blood: x / Protein: Trace / Nitrite: Negative   Leuk Esterase: Negative / RBC: 1 /hpf / WBC 1 /HPF   Sq Epi: x / Non Sq Epi: 1 / Bacteria: Negative      Arterial Blood Gas:   @ 21:14  7.43/38/40/24/72/.7  ABG lactate: --        MICROBIOLOGY:     RADIOLOGY:  [ ] Reviewed and interpreted by me    Point of Care Ultrasound Findings:    PFT:    EKG:

## 2021-01-21 NOTE — CHART NOTE - NSCHARTNOTEFT_GEN_A_CORE
Episodic Note     Called to pronounce patient with cardiopulmonary arrest secondary to COVID-19.   Patient seen and evaluated at bedside.  Physical exam shows patient in bed, unresponsive to verbal and tactile stimuli. Skin pale, heart sounds absent, no spontaneous respirations, no palpable blood pressure or pulse, pupils fixed and dilated.    Pronounced dead at 21:50 on 1/21/2021.   Family notified.  Family offered autopsy, and declined.  This patient's case does not qualify to be a Medical Examiner's case.  Attending Dr. Stringer notified. Dr. Stringer in agreement with the above plan.     Marcia Ward PA-C  Department of Medicine   #[id number]

## 2021-01-21 NOTE — PROVIDER CONTACT NOTE (OTHER) - DATE AND TIME:
15-Tyson-2021 00:33
17-Jan-2021 04:33
21-Jan-2021 19:45
19-Jan-2021 09:45
20-Jan-2021 05:10
19-Jan-2021 05:35
20-Jan-2021 02:58

## 2021-01-21 NOTE — PROGRESS NOTE ADULT - SUBJECTIVE AND OBJECTIVE BOX
Patient is a 75y old  Male who presents with a chief complaint of sob (2021 14:58)    Date of servie : 21 @ 16:50  INTERVAL HPI/OVERNIGHT EVENTS:  T(C): 37 (21 @ 16:13), Max: 39.1 (21 @ 20:35)  HR: 90 (21 @ 16:13) (90 - 110)  BP: 130/80 (21 @ 16:13) (94/61 - 130/80)  RR: 22 (21 @ 16:13) (22 - 28)  SpO2: 90% (21 @ 16:13) (89% - 95%)  Wt(kg): --  I&O's Summary    2021 07:01  -  2021 07:00  --------------------------------------------------------  IN: 60 mL / OUT: 1000 mL / NET: -940 mL        LABS:                        13.1   12.57 )-----------( 401      ( 2021 07:22 )             39.7         134<L>  |  94<L>  |  34<H>  ----------------------------<  107<H>  4.8   |  27  |  0.87    Ca    8.8      2021 07:47  Phos  4.6       Mg     2.3         TPro  7.2  /  Alb  2.8<L>  /  TBili  1.0  /  DBili  0.5<H>  /  AST  82<H>  /  ALT  118<H>  /  AlkPhos  338<H>        Urinalysis Basic - ( 2021 00:27 )    Color: Yellow / Appearance: Clear / S.023 / pH: x  Gluc: x / Ketone: Negative  / Bili: Negative / Urobili: Negative   Blood: x / Protein: Trace / Nitrite: Negative   Leuk Esterase: Negative / RBC: 1 /hpf / WBC 1 /HPF   Sq Epi: x / Non Sq Epi: 1 / Bacteria: Negative      CAPILLARY BLOOD GLUCOSE      POCT Blood Glucose.: 89 mg/dL (2021 20:56)    ABG - ( 2021 21:14 )  pH, Arterial: 7.43  pH, Blood: x     /  pCO2: 38    /  pO2: 40    / HCO3: 24    / Base Excess: .7    /  SaO2: 72                  Urinalysis Basic - ( 2021 00:27 )    Color: Yellow / Appearance: Clear / S.023 / pH: x  Gluc: x / Ketone: Negative  / Bili: Negative / Urobili: Negative   Blood: x / Protein: Trace / Nitrite: Negative   Leuk Esterase: Negative / RBC: 1 /hpf / WBC 1 /HPF   Sq Epi: x / Non Sq Epi: 1 / Bacteria: Negative        MEDICATIONS  (STANDING):  allopurinol 150 milliGRAM(s) Oral daily  amitriptyline 10 milliGRAM(s) Oral three times a day  aspirin  chewable 81 milliGRAM(s) Oral daily  atorvastatin 10 milliGRAM(s) Oral at bedtime  aztreonam  IVPB 1000 milliGRAM(s) IV Intermittent every 8 hours  DULoxetine 60 milliGRAM(s) Oral daily  enoxaparin Injectable 90 milliGRAM(s) SubCutaneous two times a day  montelukast 10 milliGRAM(s) Oral daily  pantoprazole  Injectable 40 milliGRAM(s) IV Push daily  vancomycin  IVPB 1000 milliGRAM(s) IV Intermittent every 12 hours    MEDICATIONS  (PRN):  ALBUTerol    90 MICROgram(s) HFA Inhaler 2 Puff(s) Inhalation every 6 hours PRN Bronchospasm          PHYSICAL EXAM:  GENERAL: NAD, well-groomed, well-developed  HEAD:  Atraumatic, Normocephalic  CHEST/LUNG: Clear to percussion bilaterally; No rales, rhonchi, wheezing, or rubs  HEART: Regular rate and rhythm; No murmurs, rubs, or gallops  ABDOMEN: Soft, Nontender, Nondistended; Bowel sounds present  EXTREMITIES:  2+ Peripheral Pulses, No clubbing, cyanosis, or edema  LYMPH: No lymphadenopathy noted  SKIN: No rashes or lesions    Care Discussed with Consultants/Other Providers [ ] YES  [ ] NO

## 2021-01-21 NOTE — CHART NOTE - NSCHARTNOTEFT_GEN_A_CORE
Episodic Note    Notified during sign out by Medicine ([facility]) day provider pt with low o2 sat  ISRAEL Kennedy  21-Jan-2021 19:45  pt with low o2 sat  pt admitted with infection due to covid  pt not responding. decreased o2 sat  continue to monitor  pt to go on non-rebreather 2 mg morphine PRN      ISRAEL Garrett  21-Jan-2021 19:12  pt post rapid. pt's o2 low mid 60's. pt not responding pulse present  pt is a 74 y/o male admitted with covid. Pt DNR/DNI  pt's o2 sat 60 faint pulse not responding at present  rapid called and cancelled . recommended d/c bipap and place on non-rebreather Episodic Note    Notified during sign out by Medicine ([facility]) day provider Gerry Guadarrama PA-C, that patient with low SPO2 in the mid 60's and that patient not responding, but faint pulse present. At that time, Medicine ([facility]) day provider Gerry Oviedo PA-C cancelled the rapid called and instructed the RN to place the patient on a non-rebreather to make the patient comfortable since his code status is DNR/DNI. Patient seen and evaluated at bedside. Palpated pulse present. Patient on a non-rebreather at that time now, due to the instructions of the above Medicine ([facility]) day provider Gerry Guadarrama PA-C.     Vital Signs Last 24 Hrs (vitals prior to this incidence)  T(C): 37 (21 Jan 2021 16:13), Max: 37 (21 Jan 2021 16:13)  T(F): 98.6 (21 Jan 2021 16:13), Max: 98.6 (21 Jan 2021 16:13)  HR: 94 (21 Jan 2021 17:18) (90 - 110)  BP: 130/80 (21 Jan 2021 16:13) (94/61 - 130/80)  RR: 22 (21 Jan 2021 16:13) (22 - 28)  SpO2: 92% (21 Jan 2021 17:18) (89% - 95%)    PHYSICAL EXAM:  GENERAL: well-groomed, well-developed  CHEST/LUNG: Clear to percussion bilaterally; No rales, rhonchi, wheezing, or rubs  HEART: Pulse palpated, No murmurs, rubs, or gallops  ABDOMEN: Soft, Nontender, Nondistended  EXTREMITIES:  Intact Peripheral Pulses    ASSESSMENT/PLAN:   HPI: 76y/o M pmxh COPD, asthma, HTN, tracheobroncheomalacia, gout, arthritis, presented with shortness of breath and low oxygen from the [facility] infusion center. Now presents with a low SPO2 saturation.    1. IMPRESSION: Low SPO2 saturation   > Immediately called and discussed all of the above with Dr. Stringer.   > Per Dr. Stringer, at this time keep the patient off non-rebreather and off the BiPAP to make the patient comfortable, even though the SPO2 remains low on the non-rebreather (which Dr. Stringer is aware of), to make the patient comfortable.   > Another RRT was called, but then cancelled by Dr. Stringer.   > Per Dr. Stringer, he instructed me to order for 2 mg of Morphine IV push every 4 hours as needed. Morphine order placed on his behalf.   > Continue to monitor the patient and vitals closely.   > Patient's family called and reviewed patient's case. Code status of DNR/DNI confirmed with the family. MOLST in chart.   > F/u with the primary care team in the AM.     RN aware of the above management plan. Dr. Stringer aware of patient's above status, with his instructions above.     Marcia Ward PA-C  Department of Medicine   #[id number] Episodic Note    Notified during sign out by Medicine ([facility]) day provider Gerry Guadarrama PA-C, that patient with low SPO2 in the mid 60's and that patient not responding, but faint pulse present. At that time, Medicine ([facility]) day provider Gerry Oviedo PA-C cancelled the rapid called and instructed the RN to place the patient on a non-rebreather to make the patient comfortable since his code status is DNR/DNI. Patient seen and evaluated at bedside. Palpated pulse present. Patient on a non-rebreather at that time now, due to the instructions of the above Medicine ([facility]) day provider Gerry Guadarrama PA-C.     Vital Signs Last 24 Hrs (vitals prior to this incidence)  T(C): 37 (21 Jan 2021 16:13), Max: 37 (21 Jan 2021 16:13)  T(F): 98.6 (21 Jan 2021 16:13), Max: 98.6 (21 Jan 2021 16:13)  HR: 94 (21 Jan 2021 17:18) (90 - 110)  BP: 130/80 (21 Jan 2021 16:13) (94/61 - 130/80)  RR: 22 (21 Jan 2021 16:13) (22 - 28)  SpO2: 92% (21 Jan 2021 17:18) (89% - 95%)    PHYSICAL EXAM:  GENERAL: well-groomed, well-developed  CHEST/LUNG: Clear to percussion bilaterally; No rales, rhonchi, wheezing, or rubs  HEART: Pulse palpated, No murmurs, rubs, or gallops  ABDOMEN: Soft, Nontender, Nondistended  EXTREMITIES:  Intact Peripheral Pulses    ASSESSMENT/PLAN:   HPI: 76y/o M pmxh COPD, asthma, HTN, tracheobroncheomalacia, gout, arthritis, presented with shortness of breath and low oxygen from the [facility] infusion center. Now presents with a low SPO2 saturation.    1. IMPRESSION: Low SPO2 saturation   > Immediately called and discussed all of the above with Dr. Stringer.   > Per Dr. Stringer, at this time keep the patient off non-rebreather and off the BiPAP to make the patient comfortable, even though the SPO2 remains low on the non-rebreather (which Dr. Stringer is aware of), to make the patient comfortable. Non-rebreather order placed on Dr. Stringer's behalf.   > Another RRT was called, but then cancelled by Dr. Stringer.   > Per Dr. Stringer, he instructed me to order for 2 mg of Morphine IV push every 4 hours as needed. Morphine order placed on his behalf.   > Continue to monitor the patient and vitals closely.   > Patient's family called and reviewed patient's case. Code status of DNR/DNI confirmed with the family. ZORAIDAST in chart.   > F/u with the primary care team in the AM.     RN aware of the above management plan. Dr. Stringer aware of patient's above status, with his instructions above.     Marcia Ward PA-C  Department of Medicine   #[id number]

## 2021-01-21 NOTE — PROGRESS NOTE ADULT - ATTENDING COMMENTS
as above-initiated ABX (vanco/aztreonam D2 as per ID -pcn allergy) (w/up in progress)-on bipap 100% at present and anxiolytics  multifactorial resp failure-severe persistent asthma, TBM, debility, CAD, covid 19 pneumonitis--O2 keep sat above 90%--reduce hiflo as able/bipap  severe persistent asthma-symbicort 160, spiriva 1 q day, singulair 10, out pt xolair  TBM-s/p surgery-acapella, incentive spirometry, amitriptyline   covid19 pna--dexameth 6mg D8/10 and completed 5 days of remdisivir, zn, melatonin, vit d//c; f/up mediators-trops, crp, ddimer, feritin etc.  ID f/up-elev temp/wbc-await cx-? due to PE/DVT vs other--ABX as per ID (procalcitonin)-D2 empiric vanco/aztreonam                                           CV mult meds  DVT RLE-full dose lovenox rx     OOB     GI pepcid prophylaxis        PT-OOB as able    prog guarded  Clay Lance MD-Pulmonary   [phone]

## 2021-01-21 NOTE — PROGRESS NOTE ADULT - ASSESSMENT
Assessment and Plan    76y/o M pmxh COPD, asthma, HTN, tracheobroncheomalacia, gout, arthritis, presenting with shortness of breath and low oxygen from the [facility] infusion center    EKG: SR no ischemic changes no ischemic changes     1) COVID 19 PNA:   -likely cause of SOB   -s/p remdesivir and dexa    -ddimer elevated, on lovenox BID. LE dopplers with evidence of DVTs  -on BIPAP, ABG with PO2 40. s/p RRT overnight for hypoxia on bipap temp elevated as well, now on IV abx  -f/u ID and pulm recs    2) HTN   -controlled  -amlodipine d/thomas as per ICU recs  -continue to monitor BP    3) DVT  -LE dopplers with Right calf deep vein thrombosis & Acute deep venous thrombosis: below the knee.  -on therapeutic lovenox dosage now
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
## 2021-01-21 NOTE — CHART NOTE - NSCHARTNOTEFT_GEN_A_CORE
Updates provided to the son.  He is the only child.   The son is lamenting the challenge this year has brought.  We reviewed the case at length.  Trial of Abx sought.  He was informed that this may not yield much benefit given clinical course.  Pt's son is struggling.  Continue to follow up

## 2021-01-21 NOTE — PROGRESS NOTE ADULT - PROVIDER SPECIALTY LIST ADULT
Hospitalist
Infectious Disease
Cardiology
Cardiology
Hospitalist
Cardiology
Hospitalist
Hospitalist
Infectious Disease
Pulmonology
Cardiology
Cardiology
Hospitalist
Infectious Disease
Nephrology
Nephrology
Pulmonology
Cardiology
Infectious Disease
Nephrology
Pulmonology
Nephrology
Nephrology
Palliative Care
Palliative Care

## 2021-01-21 NOTE — PROGRESS NOTE ADULT - ASSESSMENT
74 y/o M w/severe persistent asthma recently diagnosed with COVID-19 now admitted for hypoxemia in setting of COVID-19 PNA.    - Wean supplemental O2 as tolerated  - Continue decadron  - Bronchodilators, acapella  - Supportive care  *******************  1/13- no significant changes in status  1/14-more comfortable today, anxiolytics ok here  1/15-on bipap, more comfortable  1/19-completed remdisivi 1/17, on dex 6mg until 1/22; ID f/up  1/20-no signif changes-fevers--fully cultured  1/21-empiric vanco/aztreonam as per ID (pcn allergy)-no changes

## 2021-01-21 NOTE — PROGRESS NOTE ADULT - ASSESSMENT
Problem/Plan - 1:  ·  Problem: COVID-19.  Plan: cw emperic antibiotics   ID FU  pulmonary fu appreciated   remains on bipap    Problem/Plan - 2:  ·  Problem: COPD (chronic obstructive pulmonary disease).  Plan: cw albuterol  pulmonary fu     Problem/Plan - 3:·  Problem: Depression.  Plan: cw home meds.     Problem/Plan - 4:  ·  Problem: DVT   Plan: on full dose AC    Prognosis guarded  palliative following   pt doing poorly
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
## 2021-01-21 NOTE — GOALS OF CARE CONVERSATION - ADVANCED CARE PLANNING - CONVERSATION DETAILS
Updates provided to the son.  He is the only child.   The son is lamenting the challenge this year has brought.  We reviewed the case at length.  Trial of Abx sought.  He was informed that this may not yield much benefit given clinical course.  Pt's son is struggling.  Continue to follow up.    Electronic Signatures for Addendum Section:   Gurwinder Clifford) (Signed Addendum 21-Jan-2021 14:58)  	Total time spent including the following  []chart review  []care coordination  [x]discussion with the primary team  [x]discussion with the patient, surrogate decision maker, or family  Time spent: > 20 min

## 2021-01-21 NOTE — PROVIDER CONTACT NOTE (CHANGE IN STATUS NOTIFICATION) - BACKGROUND
Admitted Covid 19+, inflenza B+
Pt admitted for hypoxia, COVID 19+
pt is a 74 y/o male admitted with covid. Pt DNR/DNI
Admitted for increased SOB and decreased POX

## 2021-01-21 NOTE — PROGRESS NOTE ADULT - SUBJECTIVE AND OBJECTIVE BOX
[facility]   MD David Montalvo MD, D.O. Ruoru Wong, PA    From 7 AM - 5 PM:  OFFICE: [phone]  Dr. Moore cell: [phone]  Dr. Tijerina cell: [phone]  Dr. Claros cell: [phone]  ISRAEL Le cell: [phone]    From 5 PM - 7 AM: Answering Service: [phone]  Date of service: 01-21-21 @ 12:02    RENAL FOLLOW UP NOTE  --------------------------------------------------------------------------------  HPI:  Pt covid +    PAST HISTORY  --------------------------------------------------------------------------------  No significant changes to PMH, PSH, FHx, SHx, unless otherwise noted    ALLERGIES & MEDICATIONS  --------------------------------------------------------------------------------  Allergies    beta-lactam antibiotics (Swelling)  Keflex (Anaphylaxis)  nitrates causes vertigo per pt (Nausea)  penicillin (Anaphylaxis)  Versed (Nausea)    Intolerances      Standing Inpatient Medications  allopurinol 150 milliGRAM(s) Oral daily  amitriptyline 10 milliGRAM(s) Oral three times a day  aspirin  chewable 81 milliGRAM(s) Oral daily  atorvastatin 10 milliGRAM(s) Oral at bedtime  aztreonam  IVPB 1000 milliGRAM(s) IV Intermittent every 8 hours  DULoxetine 60 milliGRAM(s) Oral daily  enoxaparin Injectable 90 milliGRAM(s) SubCutaneous two times a day  montelukast 10 milliGRAM(s) Oral daily  pantoprazole    Tablet 40 milliGRAM(s) Oral daily  vancomycin  IVPB 1000 milliGRAM(s) IV Intermittent every 12 hours    PRN Inpatient Medications  ALBUTerol    90 MICROgram(s) HFA Inhaler 2 Puff(s) Inhalation every 6 hours PRN      REVIEW OF SYSTEMS  --------------------------------------------------------------------------------  General: no fever  CVS: no chest pain  RESP: no sob, no cough  ABD: no abdominal pain  : no dysuria,  MSK: no edema     VITALS/PHYSICAL EXAM  --------------------------------------------------------------------------------  T(C): 36.8 (01-21-21 @ 09:50), Max: 39.1 (01-20-21 @ 20:35)  HR: 94 (01-21-21 @ 11:20) (79 - 110)  BP: 114/79 (01-21-21 @ 09:50) (104/70 - 131/75)  RR: 24 (01-21-21 @ 09:50) (18 - 28)  SpO2: 93% (01-21-21 @ 11:20) (81% - 95%)  Wt(kg): --    01-20-21 @ 07:01  -  01-21-21 @ 07:00  --------------------------------------------------------  IN: 60 mL / OUT: 1000 mL / NET: -940 mL    LABS/STUDIES  --------------------------------------------------------------------------------              13.1   12.57 >-----------<  401      [01-21-21 @ 07:22]              39.7     134  |  94  |  34  ----------------------------<  107      [01-21-21 @ 07:47]  4.8   |  27  |  0.87        Ca     8.8     [01-21-21 @ 07:47]      Mg     2.3     [01-20-21 @ 21:25]      Phos  4.6     [01-20-21 @ 21:25]    TPro  7.2  /  Alb  2.8  /  TBili  1.0  /  DBili  0.5  /  AST  82  /  ALT  118  /  AlkPhos  338  [01-21-21 @ 07:47]          Creatinine Trend:  SCr 0.87 [01-21 @ 07:47]  SCr 0.63 [01-20 @ 21:25]  SCr 0.65 [01-20 @ 11:22]  SCr 0.68 [01-19 @ 09:31]  SCr 0.69 [01-18 @ 07:22]    Urinalysis - [01-21-21 @ 00:27]      Color Yellow / Appearance Clear / SG 1.023 / pH 6.0      Gluc Negative / Ketone Negative  / Bili Negative / Urobili Negative       Blood Negative / Protein Trace / Leuk Est Negative / Nitrite Negative      RBC 1 / WBC 1 / Hyaline 4 / Gran 1 / Sq Epi  / Non Sq Epi 1 / Bacteria Negative      Ferritin 4721      [01-21-21 @ 09:37]  HbA1c 5.2      [04-26-18 @ 17:35]

## 2021-01-21 NOTE — PROVIDER CONTACT NOTE (OTHER) - ASSESSMENT
See vs flowsheet.
pt a&ox4, patient tachypneic RR 28 spo2 96% om bipap pulse 69 temp 98.4  pt noted to be anxious.
Pt tachypnoeic , rr - 40/m , spo2 - 90 % on BIPAP , hr - 110/m . Pt says he has difficulty breathing. Pt c/o anxiety , PO Valium given bedtime.  Pt repositioned. see vs flowsheet.
pt not responding. decreased o2 sat

## 2021-01-21 NOTE — DISCHARGE NOTE FOR THE EXPIRED PATIENT - HOSPITAL COURSE
76y/o M pmxh COPD, asthma, HTN, tracheobroncheomalacia, gout, arthritis, presented with shortness of breath and low oxygen from the [facility] infusion center. Patient s/p Remdesivir and Decadron. Patient hospital course complicated by a right calf DVT. Patient slowly decompensated, with multiple RRT's on this admission, requiring more O2 saturation requirements. Patient a DNR/DNI with MOLST form in chart. Patient pronounced dead on 1/21/2021 at 21:50 due to cardiopulmonary arrest, secondary to COVID-19. Proper family made aware. Attending, Dr. Stringer made aware.

## 2021-01-21 NOTE — PROGRESS NOTE ADULT - NSREFPHYEXREFTO_GEN_ALL_CORE
Inpatient Physical Exam

## 2021-01-21 NOTE — PROVIDER CONTACT NOTE (CHANGE IN STATUS NOTIFICATION) - ACTION/TREATMENT ORDERED:
Order for hi-flow NC 80%?50L ordered
rapid called and cancelled . recommended d/c bipap and place on non-rebreather
Place pt on a tent. Resp called. Next shift rn made aware to f/u
Will come to assess pt.
BC done plus labs cooling-ice pac applied

## 2021-01-21 NOTE — PROGRESS NOTE ADULT - REASON FOR ADMISSION
sob

## 2021-01-21 NOTE — PROVIDER CONTACT NOTE (OTHER) - ACTION/TREATMENT ORDERED:
diazepam 5mg given for anxiety, continue on bipap.
pt to go on non-rebreather 2 mg morphine PRN
NARINDER Davenport notified. Inj morphine 1 mg iv  to be given . Continue to monitor the pt.
Protonix IV x 1 and protonix bid po ordered.
NARINDER Davenport notified. To give inj tylenol 1 gm stat as ordered. ice packs given.  Continue to monitor.
PO Tylenol
done

## 2021-01-21 NOTE — PROVIDER CONTACT NOTE (OTHER) - NAME OF MD/NP/PA/DO NOTIFIED:
NARINDER Davenport
Clara, Man LAU
ISRAEL Chaudhari
Leslie Sanderson NP
ISRAEL Kennedy
NARINDER Davenport
ISRAEL Duke

## 2021-01-22 LAB
RAPID RVP RESULT: DETECTED
SARS-COV-2 RNA SPEC QL NAA+PROBE: DETECTED

## 2021-02-15 LAB
BASOPHILS # BLD AUTO: 0.03 K/UL
BASOPHILS NFR BLD AUTO: 0.5 %
EOSINOPHIL # BLD AUTO: 0.04 K/UL
EOSINOPHIL NFR BLD AUTO: 0.7 %
HCT VFR BLD CALC: 38.6 %
HGB BLD-MCNC: 12.4 G/DL
IMM GRANULOCYTES NFR BLD AUTO: 0.5 %
LYMPHOCYTES # BLD AUTO: 0.97 K/UL
LYMPHOCYTES NFR BLD AUTO: 16.6 %
MAN DIFF?: NORMAL
MCHC RBC-ENTMCNC: 30.9 PG
MCHC RBC-ENTMCNC: 32.1 GM/DL
MCV RBC AUTO: 96.3 FL
MONOCYTES # BLD AUTO: 0.75 K/UL
MONOCYTES NFR BLD AUTO: 12.8 %
NEUTROPHILS # BLD AUTO: 4.02 K/UL
NEUTROPHILS NFR BLD AUTO: 68.9 %
PLATELET # BLD AUTO: 167 K/UL
RBC # BLD: 4.01 M/UL
RBC # FLD: 15.3 %
WBC # FLD AUTO: 5.84 K/UL

## 2021-02-18 ENCOUNTER — APPOINTMENT (OUTPATIENT)
Dept: CARDIOLOGY | Facility: CLINIC | Age: 76
End: 2021-02-18

## 2021-03-01 ENCOUNTER — APPOINTMENT (OUTPATIENT)
Dept: UROLOGY | Facility: CLINIC | Age: 76
End: 2021-03-01

## 2021-03-24 ENCOUNTER — APPOINTMENT (OUTPATIENT)
Dept: GASTROENTEROLOGY | Facility: CLINIC | Age: 76
End: 2021-03-24

## 2021-06-03 PROCEDURE — 84295 ASSAY OF SERUM SODIUM: CPT

## 2021-06-03 PROCEDURE — 82330 ASSAY OF CALCIUM: CPT

## 2021-06-03 PROCEDURE — 80076 HEPATIC FUNCTION PANEL: CPT

## 2021-06-03 PROCEDURE — 99285 EMERGENCY DEPT VISIT HI MDM: CPT | Mod: 25

## 2021-06-03 PROCEDURE — 82248 BILIRUBIN DIRECT: CPT

## 2021-06-03 PROCEDURE — 83735 ASSAY OF MAGNESIUM: CPT

## 2021-06-03 PROCEDURE — 84132 ASSAY OF SERUM POTASSIUM: CPT

## 2021-06-03 PROCEDURE — 93005 ELECTROCARDIOGRAM TRACING: CPT

## 2021-06-03 PROCEDURE — 85027 COMPLETE CBC AUTOMATED: CPT

## 2021-06-03 PROCEDURE — 82435 ASSAY OF BLOOD CHLORIDE: CPT

## 2021-06-03 PROCEDURE — 0225U NFCT DS DNA&RNA 21 SARSCOV2: CPT

## 2021-06-03 PROCEDURE — 83880 ASSAY OF NATRIURETIC PEPTIDE: CPT

## 2021-06-03 PROCEDURE — 87040 BLOOD CULTURE FOR BACTERIA: CPT

## 2021-06-03 PROCEDURE — 82565 ASSAY OF CREATININE: CPT

## 2021-06-03 PROCEDURE — 96374 THER/PROPH/DIAG INJ IV PUSH: CPT

## 2021-06-03 PROCEDURE — 71045 X-RAY EXAM CHEST 1 VIEW: CPT

## 2021-06-03 PROCEDURE — 94640 AIRWAY INHALATION TREATMENT: CPT

## 2021-06-03 PROCEDURE — 85014 HEMATOCRIT: CPT

## 2021-06-03 PROCEDURE — 93970 EXTREMITY STUDY: CPT

## 2021-06-03 PROCEDURE — 80048 BASIC METABOLIC PNL TOTAL CA: CPT

## 2021-06-03 PROCEDURE — 84145 PROCALCITONIN (PCT): CPT

## 2021-06-03 PROCEDURE — 85018 HEMOGLOBIN: CPT

## 2021-06-03 PROCEDURE — 80053 COMPREHEN METABOLIC PANEL: CPT

## 2021-06-03 PROCEDURE — 84100 ASSAY OF PHOSPHORUS: CPT

## 2021-06-03 PROCEDURE — 81001 URINALYSIS AUTO W/SCOPE: CPT

## 2021-06-03 PROCEDURE — 85025 COMPLETE CBC W/AUTO DIFF WBC: CPT

## 2021-06-03 PROCEDURE — 86140 C-REACTIVE PROTEIN: CPT

## 2021-06-03 PROCEDURE — 83605 ASSAY OF LACTIC ACID: CPT

## 2021-06-03 PROCEDURE — 82947 ASSAY GLUCOSE BLOOD QUANT: CPT

## 2021-06-03 PROCEDURE — 82803 BLOOD GASES ANY COMBINATION: CPT

## 2021-06-03 PROCEDURE — 82962 GLUCOSE BLOOD TEST: CPT

## 2021-06-03 PROCEDURE — 82728 ASSAY OF FERRITIN: CPT

## 2021-06-03 PROCEDURE — 94660 CPAP INITIATION&MGMT: CPT

## 2021-06-03 PROCEDURE — 85379 FIBRIN DEGRADATION QUANT: CPT

## 2021-09-14 NOTE — PATIENT PROFILE ADULT - NSPROMEDSHERBAL_GEN_A_NUR
I have reviewed the surgical (or preoperative) H&P that is linked to this encounter, and examined the patient. There are no significant changes   no

## 2021-11-22 NOTE — ASSESSMENT
[FreeTextEntry1] : Mr. Rhoades is a 75 year old male who has HTN, arthritis, tracheomalacia, stenosis, asthma, allergy, GERD, and elevated IgE. He is now here for a Xolair injection; He is currently stable from a pulmonary perspective, and is s/p FOB c/w severe TBM and is s/p his tracheoplasty. improved  #1 issue is orthopedic (shoulder and knee)\par \par His shortness of breath is felt to be multifactorial due to:\par - Chronic sinusitis \par -asthma\par -poor breathing mechanics \par -cardiac disease\par -anemia\par -out of shape\par -overweight\par -TBM\par \par problem 1: asthma (stable)\par -can continue to use albuterol by the nebulizer up to QID though should use it first thing in the morning and PRN \par -continue to use Breo Ellipta 200 at 1 inhalation QD\par -continue to use Spiriva Handihaler 1 inhalation QD \par -continue to use QVar 80 2 puffs BID\par -continue Singulair 10 mg QHS \par -continue to use rescue inhaler, Ventolin, PRN and before exercise\par \par Asthma is believed to be caused by inherited (genetic) and environmental factor, but its exact cause is unknown. Asthma may be triggered by allergens, lung infections, or irritants in the air. Asthma triggers are different for each person \par -Inhaler technique reviewed as well as oral hygiene techniques reviewed with patient. Avoidance of cold air, extremes of temperature, rescue inhaler should be used before exercise. Order of medication reviewed with patient. Recommended use of a cool mist humidifier in the bedroom \par You have a clinical scenario most c/w acute bronchitis the etiology of which is unknown but empiric antibiotics are indicated. Hydration, mucolytics including Mucinex, Robitussin and the like are indicated. Cough controlling agents will be needed. \par \par problem 2: tracheomalacia (s/p PNA, s/p tracheoplasty)\par -CTSX f/u with Dr. Bustos and Dr. Verde, f/u in a few months \par -Recommended to use the Acapella or Aerobika devices\par -continue amitriptyline 10mg Q8H PRN\par Tracheomalacia is usually acquired in adults and common causes include damage by tracheostomy or endotracheal intubation damaging the tracheal cartilage with increase risk with multiple intubations, prolonged intubation, and concurrent high dose steroid therapy; external chest wall trauma and surgery; chronic compression of the trachea by benign etiologies (eg, benign mediastinal goiter) or malignancy; relapsing polychondritis; or recurrent infection. Tracheomalacia can be asymptomatic, however signs or symptoms can develop as the severity of the airway narrowing progresses with major symptoms include dyspnea, cough, and sputum retention. Other symptoms include severe paroxysms of coughing, wheezing or stridor, barking cough and may be exacerbated by forced expiration, cough, and Valsalva maneuver. Tracheomalacia is diagnosed by a bronchoscopic visualization of dynamic airway collapse on dynamic chest CT. Therapy is warranted in symptomatic patients with severe tracheomalacia and includes surgical repair as tracheobronchoplasty. The patient was referred to Dr. Everardo Jones or Dr. Johan Verde, at [facility] for a surgical consult.\par \par problem 3: allergic rhinitis\par -continue Xhance 1 sniff BID \par - continue Pulmicort 0.5 mg BID via navage\par -recommended to use "Navage" nasal rinse device \par -continue Olopatadine .6% 1 sniff/nostril BID\par -continue Clarinex 5 mg QD at breakfast \par -s/p allergic profile: blood work to include IgE level(+), eosinophil level(-), vitamin D level(-), asthma profile(-), and food IgE level (-)\par \par -Environmental measures for allergies were encouraged including mattress and pillow cover, air purifier, and environmental controls. \par \par problem 4:  GERD\par -continue to use Nexium 40 mg before breakfast\par -continue Pepcid 40 mg QHS \par -Rule of 2s: avoid eating too much, eating too late, eating too spicy, eating two hours before bed\par -Things to avoid including overeating, spicy foods, tight clothing, eating within three hours of bed, this list is not all inclusive. \par -For treatment of reflux, possible options discussed including diet control, H2 blockers, PPIs, as well as coating motility agents discussed as treatment options. Timing of meals and proximity of last meal to sleep were discussed. If symptoms persist, a formal gastrointestinal evaluation is needed. \par \par problem 5: Elevated IgE level (127) (severe allergic asthma)\par -s/p Xolair shot - R/L arms 225 \par -now injections to Q 4 weeks\par -Xolair is a recombinant DNA- derived humanized IgG1K monoclonal antibody that selectively binds ot human immunoglobulin E (IgE). Xolair is produced by a Chinese hamster ovary cell suspension culture in nutrient medium containing the antibiotic gentamicin. Gentamicin is not detectable in the final product. Xolair is a sterile, white, preservative free, lyophilized powder contained in a single use vial that is reconstituted with sterile water for suspension. Side effects include: wheezing, tightness of the chest, trouble breathing, hives, skin rash, feeling anxious or light-headed, fainting, warmth or tingling under skin, or swelling of face, lips, or tongue \par \par problem 6: obesity \par -Weight loss, exercise, and diet control were discussed and are highly encouraged. Treatment options were given such as, aqua therapy, and contacting a nutritionist. Recommended to use the elliptical, stationary bike, less use of treadmill.  Obesity is associated with worsening asthma, shortness of breath, and potential for cardiac disease, diabetes, and other underlying medical conditions. \par \par problem 7: cardiac component\par -recommended to continue to f/u with cardiologist Dr. Jed Wilks\par \par problem 8: poor breathing mechanics\par -Proper breathing techniques were reviewed with an emphasis of exhalation. Patient instructed to breath in for 1 second and out for four seconds. Patient was encouraged to not talk while walking. \par \par problem 9: no OSAS\par -based on his fatigue, EDS, snoring, elevated mallampati class, increased leg size, and changes in memory\par -he is s/p home sleep study\par -recommended Provent in the meantime \par -Sleep apnea is associated with adverse clinical consequences which an affect most organ systems.  Cardiovascular disease risk includes arrhythmias, atrial fibrillation, hypertension, coronary artery disease, and stroke. Metabolic disorders include diabetes type 2, non-alcoholic fatty liver disease. Mood disorder especially depression; and cognitive decline especially in the elderly. Associations with  chronic reflux/Rodríguez’s esophagus some but not all inclusive. \par -Reasons to assess this include arousal consistent with hypopnea; respiratory events most prominent in REM sleep or supine position; therefore sleep staging and body position are important for accurate diagnosis and estimation of AHI.\par \par problem 10: abnormal chest CT\par -c/w TBM and PNA\par -s/p chest CT in 10/19 reconfirmation \par \par problem 11: r/o immunodeficiency\par -s/p blood work: quantitative immunoglobulins, IgG subsets, strep pneumonia titers (all normal)\par \par -Due to the fact that this pt has had more infections than would be expected and immunological blood work is indicated this would include: IgG subclasses, quantitative immunoglobulins, Strep pneumoniae titers as well as Vitamin D levels. Based on this blood work we will be able to decide where the pt needs additional pneumococcal vaccine either Prevnar 13 or pneumovax. Immunology evaluation will also be potentially indicated.\par \par problem 12: Health Maintenance/COVID19 Precautions:\par - Clean your hands often. Wash your hands often with soap and water for at least 20 seconds, especially after blowing your nose, coughing, or sneezing, or having been in a public place.\par - If soap and water are not available, use a hand  that contains at least 60% alcohol.\par - To the extent possible, avoid touching high-touch surfaces in public places - elevator buttons, door handles, handrails, handshaking with people, etc. Use a tissue or your sleeve to cover your hand or finger if you must touch something.\par - Wash your hands after touching surfaces in public places.\par - Avoid touching your face, nose, eyes, etc.\par - Clean and disinfect your home to remove germs: practice routine cleaning of frequently touched surfaces (for example: tables, doorknobs, light switches, handles, desks, toilets, faucets, sinks & cell phones)\par - Avoid crowds, especially in poorly ventilated spaces. Your risk of exposure to respiratory viruses like COVID-19 may increase in crowded, closed-in settings with little air circulation if there are people in the crowd who are sick. All patients are recommended to practice social distancing and stay at least 6 feet away from others.\par - Avoid all non-essential travel including plane trips, and especially avoid embarking on cruise ships.\par -If COVID-19 is spreading in your community, take extra measures to put distance between yourself and other people to further reduce your risk of being exposed to this new virus.\par -Stay home as much as possible.\par - Consider ways of getting food brought to your house through family, social, or commercial networks\par -Be aware that the virus has been known to live in the air up to 3 hours post exposure, cardboard up to 24 hours post exposure, copper up to 4 hours post exposure, steel and plastic up to 2-3 days post exposure. Risk of transmission from these surfaces are affected by many variables.\par Immune Support Recommendations:\par -OTC Vitamin C 500mg BID \par -OTC Quercetin 250-500mg BID \par -OTC Zinc 75-100mg per day \par -OTC Melatonin 1 or 2 mg a night \par -OTC Vitamin D 1-4000mg per day \par -OTC Tonic Water 8oz per day\par Asthma and COVID19:\par You need to make sure your asthma is under control. This often requires the use of inhaled corticosteroids (and sometimes oral corticosteroids). Inhaled corticosteroids do not likely reduce your immune system’s ability to fight infections, but oral corticosteroids may. It is important to use the steps above to protect yourself to limit your exposure to any respiratory virus. \par \par problem 13: health maintenance\par -Recommended Arthro soothe pills/tablets\par -s/p a neurological evaluation with Dr. Jerrell Moore regarding his frequent falling. \par -recommended a yearly flu shot after October 15 -refused\par -recommended strep pneumonia vaccines: Prevnar-13 vaccine, followed by Pneumo vaccine 23 on year following  (completed)\par -recommended early intervention for URIs\par -recommended osteoporosis evaluations\par -recommended early dermatological evaluations\par -recommended after the age of 50 to the age of 70, colonoscopy every 5 years\par \par F/U in 3 months SPI/NIOX\par He is encouraged to call with any changes, concerns, or questions.  room air

## 2021-12-27 NOTE — PATIENT PROFILE ADULT. - NS PRO PT REFERRAL QUES 2 YN
PT APPEARS TO BE RESTING IN SUPINE POSITION W EYES CLOSED. HOB ELEVATED, BD 
LOCKD IN LOWEST POSITION W X2 SIDERAILS UP FOR PT SAFETY. VSS. BREATHING EVEN 
AND UNLABORED. NAD NOTED, WILL CONTINUE TO MONITOR. no

## 2022-03-17 NOTE — PROGRESS NOTE ADULT - PROBLEM SELECTOR PLAN 7
DVT prophlaxis:  subcutaneous lovenox or heparin as per primary team  sequential teds stockings  early ambulation
Patient

## 2022-04-11 PROBLEM — Z11.59 SCREENING FOR VIRAL DISEASE: Status: ACTIVE | Noted: 2020-01-01

## 2022-05-03 NOTE — PRE-OP CHECKLIST - BOWEL PREP
Pt comes in from [facility] via ambulance. Per EMS pt got up to go to the bathroom and began punching mirror in bathroom so EMS was called   n/a

## 2022-06-05 NOTE — DISCHARGE NOTE ADULT - ABILITY TO HEAR (WITH HEARING AID OR HEARING APPLIANCE IF NORMALLY USED):
Pt c/o right flank pain and vomiting for the past thee days, Hx of kidney stones, Pt is A&OX3,  Calm, ambulatory, afebrile, breathes are equal and unlabored, Adequate: hears normal conversation without difficulty

## 2022-08-31 NOTE — ED ADULT NURSE NOTE - BREATH SOUNDS, MLM
Wheezes Valtrex Counseling: I discussed with the patient the risks of valacyclovir including but not limited to kidney damage, nausea, vomiting and severe allergy.  The patient understands that if the infection seems to be worsening or is not improving, they are to call.

## 2023-08-07 NOTE — ED ADULT NURSE NOTE - JUGULAR VENOUS DISTENTION
absent Cibinqo Pregnancy And Lactation Text: It is unknown if this medication will adversely affect pregnancy or breast feeding.  You should not take this medication if you are currently pregnant or planning a pregnancy or while breastfeeding.

## 2024-04-02 NOTE — H&P ADULT - ASSESSMENT
74 year old Male, Former smoker, with PMHx of COPD/asthma, GERD, tracheobronchomalacia, chronic cough, and SOB/FRIED. Chest CT on 9/12/17 revealing: Severe collapse of the trachea is noted on the dynamic expiratory images. Few less than 0.3 cm solid nodules in both lungs unchanged from previous exam. Awake Bronchoscopy on 10/23/17 revealed: Short segment of approximately 1 cm of narrowing of mid trachea, with a saber sheath structure. Approximately 50% collapse of the posterior membrane at the adrianne. At that time, it was decided that we continue to monitor the patient due to a combination of degree of tracheal collapse, focal stenosis, and patient apprehension towards surgery. CT chest on 4/11/18: marked collapse of the trachea, bronchus intermedius and the left mainstem bronchi, consistent with tracheobronchomalacia. Several ill-defined opacities throughout both lungs, primary consideration is multifocal pneumonia. Patient was also recently hospitalized with the flu and is currently finishing an antibiotic regimen. Away Dynamic Bronchoscopy completed on 5/22/18: revealed at the level of the thoracic inlet, there was a mod- severe stenosis with a length of approximately 2.5 cm or six tracheal rings. The stenosis was no circumferential but rather lateral with severe saber sheath appearance. More distally the stenosis appeared less significant with a moderate amount of tracheomalacia of approximately 75% collapse. The left and right mainstem bronchi also collapsed about 75%. Awake Bronchoscopy completed on 04/26/19: the distal trachea showed up to 85% collapse with left and right mainstem collapsing 90% and bronchus intermedius collapsing 90%, distal trachea appeared widened posteriorly; however there was an area of narrowing approximately 6cm above the adrianne in a saber-sheath shape area of narrowing measured approximately 2cm in length. PFT done on 11/4/19 showed FEV1 =2.41 ,76 % of predicted value, FVC =3.28 , 81% of predicted value, PEF =6.21 ,76 % of predicted value and DLCO = 19.1 , 88% of predicted value. He is referred by Dr. Clay Lance.    All tests reviewed and patient deemed appropriate Bronchoscopy, R VATS, Robotic-assisted, bilateral bronchoplasty with prolene mesh.
independent

## 2024-09-30 NOTE — ED PROVIDER NOTE - EXITCARE/DISCHARGE INSTRUCTIONS
Verbal order and read back per Obey Vasquez MD  Stop Diltiazem  
Launch [employer] and print the 'Prescriptions from this Visit' Report

## 2024-10-17 NOTE — PRE-OP CHECKLIST - INTERNAL PROSTHESES
In an effort to ensure that our patients [employer], a Team Member has reviewed your chart and identified an opportunity to provide the best care possible. An attempt was made to discuss or schedule due or overdue Preventive or Chronic Condition care.Care Gaps identified: Breast Cancer Screening, Cervical Cancer Screening, Colorectal Cancer Screening, and Immunizations.    The Outcome was Contact was not made, left message. We are attempting to schedule a yearly wellness visit. If you have any questions or need help with scheduling, contact our Health Outreach Team at [phone].   Type of Appointment needed: Primary Care Visit   yes(specify)/left knee prothesis, bilateral hip prothesis
